# Patient Record
Sex: FEMALE | Race: BLACK OR AFRICAN AMERICAN | Employment: OTHER | URBAN - METROPOLITAN AREA
[De-identification: names, ages, dates, MRNs, and addresses within clinical notes are randomized per-mention and may not be internally consistent; named-entity substitution may affect disease eponyms.]

---

## 2018-10-22 ENCOUNTER — HOSPITAL ENCOUNTER (OUTPATIENT)
Dept: MAMMOGRAPHY | Age: 68
Discharge: HOME | End: 2018-10-22
Payer: MEDICARE

## 2018-10-22 DIAGNOSIS — Z12.31 VISIT FOR SCREENING MAMMOGRAM: ICD-10-CM

## 2018-10-22 PROCEDURE — 77067 SCR MAMMO BI INCL CAD: CPT

## 2019-09-04 ENCOUNTER — OFFICE VISIT (OUTPATIENT)
Dept: FAMILY MEDICINE CLINIC | Age: 69
End: 2019-09-04
Payer: MEDICARE

## 2019-09-04 VITALS
WEIGHT: 255.4 LBS | HEART RATE: 87 BPM | TEMPERATURE: 98.1 F | HEIGHT: 69 IN | OXYGEN SATURATION: 98 % | SYSTOLIC BLOOD PRESSURE: 138 MMHG | BODY MASS INDEX: 37.83 KG/M2 | DIASTOLIC BLOOD PRESSURE: 68 MMHG

## 2019-09-04 DIAGNOSIS — I10 ESSENTIAL HYPERTENSION: ICD-10-CM

## 2019-09-04 DIAGNOSIS — Z12.11 SCREENING FOR COLON CANCER: ICD-10-CM

## 2019-09-04 DIAGNOSIS — J42 CHRONIC BRONCHITIS, UNSPECIFIED CHRONIC BRONCHITIS TYPE (HCC): ICD-10-CM

## 2019-09-04 DIAGNOSIS — J01.90 ACUTE NON-RECURRENT SINUSITIS, UNSPECIFIED LOCATION: ICD-10-CM

## 2019-09-04 DIAGNOSIS — Z76.0 MEDICATION REFILL: Primary | ICD-10-CM

## 2019-09-04 DIAGNOSIS — Z76.89 ENCOUNTER TO ESTABLISH CARE: ICD-10-CM

## 2019-09-04 PROCEDURE — 3017F COLORECTAL CA SCREEN DOC REV: CPT | Performed by: NURSE PRACTITIONER

## 2019-09-04 PROCEDURE — 1090F PRES/ABSN URINE INCON ASSESS: CPT | Performed by: NURSE PRACTITIONER

## 2019-09-04 PROCEDURE — G8926 SPIRO NO PERF OR DOC: HCPCS | Performed by: NURSE PRACTITIONER

## 2019-09-04 PROCEDURE — 4004F PT TOBACCO SCREEN RCVD TLK: CPT | Performed by: NURSE PRACTITIONER

## 2019-09-04 PROCEDURE — 3023F SPIROM DOC REV: CPT | Performed by: NURSE PRACTITIONER

## 2019-09-04 PROCEDURE — 99203 OFFICE O/P NEW LOW 30 MIN: CPT | Performed by: NURSE PRACTITIONER

## 2019-09-04 PROCEDURE — G8427 DOCREV CUR MEDS BY ELIG CLIN: HCPCS | Performed by: NURSE PRACTITIONER

## 2019-09-04 PROCEDURE — 4040F PNEUMOC VAC/ADMIN/RCVD: CPT | Performed by: NURSE PRACTITIONER

## 2019-09-04 PROCEDURE — 1123F ACP DISCUSS/DSCN MKR DOCD: CPT | Performed by: NURSE PRACTITIONER

## 2019-09-04 PROCEDURE — G8417 CALC BMI ABV UP PARAM F/U: HCPCS | Performed by: NURSE PRACTITIONER

## 2019-09-04 PROCEDURE — G8400 PT W/DXA NO RESULTS DOC: HCPCS | Performed by: NURSE PRACTITIONER

## 2019-09-04 RX ORDER — IPRATROPIUM BROMIDE AND ALBUTEROL SULFATE 2.5; .5 MG/3ML; MG/3ML
1 SOLUTION RESPIRATORY (INHALATION) 4 TIMES DAILY
Qty: 120 VIAL | Refills: 1 | Status: SHIPPED | OUTPATIENT
Start: 2019-09-04 | End: 2021-06-21 | Stop reason: SDUPTHER

## 2019-09-04 RX ORDER — ALBUTEROL SULFATE 90 UG/1
2 AEROSOL, METERED RESPIRATORY (INHALATION) EVERY 4 HOURS PRN
Qty: 1 INHALER | Refills: 2 | Status: SHIPPED | OUTPATIENT
Start: 2019-09-04 | End: 2020-10-19 | Stop reason: SDUPTHER

## 2019-09-04 RX ORDER — MONTELUKAST SODIUM 10 MG/1
10 TABLET ORAL NIGHTLY
Qty: 90 TABLET | Refills: 2 | Status: SHIPPED | OUTPATIENT
Start: 2019-09-04 | End: 2020-01-03 | Stop reason: SDUPTHER

## 2019-09-04 RX ORDER — CETIRIZINE HYDROCHLORIDE 10 MG/1
10 TABLET ORAL DAILY
Qty: 90 TABLET | Refills: 2 | Status: SHIPPED | OUTPATIENT
Start: 2019-09-04 | End: 2020-09-11

## 2019-09-04 RX ORDER — AZELASTINE 1 MG/ML
2 SPRAY, METERED NASAL 2 TIMES DAILY
Qty: 1 BOTTLE | Refills: 3 | Status: SHIPPED | OUTPATIENT
Start: 2019-09-04 | End: 2019-11-06 | Stop reason: SINTOL

## 2019-09-04 RX ORDER — CETIRIZINE HYDROCHLORIDE 10 MG/1
10 TABLET ORAL DAILY
COMMUNITY
End: 2019-09-04 | Stop reason: SDUPTHER

## 2019-09-04 RX ORDER — SIMVASTATIN 40 MG
40 TABLET ORAL NIGHTLY
Qty: 90 TABLET | Refills: 2 | Status: SHIPPED | OUTPATIENT
Start: 2019-09-04 | End: 2020-07-02 | Stop reason: SDUPTHER

## 2019-09-04 RX ORDER — DIPHENHYDRAMINE HCL 25 MG
25 TABLET ORAL NIGHTLY PRN
Qty: 90 TABLET | Refills: 2 | Status: SHIPPED | OUTPATIENT
Start: 2019-09-04 | End: 2022-03-02

## 2019-09-04 RX ORDER — CYCLOBENZAPRINE HCL 5 MG
5 TABLET ORAL NIGHTLY PRN
Qty: 30 TABLET | Refills: 2 | Status: SHIPPED | OUTPATIENT
Start: 2019-09-04 | End: 2020-01-20

## 2019-09-04 RX ORDER — IBUPROFEN 800 MG/1
800 TABLET ORAL EVERY 12 HOURS PRN
Qty: 120 TABLET | Refills: 2 | Status: SHIPPED | OUTPATIENT
Start: 2019-09-04 | End: 2020-02-19

## 2019-09-04 RX ORDER — HYDROCHLOROTHIAZIDE 50 MG/1
50 TABLET ORAL DAILY
Qty: 90 TABLET | Refills: 4 | Status: SHIPPED | OUTPATIENT
Start: 2019-09-04 | End: 2019-09-25 | Stop reason: ALTCHOICE

## 2019-09-04 RX ORDER — MONTELUKAST SODIUM 10 MG/1
10 TABLET ORAL NIGHTLY
COMMUNITY
End: 2019-09-04 | Stop reason: SDUPTHER

## 2019-09-04 RX ORDER — AMOXICILLIN AND CLAVULANATE POTASSIUM 875; 125 MG/1; MG/1
1 TABLET, FILM COATED ORAL 2 TIMES DAILY
Qty: 20 TABLET | Refills: 0 | Status: SHIPPED | OUTPATIENT
Start: 2019-09-04 | End: 2019-09-14

## 2019-09-04 ASSESSMENT — PATIENT HEALTH QUESTIONNAIRE - PHQ9
2. FEELING DOWN, DEPRESSED OR HOPELESS: 0
SUM OF ALL RESPONSES TO PHQ9 QUESTIONS 1 & 2: 0
SUM OF ALL RESPONSES TO PHQ QUESTIONS 1-9: 0
1. LITTLE INTEREST OR PLEASURE IN DOING THINGS: 0
SUM OF ALL RESPONSES TO PHQ QUESTIONS 1-9: 0

## 2019-09-04 ASSESSMENT — ENCOUNTER SYMPTOMS
CONSTIPATION: 0
SORE THROAT: 0
NAUSEA: 0
RHINORRHEA: 1
WHEEZING: 0
SINUS PRESSURE: 1
TROUBLE SWALLOWING: 0
SINUS PAIN: 1
SHORTNESS OF BREATH: 0
CHEST TIGHTNESS: 0
FACIAL SWELLING: 1
COUGH: 0
DIARRHEA: 0

## 2019-09-04 NOTE — PROGRESS NOTES
Lifestyle    Physical activity:     Days per week: Not on file     Minutes per session: Not on file    Stress: Not on file   Relationships    Social connections:     Talks on phone: Not on file     Gets together: Not on file     Attends Synagogue service: Not on file     Active member of club or organization: Not on file     Attends meetings of clubs or organizations: Not on file     Relationship status: Not on file    Intimate partner violence:     Fear of current or ex partner: Not on file     Emotionally abused: Not on file     Physically abused: Not on file     Forced sexual activity: Not on file   Other Topics Concern    Not on file   Social History Narrative    Working now at [employer]    [employer] jobs from [address] [place] [address]   Constitutional: Negative for activity change, appetite change, chills, diaphoresis, fatigue, fever and unexpected weight change. HENT: Positive for facial swelling, postnasal drip, rhinorrhea, sinus pressure and sinus pain. Negative for ear pain, sore throat and trouble swallowing. Respiratory: Negative for cough, chest tightness, shortness of breath and wheezing. Cardiovascular: Negative for chest pain, palpitations and leg swelling. Gastrointestinal: Negative for constipation, diarrhea and nausea. Neurological: Negative for dizziness, weakness, light-headedness and headaches. Psychiatric/Behavioral: Negative. OBJECTIVE:     /68 (Site: Right Upper Arm, Position: Sitting, Cuff Size: Large Adult)   Pulse 87   Temp 98.1 °F (36.7 °C)   Ht 5' 9\" (1.753 m)   Wt 255 lb 6.4 oz (115.8 kg)   SpO2 98%   BMI 37.72 kg/m²     Physical Exam   Constitutional: She is oriented to person, place, and time. She appears well-developed and well-nourished. No distress. HENT:   Head: Normocephalic and atraumatic.    Right Ear: Tympanic membrane, external ear and ear canal normal.   Left Ear: Tympanic membrane, external ear and ear canal

## 2019-09-25 ENCOUNTER — TELEPHONE (OUTPATIENT)
Dept: FAMILY MEDICINE CLINIC | Age: 69
End: 2019-09-25

## 2019-09-25 ENCOUNTER — NURSE ONLY (OUTPATIENT)
Dept: FAMILY MEDICINE CLINIC | Age: 69
End: 2019-09-25
Payer: MEDICARE

## 2019-09-25 DIAGNOSIS — I10 ESSENTIAL HYPERTENSION: ICD-10-CM

## 2019-09-25 DIAGNOSIS — I10 ESSENTIAL HYPERTENSION: Primary | ICD-10-CM

## 2019-09-25 LAB
A/G RATIO: 1.4 (ref 1.1–2.2)
ALBUMIN SERPL-MCNC: 4 G/DL (ref 3.4–5)
ALP BLD-CCNC: 79 U/L (ref 40–129)
ALT SERPL-CCNC: 6 U/L (ref 10–40)
ANION GAP SERPL CALCULATED.3IONS-SCNC: 15 MMOL/L (ref 3–16)
AST SERPL-CCNC: 12 U/L (ref 15–37)
BASOPHILS ABSOLUTE: 0 K/UL (ref 0–0.2)
BASOPHILS RELATIVE PERCENT: 0.5 %
BILIRUB SERPL-MCNC: 0.4 MG/DL (ref 0–1)
BUN BLDV-MCNC: 9 MG/DL (ref 7–20)
CALCIUM SERPL-MCNC: 9.5 MG/DL (ref 8.3–10.6)
CHLORIDE BLD-SCNC: 91 MMOL/L (ref 99–110)
CHOLESTEROL, TOTAL: 119 MG/DL (ref 0–199)
CO2: 25 MMOL/L (ref 21–32)
CONTROL: NORMAL
CREAT SERPL-MCNC: 0.8 MG/DL (ref 0.6–1.2)
EOSINOPHILS ABSOLUTE: 0.3 K/UL (ref 0–0.6)
EOSINOPHILS RELATIVE PERCENT: 4.8 %
GFR AFRICAN AMERICAN: >60
GFR NON-AFRICAN AMERICAN: >60
GLOBULIN: 2.9 G/DL
GLUCOSE BLD-MCNC: 98 MG/DL (ref 70–99)
HCT VFR BLD CALC: 41.7 % (ref 36–48)
HDLC SERPL-MCNC: 52 MG/DL (ref 40–60)
HEMOCCULT STL QL: NORMAL
HEMOGLOBIN: 14.1 G/DL (ref 12–16)
LDL CHOLESTEROL CALCULATED: 47 MG/DL
LYMPHOCYTES ABSOLUTE: 2.4 K/UL (ref 1–5.1)
LYMPHOCYTES RELATIVE PERCENT: 37.8 %
MCH RBC QN AUTO: 27.9 PG (ref 26–34)
MCHC RBC AUTO-ENTMCNC: 33.8 G/DL (ref 31–36)
MCV RBC AUTO: 82.7 FL (ref 80–100)
MONOCYTES ABSOLUTE: 0.5 K/UL (ref 0–1.3)
MONOCYTES RELATIVE PERCENT: 7.9 %
NEUTROPHILS ABSOLUTE: 3.1 K/UL (ref 1.7–7.7)
NEUTROPHILS RELATIVE PERCENT: 49 %
PDW BLD-RTO: 14.4 % (ref 12.4–15.4)
PLATELET # BLD: 223 K/UL (ref 135–450)
PMV BLD AUTO: 8.5 FL (ref 5–10.5)
POTASSIUM SERPL-SCNC: 3.7 MMOL/L (ref 3.5–5.1)
RBC # BLD: 5.04 M/UL (ref 4–5.2)
SODIUM BLD-SCNC: 131 MMOL/L (ref 136–145)
TOTAL PROTEIN: 6.9 G/DL (ref 6.4–8.2)
TRIGL SERPL-MCNC: 98 MG/DL (ref 0–150)
TSH SERPL DL<=0.05 MIU/L-ACNC: 3.16 UIU/ML (ref 0.27–4.2)
VLDLC SERPL CALC-MCNC: 20 MG/DL
WBC # BLD: 6.4 K/UL (ref 4–11)

## 2019-09-25 PROCEDURE — 36415 COLL VENOUS BLD VENIPUNCTURE: CPT | Performed by: NURSE PRACTITIONER

## 2019-09-25 PROCEDURE — 82274 ASSAY TEST FOR BLOOD FECAL: CPT | Performed by: NURSE PRACTITIONER

## 2019-09-25 RX ORDER — LISINOPRIL AND HYDROCHLOROTHIAZIDE 20; 12.5 MG/1; MG/1
1 TABLET ORAL DAILY
Qty: 90 TABLET | Refills: 0 | Status: SHIPPED | OUTPATIENT
Start: 2019-09-25 | End: 2019-12-31

## 2019-09-30 ENCOUNTER — TELEPHONE (OUTPATIENT)
Dept: FAMILY MEDICINE CLINIC | Age: 69
End: 2019-09-30

## 2019-10-04 ENCOUNTER — INITIAL CONSULT (OUTPATIENT)
Dept: PULMONOLOGY | Age: 69
End: 2019-10-04
Payer: MEDICARE

## 2019-10-04 VITALS
WEIGHT: 256 LBS | DIASTOLIC BLOOD PRESSURE: 62 MMHG | HEIGHT: 70 IN | OXYGEN SATURATION: 96 % | SYSTOLIC BLOOD PRESSURE: 112 MMHG | HEART RATE: 78 BPM | BODY MASS INDEX: 36.65 KG/M2

## 2019-10-04 DIAGNOSIS — R05.3 CHRONIC COUGH: ICD-10-CM

## 2019-10-04 DIAGNOSIS — J45.30 MILD PERSISTENT ASTHMA WITHOUT COMPLICATION: ICD-10-CM

## 2019-10-04 DIAGNOSIS — G47.33 OBSTRUCTIVE SLEEP APNEA: ICD-10-CM

## 2019-10-04 DIAGNOSIS — Z72.0 TOBACCO ABUSE: ICD-10-CM

## 2019-10-04 DIAGNOSIS — J30.1 NON-SEASONAL ALLERGIC RHINITIS DUE TO POLLEN: Primary | ICD-10-CM

## 2019-10-04 LAB
EXPIRATORY TIME-POST: NORMAL SEC
EXPIRATORY TIME: NORMAL SEC
FEF 25-75% %CHNG: NORMAL
FEF 25-75% %PRED-POST: NORMAL %
FEF 25-75% %PRED-PRE: NORMAL L/SEC
FEF 25-75% PRED: NORMAL L/SEC
FEF 25-75%-POST: NORMAL L/SEC
FEF 25-75%-PRE: NORMAL L/SEC
FEV1 %PRED-POST: 53 %
FEV1 %PRED-PRE: 53 %
FEV1 PRED: 2.83 L
FEV1-POST: 1.5 L
FEV1-PRE: 1.49 L
FEV1/FVC %PRED-POST: 109 %
FEV1/FVC %PRED-PRE: 103 %
FEV1/FVC PRED: 75.9 %
FEV1/FVC-POST: 82.8 %
FEV1/FVC-PRE: 78 %
FVC %PRED-POST: 49 L
FVC %PRED-PRE: 51 %
FVC PRED: 3.72 L
FVC-POST: 1.81 L
FVC-PRE: 1.92 L
PEF %PRED-POST: NORMAL %
PEF %PRED-PRE: NORMAL L/SEC
PEF PRED: NORMAL L/SEC
PEF%CHNG: NORMAL
PEF-POST: NORMAL L/SEC
PEF-PRE: NORMAL L/SEC

## 2019-10-04 PROCEDURE — 4040F PNEUMOC VAC/ADMIN/RCVD: CPT | Performed by: INTERNAL MEDICINE

## 2019-10-04 PROCEDURE — 1036F TOBACCO NON-USER: CPT | Performed by: INTERNAL MEDICINE

## 2019-10-04 PROCEDURE — 99204 OFFICE O/P NEW MOD 45 MIN: CPT | Performed by: INTERNAL MEDICINE

## 2019-10-04 PROCEDURE — G8417 CALC BMI ABV UP PARAM F/U: HCPCS | Performed by: INTERNAL MEDICINE

## 2019-10-04 PROCEDURE — 1123F ACP DISCUSS/DSCN MKR DOCD: CPT | Performed by: INTERNAL MEDICINE

## 2019-10-04 PROCEDURE — 94060 EVALUATION OF WHEEZING: CPT | Performed by: INTERNAL MEDICINE

## 2019-10-04 PROCEDURE — G8400 PT W/DXA NO RESULTS DOC: HCPCS | Performed by: INTERNAL MEDICINE

## 2019-10-04 PROCEDURE — 3017F COLORECTAL CA SCREEN DOC REV: CPT | Performed by: INTERNAL MEDICINE

## 2019-10-04 PROCEDURE — 1090F PRES/ABSN URINE INCON ASSESS: CPT | Performed by: INTERNAL MEDICINE

## 2019-10-04 PROCEDURE — G8484 FLU IMMUNIZE NO ADMIN: HCPCS | Performed by: INTERNAL MEDICINE

## 2019-10-04 PROCEDURE — G8427 DOCREV CUR MEDS BY ELIG CLIN: HCPCS | Performed by: INTERNAL MEDICINE

## 2019-10-04 RX ORDER — PREDNISONE 1 MG/1
TABLET ORAL
Qty: 38 TABLET | Refills: 0 | Status: SHIPPED | OUTPATIENT
Start: 2019-10-04 | End: 2019-10-25

## 2019-10-04 ASSESSMENT — PULMONARY FUNCTION TESTS
FVC_PERCENT_PREDICTED_PRE: 51
FEV1_PERCENT_PREDICTED_PRE: 53
FEV1_PREDICTED: 2.83
FEV1/FVC_PREDICTED: 75.9
FVC_PREDICTED: 3.72
FEV1/FVC_PERCENT_PREDICTED_POST: 109
FEV1_PRE: 1.49
FEV1/FVC_PERCENT_PREDICTED_PRE: 103
FEV1/FVC_POST: 82.8
FVC_POST: 1.81
FEV1/FVC_PRE: 78.0
FEV1_PERCENT_PREDICTED_POST: 53
FVC_PRE: 1.92
FVC_PERCENT_PREDICTED_POST: 49
FEV1_POST: 1.50

## 2019-10-08 ENCOUNTER — TELEPHONE (OUTPATIENT)
Dept: FAMILY MEDICINE CLINIC | Age: 69
End: 2019-10-08

## 2019-10-08 DIAGNOSIS — J01.00 ACUTE MAXILLARY SINUSITIS, RECURRENCE NOT SPECIFIED: Primary | ICD-10-CM

## 2019-10-08 DIAGNOSIS — J45.30 MILD PERSISTENT ASTHMA WITHOUT COMPLICATION: ICD-10-CM

## 2019-10-08 RX ORDER — AMOXICILLIN AND CLAVULANATE POTASSIUM 875; 125 MG/1; MG/1
1 TABLET, FILM COATED ORAL 2 TIMES DAILY
Qty: 20 TABLET | Refills: 0 | Status: SHIPPED | OUTPATIENT
Start: 2019-10-08 | End: 2019-10-18

## 2019-10-24 ENCOUNTER — APPOINTMENT (OUTPATIENT)
Dept: GENERAL RADIOLOGY | Age: 69
End: 2019-10-24
Payer: MEDICARE

## 2019-10-24 ENCOUNTER — HOSPITAL ENCOUNTER (OUTPATIENT)
Age: 69
Setting detail: OBSERVATION
Discharge: HOME | End: 2019-10-26
Attending: EMERGENCY MEDICINE | Admitting: INTERNAL MEDICINE
Payer: MEDICARE

## 2019-10-24 ENCOUNTER — APPOINTMENT (OUTPATIENT)
Dept: CT IMAGING | Age: 69
End: 2019-10-24
Payer: MEDICARE

## 2019-10-24 DIAGNOSIS — R42 DIZZINESS: Primary | ICD-10-CM

## 2019-10-24 LAB
BASOPHILS ABSOLUTE: 0 K/CU MM
BASOPHILS RELATIVE PERCENT: 0.2 % (ref 0–1)
DIFFERENTIAL TYPE: ABNORMAL
EOSINOPHILS ABSOLUTE: 0.3 K/CU MM
EOSINOPHILS RELATIVE PERCENT: 3.8 % (ref 0–3)
HCT VFR BLD CALC: 43 % (ref 37–47)
HEMOGLOBIN: 13.7 GM/DL (ref 12.5–16)
IMMATURE NEUTROPHIL %: 0.5 % (ref 0–0.43)
LYMPHOCYTES ABSOLUTE: 2.7 K/CU MM
LYMPHOCYTES RELATIVE PERCENT: 32.9 % (ref 24–44)
MCH RBC QN AUTO: 27.3 PG (ref 27–31)
MCHC RBC AUTO-ENTMCNC: 31.9 % (ref 32–36)
MCV RBC AUTO: 85.7 FL (ref 78–100)
MONOCYTES ABSOLUTE: 0.8 K/CU MM
MONOCYTES RELATIVE PERCENT: 9 % (ref 0–4)
NUCLEATED RBC %: 0 %
PDW BLD-RTO: 14.6 % (ref 11.7–14.9)
PLATELET # BLD: 237 K/CU MM (ref 140–440)
PMV BLD AUTO: 9.4 FL (ref 7.5–11.1)
RBC # BLD: 5.02 M/CU MM (ref 4.2–5.4)
SEGMENTED NEUTROPHILS ABSOLUTE COUNT: 4.5 K/CU MM
SEGMENTED NEUTROPHILS RELATIVE PERCENT: 53.6 % (ref 36–66)
TOTAL IMMATURE NEUTOROPHIL: 0.04 K/CU MM
TOTAL NUCLEATED RBC: 0 K/CU MM
WBC # BLD: 8.3 K/CU MM (ref 4–10.5)

## 2019-10-24 PROCEDURE — 84484 ASSAY OF TROPONIN QUANT: CPT

## 2019-10-24 PROCEDURE — 85025 COMPLETE CBC W/AUTO DIFF WBC: CPT

## 2019-10-24 PROCEDURE — 93005 ELECTROCARDIOGRAM TRACING: CPT | Performed by: EMERGENCY MEDICINE

## 2019-10-24 PROCEDURE — 99285 EMERGENCY DEPT VISIT HI MDM: CPT

## 2019-10-24 PROCEDURE — 70450 CT HEAD/BRAIN W/O DYE: CPT

## 2019-10-24 PROCEDURE — 80053 COMPREHEN METABOLIC PANEL: CPT

## 2019-10-24 PROCEDURE — 71046 X-RAY EXAM CHEST 2 VIEWS: CPT

## 2019-10-24 ASSESSMENT — ENCOUNTER SYMPTOMS
SORE THROAT: 1
WHEEZING: 0
CONSTIPATION: 0
NAUSEA: 0
COUGH: 1
SHORTNESS OF BREATH: 0
DIARRHEA: 0
RHINORRHEA: 1
VOMITING: 0
SINUS PRESSURE: 1
ABDOMINAL PAIN: 0

## 2019-10-24 ASSESSMENT — PAIN DESCRIPTION - DESCRIPTORS: DESCRIPTORS: THROBBING

## 2019-10-24 ASSESSMENT — PAIN DESCRIPTION - PAIN TYPE: TYPE: ACUTE PAIN

## 2019-10-24 ASSESSMENT — PAIN DESCRIPTION - LOCATION: LOCATION: HEAD

## 2019-10-24 ASSESSMENT — PAIN SCALES - GENERAL: PAINLEVEL_OUTOF10: 5

## 2019-10-25 PROBLEM — R42 DIZZINESS: Status: ACTIVE | Noted: 2019-10-25

## 2019-10-25 LAB
ALBUMIN SERPL-MCNC: 3.8 GM/DL (ref 3.4–5)
ALP BLD-CCNC: 78 IU/L (ref 40–129)
ALT SERPL-CCNC: <5 U/L (ref 10–40)
ANION GAP SERPL CALCULATED.3IONS-SCNC: 9 MMOL/L (ref 4–16)
AST SERPL-CCNC: 13 IU/L (ref 15–37)
BILIRUB SERPL-MCNC: 0.4 MG/DL (ref 0–1)
BUN BLDV-MCNC: 10 MG/DL (ref 6–23)
CALCIUM SERPL-MCNC: 9.2 MG/DL (ref 8.3–10.6)
CHLORIDE BLD-SCNC: 95 MMOL/L (ref 99–110)
CO2: 27 MMOL/L (ref 21–32)
CREAT SERPL-MCNC: 1 MG/DL (ref 0.6–1.1)
GFR AFRICAN AMERICAN: >60 ML/MIN/1.73M2
GFR NON-AFRICAN AMERICAN: 55 ML/MIN/1.73M2
GLUCOSE BLD-MCNC: 104 MG/DL (ref 70–99)
POTASSIUM SERPL-SCNC: 3.5 MMOL/L (ref 3.5–5.1)
SODIUM BLD-SCNC: 131 MMOL/L (ref 135–145)
TOTAL PROTEIN: 7.6 GM/DL (ref 6.4–8.2)
TROPONIN T: <0.01 NG/ML

## 2019-10-25 PROCEDURE — 6370000000 HC RX 637 (ALT 250 FOR IP): Performed by: EMERGENCY MEDICINE

## 2019-10-25 PROCEDURE — G0378 HOSPITAL OBSERVATION PER HR: HCPCS

## 2019-10-25 PROCEDURE — 6370000000 HC RX 637 (ALT 250 FOR IP): Performed by: INTERNAL MEDICINE

## 2019-10-25 PROCEDURE — 6370000000 HC RX 637 (ALT 250 FOR IP): Performed by: NURSE PRACTITIONER

## 2019-10-25 PROCEDURE — 93010 ELECTROCARDIOGRAM REPORT: CPT | Performed by: INTERNAL MEDICINE

## 2019-10-25 PROCEDURE — 94640 AIRWAY INHALATION TREATMENT: CPT

## 2019-10-25 PROCEDURE — 94761 N-INVAS EAR/PLS OXIMETRY MLT: CPT

## 2019-10-25 RX ORDER — ASPIRIN 81 MG/1
81 TABLET, CHEWABLE ORAL DAILY
Status: DISCONTINUED | OUTPATIENT
Start: 2019-10-25 | End: 2019-10-26 | Stop reason: HOSPADM

## 2019-10-25 RX ORDER — LORATADINE AND PSEUDOEPHEDRINE 10; 240 MG/1; MG/1
1 TABLET, EXTENDED RELEASE ORAL DAILY
Status: DISCONTINUED | OUTPATIENT
Start: 2019-10-25 | End: 2019-10-25 | Stop reason: CLARIF

## 2019-10-25 RX ORDER — LISINOPRIL 20 MG/1
20 TABLET ORAL DAILY
Status: DISCONTINUED | OUTPATIENT
Start: 2019-10-25 | End: 2019-10-26 | Stop reason: HOSPADM

## 2019-10-25 RX ORDER — CETIRIZINE HYDROCHLORIDE 10 MG/1
10 TABLET ORAL DAILY
Status: DISCONTINUED | OUTPATIENT
Start: 2019-10-25 | End: 2019-10-26 | Stop reason: HOSPADM

## 2019-10-25 RX ORDER — IBUPROFEN 600 MG/1
600 TABLET ORAL EVERY 12 HOURS PRN
Status: DISCONTINUED | OUTPATIENT
Start: 2019-10-25 | End: 2019-10-26 | Stop reason: HOSPADM

## 2019-10-25 RX ORDER — MONTELUKAST SODIUM 10 MG/1
10 TABLET ORAL NIGHTLY
Status: DISCONTINUED | OUTPATIENT
Start: 2019-10-25 | End: 2019-10-26 | Stop reason: HOSPADM

## 2019-10-25 RX ORDER — GUAIFENESIN/DEXTROMETHORPHAN 100-10MG/5
5 SYRUP ORAL EVERY 4 HOURS PRN
Status: DISCONTINUED | OUTPATIENT
Start: 2019-10-25 | End: 2019-10-26 | Stop reason: HOSPADM

## 2019-10-25 RX ORDER — FLUTICASONE PROPIONATE 50 MCG
1 SPRAY, SUSPENSION (ML) NASAL DAILY
Status: DISCONTINUED | OUTPATIENT
Start: 2019-10-25 | End: 2019-10-26 | Stop reason: HOSPADM

## 2019-10-25 RX ORDER — CETIRIZINE HYDROCHLORIDE 10 MG/1
10 TABLET ORAL DAILY
Status: DISCONTINUED | OUTPATIENT
Start: 2019-10-25 | End: 2019-10-25

## 2019-10-25 RX ORDER — DIPHENHYDRAMINE HCL 25 MG
25 TABLET ORAL NIGHTLY PRN
Status: DISCONTINUED | OUTPATIENT
Start: 2019-10-25 | End: 2019-10-26 | Stop reason: HOSPADM

## 2019-10-25 RX ORDER — PSEUDOEPHEDRINE HCL 120 MG/1
120 TABLET, FILM COATED, EXTENDED RELEASE ORAL EVERY 12 HOURS SCHEDULED
Status: DISCONTINUED | OUTPATIENT
Start: 2019-10-25 | End: 2019-10-26 | Stop reason: HOSPADM

## 2019-10-25 RX ORDER — MECLIZINE HYDROCHLORIDE 25 MG/1
25 TABLET ORAL ONCE
Status: COMPLETED | OUTPATIENT
Start: 2019-10-25 | End: 2019-10-25

## 2019-10-25 RX ORDER — ALBUTEROL SULFATE 2.5 MG/3ML
2.5 SOLUTION RESPIRATORY (INHALATION) EVERY 6 HOURS PRN
Status: DISCONTINUED | OUTPATIENT
Start: 2019-10-25 | End: 2019-10-26 | Stop reason: HOSPADM

## 2019-10-25 RX ORDER — GLYCOPYRROLATE 1 MG/1
1 TABLET ORAL ONCE
Status: COMPLETED | OUTPATIENT
Start: 2019-10-25 | End: 2019-10-25

## 2019-10-25 RX ORDER — MECLIZINE HCL 12.5 MG/1
25 TABLET ORAL 3 TIMES DAILY PRN
Status: DISCONTINUED | OUTPATIENT
Start: 2019-10-25 | End: 2019-10-26 | Stop reason: HOSPADM

## 2019-10-25 RX ORDER — SIMVASTATIN 40 MG
40 TABLET ORAL NIGHTLY
Status: DISCONTINUED | OUTPATIENT
Start: 2019-10-25 | End: 2019-10-26 | Stop reason: HOSPADM

## 2019-10-25 RX ORDER — BENZONATATE 100 MG/1
100 CAPSULE ORAL 3 TIMES DAILY
Status: DISCONTINUED | OUTPATIENT
Start: 2019-10-25 | End: 2019-10-26 | Stop reason: HOSPADM

## 2019-10-25 RX ORDER — ALBUTEROL SULFATE 90 UG/1
2 AEROSOL, METERED RESPIRATORY (INHALATION) EVERY 4 HOURS PRN
Status: DISCONTINUED | OUTPATIENT
Start: 2019-10-25 | End: 2019-10-26 | Stop reason: HOSPADM

## 2019-10-25 RX ADMIN — MECLIZINE 25 MG: 12.5 TABLET ORAL at 20:14

## 2019-10-25 RX ADMIN — LISINOPRIL 20 MG: 20 TABLET ORAL at 09:34

## 2019-10-25 RX ADMIN — GUAIFENESIN AND DEXTROMETHORPHAN 5 ML: 100; 10 SYRUP ORAL at 20:14

## 2019-10-25 RX ADMIN — ASPIRIN 81 MG 81 MG: 81 TABLET ORAL at 09:34

## 2019-10-25 RX ADMIN — SIMVASTATIN 40 MG: 40 TABLET, FILM COATED ORAL at 20:10

## 2019-10-25 RX ADMIN — PSEUDOEPHEDRINE HYDROCHLORIDE 120 MG: 120 TABLET, FILM COATED, EXTENDED RELEASE ORAL at 21:50

## 2019-10-25 RX ADMIN — MECLIZINE HYDROCHLORIDE 25 MG: 25 TABLET ORAL at 01:11

## 2019-10-25 RX ADMIN — MONTELUKAST 10 MG: 10 TABLET, FILM COATED ORAL at 20:10

## 2019-10-25 RX ADMIN — GUAIFENESIN AND DEXTROMETHORPHAN 5 ML: 100; 10 SYRUP ORAL at 03:53

## 2019-10-25 RX ADMIN — BENZONATATE 100 MG: 100 CAPSULE ORAL at 20:10

## 2019-10-25 RX ADMIN — BENZONATATE 100 MG: 100 CAPSULE ORAL at 03:53

## 2019-10-25 RX ADMIN — GLYCOPYRROLATE 1 MG: 1 TABLET ORAL at 03:53

## 2019-10-25 RX ADMIN — GUAIFENESIN AND DEXTROMETHORPHAN HYDROBROMIDE 1 TABLET: 600; 30 TABLET, EXTENDED RELEASE ORAL at 20:10

## 2019-10-25 RX ADMIN — FLUTICASONE PROPIONATE 1 SPRAY: 50 SPRAY, METERED NASAL at 09:34

## 2019-10-25 RX ADMIN — BENZONATATE 100 MG: 100 CAPSULE ORAL at 13:28

## 2019-10-25 RX ADMIN — BENZONATATE 100 MG: 100 CAPSULE ORAL at 09:34

## 2019-10-25 RX ADMIN — CETIRIZINE HYDROCHLORIDE 10 MG: 10 TABLET, FILM COATED ORAL at 09:34

## 2019-10-25 RX ADMIN — MECLIZINE 25 MG: 12.5 TABLET ORAL at 10:57

## 2019-10-25 RX ADMIN — GUAIFENESIN AND DEXTROMETHORPHAN HYDROBROMIDE 1 TABLET: 600; 30 TABLET, EXTENDED RELEASE ORAL at 09:34

## 2019-10-25 RX ADMIN — GUAIFENESIN AND DEXTROMETHORPHAN 5 ML: 100; 10 SYRUP ORAL at 09:39

## 2019-10-25 RX ADMIN — Medication 2 PUFF: at 08:35

## 2019-10-25 RX ADMIN — PSEUDOEPHEDRINE HYDROCHLORIDE 120 MG: 120 TABLET, FILM COATED, EXTENDED RELEASE ORAL at 09:37

## 2019-10-25 RX ADMIN — GLYCOPYRROLATE 1 MG: 1 TABLET ORAL at 10:57

## 2019-10-25 ASSESSMENT — PAIN DESCRIPTION - PAIN TYPE: TYPE: ACUTE PAIN

## 2019-10-25 ASSESSMENT — PAIN DESCRIPTION - LOCATION: LOCATION: HEAD

## 2019-10-25 ASSESSMENT — PAIN SCALES - GENERAL
PAINLEVEL_OUTOF10: 4
PAINLEVEL_OUTOF10: 0

## 2019-10-25 ASSESSMENT — PAIN DESCRIPTION - DESCRIPTORS: DESCRIPTORS: HEADACHE

## 2019-10-26 VITALS
OXYGEN SATURATION: 98 % | WEIGHT: 272 LBS | RESPIRATION RATE: 16 BRPM | BODY MASS INDEX: 40.29 KG/M2 | DIASTOLIC BLOOD PRESSURE: 60 MMHG | TEMPERATURE: 97.8 F | HEIGHT: 69 IN | HEART RATE: 82 BPM | SYSTOLIC BLOOD PRESSURE: 149 MMHG

## 2019-10-26 LAB
ANION GAP SERPL CALCULATED.3IONS-SCNC: 11 MMOL/L (ref 4–16)
BUN BLDV-MCNC: 12 MG/DL (ref 6–23)
CALCIUM SERPL-MCNC: 8.9 MG/DL (ref 8.3–10.6)
CHLORIDE BLD-SCNC: 95 MMOL/L (ref 99–110)
CO2: 24 MMOL/L (ref 21–32)
CREAT SERPL-MCNC: 0.9 MG/DL (ref 0.6–1.1)
GFR AFRICAN AMERICAN: >60 ML/MIN/1.73M2
GFR NON-AFRICAN AMERICAN: >60 ML/MIN/1.73M2
GLUCOSE BLD-MCNC: 98 MG/DL (ref 70–99)
POTASSIUM SERPL-SCNC: 3.9 MMOL/L (ref 3.5–5.1)
SODIUM BLD-SCNC: 130 MMOL/L (ref 135–145)

## 2019-10-26 PROCEDURE — 6370000000 HC RX 637 (ALT 250 FOR IP): Performed by: INTERNAL MEDICINE

## 2019-10-26 PROCEDURE — G0378 HOSPITAL OBSERVATION PER HR: HCPCS

## 2019-10-26 PROCEDURE — 6370000000 HC RX 637 (ALT 250 FOR IP): Performed by: NURSE PRACTITIONER

## 2019-10-26 PROCEDURE — 80048 BASIC METABOLIC PNL TOTAL CA: CPT

## 2019-10-26 PROCEDURE — 36415 COLL VENOUS BLD VENIPUNCTURE: CPT

## 2019-10-26 RX ORDER — PSEUDOEPHEDRINE HCL 120 MG/1
120 TABLET, FILM COATED, EXTENDED RELEASE ORAL EVERY 12 HOURS
Qty: 14 TABLET | Refills: 0 | Status: SHIPPED | OUTPATIENT
Start: 2019-10-26 | End: 2019-11-02

## 2019-10-26 RX ORDER — MECLIZINE HYDROCHLORIDE 25 MG/1
25 TABLET ORAL 3 TIMES DAILY PRN
Qty: 90 TABLET | Refills: 0 | Status: SHIPPED | OUTPATIENT
Start: 2019-10-26 | End: 2019-11-25

## 2019-10-26 RX ORDER — FLUTICASONE PROPIONATE 50 MCG
1 SPRAY, SUSPENSION (ML) NASAL DAILY
Qty: 1 BOTTLE | Refills: 3 | Status: SHIPPED | OUTPATIENT
Start: 2019-10-26 | End: 2020-10-19 | Stop reason: SDUPTHER

## 2019-10-26 RX ADMIN — LISINOPRIL 20 MG: 20 TABLET ORAL at 11:13

## 2019-10-26 RX ADMIN — BENZONATATE 100 MG: 100 CAPSULE ORAL at 11:13

## 2019-10-26 RX ADMIN — ASPIRIN 81 MG 81 MG: 81 TABLET ORAL at 11:13

## 2019-10-26 RX ADMIN — FLUTICASONE PROPIONATE 1 SPRAY: 50 SPRAY, METERED NASAL at 11:17

## 2019-10-26 RX ADMIN — GUAIFENESIN AND DEXTROMETHORPHAN 5 ML: 100; 10 SYRUP ORAL at 11:19

## 2019-10-26 RX ADMIN — PSEUDOEPHEDRINE HYDROCHLORIDE 120 MG: 120 TABLET, FILM COATED, EXTENDED RELEASE ORAL at 11:14

## 2019-10-26 RX ADMIN — CETIRIZINE HYDROCHLORIDE 10 MG: 10 TABLET, FILM COATED ORAL at 11:13

## 2019-10-26 RX ADMIN — GUAIFENESIN AND DEXTROMETHORPHAN HYDROBROMIDE 1 TABLET: 600; 30 TABLET, EXTENDED RELEASE ORAL at 11:13

## 2019-10-29 LAB
EKG ATRIAL RATE: 88 BPM
EKG DIAGNOSIS: NORMAL
EKG P AXIS: 64 DEGREES
EKG P-R INTERVAL: 136 MS
EKG Q-T INTERVAL: 342 MS
EKG QRS DURATION: 74 MS
EKG QTC CALCULATION (BAZETT): 413 MS
EKG R AXIS: 53 DEGREES
EKG T AXIS: 52 DEGREES
EKG VENTRICULAR RATE: 88 BPM

## 2019-11-06 ENCOUNTER — OFFICE VISIT (OUTPATIENT)
Dept: FAMILY MEDICINE CLINIC | Age: 69
End: 2019-11-06
Payer: MEDICARE

## 2019-11-06 VITALS
TEMPERATURE: 98.1 F | HEART RATE: 83 BPM | DIASTOLIC BLOOD PRESSURE: 68 MMHG | WEIGHT: 268.2 LBS | BODY MASS INDEX: 38.39 KG/M2 | OXYGEN SATURATION: 95 % | SYSTOLIC BLOOD PRESSURE: 128 MMHG | HEIGHT: 70 IN

## 2019-11-06 DIAGNOSIS — R42 DIZZINESS: ICD-10-CM

## 2019-11-06 DIAGNOSIS — J32.9 CHRONIC SINUSITIS, UNSPECIFIED LOCATION: ICD-10-CM

## 2019-11-06 DIAGNOSIS — M79.18 LUMBAR MUSCLE PAIN: ICD-10-CM

## 2019-11-06 DIAGNOSIS — J45.30: Primary | ICD-10-CM

## 2019-11-06 DIAGNOSIS — E87.1 HYPONATREMIA: ICD-10-CM

## 2019-11-06 DIAGNOSIS — I10 ESSENTIAL HYPERTENSION: ICD-10-CM

## 2019-11-06 DIAGNOSIS — G47.30 SLEEP APNEA, UNSPECIFIED TYPE: ICD-10-CM

## 2019-11-06 LAB
ANION GAP SERPL CALCULATED.3IONS-SCNC: 18 MMOL/L (ref 3–16)
BUN BLDV-MCNC: 16 MG/DL (ref 7–20)
CALCIUM SERPL-MCNC: 9.6 MG/DL (ref 8.3–10.6)
CHLORIDE BLD-SCNC: 95 MMOL/L (ref 99–110)
CO2: 23 MMOL/L (ref 21–32)
CREAT SERPL-MCNC: 0.9 MG/DL (ref 0.6–1.2)
GFR AFRICAN AMERICAN: >60
GFR NON-AFRICAN AMERICAN: >60
GLUCOSE BLD-MCNC: 99 MG/DL (ref 70–99)
POTASSIUM SERPL-SCNC: 4.1 MMOL/L (ref 3.5–5.1)
SODIUM BLD-SCNC: 136 MMOL/L (ref 136–145)

## 2019-11-06 PROCEDURE — G8417 CALC BMI ABV UP PARAM F/U: HCPCS | Performed by: NURSE PRACTITIONER

## 2019-11-06 PROCEDURE — 99213 OFFICE O/P EST LOW 20 MIN: CPT | Performed by: NURSE PRACTITIONER

## 2019-11-06 PROCEDURE — G8484 FLU IMMUNIZE NO ADMIN: HCPCS | Performed by: NURSE PRACTITIONER

## 2019-11-06 PROCEDURE — 1123F ACP DISCUSS/DSCN MKR DOCD: CPT | Performed by: NURSE PRACTITIONER

## 2019-11-06 PROCEDURE — 4004F PT TOBACCO SCREEN RCVD TLK: CPT | Performed by: NURSE PRACTITIONER

## 2019-11-06 PROCEDURE — 1090F PRES/ABSN URINE INCON ASSESS: CPT | Performed by: NURSE PRACTITIONER

## 2019-11-06 PROCEDURE — 4040F PNEUMOC VAC/ADMIN/RCVD: CPT | Performed by: NURSE PRACTITIONER

## 2019-11-06 PROCEDURE — G8400 PT W/DXA NO RESULTS DOC: HCPCS | Performed by: NURSE PRACTITIONER

## 2019-11-06 PROCEDURE — 3017F COLORECTAL CA SCREEN DOC REV: CPT | Performed by: NURSE PRACTITIONER

## 2019-11-06 PROCEDURE — G8427 DOCREV CUR MEDS BY ELIG CLIN: HCPCS | Performed by: NURSE PRACTITIONER

## 2019-11-06 PROCEDURE — 36415 COLL VENOUS BLD VENIPUNCTURE: CPT | Performed by: NURSE PRACTITIONER

## 2019-11-06 RX ORDER — TIZANIDINE 2 MG/1
2 TABLET ORAL NIGHTLY PRN
Qty: 30 TABLET | Refills: 0 | Status: SHIPPED | OUTPATIENT
Start: 2019-11-06 | End: 2019-12-31

## 2019-11-06 ASSESSMENT — ENCOUNTER SYMPTOMS
COUGH: 1
TROUBLE SWALLOWING: 0
WHEEZING: 0
NAUSEA: 0
SINUS PAIN: 0
SORE THROAT: 0
ABDOMINAL PAIN: 0
SINUS PRESSURE: 1
CHANGE IN BOWEL HABIT: 0
CHEST TIGHTNESS: 0
SHORTNESS OF BREATH: 0

## 2019-11-07 ENCOUNTER — TELEPHONE (OUTPATIENT)
Dept: FAMILY MEDICINE CLINIC | Age: 69
End: 2019-11-07

## 2019-11-07 ENCOUNTER — TELEPHONE (OUTPATIENT)
Dept: CARDIOLOGY CLINIC | Age: 69
End: 2019-11-07

## 2019-11-08 DIAGNOSIS — G47.33 OBSTRUCTIVE SLEEP APNEA: Primary | ICD-10-CM

## 2019-11-11 ENCOUNTER — TELEPHONE (OUTPATIENT)
Dept: CARDIOLOGY CLINIC | Age: 69
End: 2019-11-11

## 2019-11-19 ENCOUNTER — TELEPHONE (OUTPATIENT)
Dept: PULMONOLOGY | Age: 69
End: 2019-11-19

## 2019-12-04 ENCOUNTER — TELEPHONE (OUTPATIENT)
Dept: PULMONOLOGY | Age: 69
End: 2019-12-04

## 2019-12-05 ENCOUNTER — INITIAL CONSULT (OUTPATIENT)
Dept: CARDIOLOGY CLINIC | Age: 69
End: 2019-12-05
Payer: MEDICARE

## 2019-12-05 VITALS
WEIGHT: 258.6 LBS | HEART RATE: 60 BPM | SYSTOLIC BLOOD PRESSURE: 130 MMHG | DIASTOLIC BLOOD PRESSURE: 78 MMHG | HEIGHT: 70 IN | BODY MASS INDEX: 37.02 KG/M2

## 2019-12-05 DIAGNOSIS — I73.9 CLAUDICATION (HCC): Primary | ICD-10-CM

## 2019-12-05 PROCEDURE — 99204 OFFICE O/P NEW MOD 45 MIN: CPT | Performed by: INTERNAL MEDICINE

## 2019-12-05 PROCEDURE — G8417 CALC BMI ABV UP PARAM F/U: HCPCS | Performed by: INTERNAL MEDICINE

## 2019-12-05 PROCEDURE — G8427 DOCREV CUR MEDS BY ELIG CLIN: HCPCS | Performed by: INTERNAL MEDICINE

## 2019-12-05 PROCEDURE — G8484 FLU IMMUNIZE NO ADMIN: HCPCS | Performed by: INTERNAL MEDICINE

## 2019-12-05 PROCEDURE — 1090F PRES/ABSN URINE INCON ASSESS: CPT | Performed by: INTERNAL MEDICINE

## 2019-12-06 ENCOUNTER — TELEPHONE (OUTPATIENT)
Dept: CARDIOLOGY CLINIC | Age: 69
End: 2019-12-06

## 2019-12-11 ENCOUNTER — PROCEDURE VISIT (OUTPATIENT)
Dept: CARDIOLOGY CLINIC | Age: 69
End: 2019-12-11
Payer: MEDICARE

## 2019-12-11 DIAGNOSIS — I73.9 CLAUDICATION (HCC): Primary | ICD-10-CM

## 2019-12-11 PROCEDURE — 93925 LOWER EXTREMITY STUDY: CPT | Performed by: INTERNAL MEDICINE

## 2019-12-11 PROCEDURE — 93922 UPR/L XTREMITY ART 2 LEVELS: CPT | Performed by: INTERNAL MEDICINE

## 2019-12-13 ENCOUNTER — TELEPHONE (OUTPATIENT)
Dept: CARDIOLOGY CLINIC | Age: 69
End: 2019-12-13

## 2019-12-16 ENCOUNTER — TELEPHONE (OUTPATIENT)
Dept: CARDIOLOGY CLINIC | Age: 69
End: 2019-12-16

## 2019-12-16 RX ORDER — METHYLPREDNISOLONE 32 MG/1
TABLET ORAL
Qty: 2 TABLET | Refills: 0 | Status: ON HOLD | OUTPATIENT
Start: 2019-12-16 | End: 2020-01-06 | Stop reason: HOSPADM

## 2019-12-30 ENCOUNTER — TELEPHONE (OUTPATIENT)
Dept: FAMILY MEDICINE CLINIC | Age: 69
End: 2019-12-30

## 2019-12-30 DIAGNOSIS — I10 ESSENTIAL HYPERTENSION: ICD-10-CM

## 2019-12-30 DIAGNOSIS — M79.18 LUMBAR MUSCLE PAIN: ICD-10-CM

## 2019-12-31 DIAGNOSIS — I10 ESSENTIAL HYPERTENSION: Primary | ICD-10-CM

## 2019-12-31 RX ORDER — TIZANIDINE 2 MG/1
2 TABLET ORAL NIGHTLY PRN
Qty: 30 TABLET | Refills: 0 | Status: SHIPPED | OUTPATIENT
Start: 2019-12-31 | End: 2020-02-11 | Stop reason: ALTCHOICE

## 2019-12-31 RX ORDER — LISINOPRIL AND HYDROCHLOROTHIAZIDE 20; 12.5 MG/1; MG/1
1 TABLET ORAL DAILY
Qty: 30 TABLET | Refills: 0 | Status: SHIPPED | OUTPATIENT
Start: 2019-12-31 | End: 2020-01-20

## 2019-12-31 RX ORDER — LISINOPRIL AND HYDROCHLOROTHIAZIDE 20; 12.5 MG/1; MG/1
1 TABLET ORAL DAILY
Qty: 30 TABLET | Refills: 0 | Status: SHIPPED | OUTPATIENT
Start: 2019-12-31 | End: 2020-02-11 | Stop reason: SDUPTHER

## 2019-12-31 RX ORDER — TIZANIDINE 2 MG/1
TABLET ORAL
Qty: 30 TABLET | Refills: 0 | Status: SHIPPED | OUTPATIENT
Start: 2019-12-31 | End: 2020-01-20

## 2020-01-02 ENCOUNTER — TELEPHONE (OUTPATIENT)
Dept: CARDIOLOGY CLINIC | Age: 70
End: 2020-01-02

## 2020-01-03 ENCOUNTER — HOSPITAL ENCOUNTER (OUTPATIENT)
Age: 70
Setting detail: SPECIMEN
Discharge: HOME | End: 2020-01-03
Payer: MEDICARE

## 2020-01-03 ENCOUNTER — TELEPHONE (OUTPATIENT)
Dept: CARDIOLOGY CLINIC | Age: 70
End: 2020-01-03

## 2020-01-03 ENCOUNTER — NURSE ONLY (OUTPATIENT)
Dept: FAMILY MEDICINE CLINIC | Age: 70
End: 2020-01-03
Payer: MEDICARE

## 2020-01-03 LAB
ANION GAP SERPL CALCULATED.3IONS-SCNC: 13 MMOL/L (ref 4–16)
APTT: 25.4 SECONDS (ref 25.1–37.1)
BASOPHILS ABSOLUTE: 0.1 K/CU MM
BASOPHILS RELATIVE PERCENT: 0.5 % (ref 0–1)
BUN BLDV-MCNC: 22 MG/DL (ref 6–23)
CALCIUM SERPL-MCNC: 9.7 MG/DL (ref 8.3–10.6)
CHLORIDE BLD-SCNC: 96 MMOL/L (ref 99–110)
CO2: 25 MMOL/L (ref 21–32)
CREAT SERPL-MCNC: 1.1 MG/DL (ref 0.6–1.1)
DIFFERENTIAL TYPE: ABNORMAL
EOSINOPHILS ABSOLUTE: 0.1 K/CU MM
EOSINOPHILS RELATIVE PERCENT: 1.2 % (ref 0–3)
GFR AFRICAN AMERICAN: 59 ML/MIN/1.73M2
GFR NON-AFRICAN AMERICAN: 49 ML/MIN/1.73M2
GLUCOSE BLD-MCNC: 105 MG/DL (ref 70–99)
HCT VFR BLD CALC: 44.9 % (ref 37–47)
HEMOGLOBIN: 14 GM/DL (ref 12.5–16)
IMMATURE NEUTROPHIL %: 0.5 % (ref 0–0.43)
INR BLD: 0.8 INDEX
LYMPHOCYTES ABSOLUTE: 3.5 K/CU MM
LYMPHOCYTES RELATIVE PERCENT: 31.6 % (ref 24–44)
MCH RBC QN AUTO: 27.1 PG (ref 27–31)
MCHC RBC AUTO-ENTMCNC: 31.2 % (ref 32–36)
MCV RBC AUTO: 87 FL (ref 78–100)
MONOCYTES ABSOLUTE: 0.9 K/CU MM
MONOCYTES RELATIVE PERCENT: 8.1 % (ref 0–4)
NUCLEATED RBC %: 0 %
PDW BLD-RTO: 14.1 % (ref 11.7–14.9)
PLATELET # BLD: 301 K/CU MM (ref 140–440)
PMV BLD AUTO: 10.3 FL (ref 7.5–11.1)
POTASSIUM SERPL-SCNC: 4.3 MMOL/L (ref 3.5–5.1)
PROTHROMBIN TIME: 9.7 SECONDS (ref 11.7–14.5)
RBC # BLD: 5.16 M/CU MM (ref 4.2–5.4)
SEGMENTED NEUTROPHILS ABSOLUTE COUNT: 6.4 K/CU MM
SEGMENTED NEUTROPHILS RELATIVE PERCENT: 58.1 % (ref 36–66)
SODIUM BLD-SCNC: 134 MMOL/L (ref 135–145)
TOTAL IMMATURE NEUTOROPHIL: 0.06 K/CU MM
TOTAL NUCLEATED RBC: 0 K/CU MM
WBC # BLD: 11 K/CU MM (ref 4–10.5)

## 2020-01-03 PROCEDURE — 80048 BASIC METABOLIC PNL TOTAL CA: CPT

## 2020-01-03 PROCEDURE — 85610 PROTHROMBIN TIME: CPT

## 2020-01-03 PROCEDURE — 85025 COMPLETE CBC W/AUTO DIFF WBC: CPT

## 2020-01-03 PROCEDURE — 36415 COLL VENOUS BLD VENIPUNCTURE: CPT | Performed by: NURSE PRACTITIONER

## 2020-01-03 PROCEDURE — 85730 THROMBOPLASTIN TIME PARTIAL: CPT

## 2020-01-06 ENCOUNTER — HOSPITAL ENCOUNTER (OUTPATIENT)
Dept: CARDIAC CATH/INVASIVE PROCEDURES | Age: 70
Discharge: HOME | End: 2020-01-06
Attending: INTERNAL MEDICINE | Admitting: INTERNAL MEDICINE
Payer: MEDICARE

## 2020-01-06 VITALS
OXYGEN SATURATION: 98 % | RESPIRATION RATE: 19 BRPM | WEIGHT: 258 LBS | SYSTOLIC BLOOD PRESSURE: 149 MMHG | TEMPERATURE: 98.2 F | DIASTOLIC BLOOD PRESSURE: 75 MMHG | HEART RATE: 62 BPM | HEIGHT: 68 IN | BODY MASS INDEX: 39.1 KG/M2

## 2020-01-06 LAB
ACTIVATED CLOTTING TIME, LOW RANGE: 176 SEC
ACTIVATED CLOTTING TIME, LOW RANGE: 194 SEC
ACTIVATED CLOTTING TIME, LOW RANGE: 234 SEC

## 2020-01-06 PROCEDURE — 2709999900 HC NON-CHARGEABLE SUPPLY

## 2020-01-06 PROCEDURE — 37220 PR REVASCULARIZATION ILIAC ARTERY ANGIOP 1ST VSL: CPT | Performed by: INTERNAL MEDICINE

## 2020-01-06 PROCEDURE — 6360000002 HC RX W HCPCS

## 2020-01-06 PROCEDURE — 85347 COAGULATION TIME ACTIVATED: CPT

## 2020-01-06 PROCEDURE — 6360000004 HC RX CONTRAST MEDICATION

## 2020-01-06 PROCEDURE — C1725 CATH, TRANSLUMIN NON-LASER: HCPCS

## 2020-01-06 PROCEDURE — 75716 ARTERY X-RAYS ARMS/LEGS: CPT

## 2020-01-06 PROCEDURE — 36245 INS CATH ABD/L-EXT ART 1ST: CPT

## 2020-01-06 PROCEDURE — 2500000003 HC RX 250 WO HCPCS

## 2020-01-06 PROCEDURE — 75625 CONTRAST EXAM ABDOMINL AORTA: CPT | Performed by: INTERNAL MEDICINE

## 2020-01-06 PROCEDURE — 75625 CONTRAST EXAM ABDOMINL AORTA: CPT

## 2020-01-06 PROCEDURE — C1769 GUIDE WIRE: HCPCS

## 2020-01-06 PROCEDURE — C1894 INTRO/SHEATH, NON-LASER: HCPCS

## 2020-01-06 PROCEDURE — 37220 HC ILIAC TERRITORY PLASTY: CPT

## 2020-01-06 PROCEDURE — 2580000003 HC RX 258

## 2020-01-06 PROCEDURE — 75716 ARTERY X-RAYS ARMS/LEGS: CPT | Performed by: INTERNAL MEDICINE

## 2020-01-06 PROCEDURE — 6370000000 HC RX 637 (ALT 250 FOR IP)

## 2020-01-06 RX ORDER — SODIUM CHLORIDE 9 MG/ML
INJECTION, SOLUTION INTRAVENOUS CONTINUOUS
Status: DISCONTINUED | OUTPATIENT
Start: 2020-01-06 | End: 2020-01-06 | Stop reason: HOSPADM

## 2020-01-06 RX ORDER — CLOPIDOGREL BISULFATE 75 MG/1
75 TABLET ORAL DAILY
Status: DISCONTINUED | OUTPATIENT
Start: 2020-01-07 | End: 2020-01-06 | Stop reason: HOSPADM

## 2020-01-06 RX ORDER — DIAZEPAM 5 MG/1
5 TABLET ORAL ONCE
Status: DISCONTINUED | OUTPATIENT
Start: 2020-01-06 | End: 2020-01-06 | Stop reason: HOSPADM

## 2020-01-06 RX ORDER — SODIUM CHLORIDE 0.9 % (FLUSH) 0.9 %
10 SYRINGE (ML) INJECTION PRN
Status: DISCONTINUED | OUTPATIENT
Start: 2020-01-06 | End: 2020-01-06 | Stop reason: HOSPADM

## 2020-01-06 RX ORDER — SODIUM CHLORIDE 0.9 % (FLUSH) 0.9 %
10 SYRINGE (ML) INJECTION EVERY 12 HOURS SCHEDULED
Status: DISCONTINUED | OUTPATIENT
Start: 2020-01-06 | End: 2020-01-06 | Stop reason: HOSPADM

## 2020-01-06 RX ORDER — CLOPIDOGREL BISULFATE 75 MG/1
75 TABLET ORAL DAILY
Qty: 90 TABLET | Refills: 3 | Status: SHIPPED | OUTPATIENT
Start: 2020-01-06 | End: 2020-10-19 | Stop reason: SDUPTHER

## 2020-01-06 RX ORDER — ASPIRIN 81 MG/1
81 TABLET, CHEWABLE ORAL DAILY
Status: DISCONTINUED | OUTPATIENT
Start: 2020-01-07 | End: 2020-01-06 | Stop reason: HOSPADM

## 2020-01-06 RX ORDER — DIPHENHYDRAMINE HCL 25 MG
25 TABLET ORAL ONCE
Status: DISCONTINUED | OUTPATIENT
Start: 2020-01-06 | End: 2020-01-06 | Stop reason: HOSPADM

## 2020-01-06 RX ORDER — ACETAMINOPHEN 325 MG/1
650 TABLET ORAL EVERY 4 HOURS PRN
Status: DISCONTINUED | OUTPATIENT
Start: 2020-01-06 | End: 2020-01-06 | Stop reason: HOSPADM

## 2020-01-06 NOTE — PROGRESS NOTES
ACT results 176  left femoral 5 fr. sheath removed. Manual pressure held per J Peabody RN x 15 minutes. No bruising, drainage, or swelling noted. No hematoma. DSD/Tegederm applied to site. Pulses unchanged , VSS. Pt. instructed on post sheath removal care/restrictions and verbalized an understanding.

## 2020-01-06 NOTE — PROGRESS NOTES
Received from cath lab. Monitor and alarms on. Call Light in reach. Procedure site assessment completed per Mickey Riedel and J Peabody RN       No hematoma or bleeding noted.

## 2020-01-06 NOTE — PROGRESS NOTES
Pt ambulated to bathroom and returned to bed without incident. Reviewed discharge instructions. Aware pt to begin on Plavix and that new prescription can be picked up at [facility], pt voiced understanding. Pt transported to main entrance by wheelchair to home with granddaughter and daughter in law.

## 2020-01-06 NOTE — PROGRESS NOTES
ACT results 176  right femoral 4 fr. sheath removed. Manual pressure held per J Peabody RN x 10 minutes. No bruising, drainage, or swelling noted. No hematoma. DSD/Tegederm applied to site. Pulses unchanged , VSS. Pt. instructed on post sheath removal care/restrictions and verbalized an understanding.

## 2020-01-06 NOTE — H&P
Substance Use Topics    Alcohol use: Yes       Comment: socially      Review of Systems:    Constitutional: Negative for diaphoresis and fatigue  Psychological:Negative for anxiety or depression  HENT: Negative for headaches, nasal congestion, sinus pain or vertigo  Eyes: Negative for visual disturbance. Endocrine: Negative for polydipsia/polyuria  Respiratory: Negative for shortness of breath  Cardiovascular: Negative for chest pain, dyspnea on exertion, claudication, edema, irregular heartbeat, murmur, palpitations or shortness of breath  Gastrointestinal: Negative for abdominal pain or heartburn  Genito-Urinary: Negative for urinary frequency/urgency  Musculoskeletal:  LE painNeurological: Negative for dizziness, headaches, memory loss, numbness/tingling, visual changes, syncope  Dermatological: Negative for rash     Objective:  /78   Pulse 60   Ht 5' 10\" (1.778 m)   Wt 258 lb 9.6 oz (117.3 kg)   BMI 37.11 kg/m²   Wt Readings from Last 3 Encounters:   12/05/19 258 lb 9.6 oz (117.3 kg)   11/06/19 268 lb 3.2 oz (121.7 kg)   10/26/19 272 lb (123.4 kg)      Body mass index is 37.11 kg/m². GENERAL - Alert, oriented, pleasant, in no apparent distress. EYES: No jaundice, no conjunctival pallor. SKIN: It is warm & dry. No rashes. No Echhymosis    HEENT - No clinically significant abnormalities seen. Neck - Supple. No jugular venous distention noted. No carotid bruits. Cardiovascular - Normal S1 and S2 without obvious murmur or gallop. Extremities - No cyanosis, clubbing, or significant edema. Pulmonary - No respiratory distress. No wheezes or rales. Abdomen - No masses, tenderness, or organomegaly. Musculoskeletal - No significant edema. No joint deformities. No muscle wasting. Neurologic - Cranial nerves II through XII are grossly intact.   There were no gross focal neurologic abnormalities.     Lab Review         Lab Results   Component Value Date     TROPONINT <0.010 10/24/2019    BNP:          Lab Results   Component Value Date     BNP 5 04/18/2013      PT/INR:  No results found for: INR  No results found for: LABA1C        Lab Results   Component Value Date     WBC 8.3 10/24/2019     HCT 43.0 10/24/2019     MCV 85.7 10/24/2019      10/24/2019            Lab Results   Component Value Date     CHOL 119 09/25/2019     TRIG 98 09/25/2019     HDL 52 09/25/2019     LDLCALC 47 09/25/2019            Lab Results   Component Value Date     ALT <5 (L) 10/24/2019     AST 13 (L) 10/24/2019      BMP:          Lab Results   Component Value Date      11/06/2019     K 4.1 11/06/2019     CL 95 11/06/2019     CO2 23 11/06/2019     BUN 16 11/06/2019     CREATININE 0.9 11/06/2019      CMP:         Lab Results   Component Value Date      11/06/2019     K 4.1 11/06/2019     CL 95 11/06/2019     CO2 23 11/06/2019     BUN 16 11/06/2019     PROT 7.6 10/24/2019     PROT 7.0 08/11/2012      TSH:          Lab Results   Component Value Date     TSH 3.16 09/25/2019     TSHHS 1.769 08/11/2012               Impression:    No diagnosis found. Patient Active Problem List   Diagnosis Code    HTN (hypertension) I10    Chronic back pain M54.9, G89.29    Allergic rhinitis J30.9    Conjunctivitis H10.9    Corneal abrasion S05. 00XA    Mild persistent asthma without complication G76.09    Obstructive sleep apnea G47.33    Tobacco abuse Z72.0    Chronic cough R05    Dizziness R42         Assessment & Plan:     -  Hypertension: Patients blood pressure is stable. Patient is advised about low sodium diet. Present medical regimen will not be changed. -  LIPID MANAGEMENT:  Available lipid  lab data reviewed  and patient was given dietary advice. NCEP- ATP III guidelines reviewed with patient. -   Changes  in medicines made:  No                         - PVD   Per angio                                            Black Gimenez MA  [facility] [place]                          Detailed Report

## 2020-01-07 RX ORDER — MONTELUKAST SODIUM 10 MG/1
10 TABLET ORAL NIGHTLY
Qty: 90 TABLET | Refills: 1 | Status: SHIPPED | OUTPATIENT
Start: 2020-01-07 | End: 2020-01-07 | Stop reason: SDUPTHER

## 2020-01-07 RX ORDER — MONTELUKAST SODIUM 10 MG/1
10 TABLET ORAL NIGHTLY
Qty: 90 TABLET | Refills: 1 | Status: SHIPPED | OUTPATIENT
Start: 2020-01-07 | End: 2020-02-06 | Stop reason: SDUPTHER

## 2020-01-20 ENCOUNTER — OFFICE VISIT (OUTPATIENT)
Dept: CARDIOLOGY CLINIC | Age: 70
End: 2020-01-20
Payer: MEDICARE

## 2020-01-20 ENCOUNTER — TELEPHONE (OUTPATIENT)
Dept: FAMILY MEDICINE CLINIC | Age: 70
End: 2020-01-20

## 2020-01-20 VITALS
HEART RATE: 94 BPM | HEIGHT: 67 IN | WEIGHT: 257.2 LBS | SYSTOLIC BLOOD PRESSURE: 118 MMHG | DIASTOLIC BLOOD PRESSURE: 66 MMHG | BODY MASS INDEX: 40.37 KG/M2

## 2020-01-20 PROCEDURE — 3017F COLORECTAL CA SCREEN DOC REV: CPT | Performed by: NURSE PRACTITIONER

## 2020-01-20 PROCEDURE — 4004F PT TOBACCO SCREEN RCVD TLK: CPT | Performed by: NURSE PRACTITIONER

## 2020-01-20 PROCEDURE — 1090F PRES/ABSN URINE INCON ASSESS: CPT | Performed by: NURSE PRACTITIONER

## 2020-01-20 PROCEDURE — 99213 OFFICE O/P EST LOW 20 MIN: CPT | Performed by: NURSE PRACTITIONER

## 2020-01-20 PROCEDURE — G8400 PT W/DXA NO RESULTS DOC: HCPCS | Performed by: NURSE PRACTITIONER

## 2020-01-20 PROCEDURE — 1123F ACP DISCUSS/DSCN MKR DOCD: CPT | Performed by: NURSE PRACTITIONER

## 2020-01-20 PROCEDURE — G8417 CALC BMI ABV UP PARAM F/U: HCPCS | Performed by: NURSE PRACTITIONER

## 2020-01-20 PROCEDURE — G8427 DOCREV CUR MEDS BY ELIG CLIN: HCPCS | Performed by: NURSE PRACTITIONER

## 2020-01-20 PROCEDURE — G8484 FLU IMMUNIZE NO ADMIN: HCPCS | Performed by: NURSE PRACTITIONER

## 2020-01-20 PROCEDURE — 4040F PNEUMOC VAC/ADMIN/RCVD: CPT | Performed by: NURSE PRACTITIONER

## 2020-01-20 RX ORDER — METHYLPREDNISOLONE 4 MG/1
TABLET ORAL
Qty: 1 KIT | Refills: 0 | Status: SHIPPED | OUTPATIENT
Start: 2020-01-20 | End: 2020-01-26

## 2020-01-20 RX ORDER — METHYLPREDNISOLONE 4 MG/1
TABLET ORAL
Qty: 1 KIT | Refills: 0 | Status: SHIPPED | OUTPATIENT
Start: 2020-01-20 | End: 2020-01-20 | Stop reason: CLARIF

## 2020-01-20 RX ORDER — PROMETHAZINE HYDROCHLORIDE AND CODEINE PHOSPHATE 6.25; 1 MG/5ML; MG/5ML
5 SYRUP ORAL EVERY 4 HOURS PRN
Qty: 80 ML | Refills: 0 | Status: SHIPPED | OUTPATIENT
Start: 2020-01-20 | End: 2020-01-24

## 2020-01-20 RX ORDER — PROMETHAZINE HYDROCHLORIDE AND CODEINE PHOSPHATE 6.25; 1 MG/5ML; MG/5ML
5 SYRUP ORAL EVERY 4 HOURS PRN
Qty: 80 ML | Refills: 0 | Status: SHIPPED | OUTPATIENT
Start: 2020-01-20 | End: 2020-01-20 | Stop reason: CLARIF

## 2020-01-20 NOTE — LETTER
CLINICAL STAFF DOCUMENTATION    Javon Carver  1950  [id number]    Have you had any Chest Pain - No     Have you had any Shortness of Breath - Yes, ongoing, unchanged, when coughing  If Yes - When at rest and on exertion    Have you had any dizziness - Yes, occasional, ongoing, on Meclizine PRN  If Yes DO ORTHOSTATIC BP - when do you feel dizzy with position change, walking, lying down   How long does it last seconds to hours.     Have you had any palpitations - No     Is the patient on any of the following medications - NONE  If Yes DO EKG    Do you have any edema - swelling in NONE    If Yes - CHECK TO SEE IF THE EDEMA IS PITTING      Check Venous \"LEG PROBLEM Questionnaire\"    Do you have a surgery or procedure scheduled in the near future - No  If Yes- DO EKG  Ask patient if they want to sign up for MyChart if they are not already signed up    Check to see if we have an E-MAIL on file for the patient    Check medication list thoroughly!!!  BE SURE TO ASK PATIENT IF THEY NEED MEDICATION REFILLS

## 2020-01-20 NOTE — PROGRESS NOTES
[address] [facility],Third [address]  Phone: [phone]    Fax [phone]                  Rock Okeefe MD, Jayne Mcneill MD, [address] [facility], MD, MD Kassidy Cross MD Ellaree Patches, MD Lucinda Manzanilla, APRN      Krista Geronimo, APRRIZWANA Taveras, APRRIZWANA Hawk, APRRIZWANA    CARDIOLOGY  NOTE      1/20/2020    RE: Yahir Dotson  (1950)                               TO:  Dr. Marzetta Phoenix, APRN - CNP  The primary cardiologist is Dr. Cherylene Setters    CC:   1. Claudication [facility])    2. Essential hypertension        Patient denies all of the following:  Chest Pain  Palpitations  Shortness of Breath  Edema  Dizziness  Syncope      HPI: Thank you for involving me in taking care of your patient Yahir Dotson, who is a  68y.o. year old female with a history as listed above. Patient presents to the office for follow up on HTN, claudication,and hyperlipidemia. Patient is  an active female who does not walk regularly. Patient is  compliant with she medications. Patient denies any cardiac complaints or needs. Vitals:    01/20/20 1158   BP: 118/66   Pulse: 94       Current Outpatient Medications   Medication Sig Dispense Refill    methylPREDNISolone (MEDROL DOSEPACK) 4 MG tablet Take by mouth. 1 kit 0    promethazine-codeine (PHENERGAN WITH CODEINE) 6.25-10 MG/5ML syrup Take 5 mLs by mouth every 4 hours as needed for Cough for up to 4 days.  80 mL 0    montelukast (SINGULAIR) 10 MG tablet Take 1 tablet by mouth nightly 90 tablet 1    clopidogrel (PLAVIX) 75 MG tablet Take 1 tablet by mouth daily 90 tablet 3    lisinopril-hydrochlorothiazide (PRINZIDE;ZESTORETIC) 20-12.5 MG per tablet TAKE 1 TABLET BY MOUTH DAILY 30 tablet 0    tiZANidine (ZANAFLEX) 2 MG tablet Take 1 tablet by mouth nightly as needed (musle pain) 30 tablet 0    MECLIZINE HCL PO Take 25 mg by mouth as needed       ADVAIR DISKUS 500-50 MCG/DOSE diskus inhaler Inhale 1 puff into the lungs every 12 hours After inhalation then gargle 1 Inhaler 11    fluticasone (FLONASE) 50 MCG/ACT nasal spray 1 spray by Each Nostril route daily 1 Bottle 3    cetirizine (ZYRTEC) 10 MG tablet Take 1 tablet by mouth daily 90 tablet 2    aspirin 81 MG tablet Take 1 tablet by mouth daily 90 tablet 2    simvastatin (ZOCOR) 40 MG tablet Take 1 tablet by mouth nightly 90 tablet 2    diphenhydrAMINE (BENADRYL) 25 MG tablet Take 1 tablet by mouth nightly as needed for Itching 90 tablet 2    ibuprofen (ADVIL;MOTRIN) 800 MG tablet Take 1 tablet by mouth every 12 hours as needed for Pain 120 tablet 2    albuterol sulfate HFA (PROVENTIL HFA) 108 (90 Base) MCG/ACT inhaler Inhale 2 puffs into the lungs every 4 hours as needed for Wheezing or Shortness of Breath (cough) 1 Inhaler 2    albuterol (PROVENTIL) (5 MG/ML) 0.5% nebulizer solution Take 1 mL by nebulization every 6 hours as needed for Wheezing or Shortness of Breath 100 vial 1    ipratropium-albuterol (DUONEB) 0.5-2.5 (3) MG/3ML SOLN nebulizer solution Inhale 3 mLs into the lungs 4 times daily 120 vial 1     No current facility-administered medications for this visit. Allergies: Latex; Cipro xr; Soap; Tomato; Influenza vaccines; and Soybean-containing drug products  Past Medical History:   Diagnosis Date    Active smoker     Active smoker     Ankle fracture, left 2006    Asthma     Chondromalacia of right knee     Dr. Jessica Ray + MRI    Chronic back pain     Chronic cough 10/4/2019    Depression     has seen psychiatry in [place] 6 months    Dizziness     CT brain normal -6/18/2012    Family history of early CAD     Father - 4st MI age 50, Massive MI age 71    H/O cardiovascular stress test 8/7/2012 8/7/2012-Lexiscan-Normal perfusion. LVSF normal.EF 58%    H/O Doppler ultrasound 12/11/2019     Abnormal left lower extremity arterial Doppler ultrasound.  The Left lower extremity arteries have a Monophasic waveform suggestive of inflow disease, The Left BARBER shows Severe peripheral arterial disease. Normal right lower extremity arterial Doppler ultrasound. Normal right lower extremity ankle brachial indices    H/O echocardiogram 8/7/2012 8/7/2012-LVSF normal. EF =>55%. impaired LV relaxation.  Herniated intervertebral disk     thoracic and lumbar    Hypertension     Mild persistent asthma without complication 00/9/1328    Normal echocardiogram 8/2012    EF=> 55%-Dr. Jose Johnson    Obesity (BMI 30-39. 9)     Obstructive sleep apnea 10/4/2019    Other screening mammogram     PAD (peripheral artery disease) ([address] Angioplasty 01/06/2020     LCIA 90% INSTENT RESTENOSED TO 0% WITH PTA.     Peripheral vascular disease ([address]     Postmenopausal     Shoulder fracture, left 2008    Tobacco abuse 10/4/2019     Past Surgical History:   Procedure Laterality Date    CHOLECYSTECTOMY  1991    KNEE SURGERY  1998    right knee    TONSILLECTOMY AND ADENOIDECTOMY  1963    TUBAL LIGATION  1977    TUMOR REMOVAL  1999    parotid    TUMOR REMOVAL  1976    left thigh     Family History   Problem Relation Age of Onset    High Blood Pressure Mother     Allergies Mother    Otelia Vasu Migraines Mother     Obesity Mother     Heart Disease Father     High Blood Pressure Father     Allergies Father     Obesity Father     Diabetes Daughter     High Blood Pressure Sister     Allergies Sister     Bleeding Prob Sister    Otelia Vasu Migraines Sister     Obesity Sister     Cancer Brother     High Blood Pressure Brother     Allergies Brother     Bleeding Prob Brother     Obesity Brother     Thyroid Disease Maternal Aunt     Kidney Disease Maternal Aunt     Heart Disease Maternal Uncle     Kidney Disease Maternal Uncle     Cancer Maternal Grandmother     Glaucoma Maternal Grandmother     Diabetes Maternal Grandfather     Glaucoma Maternal Grandfather     Glaucoma Paternal Grandmother     Glaucoma Paternal Grandfather      Social History Tobacco Use    Smoking status: Current Some Day Smoker     Packs/day: 0.25     Years: 39.00     Pack years: 9.75     Types: Cigarettes     Last attempt to quit: 2019     Years since quittin.3    Smokeless tobacco: Never Used    Tobacco comment: 2 cig. since last visit    Substance Use Topics    Alcohol use: Yes     Comment: socially        Review of Systems - History obtained from the patient  General: negative for - fatigue, malaise, night sweats, weight gain  Psychological: negative for - anxiety, depression, sleep disturbances  Ophthalmic: negative for - blurry vision, loss of vision  ENT: negative for - headaches, vertigo, visual changes  Hematological and Lymphatic: negative for - bleeding problems, blood clots, bruising, fatigue or pallor  Endocrine: negative for - malaise/lethargy, palpitations, unexpected weight changes  Respiratory: negative for - cough, orthopnea, shortness of breath or tachypnea  Cardiovascular: negative for - chest pain, dyspnea on exertion, edema or palpitations  Gastrointestinal: no abdominal pain, change in bowel habits, or black or bloody stools  Genito-Urinary: no dysuria, trouble voiding, or hematuria  Musculoskeletal: negative for - gait disturbance, pain, swelling    Neurological: negative for - confusion, dizziness, impaired coordination/balance or numbness/tingling    Objective:      Physical Exam:  /66 (Site: Left Upper Arm, Position: Sitting, Cuff Size: Large Adult)   Pulse 94   Ht 5' 7\" (1.702 m)   Wt 257 lb 3.2 oz (116.7 kg)   BMI 40.28 kg/m²   Wt Readings from Last 3 Encounters:   20 257 lb 3.2 oz (116.7 kg)   20 258 lb (117 kg)   19 258 lb 9.6 oz (117.3 kg)     Body mass index is 40.28 kg/m². GENERAL - Alert, oriented, pleasant, in no apparent distress.     Head unremarkable  Eyes - pupils equal and reactive to light - bilateral conjunctiva are pink: sclera are white   ENT - external ears intact, nose is intact:  tongue is

## 2020-01-20 NOTE — TELEPHONE ENCOUNTER
Patient called stating her allergies have started again with cough, wheezing and has not gotten any sleep because of this. Rx for cough medication and steroid called to [address] [facility]. [address] [facility]. Advised to keep appointment and to come to clinic if no better in 2 days. Verbalized understanding and agreement with plan.

## 2020-01-31 ENCOUNTER — TELEPHONE (OUTPATIENT)
Dept: FAMILY MEDICINE CLINIC | Age: 70
End: 2020-01-31

## 2020-01-31 PROBLEM — L50.9 URTICARIA: Status: ACTIVE | Noted: 2020-01-31

## 2020-01-31 RX ORDER — HYDROXYZINE HYDROCHLORIDE 25 MG/1
25 TABLET, FILM COATED ORAL EVERY 6 HOURS PRN
Qty: 30 TABLET | Refills: 0 | Status: SHIPPED | OUTPATIENT
Start: 2020-01-31 | End: 2020-02-06 | Stop reason: SDUPTHER

## 2020-01-31 NOTE — TELEPHONE ENCOUNTER
Patient called and stated that she has broke out in hives welts and is swollen, upper chest and breast area towards the back, also chin and hands. States noticed yesterday and progressively getting worse. She is taking benadryl. Unable to come in for an apt due to daughter working and she has no bus fare. Please advise.

## 2020-01-31 NOTE — TELEPHONE ENCOUNTER
She is taking Zyrtec and benadryl. She just had prednisone 1/20/20 so I would not want to give more at this point. If the swelling continues she or having some shortness of breath she would need an ED visit.

## 2020-02-06 ENCOUNTER — OFFICE VISIT (OUTPATIENT)
Dept: FAMILY MEDICINE CLINIC | Age: 70
End: 2020-02-06
Payer: MEDICARE

## 2020-02-06 VITALS
BODY MASS INDEX: 37.08 KG/M2 | WEIGHT: 259 LBS | HEART RATE: 72 BPM | DIASTOLIC BLOOD PRESSURE: 74 MMHG | SYSTOLIC BLOOD PRESSURE: 136 MMHG | TEMPERATURE: 97.6 F | HEIGHT: 70 IN | OXYGEN SATURATION: 9 %

## 2020-02-06 PROCEDURE — G8427 DOCREV CUR MEDS BY ELIG CLIN: HCPCS | Performed by: NURSE PRACTITIONER

## 2020-02-06 PROCEDURE — G8417 CALC BMI ABV UP PARAM F/U: HCPCS | Performed by: NURSE PRACTITIONER

## 2020-02-06 PROCEDURE — 4004F PT TOBACCO SCREEN RCVD TLK: CPT | Performed by: NURSE PRACTITIONER

## 2020-02-06 PROCEDURE — G8400 PT W/DXA NO RESULTS DOC: HCPCS | Performed by: NURSE PRACTITIONER

## 2020-02-06 PROCEDURE — 3017F COLORECTAL CA SCREEN DOC REV: CPT | Performed by: NURSE PRACTITIONER

## 2020-02-06 PROCEDURE — 1090F PRES/ABSN URINE INCON ASSESS: CPT | Performed by: NURSE PRACTITIONER

## 2020-02-06 PROCEDURE — 1123F ACP DISCUSS/DSCN MKR DOCD: CPT | Performed by: NURSE PRACTITIONER

## 2020-02-06 PROCEDURE — G8484 FLU IMMUNIZE NO ADMIN: HCPCS | Performed by: NURSE PRACTITIONER

## 2020-02-06 PROCEDURE — 99213 OFFICE O/P EST LOW 20 MIN: CPT | Performed by: NURSE PRACTITIONER

## 2020-02-06 PROCEDURE — 4040F PNEUMOC VAC/ADMIN/RCVD: CPT | Performed by: NURSE PRACTITIONER

## 2020-02-06 RX ORDER — MONTELUKAST SODIUM 10 MG/1
10 TABLET ORAL NIGHTLY
Qty: 90 TABLET | Refills: 1 | Status: SHIPPED | OUTPATIENT
Start: 2020-02-06 | End: 2020-10-19 | Stop reason: SDUPTHER

## 2020-02-06 RX ORDER — HYDROXYZINE HYDROCHLORIDE 25 MG/1
25 TABLET, FILM COATED ORAL EVERY 6 HOURS PRN
Qty: 30 TABLET | Refills: 1 | Status: SHIPPED | OUTPATIENT
Start: 2020-02-06 | End: 2020-04-06 | Stop reason: SDUPTHER

## 2020-02-06 RX ORDER — OMEPRAZOLE 20 MG/1
20 CAPSULE, DELAYED RELEASE ORAL DAILY
COMMUNITY
End: 2020-02-19

## 2020-02-06 ASSESSMENT — ENCOUNTER SYMPTOMS
NAUSEA: 0
SHORTNESS OF BREATH: 0
BACK PAIN: 1
CONSTIPATION: 0
DIARRHEA: 0
WHEEZING: 0
COUGH: 0
ABDOMINAL PAIN: 0

## 2020-02-06 NOTE — PROCEDURES
6  months. The patient will be followed up in the office.         Kalie Garay MD    D: 02/05/2020 12:43:51       T: 02/05/2020 13:40:44     [id number]  Job#: [id number]     Doc#: [id number]    CC:
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
## 2020-02-06 NOTE — PATIENT INSTRUCTIONS
Patient advised to increase exercise. Patient advised to contact [facility] to see about joining silver sneakers.

## 2020-02-11 RX ORDER — BACLOFEN 10 MG/1
5 TABLET ORAL NIGHTLY PRN
Qty: 15 TABLET | Refills: 2 | Status: SHIPPED | OUTPATIENT
Start: 2020-02-11 | End: 2020-04-06 | Stop reason: SDUPTHER

## 2020-02-11 RX ORDER — LISINOPRIL AND HYDROCHLOROTHIAZIDE 20; 12.5 MG/1; MG/1
1 TABLET ORAL DAILY
Qty: 90 TABLET | Refills: 3 | Status: ON HOLD
Start: 2020-02-11 | End: 2020-07-14 | Stop reason: HOSPADM

## 2020-02-13 ENCOUNTER — TELEPHONE (OUTPATIENT)
Dept: FAMILY MEDICINE CLINIC | Age: 70
End: 2020-02-13

## 2020-02-13 NOTE — TELEPHONE ENCOUNTER
Chapito Began called in with concerns of having increase in abdominal gas, discomfort. She states she looked up the side effects of omeprazole and found that it can cause increase in flatulence. She states the ENT prescribed the omeprazole because he felt reflux was causing her discomfort in her throat. She later was placed on Plavix. Advised her to stop the omeprazole because of potential drug-drug interaction leading to decrease in effectiveness of Plavix. She states she will stop the omeprazole. Advised to keep her follow up appointment.

## 2020-02-19 ENCOUNTER — OFFICE VISIT (OUTPATIENT)
Dept: FAMILY MEDICINE CLINIC | Age: 70
End: 2020-02-19
Payer: MEDICARE

## 2020-02-19 VITALS
WEIGHT: 252.3 LBS | TEMPERATURE: 98 F | BODY MASS INDEX: 37.37 KG/M2 | HEIGHT: 69 IN | OXYGEN SATURATION: 97 % | HEART RATE: 79 BPM | SYSTOLIC BLOOD PRESSURE: 128 MMHG | DIASTOLIC BLOOD PRESSURE: 70 MMHG

## 2020-02-19 PROCEDURE — 3017F COLORECTAL CA SCREEN DOC REV: CPT | Performed by: NURSE PRACTITIONER

## 2020-02-19 PROCEDURE — G8484 FLU IMMUNIZE NO ADMIN: HCPCS | Performed by: NURSE PRACTITIONER

## 2020-02-19 PROCEDURE — 1090F PRES/ABSN URINE INCON ASSESS: CPT | Performed by: NURSE PRACTITIONER

## 2020-02-19 PROCEDURE — 4040F PNEUMOC VAC/ADMIN/RCVD: CPT | Performed by: NURSE PRACTITIONER

## 2020-02-19 PROCEDURE — G8427 DOCREV CUR MEDS BY ELIG CLIN: HCPCS | Performed by: NURSE PRACTITIONER

## 2020-02-19 PROCEDURE — G8400 PT W/DXA NO RESULTS DOC: HCPCS | Performed by: NURSE PRACTITIONER

## 2020-02-19 PROCEDURE — 99213 OFFICE O/P EST LOW 20 MIN: CPT | Performed by: NURSE PRACTITIONER

## 2020-02-19 PROCEDURE — 1123F ACP DISCUSS/DSCN MKR DOCD: CPT | Performed by: NURSE PRACTITIONER

## 2020-02-19 PROCEDURE — G8417 CALC BMI ABV UP PARAM F/U: HCPCS | Performed by: NURSE PRACTITIONER

## 2020-02-19 PROCEDURE — 4004F PT TOBACCO SCREEN RCVD TLK: CPT | Performed by: NURSE PRACTITIONER

## 2020-02-19 RX ORDER — PREDNISONE 20 MG/1
20 TABLET ORAL DAILY
Qty: 5 TABLET | Refills: 0 | Status: SHIPPED | OUTPATIENT
Start: 2020-02-19 | End: 2020-02-24

## 2020-02-19 RX ORDER — AMOXICILLIN AND CLAVULANATE POTASSIUM 875; 125 MG/1; MG/1
1 TABLET, FILM COATED ORAL 2 TIMES DAILY
Qty: 20 TABLET | Refills: 0 | Status: SHIPPED | OUTPATIENT
Start: 2020-02-19 | End: 2020-02-29

## 2020-02-19 ASSESSMENT — PATIENT HEALTH QUESTIONNAIRE - PHQ9
SUM OF ALL RESPONSES TO PHQ QUESTIONS 1-9: 0
2. FEELING DOWN, DEPRESSED OR HOPELESS: 0
SUM OF ALL RESPONSES TO PHQ9 QUESTIONS 1 & 2: 0
SUM OF ALL RESPONSES TO PHQ QUESTIONS 1-9: 0
1. LITTLE INTEREST OR PLEASURE IN DOING THINGS: 0

## 2020-02-19 ASSESSMENT — ENCOUNTER SYMPTOMS
SHORTNESS OF BREATH: 1
CHEST TIGHTNESS: 0
COUGH: 1
WHEEZING: 0

## 2020-02-19 NOTE — PROGRESS NOTES
Shahab Zunilda  1950  68 y.o. SUBJECT JEMIMA:    Chief Complaint   Patient presents with   Wendie Chowdhury is a 68year old female who is in with 3 days of cough, congestion, and now hoarseness. She states over the weekend there were people at her home and a couple of the children were ill with runny noses and coughing. She denies fevers, chills or sweats but is having some shortness of breath but is bringing up thick yellow to green sputum. She is fatigued, has some chest discomfort and not eating well.         Current Outpatient Medications on File Prior to Visit   Medication Sig Dispense Refill    lisinopril-hydrochlorothiazide (PRINZIDE;ZESTORETIC) 20-12.5 MG per tablet Take 1 tablet by mouth daily 90 tablet 3    baclofen (LIORESAL) 10 MG tablet Take 0.5 tablets by mouth nightly as needed (muscle spasms) 15 tablet 2    montelukast (SINGULAIR) 10 MG tablet Take 1 tablet by mouth nightly 90 tablet 1    hydrOXYzine (ATARAX) 25 MG tablet Take 1 tablet by mouth every 6 hours as needed for Itching 30 tablet 1    clopidogrel (PLAVIX) 75 MG tablet Take 1 tablet by mouth daily 90 tablet 3    MECLIZINE HCL PO Take 25 mg by mouth as needed       ADVAIR DISKUS 500-50 MCG/DOSE diskus inhaler Inhale 1 puff into the lungs every 12 hours After inhalation then gargle 1 Inhaler 11    fluticasone (FLONASE) 50 MCG/ACT nasal spray 1 spray by Each Nostril route daily 1 Bottle 3    cetirizine (ZYRTEC) 10 MG tablet Take 1 tablet by mouth daily 90 tablet 2    aspirin 81 MG tablet Take 1 tablet by mouth daily 90 tablet 2    simvastatin (ZOCOR) 40 MG tablet Take 1 tablet by mouth nightly 90 tablet 2    diphenhydrAMINE (BENADRYL) 25 MG tablet Take 1 tablet by mouth nightly as needed for Itching 90 tablet 2    albuterol sulfate HFA (PROVENTIL HFA) 108 (90 Base) MCG/ACT inhaler Inhale 2 puffs into the lungs every 4 hours as needed for Wheezing or Shortness of Breath (cough) 1 Inhaler 2    albuterol (PROVENTIL) (5 MG/ML) 0.5% nebulizer solution Take 1 mL by nebulization every 6 hours as needed for Wheezing or Shortness of Breath 100 vial 1    ipratropium-albuterol (DUONEB) 0.5-2.5 (3) MG/3ML SOLN nebulizer solution Inhale 3 mLs into the lungs 4 times daily 120 vial 1     No current facility-administered medications on file prior to visit. Past Medical History:   Diagnosis Date    Active smoker     Active smoker     Ankle fracture, left 2006    Asthma     Chondromalacia of right knee     Dr. Jad Demarco + MRI    Chronic back pain     Chronic cough 10/4/2019    Depression     has seen psychiatry in [facility] 6 months    Dizziness     CT brain normal -6/18/2012    Family history of early CAD     Father - 4st MI age 50, Massive MI age 71    H/O cardiovascular stress test 8/7/2012 8/7/2012-Lexiscan-Normal perfusion. LVSF normal.EF 58%    H/O Doppler ultrasound 12/11/2019     Abnormal left lower extremity arterial Doppler ultrasound. The Left lower extremity arteries have a Monophasic waveform suggestive of inflow disease, The Left BARBER shows Severe peripheral arterial disease. Normal right lower extremity arterial Doppler ultrasound. Normal right lower extremity ankle brachial indices    H/O echocardiogram 8/7/2012 8/7/2012-LVSF normal. EF =>55%. impaired LV relaxation.  Herniated intervertebral disk     thoracic and lumbar    Hypertension     Mild persistent asthma without complication 25/6/2161    Normal echocardiogram 8/2012    EF=> 55%-Dr. Janell Guerin    Obesity (BMI 30-39. 9)     Obstructive sleep apnea 10/4/2019    Other screening mammogram     PAD (peripheral artery disease) ([address] Angioplasty 01/06/2020     LCIA 90% INSTENT RESTENOSED TO 0% WITH PTA.     Peripheral vascular disease ([address]     Postmenopausal     Shoulder fracture, left 2008    Tobacco abuse 10/4/2019     Past Surgical History:   Procedure Laterality Date    CHOLECYSTECTOMY  1991    KNEE SURGERY  1998    right knee Minutes per session: Not on file    Stress: Not on file   Relationships    Social connections:     Talks on phone: Not on file     Gets together: Not on file     Attends L[employer]-day service: Not on file     Active member of club or organization: Not on file     Attends meetings of clubs or organizations: Not on file     Relationship status: Not on file    Intimate partner violence:     Fear of current or ex partner: Not on file     Emotionally abused: Not on file     Physically abused: Not on file     Forced sexual activity: Not on file   Other Topics Concern    Not on file   Social History Narrative    Working now at [employer] jobs from [address] [place] [address]   Constitutional: Positive for appetite change and fatigue. Negative for activity change, chills, diaphoresis, fever and unexpected weight change. HENT: Positive for congestion and sore throat. Negative for sinus pressure, sinus pain and trouble swallowing. Respiratory: Positive for cough and shortness of breath. Negative for chest tightness and wheezing. Cardiovascular: Negative for chest pain, palpitations and leg swelling. Gastrointestinal: Negative for abdominal pain, constipation, diarrhea and nausea. Skin: Negative. Neurological: Negative. Psychiatric/Behavioral: Negative. OBJECTIVE:     /70 (Site: Right Upper Arm, Position: Sitting, Cuff Size: Medium Adult)   Pulse 79   Temp 98 °F (36.7 °C)   Ht 5' 9\" (1.753 m)   Wt 252 lb 4.8 oz (114.4 kg)   SpO2 97%   BMI 37.26 kg/m²     Physical Exam  Vitals signs reviewed. Constitutional:       General: She is not in acute distress. Appearance: She is well-developed. She is not diaphoretic. HENT:      Head: Normocephalic and atraumatic.       Right Ear: External ear normal.      Left Ear: External ear normal.      Nose: Nose normal.   Eyes:      Conjunctiva/sclera: Conjunctivae normal.      Pupils: Pupils are equal, round, and reactive to light. Neck:      Musculoskeletal: Normal range of motion and neck supple. Cardiovascular:      Rate and Rhythm: Normal rate and regular rhythm. Heart sounds: Normal heart sounds. Pulmonary:      Effort: Pulmonary effort is normal. No tachypnea, bradypnea, accessory muscle usage or respiratory distress. Breath sounds: Examination of the right-upper field reveals wheezing. Examination of the left-upper field reveals wheezing. Examination of the right-lower field reveals wheezing. Examination of the left-lower field reveals wheezing. Wheezing present. Abdominal:      Palpations: Abdomen is soft. Musculoskeletal: Normal range of motion. Skin:     General: Skin is warm and dry. Neurological:      Mental Status: She is alert and oriented to person, place, and time. Psychiatric:         Behavior: Behavior normal.         Thought Content: Thought content normal.         Judgment: Judgment normal.         No results found for requested labs within last 30 days.      LDL Calculated (mg/dL)   Date Value   09/25/2019 47       Lab Results   Component Value Date    WBC 11.0 01/03/2020    WBC 8.3 10/24/2019    WBC 6.4 09/25/2019    NEUTROABS 3.1 09/25/2019    HGB 14.0 01/03/2020    HGB 13.7 10/24/2019    HGB 14.1 09/25/2019    HCT 44.9 01/03/2020    HCT 43.0 10/24/2019    HCT 41.7 09/25/2019    MCV 87.0 01/03/2020    MCV 85.7 10/24/2019    MCV 82.7 09/25/2019     01/03/2020     10/24/2019     09/25/2019    SEGSABS 6.4 01/03/2020    SEGSABS 4.5 10/24/2019    SEGSABS 3.5 12/07/2017    LYMPHSABS 3.5 01/03/2020    MONOSABS 0.9 01/03/2020    EOSABS 0.1 01/03/2020    BASOSABS 0.1 01/03/2020     Lab Results   Component Value Date    TSH 3.16 09/25/2019    TSHHS 1.769 08/11/2012     Lab Results   Component Value Date    LABALBU 3.8 10/24/2019    BILITOT 0.4 10/24/2019    AST 13 10/24/2019    ALT <5 10/24/2019    ALKPHOS 78 10/24/2019             No results found for this visit on

## 2020-02-20 ASSESSMENT — ENCOUNTER SYMPTOMS
ABDOMINAL PAIN: 0
SORE THROAT: 1
SINUS PRESSURE: 0
TROUBLE SWALLOWING: 0
DIARRHEA: 0
NAUSEA: 0
SINUS PAIN: 0
CONSTIPATION: 0

## 2020-03-09 ENCOUNTER — OFFICE VISIT (OUTPATIENT)
Dept: PULMONOLOGY | Age: 70
End: 2020-03-09
Payer: MEDICARE

## 2020-03-09 VITALS
WEIGHT: 250 LBS | DIASTOLIC BLOOD PRESSURE: 66 MMHG | OXYGEN SATURATION: 96 % | HEART RATE: 70 BPM | BODY MASS INDEX: 35.79 KG/M2 | HEIGHT: 70 IN | SYSTOLIC BLOOD PRESSURE: 124 MMHG

## 2020-03-09 PROCEDURE — G8427 DOCREV CUR MEDS BY ELIG CLIN: HCPCS | Performed by: INTERNAL MEDICINE

## 2020-03-09 PROCEDURE — G8417 CALC BMI ABV UP PARAM F/U: HCPCS | Performed by: INTERNAL MEDICINE

## 2020-03-09 PROCEDURE — G8400 PT W/DXA NO RESULTS DOC: HCPCS | Performed by: INTERNAL MEDICINE

## 2020-03-09 PROCEDURE — 3017F COLORECTAL CA SCREEN DOC REV: CPT | Performed by: INTERNAL MEDICINE

## 2020-03-09 PROCEDURE — 1036F TOBACCO NON-USER: CPT | Performed by: INTERNAL MEDICINE

## 2020-03-09 PROCEDURE — G8484 FLU IMMUNIZE NO ADMIN: HCPCS | Performed by: INTERNAL MEDICINE

## 2020-03-09 PROCEDURE — 4040F PNEUMOC VAC/ADMIN/RCVD: CPT | Performed by: INTERNAL MEDICINE

## 2020-03-09 PROCEDURE — 99213 OFFICE O/P EST LOW 20 MIN: CPT | Performed by: INTERNAL MEDICINE

## 2020-03-09 PROCEDURE — 1090F PRES/ABSN URINE INCON ASSESS: CPT | Performed by: INTERNAL MEDICINE

## 2020-03-09 PROCEDURE — 1123F ACP DISCUSS/DSCN MKR DOCD: CPT | Performed by: INTERNAL MEDICINE

## 2020-04-06 RX ORDER — HYDROXYZINE HYDROCHLORIDE 25 MG/1
25 TABLET, FILM COATED ORAL EVERY 6 HOURS PRN
Qty: 30 TABLET | Refills: 1 | Status: SHIPPED | OUTPATIENT
Start: 2020-04-06 | End: 2020-07-02 | Stop reason: SDUPTHER

## 2020-04-06 RX ORDER — BACLOFEN 10 MG/1
5 TABLET ORAL NIGHTLY PRN
Qty: 15 TABLET | Refills: 2 | Status: SHIPPED | OUTPATIENT
Start: 2020-04-06 | End: 2020-04-07 | Stop reason: SDUPTHER

## 2020-04-07 ENCOUNTER — TELEPHONE (OUTPATIENT)
Dept: FAMILY MEDICINE CLINIC | Age: 70
End: 2020-04-07

## 2020-04-07 RX ORDER — MECLIZINE HYDROCHLORIDE 25 MG/1
25 TABLET ORAL 3 TIMES DAILY PRN
Qty: 30 TABLET | Refills: 0 | Status: SHIPPED | OUTPATIENT
Start: 2020-04-07 | End: 2020-12-09

## 2020-04-07 RX ORDER — MECLIZINE HYDROCHLORIDE CHEWABLE TABLETS 25 MG/1
25 TABLET, CHEWABLE ORAL DAILY PRN
Qty: 30 TABLET | Refills: 1 | Status: CANCELLED | OUTPATIENT
Start: 2020-04-07

## 2020-04-07 RX ORDER — BACLOFEN 10 MG/1
5 TABLET ORAL NIGHTLY PRN
Qty: 30 TABLET | Refills: 1 | Status: SHIPPED | OUTPATIENT
Start: 2020-04-07 | End: 2020-06-22 | Stop reason: SDUPTHER

## 2020-04-07 NOTE — TELEPHONE ENCOUNTER
Patient stated she has been having back pain from the dampness in the air and has been taking Baclofen half pill during the day and a half pill in the night. She is asking if it is okay to do this and if she can have a enough pills to last her a month. Also asking for refill on Meclizine 25 mg to take as needed. Last filled was in October.

## 2020-06-22 ENCOUNTER — OFFICE VISIT (OUTPATIENT)
Dept: FAMILY MEDICINE CLINIC | Age: 70
End: 2020-06-22
Payer: MEDICARE

## 2020-06-22 VITALS
TEMPERATURE: 96.7 F | WEIGHT: 258.2 LBS | SYSTOLIC BLOOD PRESSURE: 126 MMHG | HEART RATE: 71 BPM | DIASTOLIC BLOOD PRESSURE: 76 MMHG | HEIGHT: 70 IN | BODY MASS INDEX: 36.97 KG/M2 | OXYGEN SATURATION: 97 %

## 2020-06-22 PROCEDURE — 4040F PNEUMOC VAC/ADMIN/RCVD: CPT | Performed by: NURSE PRACTITIONER

## 2020-06-22 PROCEDURE — 1036F TOBACCO NON-USER: CPT | Performed by: NURSE PRACTITIONER

## 2020-06-22 PROCEDURE — 1090F PRES/ABSN URINE INCON ASSESS: CPT | Performed by: NURSE PRACTITIONER

## 2020-06-22 PROCEDURE — 3017F COLORECTAL CA SCREEN DOC REV: CPT | Performed by: NURSE PRACTITIONER

## 2020-06-22 PROCEDURE — 1123F ACP DISCUSS/DSCN MKR DOCD: CPT | Performed by: NURSE PRACTITIONER

## 2020-06-22 PROCEDURE — G8400 PT W/DXA NO RESULTS DOC: HCPCS | Performed by: NURSE PRACTITIONER

## 2020-06-22 PROCEDURE — G8417 CALC BMI ABV UP PARAM F/U: HCPCS | Performed by: NURSE PRACTITIONER

## 2020-06-22 PROCEDURE — G8427 DOCREV CUR MEDS BY ELIG CLIN: HCPCS | Performed by: NURSE PRACTITIONER

## 2020-06-22 PROCEDURE — 99213 OFFICE O/P EST LOW 20 MIN: CPT | Performed by: NURSE PRACTITIONER

## 2020-06-22 RX ORDER — LIDOCAINE HCL 4% 4 G/100G
1 CREAM TOPICAL 2 TIMES DAILY
Qty: 85 G | Refills: 0 | Status: ON HOLD | OUTPATIENT
Start: 2020-06-22 | End: 2020-07-10

## 2020-06-22 RX ORDER — BACLOFEN 10 MG/1
10 TABLET ORAL NIGHTLY PRN
Qty: 30 TABLET | Refills: 3 | Status: SHIPPED | OUTPATIENT
Start: 2020-06-22 | End: 2020-10-19 | Stop reason: ALTCHOICE

## 2020-06-22 RX ORDER — LIDOCAINE 5% 5 G/100G
1 CREAM TOPICAL NIGHTLY PRN
Qty: 113 G | Refills: 0 | Status: SHIPPED | OUTPATIENT
Start: 2020-06-22 | End: 2020-10-19 | Stop reason: ALTCHOICE

## 2020-06-22 RX ORDER — LANOLIN ALCOHOL/MO/W.PET/CERES
3 CREAM (GRAM) TOPICAL DAILY
Qty: 30 MG | Refills: 1 | Status: SHIPPED | OUTPATIENT
Start: 2020-06-22 | End: 2020-10-12

## 2020-06-22 ASSESSMENT — ENCOUNTER SYMPTOMS
COUGH: 0
BACK PAIN: 1
CHEST TIGHTNESS: 0
SHORTNESS OF BREATH: 0
WHEEZING: 0
TROUBLE SWALLOWING: 0
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
## 2020-07-06 RX ORDER — HYDROXYZINE HYDROCHLORIDE 25 MG/1
25 TABLET, FILM COATED ORAL EVERY 6 HOURS PRN
Qty: 30 TABLET | Refills: 1 | Status: SHIPPED | OUTPATIENT
Start: 2020-07-06 | End: 2020-11-06

## 2020-07-06 RX ORDER — SIMVASTATIN 40 MG
40 TABLET ORAL NIGHTLY
Qty: 90 TABLET | Refills: 2 | Status: SHIPPED | OUTPATIENT
Start: 2020-07-06 | End: 2020-10-19 | Stop reason: SDUPTHER

## 2020-07-10 ENCOUNTER — APPOINTMENT (OUTPATIENT)
Dept: GENERAL RADIOLOGY | Age: 70
End: 2020-07-10
Payer: MEDICARE

## 2020-07-10 ENCOUNTER — APPOINTMENT (OUTPATIENT)
Dept: CT IMAGING | Age: 70
End: 2020-07-10
Payer: MEDICARE

## 2020-07-10 ENCOUNTER — HOSPITAL ENCOUNTER (OUTPATIENT)
Age: 70
Setting detail: OBSERVATION
Discharge: HOME | End: 2020-07-14
Attending: INTERNAL MEDICINE | Admitting: INTERNAL MEDICINE
Payer: MEDICARE

## 2020-07-10 ENCOUNTER — APPOINTMENT (OUTPATIENT)
Dept: ULTRASOUND IMAGING | Age: 70
End: 2020-07-10
Payer: MEDICARE

## 2020-07-10 PROBLEM — R55 SYNCOPE AND COLLAPSE: Status: ACTIVE | Noted: 2020-07-10

## 2020-07-10 LAB
ALBUMIN SERPL-MCNC: 3.9 GM/DL (ref 3.4–5)
ALP BLD-CCNC: 77 IU/L (ref 40–129)
ALT SERPL-CCNC: 5 U/L (ref 10–40)
ANION GAP SERPL CALCULATED.3IONS-SCNC: 11 MMOL/L (ref 4–16)
AST SERPL-CCNC: 13 IU/L (ref 15–37)
BASOPHILS ABSOLUTE: 0 K/CU MM
BASOPHILS RELATIVE PERCENT: 0.4 % (ref 0–1)
BILIRUB SERPL-MCNC: 0.5 MG/DL (ref 0–1)
BUN BLDV-MCNC: 18 MG/DL (ref 6–23)
CALCIUM SERPL-MCNC: 9.3 MG/DL (ref 8.3–10.6)
CHLORIDE BLD-SCNC: 101 MMOL/L (ref 99–110)
CO2: 25 MMOL/L (ref 21–32)
CREAT SERPL-MCNC: 1.1 MG/DL (ref 0.6–1.1)
DIFFERENTIAL TYPE: ABNORMAL
EOSINOPHILS ABSOLUTE: 0.2 K/CU MM
EOSINOPHILS RELATIVE PERCENT: 2.2 % (ref 0–3)
GFR AFRICAN AMERICAN: 59 ML/MIN/1.73M2
GFR NON-AFRICAN AMERICAN: 49 ML/MIN/1.73M2
GLUCOSE BLD-MCNC: 99 MG/DL (ref 70–99)
HCT VFR BLD CALC: 45.3 % (ref 37–47)
HEMOGLOBIN: 14.3 GM/DL (ref 12.5–16)
IMMATURE NEUTROPHIL %: 0.6 % (ref 0–0.43)
LYMPHOCYTES ABSOLUTE: 2.5 K/CU MM
LYMPHOCYTES RELATIVE PERCENT: 36.5 % (ref 24–44)
MCH RBC QN AUTO: 27.5 PG (ref 27–31)
MCHC RBC AUTO-ENTMCNC: 31.6 % (ref 32–36)
MCV RBC AUTO: 87.1 FL (ref 78–100)
MONOCYTES ABSOLUTE: 0.7 K/CU MM
MONOCYTES RELATIVE PERCENT: 10.1 % (ref 0–4)
NUCLEATED RBC %: 0 %
PDW BLD-RTO: 14.3 % (ref 11.7–14.9)
PLATELET # BLD: 241 K/CU MM (ref 140–440)
PMV BLD AUTO: 9.5 FL (ref 7.5–11.1)
POTASSIUM SERPL-SCNC: 4.2 MMOL/L (ref 3.5–5.1)
PRO-BNP: 44.3 PG/ML
RBC # BLD: 5.2 M/CU MM (ref 4.2–5.4)
SEGMENTED NEUTROPHILS ABSOLUTE COUNT: 3.5 K/CU MM
SEGMENTED NEUTROPHILS RELATIVE PERCENT: 50.2 % (ref 36–66)
SODIUM BLD-SCNC: 137 MMOL/L (ref 135–145)
TOTAL IMMATURE NEUTOROPHIL: 0.04 K/CU MM
TOTAL NUCLEATED RBC: 0 K/CU MM
TOTAL PROTEIN: 7.7 GM/DL (ref 6.4–8.2)
TROPONIN T: <0.01 NG/ML
TROPONIN T: <0.01 NG/ML
WBC # BLD: 6.9 K/CU MM (ref 4–10.5)

## 2020-07-10 PROCEDURE — 93880 EXTRACRANIAL BILAT STUDY: CPT

## 2020-07-10 PROCEDURE — 93010 ELECTROCARDIOGRAM REPORT: CPT | Performed by: INTERNAL MEDICINE

## 2020-07-10 PROCEDURE — 6360000002 HC RX W HCPCS: Performed by: NURSE PRACTITIONER

## 2020-07-10 PROCEDURE — 85025 COMPLETE CBC W/AUTO DIFF WBC: CPT

## 2020-07-10 PROCEDURE — 83880 ASSAY OF NATRIURETIC PEPTIDE: CPT

## 2020-07-10 PROCEDURE — 80053 COMPREHEN METABOLIC PANEL: CPT

## 2020-07-10 PROCEDURE — 72131 CT LUMBAR SPINE W/O DYE: CPT

## 2020-07-10 PROCEDURE — 93005 ELECTROCARDIOGRAM TRACING: CPT | Performed by: PHYSICIAN ASSISTANT

## 2020-07-10 PROCEDURE — 6370000000 HC RX 637 (ALT 250 FOR IP): Performed by: PHYSICIAN ASSISTANT

## 2020-07-10 PROCEDURE — 72125 CT NECK SPINE W/O DYE: CPT

## 2020-07-10 PROCEDURE — 72170 X-RAY EXAM OF PELVIS: CPT

## 2020-07-10 PROCEDURE — 6370000000 HC RX 637 (ALT 250 FOR IP): Performed by: NURSE PRACTITIONER

## 2020-07-10 PROCEDURE — 2580000003 HC RX 258: Performed by: NURSE PRACTITIONER

## 2020-07-10 PROCEDURE — 70450 CT HEAD/BRAIN W/O DYE: CPT

## 2020-07-10 PROCEDURE — G0378 HOSPITAL OBSERVATION PER HR: HCPCS

## 2020-07-10 PROCEDURE — 84484 ASSAY OF TROPONIN QUANT: CPT

## 2020-07-10 PROCEDURE — 99285 EMERGENCY DEPT VISIT HI MDM: CPT

## 2020-07-10 PROCEDURE — 36415 COLL VENOUS BLD VENIPUNCTURE: CPT

## 2020-07-10 PROCEDURE — 96372 THER/PROPH/DIAG INJ SC/IM: CPT

## 2020-07-10 PROCEDURE — 71045 X-RAY EXAM CHEST 1 VIEW: CPT

## 2020-07-10 RX ORDER — ACETAMINOPHEN 325 MG/1
650 TABLET ORAL EVERY 6 HOURS PRN
Status: DISCONTINUED | OUTPATIENT
Start: 2020-07-10 | End: 2020-07-14 | Stop reason: HOSPADM

## 2020-07-10 RX ORDER — SODIUM CHLORIDE 0.9 % (FLUSH) 0.9 %
10 SYRINGE (ML) INJECTION PRN
Status: DISCONTINUED | OUTPATIENT
Start: 2020-07-10 | End: 2020-07-14 | Stop reason: HOSPADM

## 2020-07-10 RX ORDER — SODIUM CHLORIDE 0.9 % (FLUSH) 0.9 %
10 SYRINGE (ML) INJECTION EVERY 12 HOURS SCHEDULED
Status: DISCONTINUED | OUTPATIENT
Start: 2020-07-10 | End: 2020-07-14 | Stop reason: HOSPADM

## 2020-07-10 RX ORDER — FLUTICASONE PROPIONATE 50 MCG
1 SPRAY, SUSPENSION (ML) NASAL DAILY
Status: DISCONTINUED | OUTPATIENT
Start: 2020-07-11 | End: 2020-07-14 | Stop reason: HOSPADM

## 2020-07-10 RX ORDER — CETIRIZINE HYDROCHLORIDE 10 MG/1
10 TABLET ORAL DAILY
Status: DISCONTINUED | OUTPATIENT
Start: 2020-07-11 | End: 2020-07-14 | Stop reason: HOSPADM

## 2020-07-10 RX ORDER — LANOLIN ALCOHOL/MO/W.PET/CERES
3 CREAM (GRAM) TOPICAL DAILY
Status: DISCONTINUED | OUTPATIENT
Start: 2020-07-10 | End: 2020-07-14 | Stop reason: HOSPADM

## 2020-07-10 RX ORDER — ASPIRIN 81 MG/1
81 TABLET ORAL DAILY
Status: DISCONTINUED | OUTPATIENT
Start: 2020-07-10 | End: 2020-07-14 | Stop reason: HOSPADM

## 2020-07-10 RX ORDER — PROMETHAZINE HYDROCHLORIDE 25 MG/1
12.5 TABLET ORAL EVERY 6 HOURS PRN
Status: DISCONTINUED | OUTPATIENT
Start: 2020-07-10 | End: 2020-07-14 | Stop reason: HOSPADM

## 2020-07-10 RX ORDER — ONDANSETRON 2 MG/ML
4 INJECTION INTRAMUSCULAR; INTRAVENOUS EVERY 6 HOURS PRN
Status: DISCONTINUED | OUTPATIENT
Start: 2020-07-10 | End: 2020-07-14 | Stop reason: HOSPADM

## 2020-07-10 RX ORDER — SODIUM CHLORIDE 9 MG/ML
INJECTION, SOLUTION INTRAVENOUS CONTINUOUS
Status: DISCONTINUED | OUTPATIENT
Start: 2020-07-10 | End: 2020-07-11

## 2020-07-10 RX ORDER — MONTELUKAST SODIUM 10 MG/1
10 TABLET ORAL NIGHTLY
Status: DISCONTINUED | OUTPATIENT
Start: 2020-07-10 | End: 2020-07-14 | Stop reason: HOSPADM

## 2020-07-10 RX ORDER — CLOPIDOGREL BISULFATE 75 MG/1
75 TABLET ORAL DAILY
Status: DISCONTINUED | OUTPATIENT
Start: 2020-07-10 | End: 2020-07-14 | Stop reason: HOSPADM

## 2020-07-10 RX ORDER — HYDROXYZINE HYDROCHLORIDE 25 MG/1
25 TABLET, FILM COATED ORAL EVERY 6 HOURS PRN
Status: DISCONTINUED | OUTPATIENT
Start: 2020-07-10 | End: 2020-07-14 | Stop reason: HOSPADM

## 2020-07-10 RX ORDER — ACETAMINOPHEN 650 MG/1
650 SUPPOSITORY RECTAL EVERY 6 HOURS PRN
Status: DISCONTINUED | OUTPATIENT
Start: 2020-07-10 | End: 2020-07-14 | Stop reason: HOSPADM

## 2020-07-10 RX ORDER — HYDROCODONE BITARTRATE AND ACETAMINOPHEN 5; 325 MG/1; MG/1
1 TABLET ORAL ONCE
Status: COMPLETED | OUTPATIENT
Start: 2020-07-10 | End: 2020-07-10

## 2020-07-10 RX ORDER — POLYETHYLENE GLYCOL 3350 17 G/17G
17 POWDER, FOR SOLUTION ORAL DAILY PRN
Status: DISCONTINUED | OUTPATIENT
Start: 2020-07-10 | End: 2020-07-14 | Stop reason: HOSPADM

## 2020-07-10 RX ORDER — LISINOPRIL AND HYDROCHLOROTHIAZIDE 20; 12.5 MG/1; MG/1
1 TABLET ORAL DAILY
Status: DISCONTINUED | OUTPATIENT
Start: 2020-07-10 | End: 2020-07-13

## 2020-07-10 RX ORDER — BACLOFEN 10 MG/1
10 TABLET ORAL NIGHTLY PRN
Status: DISCONTINUED | OUTPATIENT
Start: 2020-07-10 | End: 2020-07-14 | Stop reason: HOSPADM

## 2020-07-10 RX ORDER — HYDROCODONE BITARTRATE AND ACETAMINOPHEN 5; 325 MG/1; MG/1
1 TABLET ORAL EVERY 6 HOURS PRN
Status: DISCONTINUED | OUTPATIENT
Start: 2020-07-10 | End: 2020-07-14 | Stop reason: HOSPADM

## 2020-07-10 RX ADMIN — MELATONIN 3 MG: at 22:02

## 2020-07-10 RX ADMIN — SODIUM CHLORIDE: 9 INJECTION, SOLUTION INTRAVENOUS at 22:06

## 2020-07-10 RX ADMIN — MONTELUKAST 10 MG: 10 TABLET, FILM COATED ORAL at 22:02

## 2020-07-10 RX ADMIN — SODIUM CHLORIDE, PRESERVATIVE FREE 10 ML: 5 INJECTION INTRAVENOUS at 22:03

## 2020-07-10 RX ADMIN — HYDROCODONE BITARTRATE AND ACETAMINOPHEN 1 TABLET: 5; 325 TABLET ORAL at 22:02

## 2020-07-10 RX ADMIN — CLOPIDOGREL BISULFATE 75 MG: 75 TABLET ORAL at 22:06

## 2020-07-10 RX ADMIN — ENOXAPARIN SODIUM 40 MG: 40 INJECTION SUBCUTANEOUS at 22:02

## 2020-07-10 RX ADMIN — LISINOPRIL AND HYDROCHLOROTHIAZIDE 1 TABLET: 12.5; 2 TABLET ORAL at 22:11

## 2020-07-10 RX ADMIN — ASPIRIN 81 MG: 81 TABLET, COATED ORAL at 22:06

## 2020-07-10 RX ADMIN — HYDROCODONE BITARTRATE AND ACETAMINOPHEN 1 TABLET: 5; 325 TABLET ORAL at 15:29

## 2020-07-10 ASSESSMENT — PAIN SCALES - GENERAL
PAINLEVEL_OUTOF10: 6
PAINLEVEL_OUTOF10: 0
PAINLEVEL_OUTOF10: 5
PAINLEVEL_OUTOF10: 0
PAINLEVEL_OUTOF10: 10
PAINLEVEL_OUTOF10: 10

## 2020-07-10 ASSESSMENT — PAIN DESCRIPTION - ORIENTATION
ORIENTATION: LOWER
ORIENTATION: LOWER;LEFT

## 2020-07-10 ASSESSMENT — PAIN DESCRIPTION - PAIN TYPE
TYPE: CHRONIC PAIN
TYPE: CHRONIC PAIN

## 2020-07-10 ASSESSMENT — PAIN DESCRIPTION - LOCATION
LOCATION: BACK
LOCATION: BACK

## 2020-07-10 NOTE — ED NOTES
Hospitalist Consult  Paged Dr Arlet Thakur at [phone]. RANDY responded at 56.      Lenora Sizer  07/10/20 [id number]

## 2020-07-10 NOTE — ED TRIAGE NOTES
Pt presents to ED for c/o fall. Pt states she was drying off after a shower while sitting on the toilet. Pt states she got sudden onset dizziness and fell off the toilet. Unknown LOC. Pt denies CP or SOB prior to falling or at this time. Pt denies being dizzy at this time. Pt denies visual disturbances or n/t/w. Pt's only c/o is back pain at this time however pt has chronic back pain and reports pain is not new. Pt a&ox4. GCS 15 speaking clear complete sentences. MAEx4 with equal strength. PMS intact. Given warm blanket for comfort. CCM and CPOX in place. Call light in reach. Will cont. To monitor.

## 2020-07-10 NOTE — ED PROVIDER NOTES
As triage Physician Assistant, I performed a medical screening history and physical exam on this patient. HISTORY OF PRESENT ILLNESS  Chastity Naylor is a 68 y.o. female  who presents with fall. Onset prior to arrival.  Context is patient had just finished her shower and was seated on the commode to dry her legs when she felt dizzy, lightheadedness. Upon attempting to stand up she \"passed out\". She does not recall how long she was passed out for but does state the next thing she knows she was \"on the floor\". She denies any preceding chest pain or shortness of breath. She notes low back pain status post fall. She states she has a history of chronic low back pain and feels she may have flared it up from the fall. PHYSICAL EXAM  /65   Pulse 78   Temp 98.5 °F (36.9 °C) (Oral)   Resp 16   Ht 5' 9\" (1.753 m)   Wt 258 lb (117 kg)   SpO2 97%   BMI 38.10 kg/m²         On exam, the patient appears well-hydrated, well-nourished, and in no acute distress. Mucous membranes are moist. Breathing is unlabored. Skin is dry. Mental status appears normal. Moves all extremities, and is without facial droop. Speech is clear. Any necessary Labs, Imaging, and/or treatment were initiated and patient moved to an emergency department exam room. Comment: Please note this report has been produced using speech recognition software and may contain errors related to that system including errors in grammar, punctuation, and spelling, as well as words and phrases that may be inappropriate. If there are any questions or concerns please feel free to contact the dictating provider for clarification.         Po Carter [place]  07/10/20 1829

## 2020-07-10 NOTE — H&P
History and Physical      Name:  Shira Tolbert /Age/Sex: 1950  (68 y.o. female)   MRN & CSN:  [id number] & [id number] Admission Date/Time: 7/10/2020  1:09 PM   [facility] PCP: Priyanka Godoy, [address] [facility] Day: 1        Admitting Physician: Dr. Lizeth Blount and Plan:   Shira Tolbert is a 68 y.o. female who presents with  Fall (fall, states leg gave out or she passed out while drying off from shower, back pain chronic, pt denies other injury from today fall)     Syncope and Collapse   Admit to Med/Surg   Neuro checks/orthostatics   Pending echo/carotids   Consult Cardiology follows with [facility]   Telemetry/troponin trend     Chronic Lower back pain exacerbation  fall   PRN symptom control     Essential hypertension- continue home antihypertensive regimen- Monitor BP trends.  PVD s/p PCI 3/2020     Patient case discussed with ED provider    Diet DIET GENERAL;   DVT Prophylaxis [x] Lovenox, []  Heparin, [] SCDs, [] Ambulation  [] Long term AC   GI Prophylaxis [x] PPI,  [] H2 Blocker,  [] Carafate,  [] Diet/Tube Feeds   Code Status Full     Disposition Admit to inpatient. Patient plans to return home upon discharge   MDM [] Low, [x] Moderate,[]  High     -Patient assessment and plan discussed and reviewed with admitting physician: Chris Vasquez MD.   History of Present Illness:     Chief Complaint: Fall (fall, states leg gave out or she passed out while drying off from shower, back pain chronic, pt denies other injury from today fall)    Shira Tolbert is a 68 y.o. female who presents after episode of loss of consciousness. The patient states she was prior to episode she was getting out of shower and sitting on her shower chair and attempted to stand up but also remembers is waking on the floor. She reports intermittent episodes of dizziness over the past month but she was attributing to chronic sinusitis due to \"allergies\".   She has a admission for similar presentation 10/2019    Ten point ROS: reviewed negative, unless as noted in above HPI. Objective:   No intake or output data in the 24 hours ending 07/10/20 1743     Vitals:   Vitals:    07/10/20 1532 07/10/20 1535 07/10/20 1603 07/10/20 1701   BP:   (!) 125/58 125/67   Pulse: 71 65 55 57   Resp: 23 21 17 19   Temp:       TempSrc:       SpO2: 96% 96% 96% 92%   Weight:       Height:           Physical Exam: 07/10/20     Gen:  awake, alert, cooperative, no apparent distress obese body habitus  Head/Eyes:  Normocephalic atraumatic, EOMI   NECK:   symmetrical, trachea midline  LUNGS: Normal Effort/ symmetry movement   CARDIOVASCULAR:  Normal rate Tele SR  ABDOMEN: Non tender, non distended, no HSM noted. MUSCULOSKELETAL: no gross deformities  NEUROLOGIC: Alert and Oriented,  Cranial nerves II-XII are grossly intact. SKIN:  no bruising or bleeding, normal skin color,  no redness    Past Medical History:      Past Medical History:   Diagnosis Date    Active smoker     Active smoker     Ankle fracture, left 2006    Asthma     Chondromalacia of right knee     Dr. Jessi Quesada + MRI    Chronic back pain     Chronic cough 10/4/2019    Depression     has seen psychiatry in [facility] 6 months    Dizziness     CT brain normal -6/18/2012    Family history of early CAD     Father - 4st MI age 50, Massive MI age 71    H/O cardiovascular stress test 8/7/2012 8/7/2012-Lexiscan-Normal perfusion. LVSF normal.EF 58%    H/O Doppler ultrasound 12/11/2019     Abnormal left lower extremity arterial Doppler ultrasound. The Left lower extremity arteries have a Monophasic waveform suggestive of inflow disease, The Left BARBER shows Severe peripheral arterial disease. Normal right lower extremity arterial Doppler ultrasound. Normal right lower extremity ankle brachial indices    H/O echocardiogram 8/7/2012 8/7/2012-LVSF normal. EF =>55%. impaired LV relaxation.      Herniated intervertebral disk     thoracic and lumbar  Hypertension     Mild persistent asthma without complication 71/3/9400    Normal echocardiogram 8/2012    EF=> 55%-Dr. Deatrice Oppenheim    Obesity (BMI 30-39. 9)     Obstructive sleep apnea 10/4/2019    Other screening mammogram     PAD (peripheral artery disease) ([facility] [address] Angioplasty 01/06/2020     LCIA 90% INSTENT RESTENOSED TO 0% WITH PTA.  Peripheral vascular disease ([facility] [address]     Postmenopausal     Shoulder fracture, left 2008    Tobacco abuse 10/4/2019       Past Surgical  History:    has a past surgical history that includes Tonsillectomy and adenoidectomy (1963); Tubal ligation (1977); knee surgery (1998); Cholecystectomy (1991); tumor removal (1999); and tumor removal (1976). Social History:    FAM HX: Reviewed  family history includes Allergies in her brother, father, mother, and sister; Bleeding Prob in her brother and sister; Cancer in her brother and maternal grandmother; Diabetes in her daughter and maternal grandfather; Glaucoma in her maternal grandfather, maternal grandmother, paternal grandfather, and paternal grandmother; Heart Disease in her father and maternal uncle; High Blood Pressure in her brother, father, mother, and sister; Kidney Disease in her maternal aunt and maternal uncle; Migraines in her mother and sister; Obesity in her brother, father, mother, and sister; Thyroid Disease in her maternal aunt. Soc HX:   Social History     Socioeconomic History    Marital status:       Spouse name: None    Number of children: 1    Years of education: None    Highest education level: None   Occupational History    Occupation: Customer Service   Social Needs    Financial resource strain: None    Food insecurity     Worry: None     Inability: None    Transportation needs     Medical: None     Non-medical: None   Tobacco Use    Smoking status: Former Smoker     Packs/day: 0.25     Years: 39.00     Pack years: 9.75     Types: Cigarettes     Last attempt to quit: 12/20/2019     Years since quittin.5    Smokeless tobacco: Never Used   Substance and Sexual Activity    Alcohol use: Yes     Comment: socially    Drug use: No    Sexual activity: None   Lifestyle    Physical activity     Days per week: None     Minutes per session: None    Stress: None   Relationships    Social connections     Talks on phone: None     Gets together: None     Attends Sikh service: None     Active member of club or organization: None     Attends meetings of clubs or organizations: None     Relationship status: None    Intimate partner violence     Fear of current or ex partner: None     Emotionally abused: None     Physically abused: None     Forced sexual activity: None   Other Topics Concern    None   Social History Narrative    Working now at [employer] jobs from [email or web address]:   reports that she quit smoking about 6 months ago. Her smoking use included cigarettes. She has a 9.75 pack-year smoking history. She has never used smokeless tobacco.  ETOH:   reports current alcohol use. Drugs:  reports no history of drug use. Allergies: Allergies   Allergen Reactions    Latex Hives and Rash    Cipro Xr Hives and Shortness Of Breath    Soap Hives and Shortness Of Breath     [place] Soap, Tide detergent      Tomato Hives and Shortness Of Breath    Influenza Vaccines Hives    Soybean-Containing Drug Products        Home Medications:     Prior to Admission medications    Medication Sig Start Date End Date Taking?  Authorizing Provider   simvastatin (ZOCOR) 40 MG tablet Take 1 tablet by mouth nightly 20   Allen Pouch, APRN - CNP   hydrOXYzine (ATARAX) 25 MG tablet Take 1 tablet by mouth every 6 hours as needed for Itching 20   Allen Pouch, APRN - CNP   baclofen (LIORESAL) 10 MG tablet Take 1 tablet by mouth nightly as needed (muscle spasms) 20   Allen Pouch, APRN - CNP   Lidocaine 5 % CREA Apply 1 applicator topically nightly as needed (chronic low back pain) 6/22/20   ADALID Coley CNP   melatonin (RA MELATONIN) 3 MG TABS tablet Take 1 tablet by mouth daily 6/22/20   ADALID Coley CNP   Lidocaine HCl (ASPERCREME W/LIDOCAINE) 4 % CREA Apply 1 inch topically 2 times daily 6/22/20   ADALID Coley CNP   lisinopril-hydrochlorothiazide (PRINZIDE;ZESTORETIC) 20-12.5 MG per tablet Take 1 tablet by mouth daily 2/11/20   Jillian Hdz MD   montelukast (SINGULAIR) 10 MG tablet Take 1 tablet by mouth nightly 2/6/20   ADALID Coley CNP   clopidogrel (PLAVIX) 75 MG tablet Take 1 tablet by mouth daily 1/6/20   Jillian Hdz MD   ADVAIR DISKUS 500-50 MCG/DOSE diskus inhaler Inhale 1 puff into the lungs every 12 hours After inhalation then gargle 11/6/19   ADALID Coley CNP   fluticasone [facility]) 50 MCG/ACT nasal spray 1 spray by Each Nostril route daily 10/26/19   Min Jack MD   cetirizine (ZYRTEC) 10 MG tablet Take 1 tablet by mouth daily 9/4/19   ADALID Coley CNP   aspirin 81 MG tablet Take 1 tablet by mouth daily 9/4/19   ADALID Coley CNP   diphenhydrAMINE (BENADRYL) 25 MG tablet Take 1 tablet by mouth nightly as needed for Itching 9/4/19   ADALID Coley CNP   albuterol sulfate HFA (PROVENTIL HFA) 108 (90 Base) MCG/ACT inhaler Inhale 2 puffs into the lungs every 4 hours as needed for Wheezing or Shortness of Breath (cough) 9/4/19   ADALID Coley CNP   albuterol (PROVENTIL) (5 MG/ML) 0.5% nebulizer solution Take 1 mL by nebulization every 6 hours as needed for Wheezing or Shortness of Breath 9/4/19 12/1/24  ADALID Coley CNP   ipratropium-albuterol (DUONEB) 0.5-2.5 (3) MG/3ML SOLN nebulizer solution Inhale 3 mLs into the lungs 4 times daily 9/4/19   ADALID Coley CNP         Medications:   Medications:    Infusions:   PRN Meds:     Data:     Laboratory this visit:  Reviewed  Recent Labs     07/10/20  1412   WBC 6.9   HGB 14.3   HCT 45.3         Recent Labs ORDERING SYSTEM PROVIDED HISTORY: injury TECHNOLOGIST PROVIDED HISTORY: Reason for exam:->injury Reason for Exam: syncope, fall FINDINGS: CT HEAD: BRAIN/VENTRICLES: There is no acute intracranial hemorrhage, mass effect or midline shift. No abnormal extra-axial fluid collection. The gray-white differentiation is maintained without evidence of an acute infarct. There is no evidence of hydrocephalus. ORBITS: The visualized portion of the orbits demonstrate no acute abnormality. SINUSES: The visualized paranasal sinuses and mastoid air cells demonstrate no acute abnormality. SOFT TISSUES/SKULL: No acute abnormality of the visualized skull or soft tissues. CT CERVICAL SPINE: BONES/ALIGNMENT: There is no acute fracture or traumatic malalignment. DEGENERATIVE CHANGES: Mild multilevel degenerative disc disease and facet osteoarthritis. SOFT TISSUES: There is no prevertebral soft tissue swelling. CT head: No evidence for acute intracranial pathology. CT cervical spine: No acute abnormality of the cervical spine. Ct Cervical Spine Wo Contrast    Result Date: 7/10/2020  EXAMINATION: CT OF THE HEAD WITHOUT CONTRAST; CT OF THE CERVICAL SPINE WITHOUT CONTRAST 7/10/2020 2:17 pm TECHNIQUE: CT of the head was performed without the administration of intravenous contrast. Dose modulation, iterative reconstruction, and/or weight based adjustment of the mA/kV was utilized to reduce the radiation dose to as low as reasonably achievable.; CT of the cervical spine was performed without the administration of intravenous contrast. Multiplanar reformatted images are provided for review. Dose modulation, iterative reconstruction, and/or weight based adjustment of the mA/kV was utilized to reduce the radiation dose to as low as reasonably achievable. COMPARISON: CT head 10/24/2019. No prior cervical spine studies.  HISTORY: ORDERING SYSTEM PROVIDED HISTORY: syncope, fall TECHNOLOGIST PROVIDED HISTORY: Has a \"code stroke\" or \"stroke degenerative disc disease. Severe bilateral facet arthropathy in the lower lumbar spine. SOFT TISSUES/RETROPERITONEUM: Status post cholecystectomy. Metal stent in the left common iliac artery. No acute lumbar spine abnormality. Xr Chest Portable    Result Date: 7/10/2020  EXAMINATION: ONE XRAY VIEW OF THE CHEST 7/10/2020 1:38 pm COMPARISON: 10/24/2019. HISTORY: ORDERING SYSTEM PROVIDED HISTORY: chest pain TECHNOLOGIST PROVIDED HISTORY: Reason for exam:->chest pain Reason for Exam: chest pian Acuity: Acute Type of Exam: Initial Additional signs and symptoms: na Relevant Medical/Surgical History: na FINDINGS: The lungs are without acute focal process. There is no effusion or pneumothorax. The cardiomediastinal silhouette is without acute process. The osseous structures are without acute process. No acute process.          Electronically signed by ADALID Gr CNP on 7/10/2020 at 5:43 PM

## 2020-07-10 NOTE — ED PROVIDER NOTES
Patient Identification  Bindu Preciado is a 68 y.o. female    Chief Complaint  Fall (fall, states leg gave out or she passed out while drying off from shower, back pain chronic, pt denies other injury from today fall)      HPI  (History provided by patient)  This is a 68 y.o. female who was brought in by EMS for CC of fall, syncope. Patient reports that she was getting out of the shower, sitting on toilet drying off when she began feeling dizzy, tried to stand up and then woke up on the floor. She reports her chronic back pain in the lower back is slightly worse than usual, more on the left. Denies radiation of pain. No headache. No chest pain or rib pain. No extremity injuries. She has no history of syncope or cardiac disease but does report frequent episodes of dizziness that she was told was due to an inner ear problem in the past.      REVIEW OF SYSTEMS    Constitutional:  Denies fever, chills  HENT:  Denies sore throat or ear pain   Eyes: Denies vision changes, eye pain  Cardiovascular:  Denies chest pain. + syncope  Respiratory:  Denies shortness of breath, cough   GI:  Denies abdominal pain, nausea, vomiting  :  Denies dysuria, discharge  Musculoskeletal:  Denies joint pain.  + back pain  Skin:  Denies rash, pruritis  Neurologic:  Denies headache, focal weakness, or sensory changes     See HPI and nursing notes for additional information     I have reviewed the following nursing documentation:  Allergies:    Allergies   Allergen Reactions    Latex Hives and Rash    Cipro Xr Hives and Shortness Of Breath    Soap Hives and Shortness Of Breath     [facility] Soap, Tide detergent      Tomato Hives and Shortness Of Breath    Influenza Vaccines Hives    Soybean-Containing Drug Products        Past medical history:  has a past medical history of Active smoker, Active smoker, Ankle fracture, left (2006), Asthma, Chondromalacia of right knee, Chronic back pain, Chronic cough (10/4/2019), Depression, ADVAIR DISKUS 500-50 MCG/DOSE diskus inhaler Inhale 1 puff into the lungs every 12 hours After inhalation then gargle 11/6/19   ADALID Canseco CNP   fluticasone [facility]) 50 MCG/ACT nasal spray 1 spray by Each Nostril route daily 10/26/19   Mary Anne Camacho MD   cetirizine (ZYRTEC) 10 MG tablet Take 1 tablet by mouth daily 9/4/19   ADALID Canseco CNP   aspirin 81 MG tablet Take 1 tablet by mouth daily 9/4/19   ADALID Canseco CNP   diphenhydrAMINE (BENADRYL) 25 MG tablet Take 1 tablet by mouth nightly as needed for Itching 9/4/19   ADALID Canseco CNP   albuterol sulfate HFA (PROVENTIL HFA) 108 (90 Base) MCG/ACT inhaler Inhale 2 puffs into the lungs every 4 hours as needed for Wheezing or Shortness of Breath (cough) 9/4/19   ADALID Canseco CNP   albuterol (PROVENTIL) (5 MG/ML) 0.5% nebulizer solution Take 1 mL by nebulization every 6 hours as needed for Wheezing or Shortness of Breath 9/4/19 12/1/24  ADALID Canseco CNP   ipratropium-albuterol (DUONEB) 0.5-2.5 (3) MG/3ML SOLN nebulizer solution Inhale 3 mLs into the lungs 4 times daily 9/4/19   ADALID Canseco CNP       Social history:  reports that she quit smoking about 6 months ago. Her smoking use included cigarettes. She has a 9.75 pack-year smoking history. She has never used smokeless tobacco. She reports current alcohol use. She reports that she does not use drugs.     Family history:    Family History   Problem Relation Age of Onset    High Blood Pressure Mother     Allergies Mother    [facility] Migraines Mother     Obesity Mother     Heart Disease Father     High Blood Pressure Father     Allergies Father     Obesity Father     Diabetes Daughter     High Blood Pressure Sister     Allergies Sister     Bleeding Prob Sister     Migraines Sister     Obesity Sister     Cancer Brother     High Blood Pressure Brother     Allergies Brother     Bleeding Prob Brother     Obesity Brother     Thyroid Disease Maternal Aunt     Kidney Disease Maternal Aunt     Heart Disease Maternal Uncle     Kidney Disease Maternal Uncle     Cancer Maternal Grandmother     Glaucoma Maternal Grandmother     Diabetes Maternal Grandfather     Glaucoma Maternal Grandfather     Glaucoma Paternal Grandmother     Glaucoma Paternal Grandfather          Exam  BP (!) 125/58   Pulse 55   Temp 98.5 °F (36.9 °C) (Oral)   Resp 17   Ht 5' 9\" (1.753 m)   Wt 258 lb (117 kg)   SpO2 96%   BMI 38.10 kg/m²   Nursing note and vitals reviewed. Constitutional: Well developed, well nourished. No acute distress. HENT:      Head: Normocephalic and atraumatic. Ears: External ears normal.      Nose: Nose normal.     Mouth: Membrane mucosa moist and pink. No posterior oropharynx erythema or tonsillar edema  Eyes: Anicteric sclera. No discharge, PERRL  Neck: Supple. Trachea midline. Cardiovascular: RRR, no murmurs, rubs, or gallops, radial pulses 2+ bilaterally. Pulmonary/Chest: Effort normal. No respiratory distress. CTAB. No stridor. No wheezes. No rales. Abdominal: Soft. Nontender to palpation. No distension. No guarding, rebound tenderness, or evidence of ascites. : No CVA tenderness. Musculoskeletal: Moves all extremities. No gross deformity. No tenderness to palpation of the cervical or thoracic spine or paraspinal muscle. Diffuse tenderness to palpation of the lumbar spine as well as the bilateral lumbar paraspinal muscles noted. NEUROLOGICAL: Awake and alert. GCS 15. Cranial nerves 2-12 grossly intact. Strength 5/5 throughout. Light touch sensation intact throughout. Skin: Warm and dry. No rash. Psychiatric: Normal mood and affect. Behavior is normal.      EKG   Please see Dr. Blossom Frazier note for EKG read.       Radiographs (if obtained):  [] The following radiograph was interpreted by myself in the absence of a radiologist:   [x] Radiologist's Report Reviewed:  XR PELVIS (1-2 VIEWS)   Final Result   No acute abnormality of the pelvis. If there is persistent clinical concern   for radiographically occult fracture in the patient is unable to bear weight,   consider further characterization with MRI. XR CHEST PORTABLE   Final Result   No acute process. CT LUMBAR SPINE WO CONTRAST   Final Result   No acute lumbar spine abnormality. CT Cervical Spine WO Contrast   Final Result   CT head:      No evidence for acute intracranial pathology. CT cervical spine:      No acute abnormality of the cervical spine. CT Head WO Contrast   Final Result   CT head:      No evidence for acute intracranial pathology. CT cervical spine:      No acute abnormality of the cervical spine.                 Labs  Results for orders placed or performed during the hospital encounter of 07/10/20   CBC Auto Differential   Result Value Ref Range    WBC 6.9 4.0 - 10.5 K/CU MM    RBC 5.20 4.2 - 5.4 M/CU MM    Hemoglobin 14.3 12.5 - 16.0 GM/DL    Hematocrit 45.3 37 - 47 %    MCV 87.1 78 - 100 FL    MCH 27.5 27 - 31 PG    MCHC 31.6 (L) 32.0 - 36.0 %    RDW 14.3 11.7 - 14.9 %    Platelets 706 508 - 709 K/CU MM    MPV 9.5 7.5 - 11.1 FL    Differential Type AUTOMATED DIFFERENTIAL     Segs Relative 50.2 36 - 66 %    Lymphocytes % 36.5 24 - 44 %    Monocytes % 10.1 (H) 0 - 4 %    Eosinophils % 2.2 0 - 3 %    Basophils % 0.4 0 - 1 %    Segs Absolute 3.5 K/CU MM    Lymphocytes Absolute 2.5 K/CU MM    Monocytes Absolute 0.7 K/CU MM    Eosinophils Absolute 0.2 K/CU MM    Basophils Absolute 0.0 K/CU MM    Nucleated RBC % 0.0 %    Total Nucleated RBC 0.0 K/CU MM    Total Immature Neutrophil 0.04 K/CU MM    Immature Neutrophil % 0.6 (H) 0 - 0.43 %   Comprehensive Metabolic Panel   Result Value Ref Range    Sodium 137 135 - 145 MMOL/L    Potassium 4.2 3.5 - 5.1 MMOL/L    Chloride 101 99 - 110 mMol/L    CO2 25 21 - 32 MMOL/L    BUN 18 6 - 23 MG/DL    CREATININE 1.1 0.6 - 1.1 MG/DL    Glucose 99 70 - 99 MG/DL    Calcium 9.3 8.3 - 10.6 MG/DL    Alb 3.9 3.4 - 5.0 GM/DL    Total Protein 7.7 6.4 - 8.2 GM/DL    Total Bilirubin 0.5 0.0 - 1.0 MG/DL    ALT 5 (L) 10 - 40 U/L    AST 13 (L) 15 - 37 IU/L    Alkaline Phosphatase 77 40 - 129 IU/L    GFR Non- 49 (L) >60 mL/min/1.73m2    GFR  59 (L) >60 mL/min/1.73m2    Anion Gap 11 4 - 16   Troponin   Result Value Ref Range    Troponin T <0.010 <0.01 NG/ML   Brain Natriuretic Peptide   Result Value Ref Range    Pro-BNP 44.30 <300 PG/ML   EKG 12 Lead   Result Value Ref Range    Ventricular Rate 66 BPM    Atrial Rate 66 BPM    P-R Interval 134 ms    QRS Duration 72 ms    Q-T Interval 396 ms    QTc Calculation (Bazett) 415 ms    P Axis 65 degrees    R Axis 36 degrees    T Axis 39 degrees    Diagnosis       Normal sinus rhythm  Low voltage QRS  Borderline ECG  When compared with ECG of 24-OCT-2019 21:38,  No significant change was found           MDM  Patient presents for syncopal episode while drying herself off after a shower. She notes exacerbation of chronic low back pain but no other injuries. Felt dizzy prior to syncopal episode. Unsure how long she was unconscious. EKG here shows normal sinus rhythm. CT of head and neck and lumbar spine reveals no acute findings. Chest x-ray and pelvis x-ray are negative. Laboratory testing is unremarkable. Unclear source of patient's syncopal episode, may be vasovagal, recommended observation in hospital for malignant arrhythmia or recurrence of symptoms. Patient comfortable with this. Consult placed to hospitalist, Idania Davis CNP, who agreed to admit. In consideration of current COVID19 pandemic, with effort to minimize unnecessary provider exposure, this patient was seen at bedside by me independently. However, in compliance with current hospital PATITO/ED protocol, prior to admission I did discuss this patient case with emergency department physician, Dr. Rhonda Das. Of note, this Pt was NOT admitted to the ICU.     Final Impression  1. Syncope and collapse    2. Acute exacerbation of chronic low back pain        Blood pressure (!) 125/58, pulse 55, temperature 98.5 °F (36.9 °C), temperature source Oral, resp. rate 17, height 5' 9\" (1.753 m), weight 258 lb (117 kg), SpO2 96 %. Disposition:  Admit to telemetry in stable condition. Patient was given scripts for the following medications. I counseled patient how to take these medications. New Prescriptions    No medications on file       This chart was generated using the [address] [place] [address] dictation system. I created this record but it may contain dictation errors given the limitations of this technology.        Vickie Lombardo PA-C  07/10/20 6931

## 2020-07-11 LAB
ALBUMIN SERPL-MCNC: 3.7 GM/DL (ref 3.4–5)
ALP BLD-CCNC: 73 IU/L (ref 40–129)
ALT SERPL-CCNC: <5 U/L (ref 10–40)
ANION GAP SERPL CALCULATED.3IONS-SCNC: 8 MMOL/L (ref 4–16)
AST SERPL-CCNC: 12 IU/L (ref 15–37)
BASOPHILS ABSOLUTE: 0 K/CU MM
BASOPHILS RELATIVE PERCENT: 0.5 % (ref 0–1)
BILIRUB SERPL-MCNC: 0.5 MG/DL (ref 0–1)
BUN BLDV-MCNC: 15 MG/DL (ref 6–23)
CALCIUM SERPL-MCNC: 9.2 MG/DL (ref 8.3–10.6)
CHLORIDE BLD-SCNC: 97 MMOL/L (ref 99–110)
CO2: 27 MMOL/L (ref 21–32)
CREAT SERPL-MCNC: 1.1 MG/DL (ref 0.6–1.1)
DIFFERENTIAL TYPE: ABNORMAL
EOSINOPHILS ABSOLUTE: 0.2 K/CU MM
EOSINOPHILS RELATIVE PERCENT: 2.5 % (ref 0–3)
GFR AFRICAN AMERICAN: 59 ML/MIN/1.73M2
GFR NON-AFRICAN AMERICAN: 49 ML/MIN/1.73M2
GLUCOSE BLD-MCNC: 88 MG/DL (ref 70–99)
HCT VFR BLD CALC: 41.6 % (ref 37–47)
HEMOGLOBIN: 13.2 GM/DL (ref 12.5–16)
IMMATURE NEUTROPHIL %: 0.5 % (ref 0–0.43)
LYMPHOCYTES ABSOLUTE: 2.1 K/CU MM
LYMPHOCYTES RELATIVE PERCENT: 35.1 % (ref 24–44)
MCH RBC QN AUTO: 27.8 PG (ref 27–31)
MCHC RBC AUTO-ENTMCNC: 31.7 % (ref 32–36)
MCV RBC AUTO: 87.8 FL (ref 78–100)
MONOCYTES ABSOLUTE: 0.6 K/CU MM
MONOCYTES RELATIVE PERCENT: 9.7 % (ref 0–4)
NUCLEATED RBC %: 0 %
PDW BLD-RTO: 14.5 % (ref 11.7–14.9)
PLATELET # BLD: 209 K/CU MM (ref 140–440)
PMV BLD AUTO: 9.5 FL (ref 7.5–11.1)
POTASSIUM SERPL-SCNC: 3.9 MMOL/L (ref 3.5–5.1)
RBC # BLD: 4.74 M/CU MM (ref 4.2–5.4)
SEGMENTED NEUTROPHILS ABSOLUTE COUNT: 3.1 K/CU MM
SEGMENTED NEUTROPHILS RELATIVE PERCENT: 51.7 % (ref 36–66)
SODIUM BLD-SCNC: 132 MMOL/L (ref 135–145)
TOTAL IMMATURE NEUTOROPHIL: 0.03 K/CU MM
TOTAL NUCLEATED RBC: 0 K/CU MM
TOTAL PROTEIN: 6.6 GM/DL (ref 6.4–8.2)
TROPONIN T: <0.01 NG/ML
WBC # BLD: 6.1 K/CU MM (ref 4–10.5)

## 2020-07-11 PROCEDURE — 2580000003 HC RX 258: Performed by: NURSE PRACTITIONER

## 2020-07-11 PROCEDURE — 6370000000 HC RX 637 (ALT 250 FOR IP): Performed by: NURSE PRACTITIONER

## 2020-07-11 PROCEDURE — 80053 COMPREHEN METABOLIC PANEL: CPT

## 2020-07-11 PROCEDURE — 84484 ASSAY OF TROPONIN QUANT: CPT

## 2020-07-11 PROCEDURE — 99205 OFFICE O/P NEW HI 60 MIN: CPT | Performed by: INTERNAL MEDICINE

## 2020-07-11 PROCEDURE — 85025 COMPLETE CBC W/AUTO DIFF WBC: CPT

## 2020-07-11 PROCEDURE — 6360000002 HC RX W HCPCS: Performed by: NURSE PRACTITIONER

## 2020-07-11 PROCEDURE — G0378 HOSPITAL OBSERVATION PER HR: HCPCS

## 2020-07-11 PROCEDURE — 96372 THER/PROPH/DIAG INJ SC/IM: CPT

## 2020-07-11 RX ADMIN — SODIUM CHLORIDE: 9 INJECTION, SOLUTION INTRAVENOUS at 11:08

## 2020-07-11 RX ADMIN — SODIUM CHLORIDE, PRESERVATIVE FREE 10 ML: 5 INJECTION INTRAVENOUS at 09:09

## 2020-07-11 RX ADMIN — CLOPIDOGREL BISULFATE 75 MG: 75 TABLET ORAL at 09:09

## 2020-07-11 RX ADMIN — CETIRIZINE HYDROCHLORIDE 10 MG: 10 TABLET, FILM COATED ORAL at 09:09

## 2020-07-11 RX ADMIN — ENOXAPARIN SODIUM 40 MG: 40 INJECTION SUBCUTANEOUS at 21:36

## 2020-07-11 RX ADMIN — ASPIRIN 81 MG: 81 TABLET, COATED ORAL at 09:09

## 2020-07-11 RX ADMIN — HYDROCODONE BITARTRATE AND ACETAMINOPHEN 1 TABLET: 5; 325 TABLET ORAL at 09:09

## 2020-07-11 RX ADMIN — LISINOPRIL AND HYDROCHLOROTHIAZIDE 1 TABLET: 12.5; 2 TABLET ORAL at 09:09

## 2020-07-11 RX ADMIN — HYDROCODONE BITARTRATE AND ACETAMINOPHEN 1 TABLET: 5; 325 TABLET ORAL at 18:44

## 2020-07-11 RX ADMIN — MELATONIN 3 MG: at 21:36

## 2020-07-11 RX ADMIN — MONTELUKAST 10 MG: 10 TABLET, FILM COATED ORAL at 21:36

## 2020-07-11 ASSESSMENT — ENCOUNTER SYMPTOMS
VOMITING: 0
COUGH: 0
COLOR CHANGE: 0
WHEEZING: 0
PHOTOPHOBIA: 0
BACK PAIN: 1
BLOOD IN STOOL: 0
CHEST TIGHTNESS: 0
NAUSEA: 0
CONSTIPATION: 0
SHORTNESS OF BREATH: 0
DIARRHEA: 0
ABDOMINAL PAIN: 0
EYE PAIN: 0

## 2020-07-11 ASSESSMENT — PAIN SCALES - GENERAL
PAINLEVEL_OUTOF10: 0
PAINLEVEL_OUTOF10: 9
PAINLEVEL_OUTOF10: 0
PAINLEVEL_OUTOF10: 8
PAINLEVEL_OUTOF10: 9

## 2020-07-11 ASSESSMENT — PAIN DESCRIPTION - LOCATION: LOCATION: BACK

## 2020-07-11 ASSESSMENT — PAIN DESCRIPTION - ORIENTATION: ORIENTATION: LOWER

## 2020-07-11 ASSESSMENT — PAIN DESCRIPTION - PAIN TYPE: TYPE: CHRONIC PAIN

## 2020-07-11 NOTE — ED PROVIDER NOTES
ED attending EKG interpretation (I otherwise did not participate in the care of this patient)     EKG Interpretation  Interpreted by me  Compared to 10/24/19  Rhythm: normal sinus   Rate: normal 66  Axis: normal  Ectopy: none  Conduction: normal  ST Segments: no acute change  T Waves: no acute change  Clinical Impression: normal sinus rhythm, no acute change     Candida Jacobo MD  07/11/20 2268

## 2020-07-11 NOTE — CONSULTS
Electrophysiology Consult Note      Reason for consultation: Syncope    Chief complaint : Syncope    Referring physician: ADALID Vitale      Primary care physician: ADALID Tan - CNP      History of Present Illness:     Patient is a 77-year-old female with history of vertigo, peripheral arterial disease, obstructive sleep apnea, asthma, hypertension, obesity presented to the emergency room after a syncopal episode. She reports that she had gotten out of the shower and was sitting on the toilet and bent over to dry off and became dizzy and lightheaded. She reports that she sat up and felt better. She then notes that when she stood up and in the next thing she realizes she found herself on the floor. She reports she did not have any confusion after waking up. She knew exactly where she was. She denied any loss of bowel or bladder movements. She denies any tongue biting. Patient reports this is her first episode of syncope. She thinks she passed out only momentarily. She denies any palpitations. She reports she had episodes of feeling lightheaded diaphoretic and flushed that are associated with dizziness before but never passing out. She also reports the above symptoms also happen with coughing. She denies any chest pain, shortness of breath      Pastmedical history:   Past Medical History:   Diagnosis Date    Active smoker     Active smoker     Ankle fracture, left 2006    Asthma     Chondromalacia of right knee     Dr. Danelle Pineda + MRI    Chronic back pain     Chronic cough 10/4/2019    Depression     has seen psychiatry in [place] 6 months    Dizziness     CT brain normal -6/18/2012    Family history of early CAD     Father - 4st MI age 50, Massive MI age 71    H/O cardiovascular stress test 8/7/2012 8/7/2012-Lexiscan-Normal perfusion. LVSF normal.EF 58%    H/O Doppler ultrasound 12/11/2019     Abnormal left lower extremity arterial Doppler ultrasound. The Left lower extremity arteries have a Monophasic waveform suggestive of inflow disease, The Left BARBER shows Severe peripheral arterial disease. Normal right lower extremity arterial Doppler ultrasound. Normal right lower extremity ankle brachial indices    H/O echocardiogram 8/7/2012 8/7/2012-LVSF normal. EF =>55%. impaired LV relaxation.  Herniated intervertebral disk     thoracic and lumbar    Hypertension     Mild persistent asthma without complication 46/8/9151    Normal echocardiogram 8/2012    EF=> 55%-Dr. Peggy Key    Obesity (BMI 30-39. 9)     Obstructive sleep apnea 10/4/2019    Other screening mammogram     PAD (peripheral artery disease) ([address] Angioplasty 01/06/2020     LCIA 90% INSTENT RESTENOSED TO 0% WITH PTA.     Peripheral vascular disease ([address]     Postmenopausal     Shoulder fracture, left 2008    Tobacco abuse 10/4/2019       Surgical history :   Past Surgical History:   Procedure Laterality Date    CHOLECYSTECTOMY  1991    KNEE SURGERY  1998    right knee    TONSILLECTOMY AND ADENOIDECTOMY  1963    TUBAL LIGATION  1977    TUMOR REMOVAL  1999    parotid    TUMOR REMOVAL  1976    left thigh       Family history:   Family History   Problem Relation Age of Onset    High Blood Pressure Mother     Allergies Mother    [facility] Migraines Mother     Obesity Mother     Heart Disease Father     High Blood Pressure Father     Allergies Father     Obesity Father     Diabetes Daughter     High Blood Pressure Sister     Allergies Sister     Bleeding Prob Sister     Migraines Sister     Obesity Sister     Cancer Brother     High Blood Pressure Brother     Allergies Brother     Bleeding Prob Brother     Obesity Brother     Thyroid Disease Maternal Aunt     Kidney Disease Maternal Aunt     Heart Disease Maternal Uncle     Kidney Disease Maternal Uncle     Cancer Maternal Grandmother     Glaucoma Maternal Grandmother     Diabetes Maternal Grandfather     90 tablet 2    diphenhydrAMINE (BENADRYL) 25 MG tablet Take 1 tablet by mouth nightly as needed for Itching 90 tablet 2    albuterol sulfate HFA (PROVENTIL HFA) 108 (90 Base) MCG/ACT inhaler Inhale 2 puffs into the lungs every 4 hours as needed for Wheezing or Shortness of Breath (cough) 1 Inhaler 2    ipratropium-albuterol (DUONEB) 0.5-2.5 (3) MG/3ML SOLN nebulizer solution Inhale 3 mLs into the lungs 4 times daily 120 vial 1    albuterol (PROVENTIL) (5 MG/ML) 0.5% nebulizer solution Take 1 mL by nebulization every 6 hours as needed for Wheezing or Shortness of Breath 100 vial 1       Review of Systems:   Review of Systems   Constitutional: Positive for fatigue. Negative for activity change, chills and fever. HENT: Positive for congestion and postnasal drip. Negative for ear pain and tinnitus. Eyes: Negative for photophobia, pain and visual disturbance. Respiratory: Negative for cough, chest tightness, shortness of breath and wheezing. Cardiovascular: Negative for chest pain, palpitations and leg swelling. Gastrointestinal: Negative for abdominal pain, blood in stool, constipation, diarrhea, nausea and vomiting. Endocrine: Negative for cold intolerance and heat intolerance. Genitourinary: Negative for dysuria, flank pain and hematuria. Musculoskeletal: Positive for arthralgias and back pain. Negative for myalgias and neck stiffness. Skin: Negative for color change and rash. Allergic/Immunologic: Negative for food allergies. Neurological: Positive for dizziness, syncope and light-headedness. Negative for numbness and headaches. Hematological: Does not bruise/bleed easily. Psychiatric/Behavioral: Negative for agitation, behavioral problems and confusion.        Examination:      Vitals:    07/10/20 2222 07/11/20 0636 07/11/20 0900 07/11/20 0910   BP: 124/60 (!) 112/51 133/63    Pulse: 76 57 63 61   Resp: 22 20 17    Temp: 98.9 °F (37.2 °C) 98 °F (36.7 °C) 97.9 °F (36.6 °C)    TempSrc: Oral Oral Oral    SpO2: 95% 93% 95%    Weight:  254 lb 11.2 oz (115.5 kg)     Height:            Body mass index is 37.61 kg/m². Physical Exam  Constitutional:       Appearance: She is well-developed. HENT:      Head: Normocephalic and atraumatic. Nose: No congestion. Mouth/Throat:      Mouth: Mucous membranes are moist.   Eyes:      Conjunctiva/sclera: Conjunctivae normal.      Pupils: Pupils are equal, round, and reactive to light. Neck:      Musculoskeletal: Normal range of motion. Thyroid: No thyromegaly. Vascular: No JVD. Cardiovascular:      Rate and Rhythm: Normal rate and regular rhythm. Heart sounds: Normal heart sounds. No murmur. No friction rub. Pulmonary:      Effort: Pulmonary effort is normal. No respiratory distress. Breath sounds: Normal breath sounds. No stridor. No wheezing. Abdominal:      General: Bowel sounds are normal. There is no distension. Palpations: Abdomen is soft. Tenderness: There is no abdominal tenderness. Musculoskeletal: Normal range of motion. General: No tenderness. Skin:     General: Skin is warm and dry. Findings: No erythema. Neurological:      General: No focal deficit present. Mental Status: She is alert and oriented to person, place, and time. Cranial Nerves: No cranial nerve deficit.    Psychiatric:         Behavior: Behavior normal.           CBC:   Lab Results   Component Value Date    WBC 6.9 07/10/2020    HGB 14.3 07/10/2020    HCT 45.3 07/10/2020     07/10/2020     Lipids:  Lab Results   Component Value Date    CHOL 119 09/25/2019    TRIG 98 09/25/2019    HDL 52 09/25/2019    LDLCALC 47 09/25/2019     PT/INR:   Lab Results   Component Value Date    INR 0.80 01/03/2020        BMP:    Lab Results   Component Value Date     07/10/2020    K 4.2 07/10/2020     07/10/2020    CO2 25 07/10/2020    BUN 18 07/10/2020     CMP:   Lab Results   Component Value Date    AST 13 (L) 07/10/2020    PROT 7.7 07/10/2020    BILITOT 0.5 07/10/2020    ALKPHOS 77 07/10/2020     TSH:    Lab Results   Component Value Date    TSH 3.16 09/25/2019       EKGINTERPRETATION - EKG Interpretation:  SINUS RHYTHM, no ischemic changes, QTC normal range      IMPRESSION / RECOMMENDATIONS:       Syncope appears vasovagal  Hypertension  Obesity  Obstructive sleep apnea    Patient description of syncope appears to be vasovagal  Given that she is about the age of 34 years it is important to ascertain that if this is cardioinhibitory syncope versus vasoinhibitor a syncope. Patient reports having multiple episodes even with coughing but never passed out but near syncopal.    So I would like to get a tilt table test on the patient prior to discharge. Echo is already ordered    Thanks again for allowing me to participate in care of this patient. Please call me if you have any questions. With best regards. Amy Harding MD, 7/11/2020 9:59 AM     Please note this report has been partially produced using speech recognition software and may contain errors related to that system including errors in grammar, punctuation, and spelling, as well as words and phrases that may be inappropriate. If there are any questions or concerns please feel free to contact the dictating provider for clarification.

## 2020-07-12 PROCEDURE — 6370000000 HC RX 637 (ALT 250 FOR IP): Performed by: NURSE PRACTITIONER

## 2020-07-12 PROCEDURE — 94761 N-INVAS EAR/PLS OXIMETRY MLT: CPT

## 2020-07-12 PROCEDURE — 2580000003 HC RX 258: Performed by: NURSE PRACTITIONER

## 2020-07-12 PROCEDURE — G0378 HOSPITAL OBSERVATION PER HR: HCPCS

## 2020-07-12 PROCEDURE — 6360000002 HC RX W HCPCS: Performed by: NURSE PRACTITIONER

## 2020-07-12 PROCEDURE — 99212 OFFICE O/P EST SF 10 MIN: CPT | Performed by: NURSE PRACTITIONER

## 2020-07-12 PROCEDURE — 96372 THER/PROPH/DIAG INJ SC/IM: CPT

## 2020-07-12 RX ADMIN — MELATONIN 3 MG: at 20:39

## 2020-07-12 RX ADMIN — CETIRIZINE HYDROCHLORIDE 10 MG: 10 TABLET, FILM COATED ORAL at 08:59

## 2020-07-12 RX ADMIN — MONTELUKAST 10 MG: 10 TABLET, FILM COATED ORAL at 20:39

## 2020-07-12 RX ADMIN — SODIUM CHLORIDE, PRESERVATIVE FREE 10 ML: 5 INJECTION INTRAVENOUS at 20:39

## 2020-07-12 RX ADMIN — ENOXAPARIN SODIUM 40 MG: 40 INJECTION SUBCUTANEOUS at 20:39

## 2020-07-12 RX ADMIN — HYDROCODONE BITARTRATE AND ACETAMINOPHEN 1 TABLET: 5; 325 TABLET ORAL at 00:46

## 2020-07-12 RX ADMIN — ASPIRIN 81 MG: 81 TABLET, COATED ORAL at 08:59

## 2020-07-12 RX ADMIN — CLOPIDOGREL BISULFATE 75 MG: 75 TABLET ORAL at 08:59

## 2020-07-12 RX ADMIN — LISINOPRIL AND HYDROCHLOROTHIAZIDE 1 TABLET: 12.5; 2 TABLET ORAL at 08:59

## 2020-07-12 RX ADMIN — HYDROCODONE BITARTRATE AND ACETAMINOPHEN 1 TABLET: 5; 325 TABLET ORAL at 08:59

## 2020-07-12 RX ADMIN — SODIUM CHLORIDE, PRESERVATIVE FREE 10 ML: 5 INJECTION INTRAVENOUS at 09:00

## 2020-07-12 RX ADMIN — HYDROCODONE BITARTRATE AND ACETAMINOPHEN 1 TABLET: 5; 325 TABLET ORAL at 19:16

## 2020-07-12 ASSESSMENT — PAIN DESCRIPTION - ORIENTATION: ORIENTATION: LOWER

## 2020-07-12 ASSESSMENT — PAIN SCALES - GENERAL
PAINLEVEL_OUTOF10: 8
PAINLEVEL_OUTOF10: 7
PAINLEVEL_OUTOF10: 8
PAINLEVEL_OUTOF10: 7

## 2020-07-12 ASSESSMENT — PAIN DESCRIPTION - PAIN TYPE: TYPE: CHRONIC PAIN

## 2020-07-12 ASSESSMENT — PAIN DESCRIPTION - LOCATION: LOCATION: BACK

## 2020-07-12 NOTE — PROGRESS NOTES
Hospitalist Progress Note      Name:  Genie Jay /Age/Sex: 1950  (68 y.o. female)   MRN & CSN:  [id number] & [id number] Admission Date/Time: 7/10/2020  1:09 PM   Location:  2667/0676-Z PCP: Pavan Meza 112 Day: 2    ASSESSMENT & PLAN:   Genie Jay is a 68 y.o.  female  Who was admitted to the hospital for syncopal episode. Disposition: Home  Awaiting Tilt Table Test on Monday. Non-Prodromal Syncope---took a shower and subsequently sat down. She bended while sitting and passed out. No prodromal symptoms. No similar episodes or syncopal episodes in the past.   ---Tilt Table Test  ---2D ECHO    Hypertension---- BP controlled  ---continue Lisinopril/HCTZ    Peripheral Arterial Disease----S/P PCI in 2020  ---Continue ASA/Plavix    OTTO    MEDICAL DECISION MAKING:  -Labs reviewed  -Imaging reviewed  -Level of risk moderate  Diet DIET GENERAL;  Diet NPO Time Specified   DVT Prophylaxis [x] Lovenox, []  Heparin, [] SCDs, [] Ambulation   GI Prophylaxis [] PPI,  [] H2 Blocker,  [] Carafate,  [] Diet/Tube Feeds   Code Status Full Code   Disposition home   MDM [] Low, [x] Moderate,[]  High     Chief complaint/Interval History/ROS     Chief Complaint:  Syncope      INTERVAL HISTORY:seen by cardiology. Tilt table test on Monday. ROS:  No chest pain. No abdominal pain. No nausea or vomiting  Objective: Intake/Output Summary (Last 24 hours) at 20207  Last data filed at 2020 5611  Gross per 24 hour   Intake 748 ml   Output 300 ml   Net 448 ml      Vitals:   Vitals:    20 2133   BP: 129/71   Pulse: 55   Resp: 18   Temp: 98.4 °F (36.9 °C)   SpO2: 96%     Physical Exam:   GEN: Awake female, AA female. Obese. Pleasant. No acute distress  EYES: No eye discharge. HENT:Moist Mucous membranes. No nasal bleeding. NECK: Supple,  RESP: no wheezing. No crackles. Symmetric breath sounds. CV: RRR. No pitting lower Ext edema. GI: Obese abdomen.    : No wright in place. MSK: no bony fractures. No gross deformities. SKIN: warm, dry, no rashes, no pressure ulcer  NEURO: Cranial nerves appear grossly intact, normal speech, no lateralizing weakness. PSYCH: Awake, alert, oriented x 4. Affect appropriate.     Medications:   Medications:    sodium chloride flush  10 mL Intravenous 2 times per day    enoxaparin  40 mg Subcutaneous Daily    aspirin  81 mg Oral Daily    clopidogrel  75 mg Oral Daily    cetirizine  10 mg Oral Daily    fluticasone  1 spray Each Nostril Daily    lisinopril-hydroCHLOROthiazide  1 tablet Oral Daily    melatonin  3 mg Oral Daily    montelukast  10 mg Oral Nightly      Infusions:    sodium chloride 75 mL/hr at 07/11/20 1108     PRN Meds: sodium chloride flush, 10 mL, PRN  acetaminophen, 650 mg, Q6H PRN    Or  acetaminophen, 650 mg, Q6H PRN  polyethylene glycol, 17 g, Daily PRN  promethazine, 12.5 mg, Q6H PRN    Or  ondansetron, 4 mg, Q6H PRN  baclofen, 10 mg, Nightly PRN  hydrOXYzine, 25 mg, Q6H PRN  HYDROcodone 5 mg - acetaminophen, 1 tablet, Q6H PRN          Electronically signed by Jose Duarte MD on 7/11/2020 at 10:57 PM

## 2020-07-13 LAB
LV EF: 53 %
LVEF MODALITY: NORMAL

## 2020-07-13 PROCEDURE — G0378 HOSPITAL OBSERVATION PER HR: HCPCS

## 2020-07-13 PROCEDURE — 96372 THER/PROPH/DIAG INJ SC/IM: CPT

## 2020-07-13 PROCEDURE — 6370000000 HC RX 637 (ALT 250 FOR IP): Performed by: NURSE PRACTITIONER

## 2020-07-13 PROCEDURE — 93660 TILT TABLE EVALUATION: CPT

## 2020-07-13 PROCEDURE — 6360000002 HC RX W HCPCS: Performed by: NURSE PRACTITIONER

## 2020-07-13 PROCEDURE — 93306 TTE W/DOPPLER COMPLETE: CPT

## 2020-07-13 PROCEDURE — 93660 TILT TABLE EVALUATION: CPT | Performed by: NURSE PRACTITIONER

## 2020-07-13 PROCEDURE — 2580000003 HC RX 258: Performed by: NURSE PRACTITIONER

## 2020-07-13 PROCEDURE — 7100000001 HC PACU RECOVERY - ADDTL 15 MIN

## 2020-07-13 PROCEDURE — 93660 TILT TABLE EVALUATION: CPT | Performed by: INTERNAL MEDICINE

## 2020-07-13 PROCEDURE — 94761 N-INVAS EAR/PLS OXIMETRY MLT: CPT

## 2020-07-13 PROCEDURE — 7100000000 HC PACU RECOVERY - FIRST 15 MIN

## 2020-07-13 RX ORDER — SENNA AND DOCUSATE SODIUM 50; 8.6 MG/1; MG/1
2 TABLET, FILM COATED ORAL DAILY PRN
Status: DISCONTINUED | OUTPATIENT
Start: 2020-07-13 | End: 2020-07-14 | Stop reason: HOSPADM

## 2020-07-13 RX ORDER — SPIRONOLACTONE 25 MG/1
25 TABLET ORAL DAILY
Qty: 90 TABLET | Refills: 0 | Status: CANCELLED | OUTPATIENT
Start: 2020-07-14

## 2020-07-13 RX ORDER — SPIRONOLACTONE 25 MG/1
25 TABLET ORAL DAILY
Status: DISCONTINUED | OUTPATIENT
Start: 2020-07-14 | End: 2020-07-14

## 2020-07-13 RX ADMIN — CLOPIDOGREL BISULFATE 75 MG: 75 TABLET ORAL at 11:14

## 2020-07-13 RX ADMIN — HYDROCODONE BITARTRATE AND ACETAMINOPHEN 1 TABLET: 5; 325 TABLET ORAL at 23:35

## 2020-07-13 RX ADMIN — HYDROCODONE BITARTRATE AND ACETAMINOPHEN 1 TABLET: 5; 325 TABLET ORAL at 14:50

## 2020-07-13 RX ADMIN — MONTELUKAST 10 MG: 10 TABLET, FILM COATED ORAL at 20:38

## 2020-07-13 RX ADMIN — ASPIRIN 81 MG: 81 TABLET, COATED ORAL at 11:13

## 2020-07-13 RX ADMIN — ACETAMINOPHEN 650 MG: 325 TABLET ORAL at 11:17

## 2020-07-13 RX ADMIN — LISINOPRIL AND HYDROCHLOROTHIAZIDE 1 TABLET: 12.5; 2 TABLET ORAL at 11:13

## 2020-07-13 RX ADMIN — ENOXAPARIN SODIUM 40 MG: 40 INJECTION SUBCUTANEOUS at 20:38

## 2020-07-13 RX ADMIN — CETIRIZINE HYDROCHLORIDE 10 MG: 10 TABLET, FILM COATED ORAL at 11:14

## 2020-07-13 RX ADMIN — HYDROCODONE BITARTRATE AND ACETAMINOPHEN 1 TABLET: 5; 325 TABLET ORAL at 06:17

## 2020-07-13 RX ADMIN — SODIUM CHLORIDE, PRESERVATIVE FREE 10 ML: 5 INJECTION INTRAVENOUS at 20:38

## 2020-07-13 ASSESSMENT — PAIN SCALES - GENERAL
PAINLEVEL_OUTOF10: 8
PAINLEVEL_OUTOF10: 9
PAINLEVEL_OUTOF10: 8
PAINLEVEL_OUTOF10: 8

## 2020-07-13 ASSESSMENT — PAIN DESCRIPTION - LOCATION: LOCATION: HEAD;BACK

## 2020-07-13 ASSESSMENT — PAIN DESCRIPTION - PAIN TYPE: TYPE: CHRONIC PAIN

## 2020-07-13 NOTE — PROGRESS NOTES
HEAD UP TILT TABLE TEST     Name: Courtney Vences  Date: 20  : 1950    MRN: [id number]  Physician: Gus Palacios MD     Allergies: Allergies   Allergen Reactions    Latex Hives and Rash    Cipro Xr Hives and Shortness Of Breath    Soap Hives and Shortness Of Breath     [facility] Soap, Tide detergent      Tomato Hives and Shortness Of Breath    Influenza Vaccines Hives    Soybean-Containing Drug Products         Medications:   Prior to Visit Medications    Medication Sig Taking?  Authorizing Provider   simvastatin (ZOCOR) 40 MG tablet Take 1 tablet by mouth nightly Yes Chaparrita Leslie, APRN - CNP   hydrOXYzine (ATARAX) 25 MG tablet Take 1 tablet by mouth every 6 hours as needed for Itching Yes Chaparrita Leslie, APRN - CNP   baclofen (LIORESAL) 10 MG tablet Take 1 tablet by mouth nightly as needed (muscle spasms) Yes Chaparrita Leslie, APRN - CNP   Lidocaine 5 % CREA Apply 1 applicator topically nightly as needed (chronic low back pain) Yes Chaparrita Leslie, APRN - CNP   melatonin (RA MELATONIN) 3 MG TABS tablet Take 1 tablet by mouth daily Yes Chaparrita Leslie, APRN - CNP   lisinopril-hydrochlorothiazide (PRINZIDE;ZESTORETIC) 20-12.5 MG per tablet Take 1 tablet by mouth daily Yes Carlos Celaya MD   montelukast (SINGULAIR) 10 MG tablet Take 1 tablet by mouth nightly Yes Chaparrita Leslie, APRN - CNP   clopidogrel (PLAVIX) 75 MG tablet Take 1 tablet by mouth daily Yes Carlos Celaya MD   ADVAIR DISKUS 500-50 MCG/DOSE diskus inhaler Inhale 1 puff into the lungs every 12 hours After inhalation then gargle Yes Chaparrita Leslie, APRN - CNP   fluticasone (FLONASE) 50 MCG/ACT nasal spray 1 spray by Each Nostril route daily Yes Zach Altman MD   cetirizine (ZYRTEC) 10 MG tablet Take 1 tablet by mouth daily Yes Chaparrita Leslie, APRN - CNP   aspirin 81 MG tablet Take 1 tablet by mouth daily Yes Chaparrita Leslie, APRN - CNP   diphenhydrAMINE (BENADRYL) 25 MG tablet Take 1 tablet by mouth nightly as needed for Itching Yes [place] ADALID Quesada CNP   albuterol sulfate HFA (PROVENTIL HFA) 108 (90 Base) MCG/ACT inhaler Inhale 2 puffs into the lungs every 4 hours as needed for Wheezing or Shortness of Breath (cough) Yes ADALID Gallegos CNP   ipratropium-albuterol (DUONEB) 0.5-2.5 (3) MG/3ML SOLN nebulizer solution Inhale 3 mLs into the lungs 4 times daily Yes ADALID Gallegos CNP   albuterol (PROVENTIL) (5 MG/ML) 0.5% nebulizer solution Take 1 mL by nebulization every 6 hours as needed for Wheezing or Shortness of Breath  ADALID Gallegos CNP         Past Medical History:   Diagnosis Date    Active smoker     Active smoker     Ankle fracture, left 2006    Asthma     Chondromalacia of right knee     Dr. Miguel Vizcaino + MRI    Chronic back pain     Chronic cough 10/4/2019    Depression     has seen psychiatry in [facility] 6 months    Dizziness     CT brain normal -6/18/2012    Family history of early CAD     Father - 1st MI age 50, Massive MI age 71    H/O cardiovascular stress test 8/7/2012 8/7/2012-Lexiscan-Normal perfusion. LVSF normal.EF 58%    H/O Doppler ultrasound 12/11/2019     Abnormal left lower extremity arterial Doppler ultrasound. The Left lower extremity arteries have a Monophasic waveform suggestive of inflow disease, The Left BARBER shows Severe peripheral arterial disease. Normal right lower extremity arterial Doppler ultrasound. Normal right lower extremity ankle brachial indices    H/O echocardiogram 8/7/2012 8/7/2012-LVSF normal. EF =>55%. impaired LV relaxation.  Herniated intervertebral disk     thoracic and lumbar    Hypertension     Mild persistent asthma without complication 20/2/3040    Normal echocardiogram 8/2012    EF=> 55%-Dr. Ginger Ayers    Obesity (BMI 30-39. 9)     Obstructive sleep apnea 10/4/2019    Other screening mammogram     PAD (peripheral artery disease) ([address] Angioplasty 01/06/2020     LCIA 90% INSTENT RESTENOSED TO 0% WITH PTA.     Peripheral vascular disease ([address]     Postmenopausal     Shoulder fracture, left 2008    Tobacco abuse 10/4/2019       Past Surgical History:   Procedure Laterality Date    CHOLECYSTECTOMY  1991    KNEE SURGERY  1998    right knee    TONSILLECTOMY AND ADENOIDECTOMY  1963    TUBAL LIGATION  1977    TUMOR REMOVAL  1999    parotid    TUMOR REMOVAL  1976    left thigh       [x] Patient verbalizes understanding of procedure    [x]  Consent obtained  [x]  NPO X13 hours  [x]  IV established with # 22 Site left anticube,  [x]  12 Lead EKG obtained   [x]  Electrolytes Na [x]  K [x]  CL [x]  CO2 [x]   Time:   07/11/2020             Date:0700    Nursing Notes/ Vital Signs/ LOC/ Skin Color alert and oriented color good     TIME BP HR RR SPO2 COMMENTS   0856 132/66 55 16 95 Denies dizziness   0858 121/73 81 22 96 Table up denies dizziness   0900 125/78 62 21 96 Skin warm and dry offers no c/o   0902 126/78 79 19 97 No changes   0904 112/70 72 12 96 Denies dizziness   0906 97/85 88 22 96 No changes   0908 86/57 89 22 95 Denies dizziness   0910 105/63 69 29 93 No changes denies dizziness   0912 124/74 77 24 95 No changes   0914 107/67 80 21 96 Denies dizziness   0917 108/62 76 22 95 Right and left carotid massage   0919 100/68 77 22 93 Nitro spray sublingual   0921 98/63 94 21 97 Complaints of being hot   0922 73/48 72 22 96 C/o feeling hot, bad   0923 58/34 62 22 93 Table down fluids open states she feels bad   0924 106/56 64 22 95 better   0926 110 66 69 23 93 Better denies dizziness   0927 104/63 67 22 94 Fluids remain open feeling better   0932 118/61 93 16 96 Sitting up no changes        Report called sent to echo

## 2020-07-13 NOTE — CARE COORDINATION
Chart reviewed, in to see pt for dc planning. Introduced self and board updated. Pt was admitted for syncopal episode. States she lives at home alone though currently has friends living with her. States she does not use nor need any DME. Explained she follows with PCP, has insurance with affordable RX co-pays and reliable transportation per [employer]. Discussed HHC and pt declined [address] need nor any other discharge needs. CM provided patient with AAA/Passport information for possible increased assistance with housekeeping and errands. CM remains available if needs arise.

## 2020-07-13 NOTE — DISCHARGE SUMMARY
Discharge Summary    Name:  Simran Delarosa /Age/Sex: 1950  (68 y.o. female)   MRN & CSN:  [id number] & [id number] Admission Date/Time: 7/10/2020  1:09 PM   Attending:  Dora Gomez MD Discharging Physician: Dora Gomez MD     Hospital Course:   Simran Delarosa is a 68 y.o.  female  Who was admitted to the hospital for syncopal episode. HOSPITAL COURSE:    Non-Prodromal Syncope----took a shower and subsequently sat down. She bended while sitting and passed out. No prodromal symptoms. No similar episodes or syncopal episodes in the past.   Has not had any more episodes of syncope while in the hospital.  2D echo with normal ejection fraction. No valvular disease. Tilt table testing was done. She was noted to phasic inhibitory syncope. As a result, lisinopril was decreased to 10 mg daily. Additionally, hydrochlorothiazide was changed to every other day. Patient reassured and discharged in stable condition. Hypertension---BP controlled. Lisinopril changed to 10 mg daily. Hydrochlorothiazide changed to every other day without a change of dosing. Peripheral Arterial Disease---S/P PCI in 2020. On aspirin and Plavix.     Obesity---weight 253 pounds, BMI 37.4    The patient expressed appropriate understanding of and agreement with the discharge recommendations, medications, and plan.      Consults this admission:  IP CONSULT TO HOSPITALIST  IP CONSULT TO CARDIOLOGY    Discharge Instruction:   Follow-up with PCP    Diet:  regular diet and cardiac diet   Activity: activity as tolerated  Disposition: Discharged to:   [x]Home, [][facility], []SNF, []Acute Rehab, []Hospice   Condition on discharge: Stable    Discharge Medications:      Mary Saleem \"Anuja\"   Home Medication Instructions LAD:[id number]    Printed on:20 0910   Medication Information                      ADVAIR DISKUS 500-50 MCG/DOSE diskus inhaler  Inhale 1 puff into the lungs every 12 hours After inhalation then gargle albuterol (PROVENTIL) (5 MG/ML) 0.5% nebulizer solution  Take 1 mL by nebulization every 6 hours as needed for Wheezing or Shortness of Breath             albuterol sulfate HFA (PROVENTIL HFA) 108 (90 Base) MCG/ACT inhaler  Inhale 2 puffs into the lungs every 4 hours as needed for Wheezing or Shortness of Breath (cough)             aspirin 81 MG tablet  Take 1 tablet by mouth daily             baclofen (LIORESAL) 10 MG tablet  Take 1 tablet by mouth nightly as needed (muscle spasms)             cetirizine (ZYRTEC) 10 MG tablet  Take 1 tablet by mouth daily             clopidogrel (PLAVIX) 75 MG tablet  Take 1 tablet by mouth daily             diphenhydrAMINE (BENADRYL) 25 MG tablet  Take 1 tablet by mouth nightly as needed for Itching             fluticasone (FLONASE) 50 MCG/ACT nasal spray  1 spray by Each Nostril route daily             hydroCHLOROthiazide (HYDRODIURIL) 12.5 MG tablet  Take 1 tablet by mouth every other day             hydroCHLOROthiazide (HYDRODIURIL) 12.5 MG tablet  Take 1 tablet by mouth every other day             HYDROcodone-acetaminophen (NORCO) 5-325 MG per tablet  Take 1 tablet by mouth every 6 hours as needed for Pain for up to 3 days.              hydrOXYzine (ATARAX) 25 MG tablet  Take 1 tablet by mouth every 6 hours as needed for Itching             ipratropium-albuterol (DUONEB) 0.5-2.5 (3) MG/3ML SOLN nebulizer solution  Inhale 3 mLs into the lungs 4 times daily             Lidocaine 5 % CREA  Apply 1 applicator topically nightly as needed (chronic low back pain)             lisinopril (PRINIVIL;ZESTRIL) 10 MG tablet  Take 1 tablet by mouth daily             lisinopril (PRINIVIL;ZESTRIL) 10 MG tablet  Take 1 tablet by mouth daily             melatonin (RA MELATONIN) 3 MG TABS tablet  Take 1 tablet by mouth daily             montelukast (SINGULAIR) 10 MG tablet  Take 1 tablet by mouth nightly             simvastatin (ZOCOR) 40 MG tablet  Take 1 tablet by mouth nightly Objective Findings at Discharge:   /61   Pulse 60   Temp 98.2 °F (36.8 °C) (Oral)   Resp 19   Ht 5' 9\" (1.753 m)   Wt 261 lb 11.2 oz (118.7 kg)   SpO2 95%   BMI 38.65 kg/m²            PHYSICAL EXAM   GEN Awake female, obese female. Pleasant. No acute distress. EYES ocular muscles intact. No eye discharge. Sclera anicteric. HENT Mucous membranes are moist.  Normocephalic atraumatic. NECK Supple,  RESP Clear to auscultation, no wheezes, rales or rhonchi. Symmetric chest movement while on room air. CARDIO/VASC        RRR. No pitting lower extremity edema. GI obese abdomen. Soft. Nontender.   Weinstein catheter is not present. External genitalia not examined. MSK No gross joint deformities. No bone fractures. SKIN Normal coloration, warm, dry. NEURO Cranial nerves appear grossly intact, normal speech, no lateralizing weakness.   PSYCH Awake, alert, oriented, normal affect, normal mood    BMP/CBC  Recent Labs     07/11/20  1044   *   K 3.9   CL 97*   CO2 27   BUN 15   CREATININE 1.1   WBC 6.1   HCT 41.6          IMAGING:  All imaging reviewed before discharge    Discharge Time of 25 minutes    Electronically signed by Viridiana Russell MD on 7/14/2020 at 9:10 AM

## 2020-07-13 NOTE — PROGRESS NOTES
Tilt table test done - + for cardio inhibitory response  recommend to decrease lisinopril to 10 mg daily  Change HCTZ to qod as patient c/o edema to legs and hands  Keep hydrated  Discussed with patient

## 2020-07-13 NOTE — PROGRESS NOTES
Hospitalist Progress Note      Name:  Clara Edouard /Age/Sex: 1950  (68 y.o. female)   MRN & CSN:  [id number] & [id number] Admission Date/Time: 7/10/2020  1:09 PM   Location:  [address] [place] [address] PCP: Pavan Alcaraz 112 Day: 4    ASSESSMENT & PLAN:   Clara Edouard is a 68 y.o.  female  Who was admitted to the hospital for syncopal episode. Disposition: Home tomorrow  Patient does not feel safe going home today. S  he is having some abdominal discomfort and headaches. Patient to remain in the hospital tonight. Non-Prodromal Syncope----took a shower and subsequently sat down. She bended while sitting and passed out. No prodromal symptoms. No similar episodes or syncopal episodes in the past.   -Tilt Table Test  -2D ECHO    Hypertension---BP controlled  -Discontinue hydrochlorothiazide and lisinopril  -Aldactone 25 on discharge    Peripheral Arterial Disease---S/P PCI in 2020  -Continue ASA/Plavix    Obesity---weight 253 pounds, BMI 37.4      MEDICAL DECISION MAKING:  -Labs reviewed  -Imaging reviewed  -Level of risk moderate  Diet DIET GENERAL;   DVT Prophylaxis [x] Lovenox, []  Heparin, [] SCDs, [] Ambulation   GI Prophylaxis [] PPI,  [] H2 Blocker,  [] Carafate,  [] Diet/Tube Feeds   Code Status Full Code   Disposition home   MDM [] Low, [x] Moderate,[]  High     Chief complaint/Interval History/ROS     Chief Complaint:  Syncope      INTERVAL HISTORY: Underwent tilt table test.  Cardiology recommended discontinuation of hydrochlorothiazide. However the patient stated if hydrochlorothiazide is discontinued she will swell up. Plan is to discontinue lisinopril hydrochlorothiazide and start the patient Aldactone on discharge. ROS:  No chest pain. No abdominal pain.  No nausea or vomiting  Objective:     No intake or output data in the 24 hours ending 20 1427   Vitals:   Vitals:    20 0828   BP: (!) 119/48   Pulse: 53   Resp: 18   Temp: 98.7 °F (37.1 °C)

## 2020-07-14 VITALS
HEART RATE: 60 BPM | TEMPERATURE: 98.2 F | OXYGEN SATURATION: 95 % | DIASTOLIC BLOOD PRESSURE: 61 MMHG | WEIGHT: 261.7 LBS | BODY MASS INDEX: 38.76 KG/M2 | HEIGHT: 69 IN | SYSTOLIC BLOOD PRESSURE: 126 MMHG | RESPIRATION RATE: 19 BRPM

## 2020-07-14 PROCEDURE — 6370000000 HC RX 637 (ALT 250 FOR IP): Performed by: INTERNAL MEDICINE

## 2020-07-14 PROCEDURE — 94761 N-INVAS EAR/PLS OXIMETRY MLT: CPT

## 2020-07-14 PROCEDURE — 6370000000 HC RX 637 (ALT 250 FOR IP): Performed by: NURSE PRACTITIONER

## 2020-07-14 PROCEDURE — G0378 HOSPITAL OBSERVATION PER HR: HCPCS

## 2020-07-14 RX ORDER — HYDROCHLOROTHIAZIDE 12.5 MG/1
25 TABLET ORAL EVERY OTHER DAY
Qty: 30 TABLET | Refills: 0 | Status: SHIPPED | OUTPATIENT
Start: 2020-07-15 | End: 2020-07-14

## 2020-07-14 RX ORDER — HYDROCHLOROTHIAZIDE 12.5 MG/1
12.5 TABLET ORAL EVERY OTHER DAY
Qty: 30 TABLET | Refills: 0 | Status: SHIPPED | OUTPATIENT
Start: 2020-07-15 | End: 2020-07-21 | Stop reason: SDUPTHER

## 2020-07-14 RX ORDER — HYDROCHLOROTHIAZIDE 12.5 MG/1
12.5 TABLET ORAL EVERY OTHER DAY
Qty: 30 TABLET | Refills: 1 | Status: SHIPPED | OUTPATIENT
Start: 2020-07-15 | End: 2020-10-19 | Stop reason: SDUPTHER

## 2020-07-14 RX ORDER — LISINOPRIL 10 MG/1
10 TABLET ORAL DAILY
Qty: 90 TABLET | Refills: 0 | Status: SHIPPED | OUTPATIENT
Start: 2020-07-14 | End: 2020-10-19 | Stop reason: SDUPTHER

## 2020-07-14 RX ORDER — LISINOPRIL 10 MG/1
10 TABLET ORAL DAILY
Qty: 60 TABLET | Refills: 0 | Status: SHIPPED | OUTPATIENT
Start: 2020-07-14 | End: 2020-07-21 | Stop reason: SDUPTHER

## 2020-07-14 RX ORDER — HYDROCHLOROTHIAZIDE 12.5 MG/1
12.5 TABLET ORAL EVERY OTHER DAY
Qty: 30 TABLET | Refills: 1 | Status: SHIPPED | OUTPATIENT
Start: 2020-07-15 | End: 2020-07-14

## 2020-07-14 RX ORDER — HYDROCODONE BITARTRATE AND ACETAMINOPHEN 5; 325 MG/1; MG/1
1 TABLET ORAL EVERY 6 HOURS PRN
Qty: 12 TABLET | Refills: 0 | Status: SHIPPED | OUTPATIENT
Start: 2020-07-14 | End: 2020-07-17

## 2020-07-14 RX ORDER — LISINOPRIL 10 MG/1
10 TABLET ORAL DAILY
Status: DISCONTINUED | OUTPATIENT
Start: 2020-07-14 | End: 2020-07-14 | Stop reason: HOSPADM

## 2020-07-14 RX ADMIN — ASPIRIN 81 MG: 81 TABLET, COATED ORAL at 08:54

## 2020-07-14 RX ADMIN — LISINOPRIL 10 MG: 10 TABLET ORAL at 08:54

## 2020-07-14 RX ADMIN — CETIRIZINE HYDROCHLORIDE 10 MG: 10 TABLET, FILM COATED ORAL at 08:54

## 2020-07-14 ASSESSMENT — PAIN SCALES - GENERAL
PAINLEVEL_OUTOF10: 0
PAINLEVEL_OUTOF10: 0

## 2020-07-14 NOTE — PROGRESS NOTES
CLINICAL PHARMACY NOTE: MEDS TO [address] [facility] Patient?: No  Total # of Prescriptions Filled: 3   The following medications were delivered to the patient:  · Hydrochlorothiazide 12.5mg  · Hydrocodone/apap 5/325mg  · Lisinopril 10mg  Total # of Interventions Completed: 1  Time Spent (min): 30    Additional Documentation:  Med assist contacted and covered her copays for the lisinopril and hctz. Kaushik Mendez in the pharmacy was kind enough to pay for her hydrocodone/apap today.

## 2020-07-21 ENCOUNTER — OFFICE VISIT (OUTPATIENT)
Dept: FAMILY MEDICINE CLINIC | Age: 70
End: 2020-07-21
Payer: MEDICARE

## 2020-07-21 VITALS
WEIGHT: 233 LBS | HEART RATE: 64 BPM | SYSTOLIC BLOOD PRESSURE: 108 MMHG | DIASTOLIC BLOOD PRESSURE: 60 MMHG | TEMPERATURE: 98.6 F | BODY MASS INDEX: 35.31 KG/M2 | OXYGEN SATURATION: 97 % | HEIGHT: 68 IN

## 2020-07-21 PROCEDURE — 36415 COLL VENOUS BLD VENIPUNCTURE: CPT | Performed by: NURSE PRACTITIONER

## 2020-07-21 PROCEDURE — 1111F DSCHRG MED/CURRENT MED MERGE: CPT | Performed by: NURSE PRACTITIONER

## 2020-07-21 PROCEDURE — 99214 OFFICE O/P EST MOD 30 MIN: CPT | Performed by: NURSE PRACTITIONER

## 2020-07-21 RX ORDER — HYDROCHLOROTHIAZIDE 12.5 MG/1
12.5 TABLET ORAL EVERY OTHER DAY
Qty: 45 TABLET | Refills: 1 | Status: SHIPPED | OUTPATIENT
Start: 2020-07-21 | End: 2020-10-19

## 2020-07-21 RX ORDER — LISINOPRIL 10 MG/1
10 TABLET ORAL DAILY
Qty: 90 TABLET | Refills: 1 | Status: SHIPPED | OUTPATIENT
Start: 2020-07-21 | End: 2020-10-19

## 2020-07-21 ASSESSMENT — ENCOUNTER SYMPTOMS
BACK PAIN: 1
SHORTNESS OF BREATH: 1
GASTROINTESTINAL NEGATIVE: 1
SINUS PAIN: 1
EYE REDNESS: 1
SINUS PRESSURE: 1
EYE ITCHING: 1
ALLERGIC/IMMUNOLOGIC NEGATIVE: 1
COUGH: 1

## 2020-07-21 NOTE — PROGRESS NOTES
ADALID Quesada CNP   lisinopril (PRINIVIL;ZESTRIL) 10 MG tablet Take 1 tablet by mouth daily Yes Lito Rojas MD   simvastatin (ZOCOR) 40 MG tablet Take 1 tablet by mouth nightly Yes ADALID Gallegos CNP   hydrOXYzine (ATARAX) 25 MG tablet Take 1 tablet by mouth every 6 hours as needed for Itching Yes ADALID Gallegos CNP   baclofen (LIORESAL) 10 MG tablet Take 1 tablet by mouth nightly as needed (muscle spasms) Yes ADALID Gallegos CNP   Lidocaine 5 % CREA Apply 1 applicator topically nightly as needed (chronic low back pain) Yes ADALID Gallegos CNP   melatonin (RA MELATONIN) 3 MG TABS tablet Take 1 tablet by mouth daily Yes ADALID Gallegos CNP   montelukast (SINGULAIR) 10 MG tablet Take 1 tablet by mouth nightly Yes ADALID Gallegos CNP   clopidogrel (PLAVIX) 75 MG tablet Take 1 tablet by mouth daily Yes Jennie Durant MD   ADVAIR DISKUS 500-50 MCG/DOSE diskus inhaler Inhale 1 puff into the lungs every 12 hours After inhalation then gargle Yes ADALID Gallegos CNP   fluticasone (FLONASE) 50 MCG/ACT nasal spray 1 spray by Each Nostril route daily Yes Harris Hua MD   cetirizine (ZYRTEC) 10 MG tablet Take 1 tablet by mouth daily Yes ADALID Gallegos CNP   aspirin 81 MG tablet Take 1 tablet by mouth daily Yes ADALID Gallegos CNP   diphenhydrAMINE (BENADRYL) 25 MG tablet Take 1 tablet by mouth nightly as needed for Itching Yes ADALID Gallegos CNP   albuterol sulfate HFA (PROVENTIL HFA) 108 (90 Base) MCG/ACT inhaler Inhale 2 puffs into the lungs every 4 hours as needed for Wheezing or Shortness of Breath (cough) Yes ADALID Gallegos CNP   albuterol (PROVENTIL) (5 MG/ML) 0.5% nebulizer solution Take 1 mL by nebulization every 6 hours as needed for Wheezing or Shortness of Breath Yes ADALID Gallegos CNP   ipratropium-albuterol (DUONEB) 0.5-2.5 (3) MG/3ML SOLN nebulizer solution Inhale 3 mLs into the lungs 4 times daily Yes Sidra Schneider APRN - CNP hydroCHLOROthiazide (HYDRODIURIL) 12.5 MG tablet Take 1 tablet by mouth every other day  Zoraida Paez MD        Social History     Tobacco Use    Smoking status: Former Smoker     Packs/day: 0.25     Years: 39.00     Pack years: 9.75     Types: Cigarettes     Last attempt to quit: 2019     Years since quittin.5    Smokeless tobacco: Never Used   Substance Use Topics    Alcohol use: Yes     Comment: socially        Vitals:    20 1116   BP: 108/60   Site: Right Upper Arm   Position: Sitting   Cuff Size: Medium Adult   Pulse: 64   Temp: 98.6 °F (37 °C)   SpO2: 97%   Weight: 233 lb (105.7 kg)   Height: 5' 8\" (1.727 m)     Estimated body mass index is 35.43 kg/m² as calculated from the following:    Height as of this encounter: 5' 8\" (1.727 m). Weight as of this encounter: 233 lb (105.7 kg). Physical Exam  Constitutional:       Appearance: She is obese. Eyes:      General: Lids are normal.      Extraocular Movements: Extraocular movements intact. Pupils: Pupils are equal, round, and reactive to light. Cardiovascular:      Rate and Rhythm: Normal rate and regular rhythm. Pulses: Normal pulses. Heart sounds: Normal heart sounds. Pulmonary:      Effort: Pulmonary effort is normal.      Breath sounds: Normal breath sounds. Neurological:      General: No focal deficit present. Mental Status: She is alert and oriented to person, place, and time. Mental status is at baseline. ASSESSMENT/PLAN:  1. Essential hypertension  - stable  - hydrochlorothiazide (HYDRODIURIL) 12.5 MG tablet; Take 1 tablet by mouth every other day  Dispense: 45 tablet; Refill: 1  - lisinopril (PRINIVIL;ZESTRIL) 10 MG tablet; Take 1 tablet by mouth daily  Dispense: 90 tablet; Refill: 1  - COMPREHENSIVE METABOLIC PANEL; Future  - CBC; Future    2. Fatigue, unspecified type  - will call results of labs  - VITAMIN B12; Future  - COMPREHENSIVE METABOLIC PANEL; Future  - CBC;  Future    Call cardiology and see if they want any changes. Return in 3 months (on 10/21/2020). An electronic signature was used to authenticate this note.     --ADALID Leigh - CNP on 7/21/2020 at 4:02 PM

## 2020-07-21 NOTE — PROGRESS NOTES
of Breath (cough)             aspirin 81 MG tablet  Take 1 tablet by mouth daily             baclofen (LIORESAL) 10 MG tablet  Take 1 tablet by mouth nightly as needed (muscle spasms)             cetirizine (ZYRTEC) 10 MG tablet  Take 1 tablet by mouth daily             clopidogrel (PLAVIX) 75 MG tablet  Take 1 tablet by mouth daily             diphenhydrAMINE (BENADRYL) 25 MG tablet  Take 1 tablet by mouth nightly as needed for Itching             fluticasone (FLONASE) 50 MCG/ACT nasal spray  1 spray by Each Nostril route daily             hydroCHLOROthiazide (HYDRODIURIL) 12.5 MG tablet  Take 1 tablet by mouth every other day             hydrochlorothiazide (HYDRODIURIL) 12.5 MG tablet  Take 1 tablet by mouth every other day             hydrOXYzine (ATARAX) 25 MG tablet  Take 1 tablet by mouth every 6 hours as needed for Itching             ipratropium-albuterol (DUONEB) 0.5-2.5 (3) MG/3ML SOLN nebulizer solution  Inhale 3 mLs into the lungs 4 times daily             Lidocaine 5 % CREA  Apply 1 applicator topically nightly as needed (chronic low back pain)             lisinopril (PRINIVIL;ZESTRIL) 10 MG tablet  Take 1 tablet by mouth daily             lisinopril (PRINIVIL;ZESTRIL) 10 MG tablet  Take 1 tablet by mouth daily             melatonin (RA MELATONIN) 3 MG TABS tablet  Take 1 tablet by mouth daily             montelukast (SINGULAIR) 10 MG tablet  Take 1 tablet by mouth nightly             simvastatin (ZOCOR) 40 MG tablet  Take 1 tablet by mouth nightly                   Medications marked \"taking\" at this time  Outpatient Medications Marked as Taking for the 7/21/20 encounter (Office Visit) with ADALID Castañeda CNP   Medication Sig Dispense Refill    hydrochlorothiazide (HYDRODIURIL) 12.5 MG tablet Take 1 tablet by mouth every other day 45 tablet 1    lisinopril (PRINIVIL;ZESTRIL) 10 MG tablet Take 1 tablet by mouth daily 90 tablet 1    lisinopril (PRINIVIL;ZESTRIL) 10 MG tablet Take 1 tablet by mouth daily 90 tablet 0    simvastatin (ZOCOR) 40 MG tablet Take 1 tablet by mouth nightly 90 tablet 2    hydrOXYzine (ATARAX) 25 MG tablet Take 1 tablet by mouth every 6 hours as needed for Itching 30 tablet 1    baclofen (LIORESAL) 10 MG tablet Take 1 tablet by mouth nightly as needed (muscle spasms) 30 tablet 3    Lidocaine 5 % CREA Apply 1 applicator topically nightly as needed (chronic low back pain) 113 g 0    melatonin (RA MELATONIN) 3 MG TABS tablet Take 1 tablet by mouth daily 30 mg 1    montelukast (SINGULAIR) 10 MG tablet Take 1 tablet by mouth nightly 90 tablet 1    clopidogrel (PLAVIX) 75 MG tablet Take 1 tablet by mouth daily 90 tablet 3    ADVAIR DISKUS 500-50 MCG/DOSE diskus inhaler Inhale 1 puff into the lungs every 12 hours After inhalation then gargle 1 Inhaler 11    fluticasone (FLONASE) 50 MCG/ACT nasal spray 1 spray by Each Nostril route daily 1 Bottle 3    cetirizine (ZYRTEC) 10 MG tablet Take 1 tablet by mouth daily 90 tablet 2    aspirin 81 MG tablet Take 1 tablet by mouth daily 90 tablet 2    diphenhydrAMINE (BENADRYL) 25 MG tablet Take 1 tablet by mouth nightly as needed for Itching 90 tablet 2    albuterol sulfate HFA (PROVENTIL HFA) 108 (90 Base) MCG/ACT inhaler Inhale 2 puffs into the lungs every 4 hours as needed for Wheezing or Shortness of Breath (cough) 1 Inhaler 2    albuterol (PROVENTIL) (5 MG/ML) 0.5% nebulizer solution Take 1 mL by nebulization every 6 hours as needed for Wheezing or Shortness of Breath 100 vial 1    ipratropium-albuterol (DUONEB) 0.5-2.5 (3) MG/3ML SOLN nebulizer solution Inhale 3 mLs into the lungs 4 times daily 120 vial 1        Medications patient taking as of now reconciled against medications ordered at time of hospital discharge: Yes    Chief Complaint   Patient presents with    Follow-Up from Hospital       History of Present illness - Follow up of Hospital diagnosis(es): Syncope, collapse; Chronic low back pain; Essential hypertension; PVD    Inpatient course: Discharge summary reviewed- see chart. Interval history/Current status: Stable      Vitals:    07/21/20 1116   BP: 108/60   Site: Right Upper Arm   Position: Sitting   Cuff Size: Medium Adult   Pulse: 64   Temp: 98.6 °F (37 °C)   SpO2: 97%   Weight: 233 lb (105.7 kg)   Height: 5' 8\" (1.727 m)     Body mass index is 35.43 kg/m². Wt Readings from Last 3 Encounters:   07/21/20 233 lb (105.7 kg)   07/14/20 261 lb 11.2 oz (118.7 kg)   06/22/20 258 lb 3.2 oz (117.1 kg)     BP Readings from Last 3 Encounters:   07/21/20 108/60   07/14/20 126/61   06/22/20 126/76        Physical Exam:  See assessment below    Assessment/Plan:  1. Essential hypertension  - stable, continue current medication regimen  - hydrochlorothiazide (HYDRODIURIL) 12.5 MG tablet; Take 1 tablet by mouth every other day  Dispense: 45 tablet; Refill: 1  - lisinopril (PRINIVIL;ZESTRIL) 10 MG tablet; Take 1 tablet by mouth daily  Dispense: 90 tablet; Refill: 1  - COMPREHENSIVE METABOLIC PANEL; Future  - CBC; Future  - LA DISCHARGE MEDS RECONCILED W/ CURRENT OUTPATIENT MED LIST    2. Fatigue, unspecified type  - will call results of labs  - VITAMIN B12; Future  - COMPREHENSIVE METABOLIC PANEL; Future  - CBC;  Future        Medical Decision Making: moderate complexity

## 2020-07-22 LAB
A/G RATIO: 1.3 (ref 1.1–2.2)
ALBUMIN SERPL-MCNC: 4.1 G/DL (ref 3.4–5)
ALP BLD-CCNC: 74 U/L (ref 40–129)
ALT SERPL-CCNC: 8 U/L (ref 10–40)
ANION GAP SERPL CALCULATED.3IONS-SCNC: 15 MMOL/L (ref 3–16)
AST SERPL-CCNC: 16 U/L (ref 15–37)
BILIRUB SERPL-MCNC: 0.4 MG/DL (ref 0–1)
BUN BLDV-MCNC: 13 MG/DL (ref 7–20)
CALCIUM SERPL-MCNC: 9.3 MG/DL (ref 8.3–10.6)
CHLORIDE BLD-SCNC: 101 MMOL/L (ref 99–110)
CO2: 23 MMOL/L (ref 21–32)
CREAT SERPL-MCNC: 0.9 MG/DL (ref 0.6–1.2)
EKG ATRIAL RATE: 66 BPM
EKG DIAGNOSIS: NORMAL
EKG P AXIS: 65 DEGREES
EKG P-R INTERVAL: 134 MS
EKG Q-T INTERVAL: 396 MS
EKG QRS DURATION: 72 MS
EKG QTC CALCULATION (BAZETT): 415 MS
EKG R AXIS: 36 DEGREES
EKG T AXIS: 39 DEGREES
EKG VENTRICULAR RATE: 66 BPM
GFR AFRICAN AMERICAN: >60
GFR NON-AFRICAN AMERICAN: >60
GLOBULIN: 3.1 G/DL
GLUCOSE BLD-MCNC: 93 MG/DL (ref 70–99)
HCT VFR BLD CALC: 44.9 % (ref 36–48)
HEMOGLOBIN: 14.5 G/DL (ref 12–16)
MCH RBC QN AUTO: 27.5 PG (ref 26–34)
MCHC RBC AUTO-ENTMCNC: 32.4 G/DL (ref 31–36)
MCV RBC AUTO: 85.1 FL (ref 80–100)
PDW BLD-RTO: 14.5 % (ref 12.4–15.4)
PLATELET # BLD: 231 K/UL (ref 135–450)
PMV BLD AUTO: 8.7 FL (ref 5–10.5)
POTASSIUM SERPL-SCNC: 4.1 MMOL/L (ref 3.5–5.1)
RBC # BLD: 5.27 M/UL (ref 4–5.2)
SODIUM BLD-SCNC: 139 MMOL/L (ref 136–145)
TOTAL PROTEIN: 7.2 G/DL (ref 6.4–8.2)
VITAMIN B-12: 494 PG/ML (ref 211–911)
WBC # BLD: 5.7 K/UL (ref 4–11)

## 2020-07-27 ENCOUNTER — TELEPHONE (OUTPATIENT)
Dept: FAMILY MEDICINE CLINIC | Age: 70
End: 2020-07-27

## 2020-07-27 NOTE — TELEPHONE ENCOUNTER
Called patient about labs. Advised to purchase a multivitamin, continue to increase fluids, rest, gradually increase activity. Verbalized understanding and agreement with plan.

## 2020-09-11 RX ORDER — CETIRIZINE HYDROCHLORIDE 10 MG/1
TABLET ORAL
Qty: 90 TABLET | Refills: 0 | Status: SHIPPED | OUTPATIENT
Start: 2020-09-11 | End: 2020-10-19 | Stop reason: SDUPTHER

## 2020-09-29 NOTE — CARE COORDINATION
Received call from Remigio Nava in pharmacy asking for help with patients copay on discharge medications.   Approved and faxed voucher to [facility] and placed patient on flagged list. Surgeon: Sanjana Pruett MD

## 2020-10-05 RX ORDER — LANOLIN ALCOHOL/MO/W.PET/CERES
3 CREAM (GRAM) TOPICAL DAILY
Qty: 30 TABLET | Refills: 1 | OUTPATIENT
Start: 2020-10-05

## 2020-10-05 RX ORDER — HYDROXYZINE HYDROCHLORIDE 25 MG/1
TABLET, FILM COATED ORAL
Qty: 30 TABLET | Refills: 1 | OUTPATIENT
Start: 2020-10-05

## 2020-10-12 RX ORDER — LANOLIN ALCOHOL/MO/W.PET/CERES
3 CREAM (GRAM) TOPICAL DAILY
Qty: 30 TABLET | Refills: 1 | Status: SHIPPED | OUTPATIENT
Start: 2020-10-12 | End: 2020-10-19 | Stop reason: SDUPTHER

## 2020-10-19 ENCOUNTER — OFFICE VISIT (OUTPATIENT)
Dept: FAMILY MEDICINE CLINIC | Age: 70
End: 2020-10-19
Payer: MEDICARE

## 2020-10-19 VITALS
HEIGHT: 69 IN | WEIGHT: 261.6 LBS | SYSTOLIC BLOOD PRESSURE: 134 MMHG | TEMPERATURE: 97.9 F | BODY MASS INDEX: 38.75 KG/M2 | HEART RATE: 66 BPM | DIASTOLIC BLOOD PRESSURE: 74 MMHG | OXYGEN SATURATION: 93 %

## 2020-10-19 VITALS
DIASTOLIC BLOOD PRESSURE: 74 MMHG | SYSTOLIC BLOOD PRESSURE: 134 MMHG | OXYGEN SATURATION: 93 % | HEART RATE: 66 BPM | HEIGHT: 69 IN | WEIGHT: 261.6 LBS | BODY MASS INDEX: 38.75 KG/M2

## 2020-10-19 PROBLEM — J42 CHRONIC BRONCHITIS (HCC): Status: ACTIVE | Noted: 2020-10-19

## 2020-10-19 PROBLEM — E66.01 MORBIDLY OBESE (HCC): Status: ACTIVE | Noted: 2020-10-19

## 2020-10-19 LAB
BASOPHILS ABSOLUTE: 0 K/UL (ref 0–0.2)
BASOPHILS RELATIVE PERCENT: 0.6 %
CHOLESTEROL, TOTAL: 126 MG/DL (ref 0–199)
EOSINOPHILS ABSOLUTE: 0.2 K/UL (ref 0–0.6)
EOSINOPHILS RELATIVE PERCENT: 2.3 %
HCT VFR BLD CALC: 44.6 % (ref 36–48)
HDLC SERPL-MCNC: 59 MG/DL (ref 40–60)
HEMOGLOBIN: 14.8 G/DL (ref 12–16)
HEPATITIS C ANTIBODY INTERPRETATION: NORMAL
LDL CHOLESTEROL CALCULATED: 51 MG/DL
LYMPHOCYTES ABSOLUTE: 2 K/UL (ref 1–5.1)
LYMPHOCYTES RELATIVE PERCENT: 30.7 %
MCH RBC QN AUTO: 27.7 PG (ref 26–34)
MCHC RBC AUTO-ENTMCNC: 33.2 G/DL (ref 31–36)
MCV RBC AUTO: 83.4 FL (ref 80–100)
MONOCYTES ABSOLUTE: 0.5 K/UL (ref 0–1.3)
MONOCYTES RELATIVE PERCENT: 7.5 %
NEUTROPHILS ABSOLUTE: 3.9 K/UL (ref 1.7–7.7)
NEUTROPHILS RELATIVE PERCENT: 58.9 %
PDW BLD-RTO: 15.1 % (ref 12.4–15.4)
PLATELET # BLD: 252 K/UL (ref 135–450)
PMV BLD AUTO: 8.2 FL (ref 5–10.5)
RBC # BLD: 5.34 M/UL (ref 4–5.2)
TRIGL SERPL-MCNC: 81 MG/DL (ref 0–150)
VLDLC SERPL CALC-MCNC: 16 MG/DL
WBC # BLD: 6.6 K/UL (ref 4–11)

## 2020-10-19 PROCEDURE — 1036F TOBACCO NON-USER: CPT | Performed by: NURSE PRACTITIONER

## 2020-10-19 PROCEDURE — 1090F PRES/ABSN URINE INCON ASSESS: CPT | Performed by: NURSE PRACTITIONER

## 2020-10-19 PROCEDURE — 4040F PNEUMOC VAC/ADMIN/RCVD: CPT | Performed by: NURSE PRACTITIONER

## 2020-10-19 PROCEDURE — 3023F SPIROM DOC REV: CPT | Performed by: NURSE PRACTITIONER

## 2020-10-19 PROCEDURE — 90732 PPSV23 VACC 2 YRS+ SUBQ/IM: CPT | Performed by: NURSE PRACTITIONER

## 2020-10-19 PROCEDURE — 99213 OFFICE O/P EST LOW 20 MIN: CPT | Performed by: NURSE PRACTITIONER

## 2020-10-19 PROCEDURE — 36415 COLL VENOUS BLD VENIPUNCTURE: CPT | Performed by: NURSE PRACTITIONER

## 2020-10-19 PROCEDURE — G0009 ADMIN PNEUMOCOCCAL VACCINE: HCPCS | Performed by: NURSE PRACTITIONER

## 2020-10-19 PROCEDURE — G0438 PPPS, INITIAL VISIT: HCPCS | Performed by: NURSE PRACTITIONER

## 2020-10-19 PROCEDURE — G8417 CALC BMI ABV UP PARAM F/U: HCPCS | Performed by: NURSE PRACTITIONER

## 2020-10-19 PROCEDURE — 1123F ACP DISCUSS/DSCN MKR DOCD: CPT | Performed by: NURSE PRACTITIONER

## 2020-10-19 PROCEDURE — G8427 DOCREV CUR MEDS BY ELIG CLIN: HCPCS | Performed by: NURSE PRACTITIONER

## 2020-10-19 PROCEDURE — G8400 PT W/DXA NO RESULTS DOC: HCPCS | Performed by: NURSE PRACTITIONER

## 2020-10-19 PROCEDURE — G8484 FLU IMMUNIZE NO ADMIN: HCPCS | Performed by: NURSE PRACTITIONER

## 2020-10-19 PROCEDURE — G8926 SPIRO NO PERF OR DOC: HCPCS | Performed by: NURSE PRACTITIONER

## 2020-10-19 PROCEDURE — 3017F COLORECTAL CA SCREEN DOC REV: CPT | Performed by: NURSE PRACTITIONER

## 2020-10-19 RX ORDER — CLOPIDOGREL BISULFATE 75 MG/1
75 TABLET ORAL DAILY
Qty: 90 TABLET | Refills: 1 | Status: SHIPPED | OUTPATIENT
Start: 2020-10-19 | End: 2020-11-06 | Stop reason: SDUPTHER

## 2020-10-19 RX ORDER — LANOLIN ALCOHOL/MO/W.PET/CERES
3 CREAM (GRAM) TOPICAL DAILY
Qty: 90 TABLET | Refills: 1 | Status: SHIPPED | OUTPATIENT
Start: 2020-10-19 | End: 2020-11-06 | Stop reason: SDUPTHER

## 2020-10-19 RX ORDER — MONTELUKAST SODIUM 10 MG/1
10 TABLET ORAL NIGHTLY
Qty: 90 TABLET | Refills: 1 | Status: SHIPPED | OUTPATIENT
Start: 2020-10-19 | End: 2020-11-06 | Stop reason: SDUPTHER

## 2020-10-19 RX ORDER — FLUTICASONE PROPIONATE 50 MCG
1 SPRAY, SUSPENSION (ML) NASAL DAILY
Qty: 1 BOTTLE | Refills: 0 | Status: SHIPPED | OUTPATIENT
Start: 2020-10-19 | End: 2020-11-06 | Stop reason: SDUPTHER

## 2020-10-19 RX ORDER — HYDROCHLOROTHIAZIDE 12.5 MG/1
12.5 TABLET ORAL EVERY OTHER DAY
Qty: 30 TABLET | Refills: 1 | Status: SHIPPED | OUTPATIENT
Start: 2020-10-19 | End: 2020-11-06 | Stop reason: SDUPTHER

## 2020-10-19 RX ORDER — SIMVASTATIN 40 MG
40 TABLET ORAL NIGHTLY
Qty: 90 TABLET | Refills: 1 | Status: SHIPPED | OUTPATIENT
Start: 2020-10-19 | End: 2020-11-06 | Stop reason: SDUPTHER

## 2020-10-19 RX ORDER — LISINOPRIL 10 MG/1
10 TABLET ORAL DAILY
Qty: 90 TABLET | Refills: 1 | Status: SHIPPED | OUTPATIENT
Start: 2020-10-19 | End: 2020-11-06 | Stop reason: SDUPTHER

## 2020-10-19 RX ORDER — MECLIZINE HYDROCHLORIDE 25 MG/1
25 TABLET ORAL 3 TIMES DAILY PRN
COMMUNITY
End: 2020-11-06

## 2020-10-19 RX ORDER — ALBUTEROL SULFATE 90 UG/1
2 AEROSOL, METERED RESPIRATORY (INHALATION) EVERY 4 HOURS PRN
Qty: 1 INHALER | Refills: 2 | Status: SHIPPED | OUTPATIENT
Start: 2020-10-19 | End: 2020-11-06 | Stop reason: SDUPTHER

## 2020-10-19 RX ORDER — CETIRIZINE HYDROCHLORIDE 10 MG/1
TABLET ORAL
Qty: 90 TABLET | Refills: 0 | Status: SHIPPED | OUTPATIENT
Start: 2020-10-19 | End: 2021-03-03

## 2020-10-19 RX ORDER — BACLOFEN 10 MG/1
10 TABLET ORAL 2 TIMES DAILY
Qty: 60 TABLET | Refills: 2 | Status: SHIPPED | OUTPATIENT
Start: 2020-10-19 | End: 2020-11-06 | Stop reason: SDUPTHER

## 2020-10-19 ASSESSMENT — ENCOUNTER SYMPTOMS
EYE DISCHARGE: 1
VOMITING: 0
SHORTNESS OF BREATH: 1
SINUS PAIN: 1
CONSTIPATION: 0
DIARRHEA: 0
CHEST TIGHTNESS: 0
BACK PAIN: 1
NAUSEA: 0

## 2020-10-19 ASSESSMENT — PATIENT HEALTH QUESTIONNAIRE - PHQ9
SUM OF ALL RESPONSES TO PHQ QUESTIONS 1-9: 1
2. FEELING DOWN, DEPRESSED OR HOPELESS: 0
SUM OF ALL RESPONSES TO PHQ QUESTIONS 1-9: 1
SUM OF ALL RESPONSES TO PHQ QUESTIONS 1-9: 1
2. FEELING DOWN, DEPRESSED OR HOPELESS: 0
SUM OF ALL RESPONSES TO PHQ9 QUESTIONS 1 & 2: 1
SUM OF ALL RESPONSES TO PHQ QUESTIONS 1-9: 1
SUM OF ALL RESPONSES TO PHQ QUESTIONS 1-9: 1
SUM OF ALL RESPONSES TO PHQ9 QUESTIONS 1 & 2: 2
SUM OF ALL RESPONSES TO PHQ9 QUESTIONS 1 & 2: 1
1. LITTLE INTEREST OR PLEASURE IN DOING THINGS: 1
SUM OF ALL RESPONSES TO PHQ QUESTIONS 1-9: 1
SUM OF ALL RESPONSES TO PHQ QUESTIONS 1-9: 2
2. FEELING DOWN, DEPRESSED OR HOPELESS: 1
1. LITTLE INTEREST OR PLEASURE IN DOING THINGS: 1
1. LITTLE INTEREST OR PLEASURE IN DOING THINGS: 1

## 2020-10-19 ASSESSMENT — LIFESTYLE VARIABLES
AUDIT-C TOTAL SCORE: INCOMPLETE
HOW OFTEN DO YOU HAVE A DRINK CONTAINING ALCOHOL: NEVER
AUDIT TOTAL SCORE: INCOMPLETE
HOW OFTEN DO YOU HAVE A DRINK CONTAINING ALCOHOL: 0

## 2020-10-19 NOTE — PROGRESS NOTES
Medicare Annual Wellness Visit  Name: Shaheed Joy Date: 10/19/2020   MRN: [id number] Sex: Female   Age: 68 y.o. Ethnicity: Non-/Non    : 1950 Race: Yany Ca is here for [employer] for behavioral, psychosocial and functional/safety risks, and cognitive dysfunction are all negative except as indicated below. These results, as well as other patient data from the [address] form, are documented in Flowsheets linked to this Encounter. Allergies   Allergen Reactions    Latex Hives and Rash    Cipro Xr Hives and Shortness Of Breath    Soap Hives and Shortness Of Breath     [place] Soap, Tide detergent      Tomato Hives and Shortness Of Breath    Influenza Vaccines Hives    Soybean-Containing Drug Products        Prior to Visit Medications    Medication Sig Taking?  Authorizing Provider   meclizine (ANTIVERT) 25 MG tablet Take 25 mg by mouth 3 times daily as needed for Dizziness Yes Historical Provider, MD   melatonin 3 MG TABS tablet TAKE 1 TABLET BY MOUTH DAILY Yes ADALID Robertson CNP   cetirizine (ZYRTEC) 10 MG tablet TAKE ONE TABLET ONCE DAILY Yes ADALID Robertson CNP   lisinopril (PRINIVIL;ZESTRIL) 10 MG tablet Take 1 tablet by mouth daily Yes Abbie Ortega MD   hydroCHLOROthiazide (HYDRODIURIL) 12.5 MG tablet Take 1 tablet by mouth every other day Yes Abbie Ortega MD   simvastatin (ZOCOR) 40 MG tablet Take 1 tablet by mouth nightly Yes ADALID Robertson CNP   hydrOXYzine (ATARAX) 25 MG tablet Take 1 tablet by mouth every 6 hours as needed for Itching Yes ADALID Robertson CNP   montelukast (SINGULAIR) 10 MG tablet Take 1 tablet by mouth nightly Yes ADALID Robertson CNP   clopidogrel (PLAVIX) 75 MG tablet Take 1 tablet by mouth daily Yes Mak Dominguez MD   ADVAIR DISKUS 500-50 MCG/DOSE diskus inhaler Inhale 1 puff into the lungs every 12 hours After inhalation then gargle Yes ADALID Robertson CNP fluticasone (FLONASE) 50 MCG/ACT nasal spray 1 spray by Each Nostril route daily  Patient taking differently: 1 spray by Each Nostril route daily PRN Yes Nasreen Wells MD   aspirin 81 MG tablet Take 1 tablet by mouth daily Yes ADALID Allison CNP   diphenhydrAMINE (BENADRYL) 25 MG tablet Take 1 tablet by mouth nightly as needed for Itching Yes ADALID Allison CNP   albuterol sulfate HFA (PROVENTIL HFA) 108 (90 Base) MCG/ACT inhaler Inhale 2 puffs into the lungs every 4 hours as needed for Wheezing or Shortness of Breath (cough) Yes ADALID Allison CNP   albuterol (PROVENTIL) (5 MG/ML) 0.5% nebulizer solution Take 1 mL by nebulization every 6 hours as needed for Wheezing or Shortness of Breath Yes ADALID Allison CNP   ipratropium-albuterol (DUONEB) 0.5-2.5 (3) MG/3ML SOLN nebulizer solution Inhale 3 mLs into the lungs 4 times daily Yes ADALID Allison CNP       Past Medical History:   Diagnosis Date    Active smoker     Active smoker     Ankle fracture, left 2006    Asthma     Chondromalacia of right knee     Dr. Latosha Beaulieu + MRI    Chronic back pain     Chronic cough 10/4/2019    Depression     has seen psychiatry in [place] 6 months    Dizziness     CT brain normal -6/18/2012    Family history of early CAD     Father - 1st MI age 50, Massive MI age 76    H/O cardiovascular stress test 8/7/2012 8/7/2012-Lexiscan-Normal perfusion. LVSF normal.EF 58%    H/O Doppler ultrasound 12/11/2019     Abnormal left lower extremity arterial Doppler ultrasound. The Left lower extremity arteries have a Monophasic waveform suggestive of inflow disease, The Left BARBER shows Severe peripheral arterial disease. Normal right lower extremity arterial Doppler ultrasound. Normal right lower extremity ankle brachial indices    H/O echocardiogram 8/7/2012 8/7/2012-LVSF normal. EF =>55%. impaired LV relaxation.      Herniated intervertebral disk     thoracic and lumbar    Hypertension     Directive ACP-Power of     Not on File Not on File Filed [address] Risk Interventions:  · Stress: regular exercise recommended- 3-5 times per week, 30-45 minutes per session    Health Habits/Nutrition:  Health Habits/Nutrition  Do you exercise for at least 20 minutes 2-3 times per week?: (!) No  Have you lost any weight without trying in the past 3 months?: No  Do you eat fewer than 2 meals per day?: (!) Yes  Have you seen a dentist within the past year?: (!) No  Body mass index: (!) 39.19  Health Habits/Nutrition Interventions:  · Nutritional issues:  recognizes need to decrease calories   · Plans to get dental care but cannot afford at this time  · She tries to walk but now the weather is bad, also her left knee bothers her at times    Safety:  Safety  Do you have working smoke detectors?: Yes  Have all throw rugs been removed or fastened?: Yes  Do you have non-slip mats or surfaces in all bathtubs/showers?: (!) No  Do all of your stairways have a railing or banister?: Yes  Are your doorways, halls and stairs free of clutter?: Yes  Do you always fasten your seatbelt when you are in a car?: Yes  Safety Interventions:  · no issues - she has a shower chair     Personalized Preventive Plan   Current Health Maintenance Status  There is no immunization history for the selected administration types on file for this patient.      Health Maintenance   Topic Date Due    Hepatitis C screen  1950    DTaP/Tdap/Td vaccine (1 - Tdap) 12/25/1967    Shingles Vaccine (1 of 2) 12/25/1998    DEXA (modify frequency per FRAX score)  12/25/2003    Pneumococcal 65+ years Vaccine (1 of 1 - PPSV23) 12/25/2013    Annual Wellness Visit (AWV)  01/02/2020    Colon Cancer Screen FIT/FOBT  09/24/2020    Lipid screen  09/25/2020    Breast cancer screen  10/22/2020    Potassium monitoring  07/21/2021    Creatinine monitoring  07/21/2021    Hepatitis A vaccine  Aged Out    Hepatitis B vaccine  Aged C/ [address] 19 Hib vaccine  Aged Out    Meningococcal (ACWY) vaccine  Aged Out     Recommendations for [place] Due: see orders and patient instructions/AVS.  . Recommended screening schedule for the next 5-10 years is provided to the patient in written form: see Patient Layo Espinoza was seen today for medicare awv. Diagnoses and all orders for this visit:    Asymptomatic menopausal state  -     DEXA Bone Density Axial Skeleton; Future    Encounter for screening mammogram for breast cancer  -     GERRY Digital Screen Bilateral [[id number]]; Future    Need for prophylactic vaccination against Streptococcus pneumoniae (pneumococcus)  -     Pneumococcal polysaccharide vaccine 23-valent PPSV23     Other problems related to lifestyle  -     Hepatitis C Antibody; Future    Screening for hyperlipidemia  -     Lipid Panel; Future    Routine general medical examination at a health care facility    Flu shot offered - refused - states she had a severe reaction in the past with flu shot      Advance Care Planning   Advanced Care Planning: Discussed the patients choices for care and treatment in case of a health event that adversely affects decision-making abilities. Also discussed the patients long-term treatment options. Reviewed with the patient the [address] [place] [address] Declaration forms  Reviewed the process of designating a competent adult as an Agent (or -in-fact) that could take make health care decisions for the patient if incompetent. Patient was asked to complete the declaration forms, either acknowledge the forms by a public notary or an eligible witness and provide a signed copy to the practice office. Time spent (minutes): 5 minutes     Cardiovascular Disease Risk Counseling: Assessed the patient's risk to develop cardiovascular disease and reviewed main risk factors.    Reviewed steps to reduce disease risk including:   · Quitting tobacco use, reducing amount smoked, or not starting the habit  · Making healthy food choices  · Being physically active and gradualy increasing activity levels   · Reduce weight and determine a healthy BMI goal  · Monitor blood pressure and treat if higher than 140/90 mmHg  · Maintain blood total cholesterol levels under 5 mmol/l or 190 mg/dl  · Maintain LDL cholesterol levels under 3.0 mmol/l or 115 mg/dl   · Control blood glucose levels  · Consider taking aspirin (75 mg daily), once blood pressure is controlled   Provided a follow up plan.   Time spent (minutes): 20 minutes
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
## 2020-10-19 NOTE — PATIENT INSTRUCTIONS
Learning About Low-Carbohydrate Diets  What is a low-carbohydrate diet? A low-carbohydrate (or \"low-carb\") diet limits foods and drinks that have carbohydrates. This includes grains, fruits, milk and yogurt, and starchy vegetables like potatoes, beans, and corn. It also avoids foods and drinks that have added sugar. Instead, low-carb diets include foods that are high in protein and fat. Why might you follow a low-carb diet? Low-carb diets may be used for a variety of reasons, such as for weight loss. People who have diabetes may use a low-carb diet to help manage their blood sugar levels. What should you do before you start the diet? Talk to your doctor before you try any diet. This is even more important if you have health problems like kidney disease, heart disease, or diabetes. Your doctor may suggest that you meet with a registered dietitian. A dietitian can help you make an eating plan that works for you. What foods do you eat on a low-carb diet? On a low-carb diet, you choose foods that are high in protein and fat. Examples of these are:  · Meat, poultry, and fish. · Eggs. · Nuts, such as walnuts, pecans, almonds, and peanuts. · Peanut butter and other nut butters. · Tofu. · Avocado. · Sheri Fear. · Non-starchy vegetables like broccoli, cauliflower, green beans, mushrooms, peppers, lettuce, and spinach. · Unsweetened non-dairy milks like almond milk and coconut milk. · Cheese, cottage cheese, and cream cheese. Current as of: August 22, 2019               Content Version: 12.6  © 2888-0260 [employer]. Care instructions adapted under license by [facility] ([facility]). If you have questions about a medical condition or this instruction, always ask your healthcare professional. [employer] any warranty or liability for your use of this information.       Personalized Preventive Plan for Nadeen Wood - 10/19/2020  Medicare offers a range of preventive health benefits. Some of the tests and screenings are paid in full while other may be subject to a deductible, co-insurance, and/or copay. Some of these benefits include a comprehensive review of your medical history including lifestyle, illnesses that may run in your family, and various assessments and screenings as appropriate. After reviewing your medical record and screening and assessments performed today your provider may have ordered immunizations, labs, imaging, and/or referrals for you. A list of these orders (if applicable) as well as your Preventive Care list are included within your After Visit Summary for your review. Other Preventive Recommendations:    · A preventive eye exam performed by an eye specialist is recommended every 1-2 years to screen for glaucoma; cataracts, macular degeneration, and other eye disorders. · A preventive dental visit is recommended every 6 months. · Try to get at least 150 minutes of exercise per week or 10,000 steps per day on a pedometer . · Order or download the FREE \"Exercise & Physical Activity: Your Everyday Guide\" from [employer]. Call 1-693.828.7882 or search [employer] online. · You need 6604-2601 mg of calcium and 6606-7871 IU of vitamin D per day. It is possible to meet your calcium requirement with diet alone, but a vitamin D supplement is usually necessary to meet this goal.  · When exposed to the sun, use a sunscreen that protects against both UVA and UVB radiation with an SPF of 30 or greater. Reapply every 2 to 3 hours or after sweating, drying off with a towel, or swimming. · Always wear a seat belt when traveling in a car. Always wear a helmet when riding a bicycle or motorcycle.

## 2020-11-06 RX ORDER — CLOPIDOGREL BISULFATE 75 MG/1
75 TABLET ORAL DAILY
Qty: 90 TABLET | Refills: 1 | Status: SHIPPED | OUTPATIENT
Start: 2020-11-06 | End: 2021-07-13 | Stop reason: SDUPTHER

## 2020-11-06 RX ORDER — SIMVASTATIN 40 MG
40 TABLET ORAL NIGHTLY
Qty: 90 TABLET | Refills: 1 | Status: SHIPPED | OUTPATIENT
Start: 2020-11-06 | End: 2021-08-21 | Stop reason: SDUPTHER

## 2020-11-06 RX ORDER — LISINOPRIL 10 MG/1
10 TABLET ORAL DAILY
Qty: 90 TABLET | Refills: 1 | Status: SHIPPED | OUTPATIENT
Start: 2020-11-06 | End: 2021-08-21 | Stop reason: SDUPTHER

## 2020-11-06 RX ORDER — FLUTICASONE PROPIONATE 50 MCG
1 SPRAY, SUSPENSION (ML) NASAL DAILY
Qty: 1 BOTTLE | Refills: 0 | Status: SHIPPED | OUTPATIENT
Start: 2020-11-06 | End: 2021-01-12 | Stop reason: SINTOL

## 2020-11-06 RX ORDER — HYDROCHLOROTHIAZIDE 12.5 MG/1
12.5 TABLET ORAL EVERY OTHER DAY
Qty: 90 TABLET | Refills: 1 | Status: SHIPPED | OUTPATIENT
Start: 2020-11-06 | End: 2021-08-21 | Stop reason: SDUPTHER

## 2020-11-06 RX ORDER — BACLOFEN 10 MG/1
10 TABLET ORAL 2 TIMES DAILY
Qty: 60 TABLET | Refills: 2 | Status: SHIPPED | OUTPATIENT
Start: 2020-11-06 | End: 2021-05-20 | Stop reason: SDUPTHER

## 2020-11-06 RX ORDER — LANOLIN ALCOHOL/MO/W.PET/CERES
3 CREAM (GRAM) TOPICAL DAILY
Qty: 90 TABLET | Refills: 1 | Status: SHIPPED | OUTPATIENT
Start: 2020-11-06 | End: 2021-01-12

## 2020-11-06 RX ORDER — ALBUTEROL SULFATE 90 UG/1
2 AEROSOL, METERED RESPIRATORY (INHALATION) EVERY 4 HOURS PRN
Qty: 1 INHALER | Refills: 2 | Status: SHIPPED | OUTPATIENT
Start: 2020-11-06 | End: 2021-08-02 | Stop reason: SDUPTHER

## 2020-11-06 RX ORDER — MONTELUKAST SODIUM 10 MG/1
10 TABLET ORAL NIGHTLY
Qty: 90 TABLET | Refills: 1 | Status: SHIPPED | OUTPATIENT
Start: 2020-11-06 | End: 2021-05-20 | Stop reason: SDUPTHER

## 2020-11-06 RX ORDER — HYDROXYZINE HYDROCHLORIDE 25 MG/1
TABLET, FILM COATED ORAL
Qty: 30 TABLET | Refills: 1 | Status: SHIPPED | OUTPATIENT
Start: 2020-11-06 | End: 2021-01-13

## 2020-12-09 RX ORDER — MECLIZINE HYDROCHLORIDE 25 MG/1
TABLET ORAL
Qty: 30 TABLET | Refills: 0 | Status: SHIPPED | OUTPATIENT
Start: 2020-12-09 | End: 2021-08-21 | Stop reason: SDUPTHER

## 2020-12-10 ENCOUNTER — HOSPITAL ENCOUNTER (OUTPATIENT)
Dept: MAMMOGRAPHY | Age: 70
Discharge: HOME | End: 2020-12-10
Payer: MEDICARE

## 2020-12-10 PROCEDURE — 77067 SCR MAMMO BI INCL CAD: CPT

## 2020-12-28 LAB
CONTROL: NORMAL
HEMOCCULT STL QL: NORMAL

## 2021-01-12 ENCOUNTER — OFFICE VISIT (OUTPATIENT)
Dept: FAMILY MEDICINE CLINIC | Age: 71
End: 2021-01-12
Payer: MEDICARE

## 2021-01-12 VITALS
WEIGHT: 261 LBS | OXYGEN SATURATION: 97 % | DIASTOLIC BLOOD PRESSURE: 74 MMHG | SYSTOLIC BLOOD PRESSURE: 130 MMHG | BODY MASS INDEX: 38.66 KG/M2 | HEIGHT: 69 IN | HEART RATE: 78 BPM | TEMPERATURE: 97.8 F

## 2021-01-12 DIAGNOSIS — G89.29 CHRONIC PAIN OF LEFT ANKLE: Primary | ICD-10-CM

## 2021-01-12 DIAGNOSIS — M25.572 CHRONIC PAIN OF LEFT ANKLE: Primary | ICD-10-CM

## 2021-01-12 DIAGNOSIS — R20.9 COLD EXTREMITIES: ICD-10-CM

## 2021-01-12 DIAGNOSIS — J45.30 MILD PERSISTENT ASTHMA WITHOUT COMPLICATION: ICD-10-CM

## 2021-01-12 DIAGNOSIS — I10 ESSENTIAL HYPERTENSION: ICD-10-CM

## 2021-01-12 PROCEDURE — G8427 DOCREV CUR MEDS BY ELIG CLIN: HCPCS | Performed by: NURSE PRACTITIONER

## 2021-01-12 PROCEDURE — G8484 FLU IMMUNIZE NO ADMIN: HCPCS | Performed by: NURSE PRACTITIONER

## 2021-01-12 PROCEDURE — 99213 OFFICE O/P EST LOW 20 MIN: CPT | Performed by: NURSE PRACTITIONER

## 2021-01-12 PROCEDURE — G8400 PT W/DXA NO RESULTS DOC: HCPCS | Performed by: NURSE PRACTITIONER

## 2021-01-12 PROCEDURE — 4040F PNEUMOC VAC/ADMIN/RCVD: CPT | Performed by: NURSE PRACTITIONER

## 2021-01-12 PROCEDURE — 3017F COLORECTAL CA SCREEN DOC REV: CPT | Performed by: NURSE PRACTITIONER

## 2021-01-12 PROCEDURE — 1036F TOBACCO NON-USER: CPT | Performed by: NURSE PRACTITIONER

## 2021-01-12 PROCEDURE — 1123F ACP DISCUSS/DSCN MKR DOCD: CPT | Performed by: NURSE PRACTITIONER

## 2021-01-12 PROCEDURE — 1090F PRES/ABSN URINE INCON ASSESS: CPT | Performed by: NURSE PRACTITIONER

## 2021-01-12 PROCEDURE — G8417 CALC BMI ABV UP PARAM F/U: HCPCS | Performed by: NURSE PRACTITIONER

## 2021-01-12 ASSESSMENT — ENCOUNTER SYMPTOMS
SORE THROAT: 0
SINUS PAIN: 0
SHORTNESS OF BREATH: 0
COUGH: 0
CHOKING: 0
TROUBLE SWALLOWING: 0
GASTROINTESTINAL NEGATIVE: 1
WHEEZING: 0
SINUS PRESSURE: 0
CHEST TIGHTNESS: 0

## 2021-01-12 ASSESSMENT — PATIENT HEALTH QUESTIONNAIRE - PHQ9
SUM OF ALL RESPONSES TO PHQ QUESTIONS 1-9: 0
2. FEELING DOWN, DEPRESSED OR HOPELESS: 0
SUM OF ALL RESPONSES TO PHQ QUESTIONS 1-9: 0

## 2021-01-12 NOTE — PROGRESS NOTES
Bonita Anderson  1950  65 y.o. SUBJECT JEMIMA:    Chief Complaint   Patient presents with    Hypertension       HPI    Jef Arriola is a 65year old female who is in for follow up of high blood pressure. She states she has been taking all medications as directed. She complains of left ankle pain that has been present for several months. She states \"I injured my ankle after stepping off a step and I heard a pop. I went to the ED and was told there was a hairline fracture, a boot was applied and told to wear this for a few weeks. I never had a follow up. \" She states the pain has gotten worse over the last few months. She is also complaining of cold extremities. She states she has placed a hot water bottle in her bed and lots of blankets because her hands and feet stay cold. She states she will be calling cardiology and pulmonology for appointments. She states she is concerned about getting the Covid vaccine but is wanting to get it. She states \"I have such allergies to medications, develop hives. I have never had a problem with medications affecting my breathing but I don't know. The allergist has never found anything that I am allergic to. \"    She states she is learning to cope with her stress at home. She states she cannot evict nor can her landlord because of the rules implemented by the pandemic.     Current Outpatient Medications on File Prior to Visit   Medication Sig Dispense Refill    Melatonin 5 MG SUBL Place 1 tablet under the tongue nightly as needed      Multiple Vitamin (MULTI-VITAMIN DAILY PO) Take 1 tablet by mouth daily      Phenylephrine-Acetaminophen (TYLENOL SINUS CONGESTION/PAIN PO) Take 1 tablet by mouth 2 times daily as needed      meclizine (ANTIVERT) 25 MG tablet TAKE ONE TABLET THREE TIMES A DAY AS NEEDED FOR DIZZINESS AVOID ALCOHOL/CAUSES DROWSINESS 30 tablet 0    hydrOXYzine (ATARAX) 25 MG tablet TAKE 1 TABLET EVERY 6 HOURS AS NEEDED FOR ITCHING AVOID ALCOHOL/CAUSES DROWSINESS 30 tablet 1 8/7/2012 8/7/2012-Lexiscan-Normal perfusion. LVSF normal.EF 58%    H/O Doppler ultrasound 12/11/2019     Abnormal left lower extremity arterial Doppler ultrasound. The Left lower extremity arteries have a Monophasic waveform suggestive of inflow disease, The Left BARBER shows Severe peripheral arterial disease. Normal right lower extremity arterial Doppler ultrasound. Normal right lower extremity ankle brachial indices    H/O echocardiogram 8/7/2012 8/7/2012-LVSF normal. EF =>55%. impaired LV relaxation.  Herniated intervertebral disk     thoracic and lumbar    Hypertension     Mild persistent asthma without complication 60/7/3953    Normal echocardiogram 8/2012    EF=> 55%-Dr. Owen Herrera    Obesity (BMI 30-39. 9)     Obstructive sleep apnea 10/4/2019    Other screening mammogram     PAD (peripheral artery disease) ([address] Angioplasty 01/06/2020     LCIA 90% INSTENT RESTENOSED TO 0% WITH PTA.     Peripheral vascular disease ([address]     Postmenopausal     Shoulder fracture, left 2008    Tobacco abuse 10/4/2019     Past Surgical History:   Procedure Laterality Date    CHOLECYSTECTOMY  1991    KNEE SURGERY  1998    right knee    TONSILLECTOMY AND ADENOIDECTOMY  1963    TUBAL LIGATION  1977    TUMOR REMOVAL  1999    parotid    TUMOR REMOVAL  1976    left thigh     Family History   Problem Relation Age of Onset    High Blood Pressure Mother     Allergies Mother    [employer] Migraines Mother     Obesity Mother     Heart Disease Father     High Blood Pressure Father     Allergies Father     Obesity Father     Diabetes Daughter     High Blood Pressure Sister     Allergies Sister     Bleeding Prob Sister     Migraines Sister     Obesity Sister     Cancer Brother     High Blood Pressure Brother     Allergies Brother     Bleeding Prob Brother     Obesity Brother     Thyroid Disease Maternal Aunt     Kidney Disease Maternal Aunt     Heart Disease Maternal Uncle     Kidney Disease Maternal Uncle     Cancer Maternal Grandmother     Glaucoma Maternal Grandmother     Diabetes Maternal Grandfather     Glaucoma Maternal Grandfather     Glaucoma Paternal Grandmother     Glaucoma Paternal Grandfather      Social History     Socioeconomic History    Marital status:      Spouse name: Not on file    Number of children: 1    Years of education: Not on file    Highest education level: Not on file   Occupational History    Occupation: Customer Service   Social Needs    Financial resource strain: Not on file    Food insecurity     Worry: Not on file     Inability: Not on file   [employer] needs     Medical: Not on file     Non-medical: Not on file   Tobacco Use    Smoking status: Former Smoker     Packs/day: 0.25     Years: 39.00     Pack years: 9.75     Types: Cigarettes     Quit date: 2019     Years since quittin.0    Smokeless tobacco: Never Used   Substance and Sexual Activity    Alcohol use: Yes     Comment: socially    Drug use: No    Sexual activity: Not on file   Lifestyle    Physical activity     Days per week: Not on file     Minutes per session: Not on file    Stress: Not on file   Relationships    Social connections     Talks on phone: Not on file     Gets together: Not on file     Attends Hinduism service: Not on file     Active member of club or organization: Not on file     Attends meetings of clubs or organizations: Not on file     Relationship status: Not on file    Intimate partner violence     Fear of current or ex partner: Not on file     Emotionally abused: Not on file     Physically abused: Not on file     Forced sexual activity: Not on file   Other Topics Concern    Not on file   Social History Narrative    Working now at [employer] from [address] [place] [address]   Constitutional: Negative for activity change, appetite change, chills, diaphoresis, fatigue, fever and unexpected weight change.    HENT: Negative for congestion, sinus pressure, sinus pain, sore throat and trouble swallowing. Respiratory: Negative for cough, choking, chest tightness, shortness of breath and wheezing. Cardiovascular: Negative for chest pain, palpitations and leg swelling. Gastrointestinal: Negative. Genitourinary: Negative. Musculoskeletal: Positive for arthralgias (left ankle). Negative for neck pain and neck stiffness. Skin: Negative. Neurological: Negative. Psychiatric/Behavioral: Negative. OBJECTIVE:     /74   Pulse 78   Temp 97.8 °F (36.6 °C) (Infrared)   Ht 5' 8.5\" (1.74 m)   Wt 261 lb (118.4 kg)   SpO2 97%   BMI 39.11 kg/m²     Physical Exam  Vitals signs reviewed. Constitutional:       General: She is not in acute distress. Appearance: Normal appearance. She is well-developed. She is not ill-appearing or diaphoretic. HENT:      Head: Normocephalic and atraumatic. Eyes:      General: No scleral icterus. Conjunctiva/sclera: Conjunctivae normal.      Pupils: Pupils are equal, round, and reactive to light. Neck:      Musculoskeletal: Normal range of motion and neck supple. No neck rigidity or muscular tenderness. Cardiovascular:      Rate and Rhythm: Normal rate and regular rhythm. Heart sounds: Normal heart sounds. No murmur. No friction rub. No gallop. Pulmonary:      Effort: Pulmonary effort is normal. No respiratory distress. Breath sounds: Normal breath sounds. No wheezing. Chest:      Chest wall: No tenderness. Abdominal:      Palpations: Abdomen is soft. Musculoskeletal: Normal range of motion. General: Tenderness (left ankle, lateral aspect) present. Right lower leg: No edema. Left lower leg: No edema. Lymphadenopathy:      Cervical: No cervical adenopathy. Skin:     General: Skin is warm and dry. Neurological:      Mental Status: She is alert and oriented to person, place, and time. Motor: No weakness.       Gait: Gait normal. Psychiatric:         Behavior: Behavior normal.         Thought Content: Thought content normal.         Judgment: Judgment normal.         No results found for requested labs within last 30 days. LDL Calculated (mg/dL)   Date Value   10/19/2020 51       Lab Results   Component Value Date    WBC 6.6 10/19/2020    WBC 5.7 07/21/2020    WBC 6.1 07/11/2020    NEUTROABS 3.9 10/19/2020    NEUTROABS 3.1 09/25/2019    HGB 14.8 10/19/2020    HGB 14.5 07/21/2020    HGB 13.2 07/11/2020    HCT 44.6 10/19/2020    HCT 44.9 07/21/2020    HCT 41.6 07/11/2020    MCV 83.4 10/19/2020    MCV 85.1 07/21/2020    MCV 87.8 07/11/2020     10/19/2020     07/21/2020     07/11/2020    SEGSABS 3.1 07/11/2020    SEGSABS 3.5 07/10/2020    SEGSABS 6.4 01/03/2020    LYMPHSABS 2.0 10/19/2020    MONOSABS 0.5 10/19/2020    EOSABS 0.2 10/19/2020    BASOSABS 0.0 10/19/2020     Lab Results   Component Value Date    TSH 3.16 09/25/2019    TSHHS 1.769 08/11/2012     Lab Results   Component Value Date    LABALBU 4.1 07/21/2020    BILITOT 0.4 07/21/2020    AST 16 07/21/2020    ALT 8 07/21/2020    ALKPHOS 74 07/21/2020             No results found for this visit on 01/12/21. ASSESSMENT AND PLAN:     1. Chronic pain of left ankle  - will call results of xray  - XR ANKLE LEFT (MIN 3 VIEWS); Future  - [address] DO [place], Orthopedic Surgery, [place]    2. Cold extremities  - will call results  - follow up with cardiology  - VITAMIN B12 & FOLATE; Future    3. Essential hypertension  - stable    4. Mild persistent asthma without complication  - follow up with pulmonology    Fluids, rest  Continue current medication regimen  Patient to contact cardiology and pulmonology for follow up appointments  Consider getting Covid vaccine  Verbalized understanding and agreement with plan      Return in about 6 months (around 7/12/2021). Care discussed with patient.  Patient educated on signs and symptoms of exacerbation and when to seek further medical attention. Advised to call for any problems, questions, or concerns. Patient verbalizes understanding and agrees with plan. Medications reviewed and reconciled. Continue current medications. Appropriate prescriptions are ordered. Risks and benefits of meds are discussed. After visit summary provided.

## 2021-01-13 DIAGNOSIS — L50.9 URTICARIA: ICD-10-CM

## 2021-01-13 RX ORDER — HYDROXYZINE HYDROCHLORIDE 25 MG/1
TABLET, FILM COATED ORAL
Qty: 30 TABLET | Refills: 1 | OUTPATIENT
Start: 2021-01-13

## 2021-01-18 ENCOUNTER — VIRTUAL VISIT (OUTPATIENT)
Dept: FAMILY MEDICINE CLINIC | Age: 71
End: 2021-01-18
Payer: MEDICARE

## 2021-01-18 DIAGNOSIS — Z12.11 SCREENING FOR COLON CANCER: ICD-10-CM

## 2021-01-18 DIAGNOSIS — J01.00 ACUTE NON-RECURRENT MAXILLARY SINUSITIS: ICD-10-CM

## 2021-01-18 DIAGNOSIS — Z12.11 SCREENING FOR COLON CANCER: Primary | ICD-10-CM

## 2021-01-18 DIAGNOSIS — K08.89 TOOTH ACHE: Primary | ICD-10-CM

## 2021-01-18 PROCEDURE — 1090F PRES/ABSN URINE INCON ASSESS: CPT | Performed by: NURSE PRACTITIONER

## 2021-01-18 PROCEDURE — 99213 OFFICE O/P EST LOW 20 MIN: CPT | Performed by: NURSE PRACTITIONER

## 2021-01-18 PROCEDURE — 1123F ACP DISCUSS/DSCN MKR DOCD: CPT | Performed by: NURSE PRACTITIONER

## 2021-01-18 PROCEDURE — 1036F TOBACCO NON-USER: CPT | Performed by: NURSE PRACTITIONER

## 2021-01-18 PROCEDURE — 4040F PNEUMOC VAC/ADMIN/RCVD: CPT | Performed by: NURSE PRACTITIONER

## 2021-01-18 PROCEDURE — G8417 CALC BMI ABV UP PARAM F/U: HCPCS | Performed by: NURSE PRACTITIONER

## 2021-01-18 PROCEDURE — G8400 PT W/DXA NO RESULTS DOC: HCPCS | Performed by: NURSE PRACTITIONER

## 2021-01-18 PROCEDURE — 82274 ASSAY TEST FOR BLOOD FECAL: CPT | Performed by: NURSE PRACTITIONER

## 2021-01-18 PROCEDURE — 3017F COLORECTAL CA SCREEN DOC REV: CPT | Performed by: NURSE PRACTITIONER

## 2021-01-18 PROCEDURE — G8427 DOCREV CUR MEDS BY ELIG CLIN: HCPCS | Performed by: NURSE PRACTITIONER

## 2021-01-18 PROCEDURE — G8484 FLU IMMUNIZE NO ADMIN: HCPCS | Performed by: NURSE PRACTITIONER

## 2021-01-18 RX ORDER — AMOXICILLIN 500 MG/1
500 CAPSULE ORAL 3 TIMES DAILY
Qty: 30 CAPSULE | Refills: 0 | Status: SHIPPED | OUTPATIENT
Start: 2021-01-18 | End: 2021-01-28

## 2021-01-18 ASSESSMENT — ENCOUNTER SYMPTOMS
WHEEZING: 0
FACIAL SWELLING: 1
SINUS PRESSURE: 1
SHORTNESS OF BREATH: 0
CHOKING: 0
SINUS PAIN: 1
COUGH: 0
CHEST TIGHTNESS: 0
TROUBLE SWALLOWING: 0
SORE THROAT: 0
GASTROINTESTINAL NEGATIVE: 1

## 2021-01-18 NOTE — PROGRESS NOTES
2021    TELEHEALTH EVALUATION -- Audio/Visual (During [facility]-55 public health emergency)    HPI:    Bonita Anderson (:  1950) has requested an audio/video evaluation for the following concern(s):    Una Novoa is a 65year old female who has requested a virtual visit from her home complaining of an infected tooth, face pain, facial swelling on the right, cough and mild headache. She states a tooth on the upper right broke off 5 days ago, the following day developed pain, no fever (98.5) and facial swelling. She states she has been using Tylenol Sinus which helps the pain. She also complains of a tender swollen gland. She denies shortness of breath, chest pain or palpitations. Review of Systems   Constitutional: Negative for activity change, appetite change, chills, diaphoresis, fatigue, fever and unexpected weight change. HENT: Positive for congestion, ear pain, facial swelling, sinus pressure and sinus pain. Negative for ear discharge, mouth sores, sore throat and trouble swallowing. Respiratory: Negative for cough, choking, chest tightness, shortness of breath and wheezing. Cardiovascular: Negative for chest pain and palpitations. Gastrointestinal: Negative. Musculoskeletal: Negative. Skin: Negative. Neurological: Positive for headaches. Prior to Visit Medications    Medication Sig Taking?  Authorizing Provider   amoxicillin (AMOXIL) 500 MG capsule Take 1 capsule by mouth 3 times daily for 10 days Yes ADALID Potter - CNP   Melatonin 5 MG SUBL Place 1 tablet under the tongue nightly as needed  Historical Provider, MD   Multiple Vitamin (MULTI-VITAMIN DAILY PO) Take 1 tablet by mouth daily  Historical Provider, MD   Phenylephrine-Acetaminophen (TYLENOL SINUS CONGESTION/PAIN PO) Take 1 tablet by mouth 2 times daily as needed  Historical Provider, MD   meclizine (ANTIVERT) 25 MG tablet TAKE ONE TABLET THREE TIMES A DAY AS NEEDED FOR DIZZINESS AVOID ALCOHOL/CAUSES DROWSINESS ADALID Giles CNP   baclofen (LIORESAL) 10 MG tablet Take 1 tablet by mouth 2 times daily  ADALID Giles CNP   lisinopril (PRINIVIL;ZESTRIL) 10 MG tablet Take 1 tablet by mouth daily  ADALID Giles CNP   montelukast (SINGULAIR) 10 MG tablet Take 1 tablet by mouth nightly  ADALID Giles CNP   hydroCHLOROthiazide (HYDRODIURIL) 12.5 MG tablet Take 1 tablet by mouth every other day  ADALID Giles CNP   ADVAIR DISKUS 500-50 MCG/DOSE diskus inhaler Inhale 1 puff into the lungs every 12 hours After inhalation then gargle  ADALID Giles CNP   albuterol sulfate HFA (PROVENTIL HFA) 108 (90 Base) MCG/ACT inhaler Inhale 2 puffs into the lungs every 4 hours as needed for Wheezing or Shortness of Breath (cough)  ADALID Giles CNP   simvastatin (ZOCOR) 40 MG tablet Take 1 tablet by mouth nightly  ADALID Giles CNP   clopidogrel (PLAVIX) 75 MG tablet Take 1 tablet by mouth daily  ADALID Giles CNP   cetirizine (ZYRTEC) 10 MG tablet TAKE ONE TABLET ONCE DAILY  ADALID Giles CNP   aspirin 81 MG tablet Take 1 tablet by mouth daily  ADALID Giles CNP   diphenhydrAMINE (BENADRYL) 25 MG tablet Take 1 tablet by mouth nightly as needed for Itching  ADALID Giles CNP   albuterol (PROVENTIL) (5 MG/ML) 0.5% nebulizer solution Take 1 mL by nebulization every 6 hours as needed for Wheezing or Shortness of Breath  ADALID Giles CNP   ipratropium-albuterol (DUONEB) 0.5-2.5 (3) MG/3ML SOLN nebulizer solution Inhale 3 mLs into the lungs 4 times daily  Patient not taking: Reported on 2021  ADALID Giles CNP       Social History     Tobacco Use    Smoking status: Former Smoker     Packs/day: 0.25     Years: 39.00     Pack years: 9.75     Types: Cigarettes     Quit date: 2019     Years since quittin.0    Smokeless tobacco: Never Used   Substance Use Topics    Alcohol use: Yes     Comment: socially    Drug use:  No PHYSICAL EXAMINATION:  [ INSTRUCTIONS:  \"[x]\" Indicates a positive item  \"[]\" Indicates a negative item  -- DELETE ALL ITEMS NOT EXAMINED]  Vital Signs: (As obtained by patient/caregiver or practitioner observation)    Blood pressure-  Heart rate-    Respiratory rate-    Temperature-  Pulse oximetry-     Constitutional: [x] Appears well-developed and well-nourished [x] No apparent distress      [] Abnormal-   Mental status  [x] Alert and awake  [x] Oriented to person/place/time [x]Able to follow commands      Eyes:  EOM    []  Normal  [] Abnormal-  Sclera  [x]  Normal  [] Abnormal -         Discharge [x]  None visible  [] Abnormal -    HENT:   [x] Normocephalic, atraumatic. [] Abnormal   Right facial swelling  Slight swelling of right eye  [] Mouth/Throat: Mucous membranes are moist.     External Ears [x] Normal  [] Abnormal-     Neck: [x] No visualized mass     Pulmonary/Chest: [x] Respiratory effort normal.  [] No visualized signs of difficulty breathing or respiratory distress        [] Abnormal-      Musculoskeletal:   [] Normal gait with no signs of ataxia         [] Normal range of motion of neck        [] Abnormal-       Neurological:        [] No Facial Asymmetry (Cranial nerve 7 motor function) (limited exam to video visit)          [x] No gaze palsy        [] Abnormal-         Skin:        [x] No significant exanthematous lesions or discoloration noted on facial skin         [] Abnormal-            Psychiatric:       [x] Normal Affect [] No Hallucinations        [] Abnormal-     Other pertinent observable physical exam findings-     ASSESSMENT/PLAN:  1. Tooth ache  - amoxicillin (AMOXIL) 500 MG capsule; Take 1 capsule by mouth 3 times daily for 10 days  Dispense: 30 capsule; Refill: 0    2. Acute non-recurrent maxillary sinusitis  - amoxicillin (AMOXIL) 500 MG capsule; Take 1 capsule by mouth 3 times daily for 10 days  Dispense: 30 capsule;  Refill: 0    Fluids, rest  Take prescribed medication as directed  Find dentist for dental care  Tylenol for discomfort  Warm compresses to right face  Saline nasal spray  Verbalized understanding and agreement with plan    No follow-ups on file. Elli Jameson is a 65 y.o. female being evaluated by a Virtual Visit (video visit) encounter to address concerns as mentioned above. A caregiver was present when appropriate. Due to this being a TeleHealth encounter (During [facility]-24 public health emergency), evaluation of the following organ systems was limited: Vitals/Constitutional/EENT/Resp/CV/GI//MS/Neuro/Skin/Heme-Lymph-Imm. Pursuant to the emergency declaration under the [address] [place] [address], [address] [place] [address] authority and the [employer] Act, this Virtual Visit was conducted with patient's (and/or legal guardian's) consent, to reduce the patient's risk of exposure to COVID-19 and provide necessary medical care. The patient (and/or legal guardian) has also been advised to contact this office for worsening conditions or problems, and seek emergency medical treatment and/or call 911 if deemed necessary. Patient identification was verified at the start of the visit: Yes    Total time spent on this encounter: Not billed by time    Services were provided through a video synchronous discussion virtually to substitute for in-person clinic visit. Patient and provider were located at their individual homes. --Archer Prader, APRN - CNP on 1/18/2021 at 10:41 AM    An electronic signature was used to authenticate this note.

## 2021-01-19 ENCOUNTER — HOSPITAL ENCOUNTER (OUTPATIENT)
Age: 71
Discharge: HOME | End: 2021-01-19
Payer: MEDICARE

## 2021-01-19 LAB
FOLATE: >20 NG/ML (ref 3.1–17.5)
VITAMIN B-12: 521.7 PG/ML (ref 211–911)

## 2021-01-19 PROCEDURE — 82607 VITAMIN B-12: CPT

## 2021-01-19 PROCEDURE — 36415 COLL VENOUS BLD VENIPUNCTURE: CPT

## 2021-01-19 PROCEDURE — 82746 ASSAY OF FOLIC ACID SERUM: CPT

## 2021-01-20 ENCOUNTER — OFFICE VISIT (OUTPATIENT)
Dept: ORTHOPEDIC SURGERY | Age: 71
End: 2021-01-20
Payer: MEDICARE

## 2021-01-20 VITALS
OXYGEN SATURATION: 99 % | BODY MASS INDEX: 38.66 KG/M2 | WEIGHT: 261 LBS | RESPIRATION RATE: 16 BRPM | HEIGHT: 69 IN | HEART RATE: 86 BPM

## 2021-01-20 DIAGNOSIS — M25.572 CHRONIC PAIN OF LEFT ANKLE: Primary | ICD-10-CM

## 2021-01-20 DIAGNOSIS — G89.29 CHRONIC PAIN OF LEFT ANKLE: Primary | ICD-10-CM

## 2021-01-20 PROCEDURE — G8484 FLU IMMUNIZE NO ADMIN: HCPCS | Performed by: PHYSICIAN ASSISTANT

## 2021-01-20 PROCEDURE — G8417 CALC BMI ABV UP PARAM F/U: HCPCS | Performed by: PHYSICIAN ASSISTANT

## 2021-01-20 PROCEDURE — G8427 DOCREV CUR MEDS BY ELIG CLIN: HCPCS | Performed by: PHYSICIAN ASSISTANT

## 2021-01-20 PROCEDURE — 1090F PRES/ABSN URINE INCON ASSESS: CPT | Performed by: PHYSICIAN ASSISTANT

## 2021-01-20 PROCEDURE — 3017F COLORECTAL CA SCREEN DOC REV: CPT | Performed by: PHYSICIAN ASSISTANT

## 2021-01-20 PROCEDURE — 1123F ACP DISCUSS/DSCN MKR DOCD: CPT | Performed by: PHYSICIAN ASSISTANT

## 2021-01-20 PROCEDURE — 99202 OFFICE O/P NEW SF 15 MIN: CPT | Performed by: PHYSICIAN ASSISTANT

## 2021-01-20 PROCEDURE — G8400 PT W/DXA NO RESULTS DOC: HCPCS | Performed by: PHYSICIAN ASSISTANT

## 2021-01-20 PROCEDURE — 4040F PNEUMOC VAC/ADMIN/RCVD: CPT | Performed by: PHYSICIAN ASSISTANT

## 2021-01-20 PROCEDURE — 1036F TOBACCO NON-USER: CPT | Performed by: PHYSICIAN ASSISTANT

## 2021-01-20 NOTE — PROGRESS NOTES
[place] [id number] and [facility]  I reviewed and agree with the portions of the HPI, review of systems, vital documentation and plan performed by my staff and have added/addended where appropriate. HPI:  Owens Boas is a 65 y.o. female that presents the office today with complaints of chronic left ankle pain that she states began as a result of an ankle fracture she sustained 20 years ago. She did not have to have surgery for the ankle but was casted and also given a brace. She states that the brace that she had been given was working well but that has since been lost and she would like a new brace. She only did therapy on the ankle back when she initially hurt the ankle 20 years ago. Rates the pain at a 5/10. Past Medical History:   Diagnosis Date    Active smoker     Active smoker     Ankle fracture, left 2006    Asthma     Chondromalacia of right knee     Dr. Griffiths Saliva + MRI    Chronic back pain     Chronic cough 10/4/2019    Depression     has seen psychiatry in [place] 6 months    Dizziness     CT brain normal -6/18/2012    Family history of early CAD     Father - 4st MI age 50, Massive MI age 71    H/O cardiovascular stress test 8/7/2012 8/7/2012-Lexiscan-Normal perfusion. LVSF normal.EF 58%    H/O Doppler ultrasound 12/11/2019     Abnormal left lower extremity arterial Doppler ultrasound. The Left lower extremity arteries have a Monophasic waveform suggestive of inflow disease, The Left BARBER shows Severe peripheral arterial disease. Normal right lower extremity arterial Doppler ultrasound. Normal right lower extremity ankle brachial indices    H/O echocardiogram 8/7/2012 8/7/2012-LVSF normal. EF =>55%. impaired LV relaxation.  Herniated intervertebral disk     thoracic and lumbar    Hypertension     Mild persistent asthma without complication 97/0/6089    Normal echocardiogram 8/2012    EF=> 55%-Dr. Josh Shabazz    Obesity (BMI 30-39. 9)     Obstructive sleep Years: 39.00     Pack years: 9.75     Types: Cigarettes     Quit date: 2019     Years since quittin.0    Smokeless tobacco: Never Used   Substance and Sexual Activity    Alcohol use: Yes     Comment: socially    Drug use: No    Sexual activity: Not on file   Lifestyle    Physical activity     Days per week: Not on file     Minutes per session: Not on file    Stress: Not on file   Relationships    Social connections     Talks on phone: Not on file     Gets together: Not on file     Attends [employer] service: Not on file     Active member of club or organization: Not on file     Attends meetings of clubs or organizations: Not on file     Relationship status: Not on file    Intimate partner violence     Fear of current or ex partner: Not on file     Emotionally abused: Not on file     Physically abused: Not on file     Forced sexual activity: Not on file   Other Topics Concern    Not on file   Social History Narrative    Working now at [employer] from [place]               Current Outpatient Medications   Medication Sig Dispense Refill    amoxicillin (AMOXIL) 500 MG capsule Take 1 capsule by mouth 3 times daily for 10 days 30 capsule 0    Melatonin 5 MG SUBL Place 1 tablet under the tongue nightly as needed      Multiple Vitamin (MULTI-VITAMIN DAILY PO) Take 1 tablet by mouth daily      Phenylephrine-Acetaminophen (TYLENOL SINUS CONGESTION/PAIN PO) Take 1 tablet by mouth 2 times daily as needed      meclizine (ANTIVERT) 25 MG tablet TAKE ONE TABLET THREE TIMES A DAY AS NEEDED FOR DIZZINESS AVOID ALCOHOL/CAUSES DROWSINESS 30 tablet 0    baclofen (LIORESAL) 10 MG tablet Take 1 tablet by mouth 2 times daily 60 tablet 2    lisinopril (PRINIVIL;ZESTRIL) 10 MG tablet Take 1 tablet by mouth daily 90 tablet 1    montelukast (SINGULAIR) 10 MG tablet Take 1 tablet by mouth nightly 90 tablet 1    hydroCHLOROthiazide (HYDRODIURIL) 12.5 MG tablet Take 1 tablet by mouth every other day 90 tablet 1    ADVAIR DISKUS 500-50 MCG/DOSE diskus inhaler Inhale 1 puff into the lungs every 12 hours After inhalation then gargle 1 Inhaler 11    albuterol sulfate HFA (PROVENTIL HFA) 108 (90 Base) MCG/ACT inhaler Inhale 2 puffs into the lungs every 4 hours as needed for Wheezing or Shortness of Breath (cough) 1 Inhaler 2    simvastatin (ZOCOR) 40 MG tablet Take 1 tablet by mouth nightly 90 tablet 1    clopidogrel (PLAVIX) 75 MG tablet Take 1 tablet by mouth daily 90 tablet 1    cetirizine (ZYRTEC) 10 MG tablet TAKE ONE TABLET ONCE DAILY 90 tablet 0    aspirin 81 MG tablet Take 1 tablet by mouth daily 90 tablet 2    diphenhydrAMINE (BENADRYL) 25 MG tablet Take 1 tablet by mouth nightly as needed for Itching 90 tablet 2    albuterol (PROVENTIL) (5 MG/ML) 0.5% nebulizer solution Take 1 mL by nebulization every 6 hours as needed for Wheezing or Shortness of Breath 100 vial 1    ipratropium-albuterol (DUONEB) 0.5-2.5 (3) MG/3ML SOLN nebulizer solution Inhale 3 mLs into the lungs 4 times daily 120 vial 1     No current facility-administered medications for this visit. Allergies   Allergen Reactions    Latex Hives and Rash    Cipro Xr Hives and Shortness Of Breath    Soap Hives and Shortness Of Breath     [facility] So[facility], Tide detergent      Tomato Hives and Shortness Of Breath    Influenza Vaccines Hives    Soybean-Containing Drug Products        Review of Systems:    Review of Systems   Constitutional: Negative. HENT: Negative. Eyes: Negative. Respiratory: Negative. Cardiovascular: Negative. Gastrointestinal: Negative. Genitourinary: Negative. Musculoskeletal: Positive for arthralgias and gait problem. Skin: Negative. Negative for rash and wound. Psychiatric/Behavioral: Negative. Physical Exam:   Pulse 86   Resp 16   Ht 5' 8.5\" (1.74 m)   Wt 261 lb (118.4 kg)   SpO2 99%   BMI 39.11 kg/m²       Gait is Antalgic.  The patient can bear weight on the injured pain  Follow up in 8 weeks

## 2021-01-20 NOTE — PROGRESS NOTES
Patient is in the office today with left ankle pain. Patient states that she broke the ankle probably 20+ years ago in another states. Patient states that she is unable to bear weight on the foot when first standing up. Will send a shocking sharp pain through the leg.  Patient states that she denies an recent injury to the ankle

## 2021-01-21 PROBLEM — G89.29 CHRONIC PAIN OF LEFT ANKLE: Status: ACTIVE | Noted: 2021-01-21

## 2021-01-21 PROBLEM — M25.572 CHRONIC PAIN OF LEFT ANKLE: Status: ACTIVE | Noted: 2021-01-21

## 2021-01-21 ASSESSMENT — ENCOUNTER SYMPTOMS
RESPIRATORY NEGATIVE: 1
GASTROINTESTINAL NEGATIVE: 1
EYES NEGATIVE: 1

## 2021-02-01 ENCOUNTER — HOSPITAL ENCOUNTER (OUTPATIENT)
Dept: PHYSICAL THERAPY | Age: 71
Setting detail: THERAPIES SERIES
Discharge: HOME | End: 2021-02-01
Payer: MEDICARE

## 2021-02-01 DIAGNOSIS — J42 CHRONIC BRONCHITIS, UNSPECIFIED CHRONIC BRONCHITIS TYPE (HCC): ICD-10-CM

## 2021-02-01 PROCEDURE — 97140 MANUAL THERAPY 1/> REGIONS: CPT

## 2021-02-01 PROCEDURE — 97161 PT EVAL LOW COMPLEX 20 MIN: CPT

## 2021-02-01 PROCEDURE — 97110 THERAPEUTIC EXERCISES: CPT

## 2021-02-01 NOTE — FLOWSHEET NOTE
address listed impairments, progress toward goal completion and improve ADL/IADL independence. PT also warranted to reduce risk for further decline or future injury. Subjective:  See eval         Any changes in Ambulatory Summary Sheet? None        Objective:  See eval   Prior to today's treatment session, patient was screened for signs and symptoms related to COVID-19 including but not limited to verbally answering questions related to feeling ill, cough, or SOB, along with taking temperature via forehead thermometer. Patient presented with all negative signs and symptoms and had no fever >100 degrees Fahrenheit this date. Exercises: (No more than 4 columns)   Exercise/Equipment 2/1/21 #1 Date Date           WARM UP                    TABLE      4 way ankle YTB x10 ea                                STANDING      Calf stretch x30\"                                              PROPRIOCEPTION                                    MODALITIES                      Other Therapeutic Activities/Education:  HEP and importance of completion, POC and goals, anatomy and physiology related to condition    Home Exercise Program:  Issued, practiced and pt demo ability to perform 2/1/2021      Manual Treatments:  STM L achilles tendon, soleus, gastroc, peroneals in prone w/ foot on stretch into DF      Modalities:  none      Communication with other providers:  POC sent 2/1      Assessment:  0/10 end pain. Pt noted her L ankle felt significant better and \"best its felt in a long time\" after manual therapy this date. Pt tolerated today's treatment without any adverse reactions or complications this date. Assessment: Pt is a 73-year-old female who presents with chronic L ankle/foot pain after an ankle fracture 20 years ago, pain worsening over the last year.  Upon assessment, pt presents with impairments in L ankle AROM, L ankle strength, SLS on LLE, impaired gait, impaired overall balance, increased soft tissue tension/ reduced gastroc and soleus flexibility and reduced independence with ADL / IADL completion. Pt would benefit from skilled therapy to address listed impairments, progress toward goal completion and improve ADL/IADL independence. PT also warranted to reduce risk for further decline or future injury.       Plan for Next Session:  nustep/bike, L ankle mobs and mobility, STM/ graston L achilles and soleus/gastroc and peroneals, L ankle strength/ balance, calf stretching      Time In / Time Out:    6643-4113    Timed Code/Total Treatment Minutes:  62': 13' MT x1, 11' TE x1, 34' Eval x1      Next Progress Note due:  10th visit      Plan of Care Interventions:  [x] Therapeutic Exercise  [x] Modalities:  [x] Therapeutic Activity     [x] Ultrasound  [x] Estim  [x] Gait Training      [] Cervical Traction [] Lumbar Traction  [x] Neuromuscular Re-education    [x] Cold/hotpack [] Iontophoresis   [x] Instruction in HEP      [x] Vasopneumatic   [] Dry Needling    [x] Manual Therapy               [] Aquatic Therapy              Electronically signed by:  Ragini Seay, PT, DPT 2/1/2021, 3:45 PM

## 2021-02-01 NOTE — PLAN OF CARE
Outpatient Physical Therapy           [place]           [x] Phone: [phone]   Fax: [phone]  [facility]           [] Phone: [phone]   Fax: [phone]     To: Referring Practitioner: Fer Claros PA-C  From: Theodora Can, PT, DPT     Patient: Zay Soriano       : 1950  Diagnosis: Diagnosis: Chronic pain of L ankle   Treatment Diagnosis: Treatment Diagnosis: L ankle weakness, reduced AROM L ankle   Date: 2021    Physical Therapy Certification/Re-Certification Form  Dear Fer Claros,  The following patient has been evaluated for physical therapy services and for therapy to continue, insurance requires physician review of the treatment plan initially and every 90 days. Please review the attached evaluation and/or summary of the patient's plan of care, and verify that you agree therapy should continue by signing the attached document and sending it back to our office. Assessment:    Assessment: Pt is a 68-year-old female who presents with chronic L ankle/foot pain after an ankle fracture 20 years ago, pain worsening over the last year. Upon assessment, pt presents with impairments in L ankle AROM, L ankle strength, SLS on LLE, impaired gait, impaired overall balance, increased soft tissue tension/ reduced gastroc and soleus flexibility and reduced independence with ADL / IADL completion. Pt would benefit from skilled therapy to address listed impairments, progress toward goal completion and improve ADL/IADL independence. PT also warranted to reduce risk for further decline or future injury. Patient agrees with established plan of care and assisted in the development of their short term and long term goals. Patient had no adverse reaction with initial treatment and there are no barriers to learning. Demonstrates no mental or cognitive disorder.      Plan of Care/Treatment to date:  [x] Therapeutic Exercise  [x] Modalities:  [x] Therapeutic Activity     [x] Ultrasound  [x] Electrical Stimulation  [x] Gait Training      [] Cervical Traction [] Lumbar Traction  [x] Neuromuscular Re-education    [x] Cold/hotpack [] Iontophoresis   [x] Instruction in HEP      [x] Vasopneumatic    [] Dry Needling  [x] Manual Therapy               [] Aquatic Therapy       Other:          Frequency/Duration:  # Days per week: [] 1 day # Weeks: [] 1 week [] 5 weeks     [x] 2 days   [] 2 weeks [x] 6 weeks     [] 3 days   [] 3 weeks [] 7 weeks     [] 4 days   [] 4 weeks [] 8 weeks         [] 9 weeks [] 10 weeks         [] 11 weeks [] 12 weeks    Rehab Potential/Progress: [] Excellent [x] Good [] Fair  [] Poor     Goals:      Short term goals  Time Frame for Short term goals: 6 visits  Short term goal 1: Pt will be Ind with HEP in order to maximize recovery outside of clinic  Short term goal 2: Pt will improve L DF AROM to 15 deg or higher to aide in stairs  Long term goals  Time Frame for Long term goals : 12 visits  Long term goal 1: Pt will improve LEFS to 44 or higher to show [address] and subjective improvement  Long term goal 2: Pt will improve L eversion AROM to 16 deg or higher to aide in gait  Long term goal 3: Pt will improve all ankle MMT/ strength to 4+/5 or higher to aide in gait  Long term goal 4: Pt will improve LLE SLS to 10 seconds or higher to show improved balance and ankle control      Electronically signed by:  Hilaria Cruz PT, DPT 2/1/2021, 3:44 PM        If you have any questions or concerns, please don't hesitate to call.   Thank you for your referral.      Physician Signature:________________________________Date:_________ TIME: _____  By signing above, therapists plan is approved by physician

## 2021-02-01 NOTE — PROGRESS NOTES
Physical Therapy  Initial Assessment  Date: 2021  Patient Name: Oanh Panda  MRN: [id number]  : 1950     Treatment Diagnosis: L ankle weakness, reduced AROM L ankle    Restrictions: none       Subjective   General  Chart Reviewed: Yes  Patient assessed for rehabilitation services?: Yes  Additional Pertinent Hx: HTN, Asthma, L ankle fracture 20 years ago  Referring Practitioner: Ana Monique PA-C  Referral Date : 21  Diagnosis: Chronic pain of L ankle  Follows Commands: Within Functional Limits  PT Visit Information  PT Insurance Information: [facility] medicare  Subjective  Subjective: Pt had a previous L ankle fracture 20 years ago (no surgery or hardware was done) and she has had worsening pain/ more trouble with mobility since then. She notes that she has increased pain when she is up and walking vs sitting and after a few days of increased activity she is really hurting. Pt notes that pain is on the lateral ankle and she will occasionally have a dull/ ache, but most of the pain in a sharp pain that will put her back into the seated position when it starts. Pt does have some difficulty with getting in/out of a chair at home and sometimes difficulty with stairs. Pt does note N/T into her feet, but she states she has stents due to circulation that can cause that. Pt does have a brace she will wear when she is out but does not use in the house unless she is going to be doing a lot of chores. Pt X-ray results from 21 show small suspected osteochondral defect within lateral aspect of talar dome.   Pain Screening  Patient Currently in Pain: No  Vital Signs  Patient Currently in Pain: No    Vision/Hearing  Vision  Vision: Within Functional Limits  Hearing  Hearing: Within functional limits    Orientation  Orientation  Overall Orientation Status: Within Normal Limits    Social/Functional History  Social/Functional History  Lives With: Alone(someone lives with her but is not avalible for assistance)  ADL Assistance: Independent  Homemaking Assistance: Independent  Ambulation Assistance: Independent  Transfer Assistance: Independent  Active : No  Mode of Transportation: Bus  Occupation: Retired    Objective     Observation/Palpation  Palpation: TTP over lateral > medial ankle, mild TTP over anterior talocrural joint and achilles tendon. Observation: Gait: mild reduced stance time LLE, reduced swing phase RLE, mild antalgic gait    AROM RLE (degrees)  RLE AROM: WFL  AROM LLE (degrees)  LLE AROM : Exceptions  L Ankle Dorsiflexion 0-20: 12  L Ankle Plantar Flexion 0-45: WFL  L Ankle Forefoot Inversion 0-40: 21  L Ankle Forefoot Eversion 0-20: 13  Joint Mobility  ROM LLE: reduced subtalar and talocrural mobility    Strength RLE  Strength RLE: WFL  Strength LLE  Strength LLE: Exception  L Ankle Dorsiflexion: 4/5  L Ankle Plantar Flexion: 5/5  L Ankle Inversion: 4/5  L Ankle Eversion: 4-/5     Additional Measures  Flexibility: reduced soleus and gastroc flexibility LLE  Special Tests: Anterior drawer: negative  Other: SLS LLE: 3 seconds  Sensation  Overall Sensation Status: WNL        Assessment   Conditions Requiring Skilled Therapeutic Intervention  Body structures, Functions, Activity limitations: Decreased functional mobility ; Decreased ADL status; Decreased ROM; Decreased strength;Decreased high-level IADLs;Decreased balance; Increased pain  Assessment: Pt is a 66-year-old female who presents with chronic L ankle/foot pain after an ankle fracture 20 years ago, pain worsening over the last year. Upon assessment, pt presents with impairments in L ankle AROM, L ankle strength, SLS on LLE, impaired gait, impaired overall balance, increased soft tissue tension/ reduced gastroc and soleus flexibility and reduced independence with ADL / IADL completion. Pt would benefit from skilled therapy to address listed impairments, progress toward goal completion and improve ADL/IADL independence. PT also warranted to reduce risk for further decline or future injury. Treatment Diagnosis: L ankle weakness, reduced AROM L ankle  Prognosis: Good  Decision Making: Low Complexity  History: see above. PLOF: independent  Exam: see above  Clinical Presentation: see above  Barriers to Learning: none. style: demonstration  REQUIRES PT FOLLOW UP: Yes  Treatment Initiated : yes on 2/1    Patient agrees with established plan of care and assisted in the development of their short term and long term goals. Patient had no adverse reaction with initial treatment and there are no barriers to learning. Demonstrates no mental or cognitive disorder.          Plan   Plan  Times per week: 2  Times per day: Daily  Plan weeks: 6  Specific instructions for Next Treatment: nustep/bike, L ankle mobs and mobility, STM/ graston L achilles and soleus/gastroc, L ankle strength/ balance  Current Treatment Recommendations: Strengthening, IADL Training, Neuromuscular Re-education, Home Exercise Program, ROM, Balance Training, Safety Education & Training, Patient/Caregiver Education & Training, Functional Mobility Training, Modalities, Gait Training, ADL/Self-care Training, Pain Management    OutComes Score   LEFS: 35/80    Goals  Short term goals  Time Frame for Short term goals: 6 visits  Short term goal 1: Pt will be Ind with HEP in order to maximize recovery outside of clinic  Short term goal 2: Pt will improve L DF AROM to 15 deg or higher to aide in stairs  Long term goals  Time Frame for Long term goals : 12 visits  Long term goal 1: Pt will improve LEFS to 44 or higher to show [address] and subjective improvement  Long term goal 2: Pt will improve L eversion AROM to 16 deg or higher to aide in gait  Long term goal 3: Pt will improve all ankle MMT/ strength to 4+/5 or higher to aide in gait  Long term goal 4: Pt will improve LLE SLS to 10 seconds or higher to show improved balance and ankle control  Patient Goals   Patient goals : reduce ankle pain       Therapy Time: [address], PT, DPT

## 2021-02-02 ENCOUNTER — HOSPITAL ENCOUNTER (OUTPATIENT)
Dept: WOMENS IMAGING | Age: 71
Discharge: HOME | End: 2021-02-02
Payer: MEDICARE

## 2021-02-02 DIAGNOSIS — Z78.0 ASYMPTOMATIC MENOPAUSAL STATE: ICD-10-CM

## 2021-02-02 PROCEDURE — 77080 DXA BONE DENSITY AXIAL: CPT

## 2021-02-12 ENCOUNTER — HOSPITAL ENCOUNTER (OUTPATIENT)
Dept: PHYSICAL THERAPY | Age: 71
Setting detail: THERAPIES SERIES
Discharge: HOME | End: 2021-02-12
Payer: MEDICARE

## 2021-02-12 PROCEDURE — 97140 MANUAL THERAPY 1/> REGIONS: CPT

## 2021-02-12 PROCEDURE — 97110 THERAPEUTIC EXERCISES: CPT

## 2021-02-12 NOTE — FLOWSHEET NOTE
Outpatient Physical Therapy  [place]           [x] Phone: [phone]   Fax: [phone]  [place]           [] Phone: [phone]   Fax: [phone]        Physical Therapy Daily Treatment Note  Date:  2021    Patient Name:  Rudi Eldridge    :  1950  MRN: [id number]  Restrictions/Precautions:  none  Diagnosis:   Diagnosis: Chronic pain of L ankle  Date of Injury/Surgery:   Treatment Diagnosis: Treatment Diagnosis: L ankle weakness, reduced AROM L ankle    Insurance/Certification information: PT Insurance Information: [facility] medicare   Referring Physician:  Referring Practitioner: Shantell Heart PA-C  Next Doctor Visit:    Plan of care signed (Y/N):  POC sent   Outcome Measure: LEFS:   Visit# / total visits:    Pain level: 510   Goals:       Short term goals  Time Frame for Short term goals: 6 visits  Short term goal 1: Pt will be Ind with HEP in order to maximize recovery outside of clinic  Short term goal 2: Pt will improve L DF AROM to 15 deg or higher to aide in stairs  Long term goals  Time Frame for Long term goals : 12 visits  Long term goal 1: Pt will improve LEFS to 44 or higher to show [address] and subjective improvement  Long term goal 2: Pt will improve L eversion AROM to 16 deg or higher to aide in gait  Long term goal 3: Pt will improve all ankle MMT/ strength to 4+/5 or higher to aide in gait  Long term goal 4: Pt will improve LLE SLS to 10 seconds or higher to show improved balance and ankle control    Summary of Evaluation: Assessment: Pt is a 78-year-old female who presents with chronic L ankle/foot pain after an ankle fracture 20 years ago, pain worsening over the last year. Upon assessment, pt presents with impairments in L ankle AROM, L ankle strength, SLS on LLE, impaired gait, impaired overall balance, increased soft tissue tension/ reduced gastroc and soleus flexibility and reduced independence with ADL / IADL completion.  Pt would benefit from skilled therapy to address listed impairments, progress toward goal completion and improve ADL/IADL independence. PT also warranted to reduce risk for further decline or future injury. Subjective:  Pt stated that her pain was about 5/10 today. Pt stated that she fell on ice/ snow walking to bus stop and hurt her knees and back. Pt stated that she has not been doing her HEP as much due to not feeling like it because of knee and back pain. Any changes in Ambulatory Summary Sheet? None        Objective:   Prior to today's treatment session, patient was screened for signs and symptoms related to COVID-19 including but not limited to verbally answering questions related to feeling ill, cough, or SOB, along with taking temperature via forehead thermometer. Patient presented with all negative signs and symptoms and had no fever >100 degrees Fahrenheit this date. Noted several areas of tissue restriction during Graston technique that improved afterwards with more tissue mobility. Exercises: (No more than 4 columns)   Exercise/Equipment 2/1/21 #1 2/12/2021 #2 Date           WARM UP        Nu-step  L-6 x 6'           TABLE      4 way ankle YTB x10 ea YTB 10x2 ea dir                                STANDING      Calf stretch x30\" man                                             [address]                      Other Therapeutic Activities/Education:  HEP and importance of completion, POC and goals, anatomy and physiology related to condition    Home Exercise Program:  Issued, practiced and pt demo ability to perform 2/1/2021      Manual Treatments:  STM/ graston L achilles and soleus/gastroc and peroneals x 20'  Manual stretching and ROM x 8'    Total manual x 28'   Modalities:  none      Communication with other providers:  POC sent 2/1      Assessment:  Pt tolerated treatment fairly well.  Pt was able to get more ROM after manual. Pt will continue to benefit from more strengthening and ROM. Did not do standing or balance activities today due to pain in back and knees from falling on the ice this past Tuesday. Pt rated pain at 4/10 after treatment. Assessment: Pt is a 68-year-old female who presents with chronic L ankle/foot pain after an ankle fracture 20 years ago, pain worsening over the last year. Upon assessment, pt presents with impairments in L ankle AROM, L ankle strength, SLS on LLE, impaired gait, impaired overall balance, increased soft tissue tension/ reduced gastroc and soleus flexibility and reduced independence with ADL / IADL completion. Pt would benefit from skilled therapy to address listed impairments, progress toward goal completion and improve ADL/IADL independence. PT also warranted to reduce risk for further decline or future injury.       Plan for Next Session:  nustep/bike, L ankle mobs and mobility, STM/ graston L achilles and soleus/gastroc and peroneals, L ankle strength/ balance, calf stretching      Time In / Time Out: 1300/1343    Timed Code/Total Treatment Minutes:    43'/ 37'   2 man ( 28') 1 TE (15')       Next Progress Note due:  10th visit      Plan of Care Interventions:  [x] Therapeutic Exercise  [x] Modalities:  [x] Therapeutic Activity     [x] Ultrasound  [x] Estim  [x] Gait Training      [] Cervical Traction [] Lumbar Traction  [x] Neuromuscular Re-education    [x] Cold/hotpack [] Iontophoresis   [x] Instruction in HEP      [x] Vasopneumatic   [] Dry Needling    [x] Manual Therapy               [] Aquatic Therapy              Electronically signed by:  Stevphen Libman, PTA 2/12/2021, 9:42 AM     2/12/2021,1:57 PM

## 2021-02-19 ENCOUNTER — HOSPITAL ENCOUNTER (OUTPATIENT)
Dept: PHYSICAL THERAPY | Age: 71
Setting detail: THERAPIES SERIES
Discharge: HOME | End: 2021-02-19
Payer: MEDICARE

## 2021-02-19 PROCEDURE — 97110 THERAPEUTIC EXERCISES: CPT

## 2021-02-19 PROCEDURE — 97140 MANUAL THERAPY 1/> REGIONS: CPT

## 2021-02-19 PROCEDURE — 97530 THERAPEUTIC ACTIVITIES: CPT

## 2021-02-19 NOTE — FLOWSHEET NOTE
Outpatient Physical Therapy  [place]           [x] Phone: [phone]   Fax: [phone]  [place]           [] Phone: [phone]   Fax: [phone]        Physical Therapy Daily Treatment Note  Date:  2021    Patient Name:  Ramez Fuller    :  1950  MRN: [id number]  Restrictions/Precautions:  none  Diagnosis:   Diagnosis: Chronic pain of L ankle  Date of Injury/Surgery:   Treatment Diagnosis: Treatment Diagnosis: L ankle weakness, reduced AROM L ankle    Insurance/Certification information: PT Insurance Information: [facility] medicare   Referring Physician:  Referring Practitioner: Suly Narvaez PA-C  Next Doctor Visit:    Plan of care signed (Y/N):  POC sent   Outcome Measure: LEFS: 35/80  Visit# / total visits:  3/12  Pain level: 0/10   Goals:       Short term goals  Time Frame for Short term goals: 6 visits  Short term goal 1: Pt will be Ind with HEP in order to maximize recovery outside of clinic  Short term goal 2: Pt will improve L DF AROM to 15 deg or higher to aide in stairs  Long term goals  Time Frame for Long term goals : 12 visits  Long term goal 1: Pt will improve LEFS to 44 or higher to show [address] and subjective improvement  Long term goal 2: Pt will improve L eversion AROM to 16 deg or higher to aide in gait  Long term goal 3: Pt will improve all ankle MMT/ strength to 4+/5 or higher to aide in gait  Long term goal 4: Pt will improve LLE SLS to 10 seconds or higher to show improved balance and ankle control    Summary of Evaluation: Assessment: Pt is a 58-year-old female who presents with chronic L ankle/foot pain after an ankle fracture 20 years ago, pain worsening over the last year. Upon assessment, pt presents with impairments in L ankle AROM, L ankle strength, SLS on LLE, impaired gait, impaired overall balance, increased soft tissue tension/ reduced gastroc and soleus flexibility and reduced independence with ADL / IADL completion.  Pt would benefit from skilled therapy to Modalities:  [x] Therapeutic Activity     [x] Ultrasound  [x] Estim  [x] Gait Training      [] Cervical Traction [] Lumbar Traction  [x] Neuromuscular Re-education    [x] Cold/hotpack [] Iontophoresis   [x] Instruction in HEP      [x] Vasopneumatic   [] Dry Needling    [x] Manual Therapy               [] Aquatic Therapy              Electronically signed by:  Silvia Gibson PTA 2/19/2021, 12:51 PM     3/75/5188,9:85 PM

## 2021-02-24 ENCOUNTER — HOSPITAL ENCOUNTER (OUTPATIENT)
Dept: PHYSICAL THERAPY | Age: 71
Setting detail: THERAPIES SERIES
Discharge: HOME | End: 2021-02-24
Payer: MEDICARE

## 2021-02-24 PROCEDURE — 97140 MANUAL THERAPY 1/> REGIONS: CPT

## 2021-02-24 PROCEDURE — 97112 NEUROMUSCULAR REEDUCATION: CPT

## 2021-02-24 PROCEDURE — 97110 THERAPEUTIC EXERCISES: CPT

## 2021-02-24 NOTE — FLOWSHEET NOTE
Outpatient Physical Therapy  [place]           [x] Phone: [phone]   Fax: [phone]  [facility]           [] Phone: [phone]   Fax: [phone]        Physical Therapy Daily Treatment Note  Date:  2021    Patient Name:  Chana Keys    :  1950  MRN: [id number]  Restrictions/Precautions:  none  Diagnosis:   Diagnosis: Chronic pain of L ankle  Date of Injury/Surgery:   Treatment Diagnosis: Treatment Diagnosis: L ankle weakness, reduced AROM L ankle    Insurance/Certification information: PT Insurance Information: [facility] medicare   Referring Physician:  Referring Practitioner: Marisol Alves PA-C  Next Doctor Visit:    Plan of care signed (Y/N):  POC sent   Outcome Measure: LEFS: 35/80  Visit# / total visits:    Pain level: 0/10 L ankle   Goals:       Short term goals  Time Frame for Short term goals: 6 visits  Short term goal 1: Pt will be Ind with HEP in order to maximize recovery outside of clinic  Short term goal 2: Pt will improve L DF AROM to 15 deg or higher to aide in stairs  Long term goals  Time Frame for Long term goals : 12 visits  Long term goal 1: Pt will improve LEFS to 44 or higher to show [address] and subjective improvement  Long term goal 2: Pt will improve L eversion AROM to 16 deg or higher to aide in gait  Long term goal 3: Pt will improve all ankle MMT/ strength to 4+/5 or higher to aide in gait  Long term goal 4: Pt will improve LLE SLS to 10 seconds or higher to show improved balance and ankle control    Summary of Evaluation: Assessment: Pt is a 76-year-old female who presents with chronic L ankle/foot pain after an ankle fracture 20 years ago, pain worsening over the last year. Upon assessment, pt presents with impairments in L ankle AROM, L ankle strength, SLS on LLE, impaired gait, impaired overall balance, increased soft tissue tension/ reduced gastroc and soleus flexibility and reduced independence with ADL / IADL completion.  Pt would benefit from skilled therapy to address listed impairments, progress toward goal completion and improve ADL/IADL independence. PT also warranted to reduce risk for further decline or future injury. Subjective: Pt stated that she fell on ice again yesterday. Pt stated that her sciatic nerve is aggravated today. Any changes in Ambulatory Summary Sheet? None        Objective:   Prior to today's treatment session, patient was screened for signs and symptoms related to COVID-19 including but not limited to verbally answering questions related to feeling ill, cough, or SOB, along with taking temperature via forehead thermometer. Patient presented with all negative signs and symptoms and had no fever >100 degrees Fahrenheit this date. The patient received  explanation for the benefits and rational in utilizing the Graston Technique for their soft tissues limitations. Patient does not have any Red flags or absolute contraindications. Patient was instructed the use of the Graston Instruments on the skin may cause some discomfort/pain [place] of the skin, bruising or Petechiae. Patient understands these risks and has agreed to continue with the intervention.              Exercises: (No more than 4 columns)   Exercise/Equipment 2/1/21 #1 2/12/2021 #2 2/19/2021 #3 2/24/2021 #4            WARM UP         Nu-step  L-6 x 6'  L-6x5'  L-6 x 5'           TABLE       4 way ankle YTB x10 ea YTB 10x2 ea dir  GTB 10x2 ea dir GTB 10x2 ea dir                                  STANDING       Calf stretch x30\" man 1/2 roll 20\" man   Ant step up   6\" 10x2 --   Lateral step up    4\" x 15 --   Marching on airex   20x 20x   Standing hip abduction     10x2 ea LE                      PROPRIOCEPTION                                          MODALITIES                         Other Therapeutic Activities/Education:  HEP and importance of completion, POC and goals, anatomy and physiology related to condition    Home Exercise Program:  Issued, practiced and Exercise  [x] Modalities:  [x] Therapeutic Activity     [x] Ultrasound  [x] Estim  [x] Gait Training      [] Cervical Traction [] Lumbar Traction  [x] Neuromuscular Re-education    [x] Cold/hotpack [] Iontophoresis   [x] Instruction in HEP      [x] Vasopneumatic   [] Dry Needling    [x] Manual Therapy               [] Aquatic Therapy              Electronically signed by:  Eugene Garcia PTA 2/24/2021, 10:33 AM      2/24/2021,3:52 PM

## 2021-02-26 ENCOUNTER — HOSPITAL ENCOUNTER (OUTPATIENT)
Dept: PHYSICAL THERAPY | Age: 71
Setting detail: THERAPIES SERIES
Discharge: HOME | End: 2021-02-26
Payer: MEDICARE

## 2021-02-26 PROCEDURE — 97110 THERAPEUTIC EXERCISES: CPT

## 2021-02-26 PROCEDURE — 97140 MANUAL THERAPY 1/> REGIONS: CPT

## 2021-02-26 NOTE — FLOWSHEET NOTE
Outpatient Physical Therapy  [place]           [x] Phone: [phone]   Fax: [phone]  [facility]           [] Phone: [phone]   Fax: [phone]        Physical Therapy Daily Treatment Note  Date:  2021    Patient Name:  Blake Lopes    :  1950  MRN: [id number]  Restrictions/Precautions:  none  Diagnosis:   Diagnosis: Chronic pain of L ankle  Date of Injury/Surgery:   Treatment Diagnosis: Treatment Diagnosis: L ankle weakness, reduced AROM L ankle    Insurance/Certification information: PT Insurance Information: [facility] medicare   Referring Physician:  Referring Practitioner: Blanca Singer PA-C  Next Doctor Visit:    Plan of care signed (Y/N):  POC sent   Outcome Measure: LEFS: 35  Visit# / total visits:    Pain level: 10 L ankle   Goals:       Short term goals  Time Frame for Short term goals: 6 visits  Short term goal 1: Pt will be Ind with HEP in order to maximize recovery outside of clinic  Short term goal 2: Pt will improve L DF AROM to 15 deg or higher to aide in stairs  Long term goals  Time Frame for Long term goals : 12 visits  Long term goal 1: Pt will improve LEFS to 44 or higher to show MDC and subjective improvement  Long term goal 2: Pt will improve L eversion AROM to 16 deg or higher to aide in gait  Long term goal 3: Pt will improve all ankle MMT/ strength to 4+/5 or higher to aide in gait  Long term goal 4: Pt will improve LLE SLS to 10 seconds or higher to show improved balance and ankle control    Summary of Evaluation: Assessment: Pt is a 26-year-old female who presents with chronic L ankle/foot pain after an ankle fracture 20 years ago, pain worsening over the last year. Upon assessment, pt presents with impairments in L ankle AROM, L ankle strength, SLS on LLE, impaired gait, impaired overall balance, increased soft tissue tension/ reduced gastroc and soleus flexibility and reduced independence with ADL / IADL completion.  Pt would benefit from skilled therapy to address listed impairments, progress toward goal completion and improve ADL/IADL independence. PT also warranted to reduce risk for further decline or future injury. Subjective: Pt states her ankle is doing alright this date. She did do more activity yesterday and her back is bothering her, but her ankle held up. Any changes in Ambulatory Summary Sheet? None        Objective:   Prior to today's treatment session, patient was screened for signs and symptoms related to COVID-19 including but not limited to verbally answering questions related to feeling ill, cough, or SOB, along with taking temperature via forehead thermometer. Patient presented with all negative signs and symptoms and had no fever >100 degrees Fahrenheit this date.      -significant tightness peroneals/tendons and gastroc/soleus and achilles     Exercises: (No more than 4 columns)   Exercise/Equipment 2/19/2021 #3 2/24/2021 #4 2/26/21 #5           WARM UP        Nu-step L-6x5'  L-6 x 5'  L6 x5'         TABLE      4 way ankle GTB 10x2 ea dir GTB 10x2 ea dir    [place] board   x20 4 directions                        STANDING      Calf stretch 1/2 roll 20\" man 2x30\" gastroc  2x30\" soleus   Ant step up 6\" 10x2 --    Lateral step up  4\" x 15 --    Marching on airex 20x 20x    Standing hip abduction   10x2 ea LE                     PROPRIOCEPTION                                    MODALITIES                      Other Therapeutic Activities/Education:  HEP and importance of completion    Home Exercise Program:  Issued, practiced and pt demo ability to perform 2/1/2021    Manual Treatments:    STM and IASTM L gastroc, soleus, peroneals, achilles and peroneal tendons. Modalities:  none      Communication with other providers:  POC sent 2/1      Assessment:  Pt tolerated today's treatment without any adverse reactions or complications this date. Pt had improved tissue tension, less pain and improved gait after manual this date.  Pt would continue to benefit from skilled therapy interventions to address remaining impairments, improve mobility and strength and progress toward goal completion while reducing risk for re-injury or further decline.   End pain: 0/10 and felt looser    Plan for Next Session:  nustep/bike, L ankle mobs and mobility, STM/ graston L achilles and soleus/gastroc and peroneals, L ankle strength/ balance, calf stretching    Time In / Time Out: 8242-2088    Timed Code/Total Treatment Minutes:  37': 24' MT x2, 19' TE x1      Next Progress Note due:  10th visit      Plan of Care Interventions:  [x] Therapeutic Exercise  [x] Modalities:  [x] Therapeutic Activity     [x] Ultrasound  [x] Estim  [x] Gait Training      [] Cervical Traction [] Lumbar Traction  [x] Neuromuscular Re-education    [x] Cold/hotpack [] Iontophoresis   [x] Instruction in HEP      [x] Vasopneumatic   [] Dry Needling    [x] Manual Therapy               [] Aquatic Therapy              Electronically signed by:  Theodora Can, PT, DPT 2/26/2021, 10:30 AM      2/26/2021,10:30 AM

## 2021-03-02 NOTE — FLOWSHEET NOTE
Outpatient Physical Therapy  [facility]           [x] Phone: [phone]   Fax: [phone]  [facility]           [] Phone: [phone]   Fax: [phone]        Physical Therapy Daily Treatment Note  Date:  3/2/2021    Patient Name:  April Salazar    :  1950  MRN: [id number]  Restrictions/Precautions:  none  Diagnosis:   Diagnosis: Chronic pain of L ankle  Date of Injury/Surgery:   Treatment Diagnosis: Treatment Diagnosis: L ankle weakness, reduced AROM L ankle    Insurance/Certification information: PT Insurance Information: [facility] medicare   Referring Physician:  Referring Practitioner: Candi England PA-C  Next Doctor Visit:    Plan of care signed (Y/N):  POC sent   Outcome Measure: LEFS: 35/80  Visit# / total visits:    Pain level: 0/10 L ankle       Goals:       Short term goals  Time Frame for Short term goals: 6 visits  Short term goal 1: Pt will be Ind with HEP in order to maximize recovery outside of clinic Reports compliance   Short term goal 2: Pt will improve L DF AROM to 15 deg or higher to aide in stairs Not met   Long term goals  Time Frame for Long term goals : 12 visits  Long term goal 1: Pt will improve LEFS to 44 or higher to show [address] and subjective improvement  Long term goal 2: Pt will improve L eversion AROM to 16 deg or higher to aide in gait Min progress   Long term goal 3: Pt will improve all ankle MMT/ strength to 4+/5 or higher to aide in gait Met   Long term goal 4: Pt will improve LLE SLS to 10 seconds or higher to show improved balance and ankle control Not met     Summary of Evaluation: Assessment: Pt is a 68-year-old female who presents with chronic L ankle/foot pain after an ankle fracture 20 years ago, pain worsening over the last year.  Upon assessment, pt presents with impairments in L ankle AROM, L ankle strength, SLS on LLE, impaired gait, impaired overall balance, increased soft tissue tension/ reduced gastroc and soleus flexibility and reduced independence with ADL / IADL completion. Pt would benefit from skilled therapy to address listed impairments, progress toward goal completion and improve ADL/IADL independence. PT also warranted to reduce risk for further decline or future injury. Subjective: Caron Robertson arrives to therapy stating that the ankle is feeling pretty good, back is bothering her. Any changes in Ambulatory Summary Sheet? None        Objective:   Prior to today's treatment session, patient was screened for signs and symptoms related to COVID-19 including but not limited to verbally answering questions related to feeling ill, cough, or SOB, along with taking temperature via forehead thermometer. Patient presented with all negative signs and symptoms and had no fever >100 degrees Fahrenheit this date. AROM  DF 2°  EV 14°    MMT  5/5 within available range     Difficulty with airex marches; however, after first few marches patient was able to progress to marching with no UE assist. Very tight in gastroc/soleus complex and tender in some areas with graston. Posterior tibialis was tender and tight distally. Moderate swelling surrounding the ankle and in the achilles.      Exercises: (No more than 4 columns)   Exercise/Equipment 2/24/2021 #4 2/26/21 #5 3/3/2021 #6           WARM UP        Nu-step L-6 x 5'  L6 x5' S14/A11/L6 5'         TABLE      4 way ankle GTB 10x2 ea dir  -   [place] board  x20 4 directions 20* ea dir                         STANDING      Calf stretch man 2x30\" gastroc  2x30\" soleus    Marching on airex 20x  2*10   Standing hip abduction  10x2 ea LE  -                    PROPRIOCEPTION                                    MODALITIES                      Other Therapeutic Activities/Education:  HEP and importance of completion    Home Exercise Program:  Issued, practiced and pt demo ability to perform 2/1/2021    Manual Treatments:  GT was applied to the  Gastroc/soleus complex, achilles tendon, peroneals and posterior tibialis

## 2021-03-03 ENCOUNTER — HOSPITAL ENCOUNTER (OUTPATIENT)
Dept: PHYSICAL THERAPY | Age: 71
Setting detail: THERAPIES SERIES
Discharge: HOME | End: 2021-03-03
Payer: MEDICARE

## 2021-03-03 PROCEDURE — 97140 MANUAL THERAPY 1/> REGIONS: CPT

## 2021-03-03 PROCEDURE — 97110 THERAPEUTIC EXERCISES: CPT

## 2021-03-03 RX ORDER — CETIRIZINE HYDROCHLORIDE 10 MG/1
TABLET ORAL
Qty: 90 TABLET | Refills: 0 | Status: SHIPPED | OUTPATIENT
Start: 2021-03-03 | End: 2021-05-20 | Stop reason: SDUPTHER

## 2021-03-05 ENCOUNTER — HOSPITAL ENCOUNTER (OUTPATIENT)
Dept: PHYSICAL THERAPY | Age: 71
Setting detail: THERAPIES SERIES
Discharge: HOME | End: 2021-03-05
Payer: MEDICARE

## 2021-03-05 PROCEDURE — 97110 THERAPEUTIC EXERCISES: CPT

## 2021-03-05 PROCEDURE — 97140 MANUAL THERAPY 1/> REGIONS: CPT

## 2021-03-05 NOTE — FLOWSHEET NOTE
Outpatient Physical Therapy  [facility]           [x] Phone: [phone]   Fax: [phone]  [facility]           [] Phone: [phone]   Fax: [phone]        Physical Therapy Daily Treatment Note  Date:  3/5/2021    Patient Name:  Aurelio Soni    :  1950  MRN: [id number]  Restrictions/Precautions:  none  Diagnosis:   Diagnosis: Chronic pain of L ankle  Date of Injury/Surgery:   Treatment Diagnosis: Treatment Diagnosis: L ankle weakness, reduced AROM L ankle    Insurance/Certification information: PT Insurance Information: [facility] medicare   Referring Physician:  Referring Practitioner: Elisabeth Mckeon PA-C  Next Doctor Visit:    Plan of care signed (Y/N):  POC sent   Outcome Measure: LEFS: 35/80  Visit# / total visits:    Pain level: 0/10 L ankle       Goals:       Short term goals  Time Frame for Short term goals: 6 visits  Short term goal 1: Pt will be Ind with HEP in order to maximize recovery outside of clinic Reports compliance   Short term goal 2: Pt will improve L DF AROM to 15 deg or higher to aide in stairs Not met   Long term goals  Time Frame for Long term goals : 12 visits  Long term goal 1: Pt will improve LEFS to 44 or higher to show [address] and subjective improvement  Long term goal 2: Pt will improve L eversion AROM to 16 deg or higher to aide in gait Min progress   Long term goal 3: Pt will improve all ankle MMT/ strength to 4+/5 or higher to aide in gait Met   Long term goal 4: Pt will improve LLE SLS to 10 seconds or higher to show improved balance and ankle control Not met     Summary of Evaluation: Assessment: Pt is a 68-year-old female who presents with chronic L ankle/foot pain after an ankle fracture 20 years ago, pain worsening over the last year.  Upon assessment, pt presents with impairments in L ankle AROM, L ankle strength, SLS on LLE, impaired gait, impaired overall balance, increased soft tissue tension/ reduced gastroc and soleus flexibility and reduced independence with ADL / IADL completion. Pt would benefit from skilled therapy to address listed impairments, progress toward goal completion and improve ADL/IADL independence. PT also warranted to reduce risk for further decline or future injury. Subjective:  Pt stated that her ankle feels pretty good today. Pt stated that it's just stiff and tight. Pt stated that she had cramping in \"ball of foot\" last night. Pt stated that she feels like therapy is helping her to walk better. Pt stated that her allergies are really bad today. Any changes in Ambulatory Summary Sheet? None        Objective:   Prior to today's treatment session, patient was screened for signs and symptoms related to COVID-19 including but not limited to verbally answering questions related to feeling ill, cough, or SOB, along with taking temperature via forehead thermometer. Patient presented with all negative signs and symptoms and had no fever >100 degrees Fahrenheit this date.     noted red/ watery eyes    Multiple areas of tenderness in calf especially around sock indentation and medial L malleolus   Pt is very ticklish on bottom of foot so it was done with deep pressure of hand vs. Graston  Tends to march off of airex    Exercises: (No more than 4 columns)   Exercise/Equipment 2/24/2021 #4 2/26/21 #5 3/3/2021 #6 3/5/2021 # 7            WARM UP         Nu-step L-6 x 5'  L6 x5' S14/A11/L6 5' L6 x6'          TABLE       4 way ankle GTB 10x2 ea dir  - HEP   [place] board  x20 4 directions 20* ea dir                             STANDING       Calf stretch man 2x30\" gastroc  2x30\" soleus  man   Marching on airex 20x  2*10 20x  20x on floor   Standing hip abduction  10x2 ea LE  -    Heel raises     10x2                PROPRIOCEPTION       [place] board taps    F/B 10x2 ea                                MODALITIES                         Other Therapeutic Activities/Education:  HEP and importance of completion    Home Exercise Program:  Issued, practiced and pt demo ability to perform 2/1/2021    Manual Treatments:  GT was applied to the  Gastroc/soleus complex, achilles tendon, peroneals and posterior tibialis because the assessment demonstrated decreased soft tissue mobility and ROM. Ulus Reusing Tools and techniques used were based on the assessment and treatment goals. The patients skin at the end of the treatment showed improved ROM and increased tissue mobility with mild redness in some areas   Overall GT treatment time 15'            Modalities:  none      Communication with other providers:  POC sent 2/1      Assessment: Pt tolerated treatment fairly well. Pt is making improvements with balance and ROM ,but continues to be limited and reports cramping in foot and calf. Pt rated pain at 0/10 and stated that she has more mobility in her ankle. Pt will continue to benefit from more ROM/ flexibility and strength.    Plan for Next Session:  nustep/bike, L ankle mobs and mobility, STM/ graston L achilles and soleus/gastroc and peroneals, L ankle strength/ balance, calf stretching    Time In / Time Out: 1300/ 1353    Timed Code/Total Treatment Minutes:  2 TE ( 30') 2 TE (23')        Next Progress Note due:  10th visit      Plan of Care Interventions:  [x] Therapeutic Exercise  [x] Modalities:  [x] Therapeutic Activity     [x] Ultrasound  [x] Estim  [x] Gait Training      [] Cervical Traction [] Lumbar Traction  [x] Neuromuscular Re-education    [x] Cold/hotpack [] Iontophoresis   [x] Instruction in HEP      [x] Vasopneumatic   [] Dry Needling    [x] Manual Therapy               [] Aquatic Therapy              Electronically signed by:  Nash Berger PTA     3/5/2021, 12:57 PM     3/5/2021,3:12 PM

## 2021-03-12 ENCOUNTER — HOSPITAL ENCOUNTER (OUTPATIENT)
Dept: PHYSICAL THERAPY | Age: 71
Setting detail: THERAPIES SERIES
Discharge: HOME | End: 2021-03-12
Payer: MEDICARE

## 2021-03-12 ENCOUNTER — VIRTUAL VISIT (OUTPATIENT)
Dept: FAMILY MEDICINE CLINIC | Age: 71
End: 2021-03-12
Payer: MEDICARE

## 2021-03-12 DIAGNOSIS — K08.89 TOOTH ACHE: Primary | ICD-10-CM

## 2021-03-12 PROCEDURE — 4040F PNEUMOC VAC/ADMIN/RCVD: CPT | Performed by: NURSE PRACTITIONER

## 2021-03-12 PROCEDURE — G8399 PT W/DXA RESULTS DOCUMENT: HCPCS | Performed by: NURSE PRACTITIONER

## 2021-03-12 PROCEDURE — G8417 CALC BMI ABV UP PARAM F/U: HCPCS | Performed by: NURSE PRACTITIONER

## 2021-03-12 PROCEDURE — 1090F PRES/ABSN URINE INCON ASSESS: CPT | Performed by: NURSE PRACTITIONER

## 2021-03-12 PROCEDURE — G8484 FLU IMMUNIZE NO ADMIN: HCPCS | Performed by: NURSE PRACTITIONER

## 2021-03-12 PROCEDURE — 97110 THERAPEUTIC EXERCISES: CPT

## 2021-03-12 PROCEDURE — 97140 MANUAL THERAPY 1/> REGIONS: CPT

## 2021-03-12 PROCEDURE — 99213 OFFICE O/P EST LOW 20 MIN: CPT | Performed by: NURSE PRACTITIONER

## 2021-03-12 PROCEDURE — 1123F ACP DISCUSS/DSCN MKR DOCD: CPT | Performed by: NURSE PRACTITIONER

## 2021-03-12 PROCEDURE — 3017F COLORECTAL CA SCREEN DOC REV: CPT | Performed by: NURSE PRACTITIONER

## 2021-03-12 PROCEDURE — G8428 CUR MEDS NOT DOCUMENT: HCPCS | Performed by: NURSE PRACTITIONER

## 2021-03-12 PROCEDURE — 1036F TOBACCO NON-USER: CPT | Performed by: NURSE PRACTITIONER

## 2021-03-12 RX ORDER — AMOXICILLIN 500 MG/1
500 CAPSULE ORAL 3 TIMES DAILY
Qty: 30 CAPSULE | Refills: 0 | Status: SHIPPED | OUTPATIENT
Start: 2021-03-12 | End: 2021-03-22

## 2021-03-12 ASSESSMENT — ENCOUNTER SYMPTOMS
SINUS PRESSURE: 0
CHEST TIGHTNESS: 0
WHEEZING: 0
SORE THROAT: 0
SHORTNESS OF BREATH: 0
FACIAL SWELLING: 1
SINUS PAIN: 0
COUGH: 0

## 2021-03-12 NOTE — PROGRESS NOTES
3/12/2021    TELEHEALTH EVALUATION -- Audio/Visual (During UJBXV-[id number] public health emergency)    HPI:    Cash Diaz (:  1950) has requested an audio/video evaluation for the following concern(s):    Joanna Santoro is a 65year old female who has requested a virtual visit to address dental pain. She states she had a filling that fell out about 3 days ago. She contacted her dentist who told she would have to wait until 2 weeks after her second Covid vaccine. She states the left lower molar that had the filling is very painful, has face swelling on the left and a \"knot\" under the jaw line. She also complains of some left ear pain. She denies fevers, chills or sweats. She states since the vaccine she has had some continued fatigue. Review of Systems   Constitutional: Negative for activity change, appetite change, chills, diaphoresis, fatigue, fever and unexpected weight change. HENT: Positive for ear pain and facial swelling. Negative for ear discharge, sinus pressure, sinus pain and sore throat. Respiratory: Negative for cough, chest tightness, shortness of breath and wheezing. Cardiovascular: Negative for chest pain and palpitations. Neurological: Negative for dizziness, light-headedness and headaches. Prior to Visit Medications    Medication Sig Taking?  Authorizing Provider   amoxicillin (AMOXIL) 500 MG capsule Take 1 capsule by mouth 3 times daily for 10 days Yes ADALID Velasco CNP   cetirizine (ZYRTEC) 10 MG tablet TAKE ONE TABLET ONCE DAILY  ADALID Velasco CNP   albuterol (PROVENTIL) (5 MG/ML) 0.5% nebulizer solution Take 1 mL by nebulization every 6 hours as needed for Wheezing or Shortness of Breath  ADALID Velasco CNP   Melatonin 5 MG SUBL Place 1 tablet under the tongue nightly as needed  Historical Provider, MD   Multiple Vitamin (MULTI-VITAMIN DAILY PO) Take 1 tablet by mouth daily  Historical Provider, MD   Phenylephrine-Acetaminophen (TYLENOL SINUS CONGESTION/PAIN PO) Take 1 tablet by mouth 2 times daily as needed  Historical Provider, MD   meclizine (ANTIVERT) 25 MG tablet TAKE ONE TABLET THREE TIMES A DAY AS NEEDED FOR DIZZINESS AVOID ALCOHOL/CAUSES DROWSINESS  Umsan Span, APRN - CNP   baclofen (LIORESAL) 10 MG tablet Take 1 tablet by mouth 2 times daily  Usman Span, APRN - CNP   lisinopril (PRINIVIL;ZESTRIL) 10 MG tablet Take 1 tablet by mouth daily  Usman Span, APRN - CNP   montelukast (SINGULAIR) 10 MG tablet Take 1 tablet by mouth nightly  Usman Span, APRN - CNP   hydroCHLOROthiazide (HYDRODIURIL) 12.5 MG tablet Take 1 tablet by mouth every other day  Usman Span, APRN - CNP   ADVAIR DISKUS 500-50 MCG/DOSE diskus inhaler Inhale 1 puff into the lungs every 12 hours After inhalation then gargle  Usman Span, APRN - CNP   albuterol sulfate HFA (PROVENTIL HFA) 108 (90 Base) MCG/ACT inhaler Inhale 2 puffs into the lungs every 4 hours as needed for Wheezing or Shortness of Breath (cough)  Usman Span, APRN - CNP   simvastatin (ZOCOR) 40 MG tablet Take 1 tablet by mouth nightly  Usman Span, APRN - CNP   clopidogrel (PLAVIX) 75 MG tablet Take 1 tablet by mouth daily  Usman Span, APRN - CNP   aspirin 81 MG tablet Take 1 tablet by mouth daily  Usman Span, APRN - CNP   diphenhydrAMINE (BENADRYL) 25 MG tablet Take 1 tablet by mouth nightly as needed for Itching  Usman Span, APRN - CNP   ipratropium-albuterol (DUONEB) 0.5-2.5 (3) MG/3ML SOLN nebulizer solution Inhale 3 mLs into the lungs 4 times daily  Usman Span, APRN - CNP       Social History     Tobacco Use    Smoking status: Former Smoker     Packs/day: 0.25     Years: 39.00     Pack years: 9.75     Types: Cigarettes     Quit date: 2019     Years since quittin.2    Smokeless tobacco: Never Used   Substance Use Topics    Alcohol use: Yes     Comment: socially    Drug use: No          PHYSICAL EXAMINATION:  [ INSTRUCTIONS:  \"[x]\" Indicates a positive item  \"[]\" Indicates a negative item  -- DELETE ALL ITEMS NOT EXAMINED]  Vital Signs: (As obtained by patient/caregiver or practitioner observation)    Blood pressure-  Heart rate-    Respiratory rate-    Temperature-  Pulse oximetry-     Constitutional: [x] Appears well-developed and well-nourished [x] No apparent distress      [] Abnormal-   Mental status  [x] Alert and awake  [] Oriented to person/place/time [x]Able to follow commands      Eyes:  EOM    []  Normal  [] Abnormal-  Sclera  [x]  Normal  [] Abnormal -         Discharge [x]  None visible  [] Abnormal -    HENT:   [x] Normocephalic, atraumatic. [] Abnormal   [x] Mouth/Throat: Mucous membranes are moist.     External Ears [x] Normal  [] Abnormal-     Neck: [x] No visualized mass     Pulmonary/Chest: [x] Respiratory effort normal.  [x] No visualized signs of difficulty breathing or respiratory distress        [] Abnormal-      Musculoskeletal:   [] Normal gait with no signs of ataxia         [x] Normal range of motion of neck        [] Abnormal-       Neurological:        [x] No Facial Asymmetry (Cranial nerve 7 motor function) (limited exam to video visit)          [x] No gaze palsy        [] Abnormal-         Skin:        [x] No significant exanthematous lesions or discoloration noted on facial skin         [] Abnormal-            Psychiatric:       [x] Normal Affect [] No Hallucinations        [] Abnormal-     Other pertinent observable physical exam findings-     ASSESSMENT/PLAN:  1. Tooth ache  - amoxicillin (AMOXIL) 500 MG capsule; Take 1 capsule by mouth 3 times daily for 10 days  Dispense: 30 capsule; Refill: 0    Fluids, rest  Warm salt mouth rinses  Tylenol for discomfort  Take prescribed medication as directed  Follow up with dentist in 2 weeks  Verbalized understanding and agreement with plan    No follow-ups on file. Tierra Junior, was evaluated through a synchronous (real-time) audio-video encounter.  The patient (or guardian if applicable) is aware that this is a billable service. Verbal consent to proceed has been obtained within the past 12 months. The visit was conducted pursuant to the emergency declaration under the [address] [place] [address] and the [employer] General Act. Patient identification was verified, and a caregiver was present when appropriate. The patient was located in a state where the provider was credentialed to provide care. Total time spent on this encounter: Not billed by time    --ADALID Jackson CNP on 3/12/2021 at 11:11 AM    An electronic signature was used to authenticate this note.

## 2021-03-12 NOTE — FLOWSHEET NOTE
Outpatient Physical Therapy  [place]           [x] Phone: [phone]   Fax: [phone]  [facility]           [] Phone: [phone]   Fax: [phone]        Physical Therapy Daily Treatment Note  Date:  3/12/2021    Patient Name:  Addison Jacobsen    :  1950  MRN: [id number]  Restrictions/Precautions:  none  Diagnosis:   Diagnosis: Chronic pain of L ankle  Date of Injury/Surgery:   Treatment Diagnosis: Treatment Diagnosis: L ankle weakness, reduced AROM L ankle    Insurance/Certification information: PT Insurance Information: [facility] medicare   Referring Physician:  Referring Practitioner: Lawanda More PA-C  Next Doctor Visit:    Plan of care signed (Y/N):  yes  Outcome Measure: LEFS: 35/80  Visit# / total visits:    Pain level: 0/10 L ankle however significant tightness in calf       Goals:       Short term goals  Time Frame for Short term goals: 6 visits  Short term goal 1: Pt will be Ind with HEP in order to maximize recovery outside of clinic Reports compliance   Short term goal 2: Pt will improve L DF AROM to 15 deg or higher to aide in stairs Not met   Long term goals  Time Frame for Long term goals : 12 visits  Long term goal 1: Pt will improve LEFS to 44 or higher to show [address] and subjective improvement  Long term goal 2: Pt will improve L eversion AROM to 16 deg or higher to aide in gait Min progress   Long term goal 3: Pt will improve all ankle MMT/ strength to 4+/5 or higher to aide in gait Met   Long term goal 4: Pt will improve LLE SLS to 10 seconds or higher to show improved balance and ankle control Not met     Summary of Evaluation: Assessment: Pt is a 77-year-old female who presents with chronic L ankle/foot pain after an ankle fracture 20 years ago, pain worsening over the last year.  Upon assessment, pt presents with impairments in L ankle AROM, L ankle strength, SLS on LLE, impaired gait, impaired overall balance, increased soft tissue tension/ reduced gastroc and soleus flexibility and reduced independence with ADL / IADL completion. Pt would benefit from skilled therapy to address listed impairments, progress toward goal completion and improve ADL/IADL independence. PT also warranted to reduce risk for further decline or future injury. Subjective:  Pt is doing well this date but she has not been able to do her HEP the last 2 days due to fatigue and symptoms from getting her second COVID shot on Wed. She notes that due to not having as much energy to do her HEP, she feels more tight than usual. Pt notes her ankle is not hurting, just the calf tightness. Any changes in Ambulatory Summary Sheet? None      Objective:   Prior to today's treatment session, patient was screened for signs and symptoms related to COVID-19 including but not limited to verbally answering questions related to feeling ill, cough, or SOB, along with taking temperature via forehead thermometer. Patient presented with all negative signs and symptoms and had no fever >100 degrees Fahrenheit this date.     noted red/ watery eyes    -increased rest needed this date due to fatigue from second COVID shot    Exercises: (No more than 4 columns)   Exercise/Equipment 3/3/2021 #6 3/5/2021 # 7 3/12/21 #8           WARM UP        Nu-step S14/A11/L6 5' L6 x6' L6 x5'         TABLE      4 way ankle - HEP    [place] board 20* ea dir   20x in 4 directions                        STANDING      Calf stretch  man 2x30\" gastroc, x30\" soleus   Marching on airex 2*10 20x  20x on floor    Standing hip abduction  -     Heel raises   10x2  Discontinue - will make calf tighter               PROPRIOCEPTION      Wobble board taps  F/B 10x2 ea  F/B 10x2 ea                            MODALITIES                      Other Therapeutic Activities/Education:  HEP and importance of completion    Home Exercise Program:  Issued, practiced and pt demo ability to perform 2/1/2021    Manual Treatments:      STM and IASTM L gastroc, soleus, peroneals, achilles and peroneal tendons. Modalities:  none      Communication with other providers:  POC sent 2/1      Assessment: Pt tolerated today's treatment without any adverse reactions or complications this date. Pt had no pain when she left and reported significant improvement in tightness and tissue tension after manual this date. Pt would continue to benefit from skilled therapy interventions to address remaining impairments, improve mobility and strength and progress toward goal completion while reducing risk for re-injury or further decline.   End pain: 0/10    Plan for Next Session:  nustep/bike, L ankle mobs and mobility, STM/ graston L achilles and soleus/gastroc and peroneals, L ankle strength/ balance, calf stretching    Time In / Time Out: 1300 - 1339    Timed Code/Total Treatment Minutes:  44': 21' MT x2, 16' TE x1      Next Progress Note due:  10th visit      Plan of Care Interventions:  [x] Therapeutic Exercise  [x] Modalities:  [x] Therapeutic Activity     [x] Ultrasound  [x] Estim  [x] Gait Training      [] Cervical Traction [] Lumbar Traction  [x] Neuromuscular Re-education    [x] Cold/hotpack [] Iontophoresis   [x] Instruction in HEP      [x] Vasopneumatic   [] Dry Needling    [x] Manual Therapy               [] Aquatic Therapy              Electronically signed by:  Cate Cisneros PT, DPT  3/12/2021,1:04 PM

## 2021-03-17 ENCOUNTER — OFFICE VISIT (OUTPATIENT)
Dept: ORTHOPEDIC SURGERY | Age: 71
End: 2021-03-17
Payer: MEDICARE

## 2021-03-17 VITALS — WEIGHT: 261 LBS | RESPIRATION RATE: 16 BRPM | HEIGHT: 69 IN | BODY MASS INDEX: 38.66 KG/M2

## 2021-03-17 DIAGNOSIS — M25.572 CHRONIC PAIN OF LEFT ANKLE: Primary | ICD-10-CM

## 2021-03-17 DIAGNOSIS — G89.29 CHRONIC PAIN OF LEFT ANKLE: Primary | ICD-10-CM

## 2021-03-17 PROCEDURE — 1090F PRES/ABSN URINE INCON ASSESS: CPT | Performed by: PHYSICIAN ASSISTANT

## 2021-03-17 PROCEDURE — G8484 FLU IMMUNIZE NO ADMIN: HCPCS | Performed by: PHYSICIAN ASSISTANT

## 2021-03-17 PROCEDURE — 3017F COLORECTAL CA SCREEN DOC REV: CPT | Performed by: PHYSICIAN ASSISTANT

## 2021-03-17 PROCEDURE — G8399 PT W/DXA RESULTS DOCUMENT: HCPCS | Performed by: PHYSICIAN ASSISTANT

## 2021-03-17 PROCEDURE — G8427 DOCREV CUR MEDS BY ELIG CLIN: HCPCS | Performed by: PHYSICIAN ASSISTANT

## 2021-03-17 PROCEDURE — G8417 CALC BMI ABV UP PARAM F/U: HCPCS | Performed by: PHYSICIAN ASSISTANT

## 2021-03-17 PROCEDURE — 4040F PNEUMOC VAC/ADMIN/RCVD: CPT | Performed by: PHYSICIAN ASSISTANT

## 2021-03-17 PROCEDURE — 99212 OFFICE O/P EST SF 10 MIN: CPT | Performed by: PHYSICIAN ASSISTANT

## 2021-03-17 PROCEDURE — 1036F TOBACCO NON-USER: CPT | Performed by: PHYSICIAN ASSISTANT

## 2021-03-17 PROCEDURE — 1123F ACP DISCUSS/DSCN MKR DOCD: CPT | Performed by: PHYSICIAN ASSISTANT

## 2021-03-17 NOTE — PROGRESS NOTES
Review of Systems   Constitutional: Negative. HENT: Negative. Eyes: Negative. Respiratory: Negative. Cardiovascular: Negative. Gastrointestinal: Negative. Genitourinary: Negative. Musculoskeletal: Positive for arthralgias and gait problem. Skin: Negative. Negative for rash and wound. Psychiatric/Behavioral: Negative. I reviewed and agree with the portions of the HPI, review of systems, vital documentation and plan performed by my staff and have added/addended where appropriate. Gail Cristina is a 65 y.o. female that is in the office post physical therapy. Patient states that the lateral side of the ankle is great, but the medially side of the ankle is having sharp achy pain that radiates into the heel. No new injury or falls. Past Medical History:   Diagnosis Date    Active smoker     Active smoker     Ankle fracture, left 2006    Asthma     Chondromalacia of right knee     Dr. Juliana Gentile + MRI    Chronic back pain     Chronic cough 10/4/2019    Depression     has seen psychiatry in [place] 6 months    Dizziness     CT brain normal -6/18/2012    Family history of early CAD     Father - 4st MI age 50, Massive MI age 71    H/O cardiovascular stress test 8/7/2012 8/7/2012-[facility]-Normal perfusion. LVSF normal.EF 58%    H/O Doppler ultrasound 12/11/2019     Abnormal left lower extremity arterial Doppler ultrasound. The Left lower extremity arteries have a Monophasic waveform suggestive of inflow disease, The Left BARBER shows Severe peripheral arterial disease. Normal right lower extremity arterial Doppler ultrasound. Normal right lower extremity ankle brachial indices    H/O echocardiogram 8/7/2012 8/7/2012-LVSF normal. EF =>55%. impaired LV relaxation.      Herniated intervertebral disk     thoracic and lumbar    Hypertension     Mild persistent asthma without complication 54/9/3630    Normal echocardiogram 8/2012    EF=> 55%-Dr. Cesia Lord    Obesity (BMI years:  9.75     Types: Cigarettes     Quit date: 2019     Years since quittin.3    Smokeless tobacco: Never Used   Substance and Sexual Activity    Alcohol use: Yes     Comment: socially    Drug use: No    Sexual activity: None   Lifestyle    Physical activity     Days per week: None     Minutes per session: None    Stress: None   Relationships    Social connections     Talks on phone: None     Gets together: None     Attends [employer] service: None     Active member of club or organization: None     Attends meetings of clubs or organizations: None     Relationship status: None    Intimate partner violence     Fear of current or ex partner: None     Emotionally abused: None     Physically abused: None     Forced sexual activity: None   Other Topics Concern    None   Social History Narrative    Working now at [employer]    Transferred jobs from [employer]               Current Outpatient Medications   Medication Sig Dispense Refill    cetirizine (ZYRTEC) 10 MG tablet TAKE ONE TABLET ONCE DAILY 90 tablet 0    albuterol (PROVENTIL) (5 MG/ML) 0.5% nebulizer solution Take 1 mL by nebulization every 6 hours as needed for Wheezing or Shortness of Breath 100 vial 1    Melatonin 5 MG SUBL Place 1 tablet under the tongue nightly as needed      Multiple Vitamin (MULTI-VITAMIN DAILY PO) Take 1 tablet by mouth daily      Phenylephrine-Acetaminophen (TYLENOL SINUS CONGESTION/PAIN PO) Take 1 tablet by mouth 2 times daily as needed      meclizine (ANTIVERT) 25 MG tablet TAKE ONE TABLET THREE TIMES A DAY AS NEEDED FOR DIZZINESS AVOID ALCOHOL/CAUSES DROWSINESS 30 tablet 0    baclofen (LIORESAL) 10 MG tablet Take 1 tablet by mouth 2 times daily 60 tablet 2    lisinopril (PRINIVIL;ZESTRIL) 10 MG tablet Take 1 tablet by mouth daily 90 tablet 1    montelukast (SINGULAIR) 10 MG tablet Take 1 tablet by mouth nightly 90 tablet 1    hydroCHLOROthiazide (HYDRODIURIL) 12.5 MG tablet Take 1 tablet by mouth every other day 90 tablet 1    ADVAIR DISKUS 500-50 MCG/DOSE diskus inhaler Inhale 1 puff into the lungs every 12 hours After inhalation then gargle 1 Inhaler 11    albuterol sulfate HFA (PROVENTIL HFA) 108 (90 Base) MCG/ACT inhaler Inhale 2 puffs into the lungs every 4 hours as needed for Wheezing or Shortness of Breath (cough) 1 Inhaler 2    simvastatin (ZOCOR) 40 MG tablet Take 1 tablet by mouth nightly 90 tablet 1    clopidogrel (PLAVIX) 75 MG tablet Take 1 tablet by mouth daily 90 tablet 1    aspirin 81 MG tablet Take 1 tablet by mouth daily 90 tablet 2    diphenhydrAMINE (BENADRYL) 25 MG tablet Take 1 tablet by mouth nightly as needed for Itching 90 tablet 2    ipratropium-albuterol (DUONEB) 0.5-2.5 (3) MG/3ML SOLN nebulizer solution Inhale 3 mLs into the lungs 4 times daily 120 vial 1     No current facility-administered medications for this visit. Allergies   Allergen Reactions    Latex Hives and Rash    Cipro Xr Hives and Shortness Of Breath    Soap Hives and Shortness Of Breath     [facility] Soap, Tide detergent      Tomato Hives and Shortness Of Breath    Influenza Vaccines Hives    Soybean-Containing Drug Products        Review of Systems:  See above      Physical Exam:   Resp 16   Ht 5' 8.5\" (1.74 m)   Wt 261 lb (118.4 kg)   BMI 39.11 kg/m²        Gait is Antalgic. Gen/Psych:Examination reveals a pleasant individual in no acute distress. The patient is oriented to time, place and person. The patient's mood and affect are appropriate. Patient appears well nourished. Body habitus is overweight     Lymph:  no lymphedema in bilateral lower extremities     Skin intact in bilateral lower extremities with no ulcerations, lesions, rash, erythema. Vascular: There are mild varicosities in bilateral lower extremities, sensation intact to light touch over bilateral lower extremities. LEft ankle exam:  Inspection: No edema or erythema, no ecchymosis.   Palpation: Mild to moderate tenderness over the medial aspect of the ankle. Range of motion/Strength   Dorsiflexion 5 degrees, 5/5   Plantarflexion 40 degrees, 5/5   Eversion 10 degrees, 5/5   Inversion 10 degrees, 5/5                Impression:   Diagnosis Orders   1.  Chronic pain of left ankle             Plan:    The most likely impression, expected course, diagnostic and treatment options were discussed, will proceed with:  Patient Instructions   Continue to weight bear as tolerated  Continue range of motion  Ice and elevate as needed  Tylenol or Motrin for pain  Continue therapy sessions  Follow up as needed

## 2021-03-17 NOTE — PATIENT INSTRUCTIONS
Continue to weight bear as tolerated  Continue range of motion  Ice and elevate as needed  Tylenol or Motrin for pain  Continue therapy sessions  Follow up as needed

## 2021-03-24 ENCOUNTER — HOSPITAL ENCOUNTER (OUTPATIENT)
Dept: PHYSICAL THERAPY | Age: 71
Setting detail: THERAPIES SERIES
Discharge: HOME | End: 2021-03-24
Payer: MEDICARE

## 2021-03-24 PROCEDURE — 97110 THERAPEUTIC EXERCISES: CPT

## 2021-03-24 PROCEDURE — 97140 MANUAL THERAPY 1/> REGIONS: CPT

## 2021-03-24 NOTE — FLOWSHEET NOTE
Outpatient Physical Therapy  [facility]           [x] Phone: [phone]   Fax: [phone]  [facility]           [] Phone: [phone]   Fax: [phone]        Physical Therapy Daily Treatment Note  Date:  3/24/2021    Patient Name:  April Lights    :  1950  MRN: [id number]  Restrictions/Precautions:  none  Diagnosis:   Diagnosis: Chronic pain of L ankle  Date of Injury/Surgery:   Treatment Diagnosis: Treatment Diagnosis: L ankle weakness, reduced AROM L ankle    Insurance/Certification information: PT Insurance Information: [facility] medicare   Referring Physician:  Referring Practitioner: Candi England PA-C  Next Doctor Visit:    Plan of care signed (Y/N):  yes  Outcome Measure: LEFS: 35  Visit# / total visits:    Pain level: 1-2/10 medial L ankle       Goals:       Short term goals  Time Frame for Short term goals: 6 visits  Short term goal 1: Pt will be Ind with HEP in order to maximize recovery outside of clinic Reports compliance   Short term goal 2: Pt will improve L DF AROM to 15 deg or higher to aide in stairs Not met   Long term goals  Time Frame for Long term goals : 12 visits  Long term goal 1: Pt will improve LEFS to 44 or higher to show MDC and subjective improvement  Long term goal 2: Pt will improve L eversion AROM to 16 deg or higher to aide in gait Min progress   Long term goal 3: Pt will improve all ankle MMT/ strength to 4+/5 or higher to aide in gait Met   Long term goal 4: Pt will improve LLE SLS to 10 seconds or higher to show improved balance and ankle control Not met     Summary of Evaluation: Assessment: Pt is a 66-year-old female who presents with chronic L ankle/foot pain after an ankle fracture 20 years ago, pain worsening over the last year.  Upon assessment, pt presents with impairments in L ankle AROM, L ankle strength, SLS on LLE, impaired gait, impaired overall balance, increased soft tissue tension/ reduced gastroc and soleus flexibility and reduced independence with ADL / IADL completion. Pt would benefit from skilled therapy to address listed impairments, progress toward goal completion and improve ADL/IADL independence. PT also warranted to reduce risk for further decline or future injury. Subjective:  Pt reports primarily of discomfort medial ankle and muscle tightness this morning. Any changes in Ambulatory Summary Sheet? None      Objective:   Prior to today's treatment session, patient was screened for signs and symptoms related to COVID-19 including but not limited to verbally answering questions related to feeling ill, cough, or SOB, along with taking temperature via forehead thermometer. Patient presented with all negative signs and symptoms and had no fever >100 degrees Fahrenheit this date. noted red/ watery eyes    Min tenderness medial ankle    Exercises: (No more than 4 columns)   Exercise/Equipment 3/3/2021 #6 3/5/2021 # 7 3/12/21 #8 3/24/21 #9            WARM UP         Nu-step S14/A11/L6 5' L6 x6' L6 x5' L6 x5'          TABLE       4 way ankle - HEP     [place] board 20* ea dir   20x in 4 directions 20x in 4 directions                           STANDING       Calf stretch  man 2x30\" gastroc, x30\" soleus 2x30\" gastroc, x30\" soleus   Marching on airex 2*10 20x  20x on floor     Standing hip abduction  -      Heel raises   10x2  Discontinue - will make calf tighter                 PROPRIOCEPTION       Wobble board taps  F/B 10x2 ea  F/B 10x2 ea  F/B 10x2 ea                               MODALITIES                         Other Therapeutic Activities/Education:  HEP and importance of completion    Home Exercise Program:  Issued, practiced and pt demo ability to perform 2/1/2021    Manual Treatments:      STM  L gastroc, soleus, peroneals, achilles and peroneal tendons. Modalities:  none      Communication with other providers:  POC sent 2/1      Assessment: Pt tolerated today's treatment without any adverse reactions.  Reports of no pain and less muscle tightness following today's session. Pt would continue to benefit from skilled therapy interventions to address remaining impairments, improve mobility and strength and progress toward goal completion while reducing risk for re-injury or further decline.   End pain: 0/10    Plan for Next Session:  nustep/bike, L ankle mobs and mobility, STM/ graston L achilles and soleus/gastroc and peroneals, L ankle strength/ balance, calf stretching    Time In / Time Out: 1118 - 1157    Timed Code/Total Treatment Minutes:  39/39, (2) TE, (1) MT      Next Progress Note due:  10th visit      Plan of Care Interventions:  [x] Therapeutic Exercise  [x] Modalities:  [x] Therapeutic Activity     [x] Ultrasound  [x] Estim  [x] Gait Training      [] Cervical Traction [] Lumbar Traction  [x] Neuromuscular Re-education    [x] Cold/hotpack [] Iontophoresis   [x] Instruction in HEP      [x] Vasopneumatic   [] Dry Needling    [x] Manual Therapy               [] Aquatic Therapy              Electronically signed by:  Vadim Cm PTA   3/24/2021,11:18 AM

## 2021-03-31 ENCOUNTER — HOSPITAL ENCOUNTER (OUTPATIENT)
Dept: PHYSICAL THERAPY | Age: 71
Setting detail: THERAPIES SERIES
Discharge: HOME | End: 2021-03-31
Payer: MEDICARE

## 2021-03-31 PROCEDURE — 97140 MANUAL THERAPY 1/> REGIONS: CPT

## 2021-03-31 PROCEDURE — 97110 THERAPEUTIC EXERCISES: CPT

## 2021-03-31 NOTE — FLOWSHEET NOTE
Outpatient Physical Therapy  [facility]           [x] Phone: [phone]   Fax: [phone]  Claudyishmael Isaacs           [] Phone: [phone]   Fax: [phone]        Physical Therapy Daily Treatment Note  Date:  3/31/2021    Patient Name:  Ian Merlin    :  1950  MRN: [id number]  Restrictions/Precautions:  none  Diagnosis:   Diagnosis: Chronic pain of L ankle  Date of Injury/Surgery:   Treatment Diagnosis: Treatment Diagnosis: L ankle weakness, reduced AROM L ankle    Insurance/Certification information: PT Insurance Information: [facility] medicare   Referring Physician:  Referring Practitioner: Shoshana Pringle PA-C  Next Doctor Visit:    Plan of care signed (Y/N):  yes  Outcome Measure: LEFS:   Visit# / total visits:  10/12  Pain level: 1-2/10 medial L ankle       Goals:       Short term goals  Time Frame for Short term goals: 6 visits  Short term goal 1: Pt will be Ind with HEP in order to maximize recovery outside of clinic Reports compliance   Short term goal 2: Pt will improve L DF AROM to 15 deg or higher to aide in stairs Not met   Long term goals  Time Frame for Long term goals : 12 visits  Long term goal 1: Pt will improve LEFS to 44 or higher to show MDC and subjective improvement  Long term goal 2: Pt will improve L eversion AROM to 16 deg or higher to aide in gait Min progress   Long term goal 3: Pt will improve all ankle MMT/ strength to 4+/5 or higher to aide in gait Met   Long term goal 4: Pt will improve LLE SLS to 10 seconds or higher to show improved balance and ankle control Not met     Summary of Evaluation: Assessment: Pt is a 78-year-old female who presents with chronic L ankle/foot pain after an ankle fracture 20 years ago, pain worsening over the last year.  Upon assessment, pt presents with impairments in L ankle AROM, L ankle strength, SLS on LLE, impaired gait, impaired overall balance, increased soft tissue tension/ reduced gastroc and soleus flexibility and reduced independence pt demo ability to perform 2/1/2021    Manual Treatments:  GT was applied to the  Gastroc/soleus complex, achilles tendon, peroneals and posterior tibialis because the assessment demonstrated decreased soft tissue mobility and ROM. Dk Miguel Tools and techniques used were based on the assessment and treatment goals. The patients skin at the end of the treatment showed improved ROM and increased tissue mobility with mild redness in some areas   Overall GT treatment time 15'     STM  L gastroc, soleus, peroneals, achilles and peroneal tendons x 10'     Modalities:  none      Communication with other providers:  POC sent 2/1      Assessment: Pt tolerated treatment fairly well. Pt responded well to manual stretching and Graston treatment. Pt rated pain at 0/10 and stated that she had more mobility. Pt will continue to benefit from more stretching and ROM.    Plan for Next Session:  nustep/bike, L ankle mobs and mobility, STM/ graston L achilles and soleus/gastroc and peroneals, L ankle strength/ balance, calf stretching    Time In / Time Out: 1300/1344    Timed Code/Total Treatment Minutes:  44'/44' 1 man ( 21') 2 TE (23')     Next Progress Note due:  10th visit      Plan of Care Interventions:  [x] Therapeutic Exercise  [x] Modalities:  [x] Therapeutic Activity     [x] Ultrasound  [x] Estim  [x] Gait Training      [] Cervical Traction [] Lumbar Traction  [x] Neuromuscular Re-education    [x] Cold/hotpack [] Iontophoresis   [x] Instruction in HEP      [x] Vasopneumatic   [] Dry Needling    [x] Manual Therapy               [] Aquatic Therapy              Electronically signed by:  Denyce Leventhal, PTA   3/31/2021,12:17 PM       3/31/2021,3:17 PM

## 2021-04-06 ENCOUNTER — HOSPITAL ENCOUNTER (OUTPATIENT)
Dept: PHYSICAL THERAPY | Age: 71
Setting detail: THERAPIES SERIES
Discharge: HOME | End: 2021-04-06
Payer: MEDICARE

## 2021-04-06 PROCEDURE — 97140 MANUAL THERAPY 1/> REGIONS: CPT

## 2021-04-06 PROCEDURE — 97110 THERAPEUTIC EXERCISES: CPT

## 2021-04-06 NOTE — DISCHARGE SUMMARY
seconds        Assessment:   Pt has shown good progress since therapy start regarding improved ROM and strength at L ankle, along with some improved flexibility. Pt continues to present with some soft tissue tightness at L achilles/ calf and peroneals but has improved since eval and now knows how to address with HEP. Pt has also shown good goal completion at this time and has met all goals except for a few degrees short on DF (but is not limited by functionally). PT educated pt on continuation of HEP after dc for max benefits and reduced risk for decline. Pt dc this date. Goal Status:  [x] Achieved [x] Partially Achieved  [] Not Achieved     Changes to goals:    Short term goals  Time Frame for Short term goals: 6 visits  Short term goal 1: Pt will be Ind with HEP in order to maximize recovery outside of clinic GOAL MET 4/6   Short term goal 2: Pt will improve L DF AROM to 15 deg or higher to aide in stairs Almost MET 4/6  Long term goals  Time Frame for Long term goals : 12 visits  Long term goal 1: Pt will improve LEFS to 44 or higher to show [address] and subjective improvement: GOAL MET 4/6  Long term goal 2: Pt will improve L eversion AROM to 16 deg or higher to aide in gait GOAL MET 4/6  Long term goal 3: Pt will improve all ankle MMT strength to 4+/5 or higher to aide in gait GOAL MET 4/6  Long term goal 4: Pt will improve LLE SLS to 10 seconds or higher to show improved balance and ankle control GOAL MET 4/6     Patient Status: [] Continue per initial plan of Care     [x] Patient now discharged     [] Additional visits requested, Please re-certify for additional visits:      Requested frequency/duration:      If we are requesting more visits, we fully anticipate the patient's condition is expected to improve within the treatment timeframe we are requesting. Electronically signed by:  Enrique Hook, PT, DPT 4/6/2021, 6:49 AM    If you have any questions or concerns, please don't hesitate to call.   Thank you for your referral.    Physician Signature:______________________ Date:______ Time: ________  By signing above, therapists plan is approved by physician

## 2021-04-06 NOTE — FLOWSHEET NOTE
Outpatient Physical Therapy  [facility]           [x] Phone: [phone]   Fax: [phone]  [facility]           [] Phone: [phone]   Fax: [phone]        Physical Therapy Daily Treatment Note  Date:  2021    Patient Name:  Brendan Israel    :  1950  MRN: [id number]  Restrictions/Precautions:  none  Diagnosis:   Diagnosis: Chronic pain of L ankle  Date of Injury/Surgery:   Treatment Diagnosis: Treatment Diagnosis: L ankle weakness, reduced AROM L ankle    Insurance/Certification information: PT Insurance Information: [facility] medicare   Referring Physician:  Referring Practitioner: Alejandro Posadas PA-C  Next Doctor Visit:    Plan of care signed (Y/N):  yes  Outcome Measure: LEFS: 49/80 - on   Visit# / total visits:    Pain level: 0/10 - only sore      Goals:        Short term goals  Time Frame for Short term goals: 6 visits  Short term goal 1: Pt will be Ind with HEP in order to maximize recovery outside of clinic GOAL MET    Short term goal 2: Pt will improve L DF AROM to 15 deg or higher to aide in stairs Almost MET /6  Long term goals  Time Frame for Long term goals : 12 visits  Long term goal 1: Pt will improve LEFS to 44 or higher to show MDC and subjective improvement: GOAL MET   Long term goal 2: Pt will improve L eversion AROM to 16 deg or higher to aide in gait GOAL MET 6  Long term goal 3: Pt will improve all ankle MMT strength to 4+/5 or higher to aide in gait GOAL MET 4/6  Long term goal 4: Pt will improve LLE SLS to 10 seconds or higher to show improved balance and ankle control GOAL MET /6    Summary of Evaluation: Assessment: Pt is a 68-year-old female who presents with chronic L ankle/foot pain after an ankle fracture 20 years ago, pain worsening over the last year.  Upon assessment, pt presents with impairments in L ankle AROM, L ankle strength, SLS on LLE, impaired gait, impaired overall balance, increased soft tissue tension/ reduced gastroc and soleus STANDING      Calf stretch 2x30\" gastroc, x30\" soleus 30\" x2 gastroc  30\"x2 soleus  30\" x2 gastroc  30\"x2 soleus    Marching on airex      Standing hip abduction       Heel raises                  PROPRIOCEPTION      Wobble board taps F/B 10x2 ea F/B 10x2 ea F/B 10x2 ea                           MODALITIES                      Other Therapeutic Activities/Education:  HEP and importance of completion after dc    Home Exercise Program:  Issued, practiced and pt demo ability to perform 2/1/2021    Manual Treatments:      STM & IASTM  L gastroc, soleus, peroneals, achilles and peroneal tendons     Modalities:  none      Communication with other providers:  POC sent 2/1      Assessment: Pt tolerated today's treatment without any adverse reactions or complications this date. Pt has shown good progress since therapy start regarding improved ROM and strength at L ankle, along with some improved flexibility. Pt continues to present with some soft tissue tightness at L achilles/ calf and peroneals but has improved since eval and now knows how to address with HEP. Pt has also shown good goal completion at this time and has met all goals except for a few degrees short on DF (but is not limited by functionally). PT educated pt on continuation of HEP after dc for max benefits and reduced risk for decline. Pt dc this date.        Plan for Next Session:  nustep/bike, L ankle mobs and mobility, STM/ graston L achilles and soleus/gastroc and peroneals, L ankle strength/ balance, calf stretching    Time In / Time Out: 8118 - 1337    Timed Code/Total Treatment Minutes:  44': 24' MT x2, 15' TE x1    Next Progress Note due:  dc      Plan of Care Interventions:  [x] Therapeutic Exercise  [x] Modalities:  [x] Therapeutic Activity     [x] Ultrasound  [x] Estim  [x] Gait Training      [] Cervical Traction [] Lumbar Traction  [x] Neuromuscular Re-education    [x] Cold/hotpack [] Iontophoresis   [x] Instruction in HEP      [x] Vasopneumatic [] Dry Needling    [x] Manual Therapy               [] Aquatic Therapy              Electronically signed by:  Griffin Jalloh PT, DPT   4/6/2021,6:44 AM       4/6/2021,6:44 AM

## 2021-04-11 ASSESSMENT — ENCOUNTER SYMPTOMS
EYES NEGATIVE: 1
RESPIRATORY NEGATIVE: 1
GASTROINTESTINAL NEGATIVE: 1

## 2021-04-14 ENCOUNTER — VIRTUAL VISIT (OUTPATIENT)
Dept: FAMILY MEDICINE CLINIC | Age: 71
End: 2021-04-14
Payer: MEDICARE

## 2021-04-14 DIAGNOSIS — R05.9 COUGH: Primary | ICD-10-CM

## 2021-04-14 DIAGNOSIS — R07.89 CHEST TIGHTNESS: ICD-10-CM

## 2021-04-14 PROCEDURE — G8399 PT W/DXA RESULTS DOCUMENT: HCPCS | Performed by: NURSE PRACTITIONER

## 2021-04-14 PROCEDURE — 1090F PRES/ABSN URINE INCON ASSESS: CPT | Performed by: NURSE PRACTITIONER

## 2021-04-14 PROCEDURE — 99213 OFFICE O/P EST LOW 20 MIN: CPT | Performed by: NURSE PRACTITIONER

## 2021-04-14 PROCEDURE — G8428 CUR MEDS NOT DOCUMENT: HCPCS | Performed by: NURSE PRACTITIONER

## 2021-04-14 PROCEDURE — 3017F COLORECTAL CA SCREEN DOC REV: CPT | Performed by: NURSE PRACTITIONER

## 2021-04-14 PROCEDURE — 1036F TOBACCO NON-USER: CPT | Performed by: NURSE PRACTITIONER

## 2021-04-14 PROCEDURE — 1123F ACP DISCUSS/DSCN MKR DOCD: CPT | Performed by: NURSE PRACTITIONER

## 2021-04-14 PROCEDURE — 4040F PNEUMOC VAC/ADMIN/RCVD: CPT | Performed by: NURSE PRACTITIONER

## 2021-04-14 PROCEDURE — G8417 CALC BMI ABV UP PARAM F/U: HCPCS | Performed by: NURSE PRACTITIONER

## 2021-04-14 RX ORDER — PREDNISONE 20 MG/1
20 TABLET ORAL DAILY
Qty: 5 TABLET | Refills: 0 | Status: SHIPPED | OUTPATIENT
Start: 2021-04-14 | End: 2021-04-19

## 2021-04-14 ASSESSMENT — ENCOUNTER SYMPTOMS
CHEST TIGHTNESS: 0
SORE THROAT: 0
SINUS PAIN: 0
CHOKING: 0
NAUSEA: 0
TROUBLE SWALLOWING: 0
SHORTNESS OF BREATH: 0
SINUS PRESSURE: 0
ABDOMINAL PAIN: 0
WHEEZING: 0
COUGH: 0

## 2021-04-14 NOTE — PROGRESS NOTES
THREE TIMES A DAY AS NEEDED FOR DIZZINESS AVOID ALCOHOL/CAUSES DROWSINESS  ADALID Musa CNP   baclofen (LIORESAL) 10 MG tablet Take 1 tablet by mouth 2 times daily  ADALID Musa CNP   lisinopril (PRINIVIL;ZESTRIL) 10 MG tablet Take 1 tablet by mouth daily  ADALID Musa CNP   montelukast (SINGULAIR) 10 MG tablet Take 1 tablet by mouth nightly  ADALID Musa CNP   hydroCHLOROthiazide (HYDRODIURIL) 12.5 MG tablet Take 1 tablet by mouth every other day  ADALID Musa CNP   ADVAIR DISKUS 500-50 MCG/DOSE diskus inhaler Inhale 1 puff into the lungs every 12 hours After inhalation then gargle  ADALID Musa CNP   albuterol sulfate HFA (PROVENTIL HFA) 108 (90 Base) MCG/ACT inhaler Inhale 2 puffs into the lungs every 4 hours as needed for Wheezing or Shortness of Breath (cough)  ADALID Musa CNP   simvastatin (ZOCOR) 40 MG tablet Take 1 tablet by mouth nightly  ADALID Musa CNP   clopidogrel (PLAVIX) 75 MG tablet Take 1 tablet by mouth daily  ADALID Musa CNP   aspirin 81 MG tablet Take 1 tablet by mouth daily  ADALID Musa CNP   diphenhydrAMINE (BENADRYL) 25 MG tablet Take 1 tablet by mouth nightly as needed for Itching  ADALID Musa CNP   ipratropium-albuterol (DUONEB) 0.5-2.5 (3) MG/3ML SOLN nebulizer solution Inhale 3 mLs into the lungs 4 times daily  ADALID Musa CNP       Social History     Tobacco Use    Smoking status: Former Smoker     Packs/day: 0.25     Years: 39.00     Pack years: 9.75     Types: Cigarettes     Quit date: 2019     Years since quittin.3    Smokeless tobacco: Never Used   Substance Use Topics    Alcohol use: Yes     Comment: socially    Drug use: No          PHYSICAL EXAMINATION:  [ INSTRUCTIONS:  \"[x]\" Indicates a positive item  \"[]\" Indicates a negative item  -- DELETE ALL ITEMS NOT EXAMINED]  Vital Signs: (As obtained by patient/caregiver or practitioner observation)    Blood pressure-  Heart rate-    Respiratory rate-    Temperature-  Pulse oximetry-     Constitutional: [x] Appears well-developed and well-nourished [x] No apparent distress      [] Abnormal-   Mental status  [x] Alert and awake  [x] Oriented to person/place/time [x]Able to follow commands      Eyes:  EOM    []  Normal  [] Abnormal-  Sclera  [x]  Normal  [] Abnormal -         Discharge []  None visible  [] Abnormal -    HENT:   [x] Normocephalic, atraumatic. [] Abnormal   [] Mouth/Throat: Mucous membranes are moist.     External Ears [x] Normal  [] Abnormal-     Neck: [x] No visualized mass     Pulmonary/Chest: [x] Respiratory effort normal.  [x] No visualized signs of difficulty breathing or respiratory distress        [] Abnormal-      Musculoskeletal:   [] Normal gait with no signs of ataxia         [x] Normal range of motion of neck        [] Abnormal-       Neurological:        [x] No Facial Asymmetry (Cranial nerve 7 motor function) (limited exam to video visit)          [] No gaze palsy        [] Abnormal-         Skin:        [x] No significant exanthematous lesions or discoloration noted on facial skin         [] Abnormal-            Psychiatric:       [x] Normal Affect [] No Hallucinations        [] Abnormal-     Other pertinent observable physical exam findings-     ASSESSMENT/PLAN:  1. Cough  - predniSONE (DELTASONE) 20 MG tablet; Take 1 tablet by mouth daily for 5 days  Dispense: 5 tablet; Refill: 0    2. Chest tightness  - predniSONE (DELTASONE) 20 MG tablet; Take 1 tablet by mouth daily for 5 days  Dispense: 5 tablet; Refill: 0    Fluids, rest  Take prescribed medication as directed  Robitussin for cough and congestion  Follow up if no improvement in one week  Verbalized understanding and agreement with plan    No follow-ups on file. Lopez Mosley, was evaluated through a synchronous (real-time) audio-video encounter. The patient (or guardian if applicable) is aware that this is a billable service.

## 2021-04-16 ENCOUNTER — TELEPHONE (OUTPATIENT)
Dept: FAMILY MEDICINE CLINIC | Age: 71
End: 2021-04-16

## 2021-04-16 NOTE — TELEPHONE ENCOUNTER
Spoke with Lito Maldonado, states her grandson fell into there right knee last night and she started having pain and swelling. She states she had a meniscus tear on medial aspect of the right knee a few years ago. She states the knee is still swollen and painful when standing. She has contacted ortho and has an appointment in one week. Advised her to use ice pack, elevated and tylenol for discomfort. She states \"I started my prednisone and that may help with the swelling. \"

## 2021-04-23 ENCOUNTER — OFFICE VISIT (OUTPATIENT)
Dept: ORTHOPEDIC SURGERY | Age: 71
End: 2021-04-23
Payer: MEDICARE

## 2021-04-23 VITALS
OXYGEN SATURATION: 96 % | WEIGHT: 261 LBS | HEIGHT: 69 IN | BODY MASS INDEX: 38.66 KG/M2 | HEART RATE: 86 BPM | RESPIRATION RATE: 18 BRPM

## 2021-04-23 DIAGNOSIS — M25.561 MEDIAL JOINT LINE TENDERNESS OF RIGHT KNEE: Primary | ICD-10-CM

## 2021-04-23 DIAGNOSIS — S83.91XA SPRAIN OF RIGHT KNEE, UNSPECIFIED LIGAMENT, INITIAL ENCOUNTER: ICD-10-CM

## 2021-04-23 PROCEDURE — G8417 CALC BMI ABV UP PARAM F/U: HCPCS | Performed by: PHYSICIAN ASSISTANT

## 2021-04-23 PROCEDURE — 3017F COLORECTAL CA SCREEN DOC REV: CPT | Performed by: PHYSICIAN ASSISTANT

## 2021-04-23 PROCEDURE — G8399 PT W/DXA RESULTS DOCUMENT: HCPCS | Performed by: PHYSICIAN ASSISTANT

## 2021-04-23 PROCEDURE — 1123F ACP DISCUSS/DSCN MKR DOCD: CPT | Performed by: PHYSICIAN ASSISTANT

## 2021-04-23 PROCEDURE — 1090F PRES/ABSN URINE INCON ASSESS: CPT | Performed by: PHYSICIAN ASSISTANT

## 2021-04-23 PROCEDURE — 99203 OFFICE O/P NEW LOW 30 MIN: CPT | Performed by: PHYSICIAN ASSISTANT

## 2021-04-23 PROCEDURE — 4040F PNEUMOC VAC/ADMIN/RCVD: CPT | Performed by: PHYSICIAN ASSISTANT

## 2021-04-23 PROCEDURE — 1036F TOBACCO NON-USER: CPT | Performed by: PHYSICIAN ASSISTANT

## 2021-04-23 PROCEDURE — G8427 DOCREV CUR MEDS BY ELIG CLIN: HCPCS | Performed by: PHYSICIAN ASSISTANT

## 2021-04-23 ASSESSMENT — ENCOUNTER SYMPTOMS
GASTROINTESTINAL NEGATIVE: 1
EYES NEGATIVE: 1
RESPIRATORY NEGATIVE: 1
ALLERGIC/IMMUNOLOGIC NEGATIVE: 1

## 2021-04-23 NOTE — PROGRESS NOTES
[facility]    HPI:  Elizabeth Michelle is a 65 y.o. female who resents for an evaluation of a right knee injury. Patient states on 4/15 her grandson abruptly woke her up and she jumped and tripped and her right knee hit the floor. Patient rates her pain 45/10 describes an ache with sharp pains. She states she did take some prednisone which helped with her swelling but states she continues to have pain. She states she notes pain with pivoting and pain with sitting and prolonged standing as well as prolonged walking. She states she has noted some instability and buckling of her right knee. She says straightening her knee also worsens her pain. She states she does have past history of a right knee arthroscopy and did have a meniscus repair at that time. She states her pain in her right knee feels similar to her previous meniscus tear symptoms. Past Medical History:   Diagnosis Date    Active smoker     Active smoker     Ankle fracture, left 2006    Asthma     Chondromalacia of right knee     Dr. Peng Ramirez + MRI    Chronic back pain     Chronic cough 10/4/2019    Depression     has seen psychiatry in [place] 6 months    Dizziness     CT brain normal -6/18/2012    Family history of early CAD     Father - 4st MI age 50, Massive MI age 71    H/O cardiovascular stress test 8/7/2012 8/7/2012-Lexiscan-Normal perfusion. LVSF normal.EF 58%    H/O Doppler ultrasound 12/11/2019     Abnormal left lower extremity arterial Doppler ultrasound. The Left lower extremity arteries have a Monophasic waveform suggestive of inflow disease, The Left BARBER shows Severe peripheral arterial disease. Normal right lower extremity arterial Doppler ultrasound. Normal right lower extremity ankle brachial indices    H/O echocardiogram 8/7/2012 8/7/2012-LVSF normal. EF =>55%. impaired LV relaxation.      Herniated intervertebral disk     thoracic and lumbar    Hypertension     Mild persistent asthma without complication 64/4/5956    Normal echocardiogram 8/2012    EF=> 55%-Dr. Cesia Lord    Obesity (BMI 30-39. 9)     Obstructive sleep apnea 10/4/2019    Other screening mammogram     PAD (peripheral artery disease) ([facility] [address] Angioplasty 01/06/2020     LCIA 90% INSTENT RESTENOSED TO 0% WITH PTA.  Peripheral vascular disease ([facility] [address]     Postmenopausal     Shoulder fracture, left 2008    Tobacco abuse 10/4/2019     Past Surgical History:   Procedure Laterality Date    CHOLECYSTECTOMY  1991    KNEE SURGERY  1998    right knee    TONSILLECTOMY AND ADENOIDECTOMY  1963    TUBAL LIGATION  1977    TUMOR REMOVAL  1999    parotid    TUMOR REMOVAL  1976    left thigh      Family History   Problem Relation Age of Onset    High Blood Pressure Mother     Allergies Mother    Gennette Iraqi Migraines Mother     Obesity Mother     Heart Disease Father     High Blood Pressure Father     Allergies Father     Obesity Father     Diabetes Daughter     High Blood Pressure Sister     Allergies Sister     Bleeding Prob Sister    Gennette Iraqi Migraines Sister     Obesity Sister     Cancer Brother     High Blood Pressure Brother     Allergies Brother     Bleeding Prob Brother     Obesity Brother     Thyroid Disease Maternal Aunt     Kidney Disease Maternal Aunt     Heart Disease Maternal Uncle     Kidney Disease Maternal Uncle     Cancer Maternal Grandmother     Glaucoma Maternal Grandmother     Diabetes Maternal Grandfather     Glaucoma Maternal Grandfather     Glaucoma Paternal Grandmother     Glaucoma Paternal Grandfather      Social History     Socioeconomic History    Marital status:       Spouse name: None    Number of children: 1    Years of education: None    Highest education level: None   Occupational History    Occupation: Customer Service   Social Needs    Financial resource strain: None    Food insecurity     Worry: None     Inability: None    Transportation needs     Medical: None Non-medical: None   Tobacco Use    Smoking status: Former Smoker     Packs/day: 0.25     Years: 39.00     Pack years: 9.75     Types: Cigarettes     Quit date: 2019     Years since quittin.3    Smokeless tobacco: Never Used   Substance and Sexual Activity    Alcohol use: Yes     Comment: socially    Drug use: No    Sexual activity: None   Lifestyle    Physical activity     Days per week: None     Minutes per session: None    Stress: None   Relationships    Social connections     Talks on phone: None     Gets together: None     Attends Religion service: None     Active member of club or organization: None     Attends meetings of clubs or organizations: None     Relationship status: None    Intimate partner violence     Fear of current or ex partner: None     Emotionally abused: None     Physically abused: None     Forced sexual activity: None   Other Topics Concern    None   Social History Narrative    Working now at [employer]    Transferred jobs from [employer]             Current Outpatient Medications   Medication Sig Dispense Refill    cetirizine (ZYRTEC) 10 MG tablet TAKE ONE TABLET ONCE DAILY 90 tablet 0    albuterol (PROVENTIL) (5 MG/ML) 0.5% nebulizer solution Take 1 mL by nebulization every 6 hours as needed for Wheezing or Shortness of Breath 100 vial 1    Multiple Vitamin (MULTI-VITAMIN DAILY PO) Take 1 tablet by mouth daily      Phenylephrine-Acetaminophen (TYLENOL SINUS CONGESTION/PAIN PO) Take 1 tablet by mouth 2 times daily as needed      baclofen (LIORESAL) 10 MG tablet Take 1 tablet by mouth 2 times daily 60 tablet 2    lisinopril (PRINIVIL;ZESTRIL) 10 MG tablet Take 1 tablet by mouth daily 90 tablet 1    montelukast (SINGULAIR) 10 MG tablet Take 1 tablet by mouth nightly 90 tablet 1    hydroCHLOROthiazide (HYDRODIURIL) 12.5 MG tablet Take 1 tablet by mouth every other day 90 tablet 1    simvastatin (ZOCOR) 40 MG tablet Take 1 tablet by mouth nightly 90 tablet 1    clopidogrel (PLAVIX) 75 MG tablet Take 1 tablet by mouth daily 90 tablet 1    aspirin 81 MG tablet Take 1 tablet by mouth daily 90 tablet 2    diphenhydrAMINE (BENADRYL) 25 MG tablet Take 1 tablet by mouth nightly as needed for Itching 90 tablet 2    ipratropium-albuterol (DUONEB) 0.5-2.5 (3) MG/3ML SOLN nebulizer solution Inhale 3 mLs into the lungs 4 times daily 120 vial 1    Melatonin 5 MG SUBL Place 1 tablet under the tongue nightly as needed      meclizine (ANTIVERT) 25 MG tablet TAKE ONE TABLET THREE TIMES A DAY AS NEEDED FOR DIZZINESS AVOID ALCOHOL/CAUSES DROWSINESS (Patient not taking: Reported on 4/23/2021) 30 tablet 0    ADVAIR DISKUS 500-50 MCG/DOSE diskus inhaler Inhale 1 puff into the lungs every 12 hours After inhalation then gargle (Patient not taking: Reported on 4/23/2021) 1 Inhaler 11    albuterol sulfate HFA (PROVENTIL HFA) 108 (90 Base) MCG/ACT inhaler Inhale 2 puffs into the lungs every 4 hours as needed for Wheezing or Shortness of Breath (cough) (Patient not taking: Reported on 4/23/2021) 1 Inhaler 2     No current facility-administered medications for this visit. Allergies   Allergen Reactions    Latex Hives and Rash    Cipro Xr Hives and Shortness Of Breath    Soap Hives and Shortness Of Breath     [facility] Soap, Tide detergent      Tomato Hives and Shortness Of Breath    Influenza Vaccines Hives    Soybean-Containing Drug Products        Review of Systems:   Review of Systems   Constitutional: Negative. HENT: Negative. Eyes: Negative. Respiratory: Negative. Cardiovascular: Negative. Gastrointestinal: Negative. Endocrine: Negative. Genitourinary: Negative. Musculoskeletal: Positive for arthralgias. Skin: Negative. Allergic/Immunologic: Negative. Neurological: Negative. Hematological: Negative. Psychiatric/Behavioral: Negative.         Physical Exam:  Pulse 86   Resp 18   Ht 5' 8.5\" (1.74 m)   Wt 261 lb (118.4 kg)   SpO2 96%   BMI 39.11 kg/m²      The patient can bear weight on the injured extremity. Gen/Psych: Examination reveals a pleasant individual in no acute distress. The patient is oriented to time, place, and person. The patient's mood and affect are appropriate. Patient appears well nourished. Lymph: No obvious lymphedema in right lower extremity     Skin: Intact in right lower extremity with no ulcerations, lesions, rash, erythema. Vascular: There are no obvious varicosities in right lower extremity, sensation present to light touch over right lower extremity. Capillary refill less than 3. Musculoskeletal:  right leg/knee exam:  Inspection:    No erythema or ecchymosis. No swelling noted. Leg alignment:     neutral  Quadriceps/hamstring atrophy:   no  Knee effusion:    no   ROM:       Lachman:    no  Ant/Posterior drawer:   no  Lateral patella glide at 30 deg's: 20%  Medial patella glide at 30 deg's: 10mm  Varus laxity at 0 and 30 deg's: no  Valguslaxity at 0 and 30 deg's: no  Tenderness at:   Tenderness to palpation over the medial joint line. Nontender palpation of the lateral joint line. Nontender palpation posterior calf, soft compartments. Lora:    Positive  Knee strength is 5/5 flexion and extension  No pain with hip internal rotation and external rotation. Patient can perform ankle dorsiflexion and plantarflexion. Imagin views of the right knee were obtained which showed no acute fracture or dislocation. Impression   Chondrocalcinosis compatible with CPPD arthropathy, with mild tricompartment   degenerative change and small joint effusion         Impression:   1. Medial joint line tenderness  2. Right knee sprain    Plan:   The patient was seen in clinic for evaluation of her right knee injury. Patient states she jumped up and tripped and fell on her right knee. She states she does have history of a meniscus repair and states her symptoms today feel similar.   She states her swelling has improved but continues to have pain. She notes pain with pivoting and has had some buckling. Xray imaging of the patient's right knee was obtained which showed no acute fracture dislocation. On physical exam, patient was tender palpation over the medial joint line region. No erythema or ecchymosis seen. Capillary refill less than 3. Sensation intact to light touch. Positive Lora's. Due to patient's mechanism of injury, medial joint line pain, and positive Lora's, MRI will be ordered for the evaluation of any ligament or meniscus tears. Patient will follow up in clinic once MRI has been completed. Continue to weight bear as tolerated  Continue range of motion as tolerated  Ice and elevate as needed  May take Tylenol or Motrin for pain as needed  MRI ordered. Follow-up in 2 weeks     Please note this report has been partially produced using speech recognition software and may contain errors related to that system including errors in grammar, punctuation, and spelling, as well as words and phrases that may be inappropriate. If there are any questions or concerns please feel free to contact the dictating provider for clarification.

## 2021-04-23 NOTE — PROGRESS NOTES
Patient presents to the office with complaints of right knee pain that began following recent injury that occurred on 4/15/21. Patient states that she in a deep sleep when her grandson stopped by her house startling her when he pounded on her door causing the patient to abruptly awaken and twist the knee upon getting out of bed and tripping over a pile of clothes landing onto her knee. Since the fall, patient has been having severe pain along the medial aspect of the right knee with swelling of the entire leg down to her toes. Swelling has since subsided some, but pain remains. Pain is aggravated by weightbearing, extension, flexion, and transitioning from seated to standing position. She is not currently taking OTC pain relievers or anti-inflammatories at this time. She previously was applying ice to the extremity for the first few days following injury. Hx of previous meniscus repair of the right knee by [place] 8-9 years ago and knee arthroscopy in [place] 30 years ago. No recent surgeries or conservative therapies reported.

## 2021-04-23 NOTE — PATIENT INSTRUCTIONS
Continue to weight bear as tolerated  Continue range of motion as tolerated  Ice and elevate as needed  May take Tylenol or Motrin for pain as needed  MRI ordered.     Follow-up in 2 weeks

## 2021-05-06 ENCOUNTER — TELEPHONE (OUTPATIENT)
Dept: CARDIOLOGY CLINIC | Age: 71
End: 2021-05-06

## 2021-05-06 NOTE — TELEPHONE ENCOUNTER
Patient is to have a MRI of her knee 5/11 [facility]   They are asking her information about her   Stents that she has no Idea the information

## 2021-05-20 DIAGNOSIS — J42 CHRONIC BRONCHITIS, UNSPECIFIED CHRONIC BRONCHITIS TYPE (HCC): ICD-10-CM

## 2021-05-20 DIAGNOSIS — M54.40 CHRONIC LOW BACK PAIN WITH SCIATICA, SCIATICA LATERALITY UNSPECIFIED, UNSPECIFIED BACK PAIN LATERALITY: ICD-10-CM

## 2021-05-20 DIAGNOSIS — Z76.0 MEDICATION REFILL: ICD-10-CM

## 2021-05-20 DIAGNOSIS — G89.29 CHRONIC LOW BACK PAIN WITH SCIATICA, SCIATICA LATERALITY UNSPECIFIED, UNSPECIFIED BACK PAIN LATERALITY: ICD-10-CM

## 2021-05-21 RX ORDER — CETIRIZINE HYDROCHLORIDE 10 MG/1
TABLET ORAL
Qty: 90 TABLET | Refills: 0 | Status: SHIPPED | OUTPATIENT
Start: 2021-05-21 | End: 2021-08-21 | Stop reason: SDUPTHER

## 2021-05-21 RX ORDER — MONTELUKAST SODIUM 10 MG/1
10 TABLET ORAL NIGHTLY
Qty: 90 TABLET | Refills: 1 | Status: SHIPPED | OUTPATIENT
Start: 2021-05-21 | End: 2021-10-12 | Stop reason: SDUPTHER

## 2021-05-21 RX ORDER — BACLOFEN 10 MG/1
10 TABLET ORAL 2 TIMES DAILY
Qty: 60 TABLET | Refills: 2 | Status: SHIPPED | OUTPATIENT
Start: 2021-05-21 | End: 2021-10-12 | Stop reason: SDUPTHER

## 2021-06-15 ENCOUNTER — OFFICE VISIT (OUTPATIENT)
Dept: FAMILY MEDICINE CLINIC | Age: 71
End: 2021-06-15
Payer: MEDICARE

## 2021-06-15 VITALS
DIASTOLIC BLOOD PRESSURE: 84 MMHG | BODY MASS INDEX: 39.65 KG/M2 | HEART RATE: 76 BPM | WEIGHT: 264.6 LBS | OXYGEN SATURATION: 94 % | TEMPERATURE: 98.6 F | SYSTOLIC BLOOD PRESSURE: 136 MMHG

## 2021-06-15 DIAGNOSIS — J01.00 ACUTE MAXILLARY SINUSITIS, RECURRENCE NOT SPECIFIED: ICD-10-CM

## 2021-06-15 DIAGNOSIS — J42 CHRONIC BRONCHITIS, UNSPECIFIED CHRONIC BRONCHITIS TYPE (HCC): ICD-10-CM

## 2021-06-15 DIAGNOSIS — M54.40 CHRONIC LOW BACK PAIN WITH SCIATICA, SCIATICA LATERALITY UNSPECIFIED, UNSPECIFIED BACK PAIN LATERALITY: ICD-10-CM

## 2021-06-15 DIAGNOSIS — H11.241: ICD-10-CM

## 2021-06-15 DIAGNOSIS — G89.29 CHRONIC LOW BACK PAIN WITH SCIATICA, SCIATICA LATERALITY UNSPECIFIED, UNSPECIFIED BACK PAIN LATERALITY: ICD-10-CM

## 2021-06-15 DIAGNOSIS — I10 ESSENTIAL HYPERTENSION: Primary | ICD-10-CM

## 2021-06-15 PROCEDURE — G8399 PT W/DXA RESULTS DOCUMENT: HCPCS | Performed by: NURSE PRACTITIONER

## 2021-06-15 PROCEDURE — G8417 CALC BMI ABV UP PARAM F/U: HCPCS | Performed by: NURSE PRACTITIONER

## 2021-06-15 PROCEDURE — G8926 SPIRO NO PERF OR DOC: HCPCS | Performed by: NURSE PRACTITIONER

## 2021-06-15 PROCEDURE — 3023F SPIROM DOC REV: CPT | Performed by: NURSE PRACTITIONER

## 2021-06-15 PROCEDURE — 4040F PNEUMOC VAC/ADMIN/RCVD: CPT | Performed by: NURSE PRACTITIONER

## 2021-06-15 PROCEDURE — 1036F TOBACCO NON-USER: CPT | Performed by: NURSE PRACTITIONER

## 2021-06-15 PROCEDURE — 1090F PRES/ABSN URINE INCON ASSESS: CPT | Performed by: NURSE PRACTITIONER

## 2021-06-15 PROCEDURE — 1123F ACP DISCUSS/DSCN MKR DOCD: CPT | Performed by: NURSE PRACTITIONER

## 2021-06-15 PROCEDURE — 99214 OFFICE O/P EST MOD 30 MIN: CPT | Performed by: NURSE PRACTITIONER

## 2021-06-15 PROCEDURE — 3017F COLORECTAL CA SCREEN DOC REV: CPT | Performed by: NURSE PRACTITIONER

## 2021-06-15 PROCEDURE — G8427 DOCREV CUR MEDS BY ELIG CLIN: HCPCS | Performed by: NURSE PRACTITIONER

## 2021-06-15 RX ORDER — METHYLPREDNISOLONE 4 MG/1
TABLET ORAL
Qty: 1 KIT | Refills: 0 | Status: SHIPPED | OUTPATIENT
Start: 2021-06-15 | End: 2021-06-21

## 2021-06-15 ASSESSMENT — ENCOUNTER SYMPTOMS
CHEST TIGHTNESS: 0
SHORTNESS OF BREATH: 1
SINUS PRESSURE: 1
COUGH: 1
TROUBLE SWALLOWING: 0
SORE THROAT: 0
WHEEZING: 0
BACK PAIN: 1
SINUS PAIN: 0

## 2021-06-15 NOTE — PROGRESS NOTES
Caren Byrne  1950  65 y.o. SUBJECT JEMIMA:    Chief Complaint   Patient presents with    6 Month Follow-Up       HPI     Melissa Anders is a 65year old female who is in for follow up. She complains of increase in bronchitis and sinus problems with the current allergy season. She states she is compliant with her medications. Her blood pressure stable, today 136/84. She has been using saline nasal spray without much relief. She states the steroid nasal sprays cause a lot of nose bleed so she avoids them. She complains of an eye disorder that occurred about 6 months after some debris got in her eye. She states \"I never follow up on this, just decided to flush my eyes. \" She states she has not had changes in vision and has no pain. She states her last eye exam was 2 years ago and knows she has cataracts. She complains of ridges in her nails and easy breakage of nails. She state she takes daily vitamins and eats well balanced meals. She states she continues to smoke, able to quit for 2 weeks when her roommate was away. She states during the time her roommate was away she had more energy, went outside and enjoyed her time. She states once the roommate returned and there was drama she started back to smoking. She states she has flares up of her back pain and has sciatica that affects both sides. She states she uses tylenol and her baclofen which help. Melissa Anders is very upset and spoke about transportation issues set up by insurance plan. She states there have been a couple of occasions where the transportation company's behavior has made her late for appointment. Current Outpatient Medications on File Prior to Visit   Medication Sig Dispense Refill    NASAL SALINE NA by Nasal route      albuterol (PROVENTIL) (5 MG/ML) 0.5% nebulizer solution Take 1 mL by nebulization every 6 hours as needed for Wheezing or Shortness of Breath 100 vial 1    cetirizine (ZYRTEC) 10 MG tablet Take one tablet by mouth daily.  90 tablet 0  baclofen (LIORESAL) 10 MG tablet Take 1 tablet by mouth 2 times daily 60 tablet 2    montelukast (SINGULAIR) 10 MG tablet Take 1 tablet by mouth nightly 90 tablet 1    Melatonin 5 MG SUBL Place 1 tablet under the tongue nightly as needed      Multiple Vitamin (MULTI-VITAMIN DAILY PO) Take 1 tablet by mouth daily      Phenylephrine-Acetaminophen (TYLENOL SINUS CONGESTION/PAIN PO) Take 1 tablet by mouth 2 times daily as needed      meclizine (ANTIVERT) 25 MG tablet TAKE ONE TABLET THREE TIMES A DAY AS NEEDED FOR DIZZINESS AVOID ALCOHOL/CAUSES DROWSINESS 30 tablet 0    lisinopril (PRINIVIL;ZESTRIL) 10 MG tablet Take 1 tablet by mouth daily 90 tablet 1    hydroCHLOROthiazide (HYDRODIURIL) 12.5 MG tablet Take 1 tablet by mouth every other day 90 tablet 1    albuterol sulfate HFA (PROVENTIL HFA) 108 (90 Base) MCG/ACT inhaler Inhale 2 puffs into the lungs every 4 hours as needed for Wheezing or Shortness of Breath (cough) 1 Inhaler 2    simvastatin (ZOCOR) 40 MG tablet Take 1 tablet by mouth nightly 90 tablet 1    clopidogrel (PLAVIX) 75 MG tablet Take 1 tablet by mouth daily 90 tablet 1    aspirin 81 MG tablet Take 1 tablet by mouth daily 90 tablet 2    diphenhydrAMINE (BENADRYL) 25 MG tablet Take 1 tablet by mouth nightly as needed for Itching 90 tablet 2    albuterol (PROVENTIL) (5 MG/ML) 0.5% nebulizer solution Take 1 mL by nebulization every 6 hours as needed for Wheezing or Shortness of Breath 100 vial 1    ADVAIR DISKUS 500-50 MCG/DOSE diskus inhaler Inhale 1 puff into the lungs every 12 hours After inhalation then gargle (Patient not taking: Reported on 4/23/2021) 1 Inhaler 11    ipratropium-albuterol (DUONEB) 0.5-2.5 (3) MG/3ML SOLN nebulizer solution Inhale 3 mLs into the lungs 4 times daily (Patient not taking: Reported on 6/15/2021) 120 vial 1     No current facility-administered medications on file prior to visit.        Past Medical History:   Diagnosis Date    Active smoker     Active smoker     Ankle fracture, left 2006    Asthma     Chondromalacia of right knee     Dr. Vaibhav Morton + MRI    Chronic back pain     Chronic cough 10/4/2019    Depression     [facility]    Dizziness     CT brain normal -6/18/2012    Family history of early CAD     Father - 4st MI age 50, Massive MI age 71    H/O cardiovascular stress test 8/7/2012 8/7/2012-Lexiscan-Normal perfusion. LVSF normal.EF 58%    H/O Doppler ultrasound 12/11/2019     Abnormal left lower extremity arterial Doppler ultrasound. The Left lower extremity arteries have a Monophasic waveform suggestive of inflow disease, The Left BARBER shows Severe peripheral arterial disease. Normal right lower extremity arterial Doppler ultrasound. Normal right lower extremity ankle brachial indices    H/O echocardiogram 8/7/2012 8/7/2012-LVSF normal. EF =>55%. impaired LV relaxation.  Herniated intervertebral disk     thoracic and lumbar    Hypertension     Mild persistent asthma without complication 17/4/9173    Normal echocardiogram 8/2012    EF=> 55%-Dr. Espinoza Hope    Obesity (BMI 30-39. 9)     Obstructive sleep apnea 10/4/2019    Other screening mammogram     PAD (peripheral artery disease) ([address] Angioplasty 01/06/2020     LCIA 90% INSTENT RESTENOSED TO 0% WITH PTA.     Peripheral vascular disease ([address]     Postmenopausal     Shoulder fracture, left 2008    Tobacco abuse 10/4/2019     Past Surgical History:   Procedure Laterality Date    CHOLECYSTECTOMY  1991    KNEE SURGERY  1998    right knee    TONSILLECTOMY AND ADENOIDECTOMY  1963    TUBAL LIGATION  1977    TUMOR REMOVAL  1999    parotid    TUMOR REMOVAL  1976    left thigh     Family History   Problem Relation Age of Onset    High Blood Pressure Mother     Allergies Mother    Del Henle Migraines Mother     Obesity Mother     Heart Disease Father     High Blood Pressure Father     Allergies Father     Obesity Father     Diabetes Daughter     High Blood Pressure Sister     Allergies Sister     Bleeding Prob Sister    Bryant Migraines Sister     Obesity Sister     Cancer Brother     High Blood Pressure Brother     Allergies Brother     Bleeding Prob Brother     Obesity Brother     Thyroid Disease Maternal Aunt     Kidney Disease Maternal Aunt     Heart Disease Maternal Uncle     Kidney Disease Maternal Uncle     Cancer Maternal Grandmother     Glaucoma Maternal Grandmother     Diabetes Maternal Grandfather     Glaucoma Maternal Grandfather     Glaucoma Paternal Grandmother     Glaucoma Paternal Grandfather      Social History     Socioeconomic History    Marital status:      Spouse name: Not on file    Number of children: 1    Years of education: Not on file    Highest education level: Not on file   Occupational History    Occupation: Customer Service   Tobacco Use    Smoking status: Former Smoker     Packs/day: 0.25     Years: 39.00     Pack years: 9.75     Types: Cigarettes     Quit date: 2019     Years since quittin.4    Smokeless tobacco: Never Used   Substance and Sexual Activity    Alcohol use: Yes     Comment: socially    Drug use: No    Sexual activity: Not on file   Other Topics Concern    Not on file   Social History Narrative    Working now at [employer] jobs from [address] [employer] Strain:     Difficulty of Paying Living Expenses:    Food Insecurity:     Worried About [address] [facility] in the Last Year:    [address] [facility] in the Last Year:    Transportation Needs:     Lack of Transportation (Medical):      Lack of Transportation (Non-Medical):    Physical Activity:     Days of Exercise per Week:     Minutes of Exercise per Session:    Stress:     Feeling of Stress :    Social Connections:     Frequency of Communication with Friends and Family:     Frequency of Social Gatherings with Friends and Family:     Attends Anabaptism Services:     Active Member of Clubs or Organizations:     Attends Club or Organization Meetings:     Marital Status:    Intimate Partner Violence:     Fear of Current or Ex-Partner:     Emotionally Abused:     Physically Abused:     Sexually Abused:        Review of Systems   Constitutional: Negative for activity change, appetite change, chills, diaphoresis, fatigue, fever and unexpected weight change. HENT: Positive for sinus pressure and sneezing. Negative for sinus pain, sore throat and trouble swallowing. Respiratory: Positive for cough and shortness of breath. Negative for chest tightness and wheezing. Cardiovascular: Negative for chest pain, palpitations and leg swelling. Genitourinary: Negative. Musculoskeletal: Positive for back pain. Negative for gait problem. Allergic/Immunologic: Positive for environmental allergies. Psychiatric/Behavioral: Positive for dysphoric mood. The patient is nervous/anxious. OBJECTIVE:     /84 (Site: Right Upper Arm, Position: Sitting, Cuff Size: Large Adult)   Pulse 76   Temp 98.6 °F (37 °C) (Infrared)   Wt 264 lb 9.6 oz (120 kg)   SpO2 94%   BMI 39.65 kg/m²     Physical Exam  Vitals reviewed. Constitutional:       General: She is not in acute distress. Appearance: Normal appearance. She is well-developed. She is obese. She is not ill-appearing or diaphoretic. HENT:      Head: Normocephalic and atraumatic. Right Ear: Tympanic membrane and external ear normal.      Left Ear: Tympanic membrane and external ear normal.      Nose: Nose normal. No congestion or rhinorrhea. Mouth/Throat:      Pharynx: No oropharyngeal exudate or posterior oropharyngeal erythema. Eyes:      General: No scleral icterus. Right eye: No discharge. Conjunctiva/sclera: Conjunctivae normal.      Pupils: Pupils are equal, round, and reactive to light. Cardiovascular:      Rate and Rhythm: Normal rate and regular rhythm.       Heart sounds: Normal heart sounds. Pulmonary:      Effort: Pulmonary effort is normal.      Breath sounds: Normal breath sounds. Abdominal:      Palpations: Abdomen is soft. Musculoskeletal:         General: Normal range of motion. Cervical back: Normal range of motion and neck supple. No rigidity or tenderness. Right lower leg: No edema. Left lower leg: No edema. Lymphadenopathy:      Cervical: No cervical adenopathy. Skin:     General: Skin is warm and dry. Neurological:      Mental Status: She is alert and oriented to person, place, and time. Psychiatric:         Behavior: Behavior normal.         Thought Content: Thought content normal.         Judgment: Judgment normal.         No results found for requested labs within last 30 days. LDL Calculated (mg/dL)   Date Value   10/19/2020 51       Lab Results   Component Value Date    WBC 6.6 10/19/2020    WBC 5.7 07/21/2020    WBC 6.1 07/11/2020    NEUTROABS 3.9 10/19/2020    NEUTROABS 3.1 09/25/2019    HGB 14.8 10/19/2020    HGB 14.5 07/21/2020    HGB 13.2 07/11/2020    HCT 44.6 10/19/2020    HCT 44.9 07/21/2020    HCT 41.6 07/11/2020    MCV 83.4 10/19/2020    MCV 85.1 07/21/2020    MCV 87.8 07/11/2020     10/19/2020     07/21/2020     07/11/2020    SEGSABS 3.1 07/11/2020    SEGSABS 3.5 07/10/2020    SEGSABS 6.4 01/03/2020    LYMPHSABS 2.0 10/19/2020    MONOSABS 0.5 10/19/2020    EOSABS 0.2 10/19/2020    BASOSABS 0.0 10/19/2020     Lab Results   Component Value Date    TSH 3.16 09/25/2019    TSHHS 1.769 08/11/2012     Lab Results   Component Value Date    LABALBU 4.1 07/21/2020    BILITOT 0.4 07/21/2020    AST 16 07/21/2020    ALT 8 07/21/2020    ALKPHOS 74 07/21/2020             No results found for this visit on 06/15/21. ASSESSMENT AND PLAN:     1. Essential hypertension  - stable    2. Chronic bronchitis, unspecified chronic bronchitis type (HCC)  - methylPREDNISolone (MEDROL DOSEPACK) 4 MG tablet;  Take by mouth.  Dispense: 1 kit; Refill: 0    3. Chronic low back pain with sciatica, sciatica laterality unspecified, unspecified back pain laterality    4. Acute maxillary sinusitis, recurrence not specified  - methylPREDNISolone (MEDROL DOSEPACK) 4 MG tablet; Take by mouth. Dispense: 1 kit; Refill: 0    5. Conjunctival scar, right    Fluids, rest  Continue current medication regimen  Take prednisone as directed  Call ophthamologist for appointment to address right eye issue  Keep appointment with pulmonologist and cardiologist  Suggested follow up with allergist - declined to return to him at this time  Verbalized understanding and agreement with plan    Spent 5 minutes review labs, consultant notes  Spent 15 minutes doing assessments and discussing complaints  Spent 10 minutes discussing plan  Spent 5 minutes completing note and closing chart    Return in about 4 months (around 10/15/2021). Care discussed with patient. Patient educated on signs and symptoms of exacerbation and when to seek further medical attention. Advised to call for any problems, questions, or concerns. Patient verbalizes understanding and agrees with plan. Medications reviewed and reconciled. Continue current medications. Appropriate prescriptions are ordered. Risks and benefits of meds are discussed. After visit summary provided.

## 2021-06-21 ENCOUNTER — TELEMEDICINE (OUTPATIENT)
Dept: FAMILY MEDICINE CLINIC | Age: 71
End: 2021-06-21
Payer: MEDICARE

## 2021-06-21 DIAGNOSIS — J42 CHRONIC BRONCHITIS, UNSPECIFIED CHRONIC BRONCHITIS TYPE (HCC): ICD-10-CM

## 2021-06-21 DIAGNOSIS — G44.89 HEADACHE SYNDROME: Primary | ICD-10-CM

## 2021-06-21 PROCEDURE — 1090F PRES/ABSN URINE INCON ASSESS: CPT | Performed by: NURSE PRACTITIONER

## 2021-06-21 PROCEDURE — 1123F ACP DISCUSS/DSCN MKR DOCD: CPT | Performed by: NURSE PRACTITIONER

## 2021-06-21 PROCEDURE — G8417 CALC BMI ABV UP PARAM F/U: HCPCS | Performed by: NURSE PRACTITIONER

## 2021-06-21 PROCEDURE — 1036F TOBACCO NON-USER: CPT | Performed by: NURSE PRACTITIONER

## 2021-06-21 PROCEDURE — 4040F PNEUMOC VAC/ADMIN/RCVD: CPT | Performed by: NURSE PRACTITIONER

## 2021-06-21 PROCEDURE — G8427 DOCREV CUR MEDS BY ELIG CLIN: HCPCS | Performed by: NURSE PRACTITIONER

## 2021-06-21 PROCEDURE — G8399 PT W/DXA RESULTS DOCUMENT: HCPCS | Performed by: NURSE PRACTITIONER

## 2021-06-21 PROCEDURE — 3023F SPIROM DOC REV: CPT | Performed by: NURSE PRACTITIONER

## 2021-06-21 PROCEDURE — 99214 OFFICE O/P EST MOD 30 MIN: CPT | Performed by: NURSE PRACTITIONER

## 2021-06-21 PROCEDURE — G8926 SPIRO NO PERF OR DOC: HCPCS | Performed by: NURSE PRACTITIONER

## 2021-06-21 PROCEDURE — 3017F COLORECTAL CA SCREEN DOC REV: CPT | Performed by: NURSE PRACTITIONER

## 2021-06-21 RX ORDER — BUTALBITAL, ACETAMINOPHEN AND CAFFEINE 50; 325; 40 MG/1; MG/1; MG/1
1 TABLET ORAL 3 TIMES DAILY PRN
Qty: 30 TABLET | Refills: 0 | Status: SHIPPED | OUTPATIENT
Start: 2021-06-21 | End: 2022-02-23 | Stop reason: SDUPTHER

## 2021-06-21 RX ORDER — IPRATROPIUM BROMIDE AND ALBUTEROL SULFATE 2.5; .5 MG/3ML; MG/3ML
1 SOLUTION RESPIRATORY (INHALATION) 4 TIMES DAILY
Qty: 120 VIAL | Refills: 1 | Status: SHIPPED | OUTPATIENT
Start: 2021-06-21 | End: 2022-10-12 | Stop reason: SDUPTHER

## 2021-06-21 ASSESSMENT — ENCOUNTER SYMPTOMS
SORE THROAT: 0
WHEEZING: 1
CHEST TIGHTNESS: 1
COUGH: 1
SHORTNESS OF BREATH: 1
TROUBLE SWALLOWING: 0
NAUSEA: 0
CHOKING: 0
SINUS PAIN: 0
SINUS PRESSURE: 0

## 2021-06-21 NOTE — PROGRESS NOTES
2021    TELEHEALTH EVALUATION -- Audio/Visual (During CCIWW-98 public health emergency)    HPI:    Amairani Garrison (:  1950) has requested an audio/video evaluation for the following concern(s):    Bhavana Flores is a 65year old female who has requested a virtual visit. She states since the humidity has been up and hot temperatures she has had more problems with breathing. She states she is using all medications for asthma and allergy. She states she finally turned on the air conditioning which helped some. She is also complaining of a headache that was worse this morning. She states she did take some pain medication and the headache is tolerable at this time. Masha Salazar states she will be traveling to [place] in 3 1/2 weeks. She is concerned that the humidity and temperatures will be up and will have a flare of her asthma. She states she has been coughing up some phlegm, short of breath and some wheezing. Review of Systems   Constitutional: Negative for activity change, appetite change, chills, diaphoresis, fatigue, fever and unexpected weight change. HENT: Negative for sinus pressure, sinus pain, sore throat and trouble swallowing. Respiratory: Positive for cough, chest tightness, shortness of breath and wheezing. Negative for choking. Cardiovascular: Negative for chest pain, palpitations and leg swelling. Gastrointestinal: Negative for nausea. Neurological: Positive for headaches. Negative for dizziness, weakness and light-headedness. Prior to Visit Medications    Medication Sig Taking?  Authorizing Provider   ipratropium-albuterol (DUONEB) 0.5-2.5 (3) MG/3ML SOLN nebulizer solution Inhale 3 mLs into the lungs 4 times daily Yes ADALID Butler CNP   butalbital-acetaminophen-caffeine (FIORICET, ESGIC) -40 MG per tablet Take 1 tablet by mouth 3 times daily as needed for Headaches Yes ADALID Butler CNP   NASAL SALINE NA by Nasal route Yes Historical Provider, MD   albuterol (PROVENTIL) (5 MG/ML) 0.5% nebulizer solution Take 1 mL by nebulization every 6 hours as needed for Wheezing or Shortness of Breath Yes ADALID Musa CNP   cetirizine (ZYRTEC) 10 MG tablet Take one tablet by mouth daily. Yes ADALID Musa CNP   montelukast (SINGULAIR) 10 MG tablet Take 1 tablet by mouth nightly Yes ADALID Musa CNP   albuterol (PROVENTIL) (5 MG/ML) 0.5% nebulizer solution Take 1 mL by nebulization every 6 hours as needed for Wheezing or Shortness of Breath Yes ADALID Musa CNP   Multiple Vitamin (MULTI-VITAMIN DAILY PO) Take 1 tablet by mouth daily Yes Historical Provider, MD   lisinopril (PRINIVIL;ZESTRIL) 10 MG tablet Take 1 tablet by mouth daily Yes ADALID Musa CNP   hydroCHLOROthiazide (HYDRODIURIL) 12.5 MG tablet Take 1 tablet by mouth every other day Yes ADALID Musa CNP   ADVAIR DISKUS 500-50 MCG/DOSE diskus inhaler Inhale 1 puff into the lungs every 12 hours After inhalation then gargle Yes ADALID Musa CNP   albuterol sulfate HFA (PROVENTIL HFA) 108 (90 Base) MCG/ACT inhaler Inhale 2 puffs into the lungs every 4 hours as needed for Wheezing or Shortness of Breath (cough) Yes ADALID Musa CNP   simvastatin (ZOCOR) 40 MG tablet Take 1 tablet by mouth nightly Yes ADALID Musa CNP   clopidogrel (PLAVIX) 75 MG tablet Take 1 tablet by mouth daily Yes ADALID Musa CNP   aspirin 81 MG tablet Take 1 tablet by mouth daily Yes ADALID Musa CNP   diphenhydrAMINE (BENADRYL) 25 MG tablet Take 1 tablet by mouth nightly as needed for Itching Yes ADALID Musa CNP   methylPREDNISolone (MEDROL DOSEPACK) 4 MG tablet Take by mouth.   ADALID Musa CNP   baclofen (LIORESAL) 10 MG tablet Take 1 tablet by mouth 2 times daily  Patient not taking: Reported on 6/21/2021  ADALID Musa CNP   Phenylephrine-Acetaminophen (TYLENOL SINUS CONGESTION/PAIN PO) Take 1 tablet by mouth 2 times daily as needed  Patient not taking: Reported on 2021  Historical Provider, MD dixonzine (ANTIVERT) 25 MG tablet TAKE ONE TABLET THREE TIMES A DAY AS NEEDED FOR DIZZINESS AVOID ALCOHOL/CAUSES DROWSINESS  Patient not taking: Reported on 2021  ADALID Lebron - CNP       Social History     Tobacco Use    Smoking status: Former Smoker     Packs/day: 0.25     Years: 39.00     Pack years: 9.75     Types: Cigarettes     Quit date: 2019     Years since quittin.5    Smokeless tobacco: Never Used   Substance Use Topics    Alcohol use: Yes     Comment: socially    Drug use: No          PHYSICAL EXAMINATION:  [ INSTRUCTIONS:  \"[x]\" Indicates a positive item  \"[]\" Indicates a negative item  -- DELETE ALL ITEMS NOT EXAMINED]  Vital Signs: (As obtained by patient/caregiver or practitioner observation)    Blood pressure-  Heart rate-    Respiratory rate-    Temperature-  Pulse oximetry-     Constitutional: [x] Appears well-developed and well-nourished [x] No apparent distress      [] Abnormal-   Mental status  [x] Alert and awake  [x] Oriented to person/place/time []Able to follow commands      Eyes:  EOM    []  Normal  [] Abnormal-  Sclera  []  Normal  [] Abnormal -         Discharge [x]  None visible  [] Abnormal -    HENT:   [x] Normocephalic, atraumatic.   [] Abnormal   [] Mouth/Throat: Mucous membranes are moist.     External Ears [x] Normal  [] Abnormal-     Neck: [x] No visualized mass     Pulmonary/Chest: [x] Respiratory effort normal.  [x] No visualized signs of difficulty breathing or respiratory distress        [] Abnormal-      Musculoskeletal:   [] Normal gait with no signs of ataxia         [x] Normal range of motion of neck        [] Abnormal-       Neurological:        [x] No Facial Asymmetry (Cranial nerve 7 motor function) (limited exam to video visit)          [] No gaze palsy        [] Abnormal-         Skin:        [x] No significant exanthematous lesions or discoloration noted on facial skin         [] Abnormal-            Psychiatric:       [x] Normal Affect [] No Hallucinations        [] Abnormal-     Other pertinent observable physical exam findings-     ASSESSMENT/PLAN:  1. Chronic bronchitis, unspecified chronic bronchitis type ([facility]  - ipratropium-albuterol (DUONEB) 0.5-2.5 (3) MG/3ML SOLN nebulizer solution; Inhale 3 mLs into the lungs 4 times daily  Dispense: 120 vial; Refill: 1    2. Headache syndrome  - butalbital-acetaminophen-caffeine (FIORICET, ESGIC) -40 MG per tablet; Take 1 tablet by mouth 3 times daily as needed for Headaches  Dispense: 30 tablet; Refill: 0    Fluids, rest  Advised to use medications as directed  Continue current medication regimen  If no improvement, encouraged to contact pulmonology  Verbalized understanding and agreement with plan    No follow-ups on file. Shady Kapoor, was evaluated through a synchronous (real-time) audio-video encounter. The patient (or guardian if applicable) is aware that this is a billable service. Verbal consent to proceed has been obtained within the past 12 months. The visit was conducted pursuant to the emergency declaration under the [address] [place] [address], [address] [place] [address] authority and the [employer] and [employer] General Act. Patient identification was verified, and a caregiver was present when appropriate. The patient was located in a state where the provider was credentialed to provide care. Total time spent on this encounter: Not billed by time    --ADALID Hamlin CNP on 6/21/2021 at 5:35 PM    An electronic signature was used to authenticate this note.

## 2021-07-13 DIAGNOSIS — Z76.0 MEDICATION REFILL: ICD-10-CM

## 2021-07-13 RX ORDER — CLOPIDOGREL BISULFATE 75 MG/1
75 TABLET ORAL DAILY
Qty: 90 TABLET | Refills: 1 | Status: SHIPPED | OUTPATIENT
Start: 2021-07-13 | End: 2021-11-03

## 2021-07-19 ENCOUNTER — TELEPHONE (OUTPATIENT)
Dept: FAMILY MEDICINE CLINIC | Age: 71
End: 2021-07-19

## 2021-07-19 DIAGNOSIS — Z12.31 BREAST CANCER SCREENING BY MAMMOGRAM: Primary | ICD-10-CM

## 2021-07-26 ENCOUNTER — HOSPITAL ENCOUNTER (OUTPATIENT)
Dept: MRI IMAGING | Age: 71
Discharge: HOME | End: 2021-07-26
Payer: MEDICARE

## 2021-07-26 DIAGNOSIS — S83.91XA SPRAIN OF RIGHT KNEE, UNSPECIFIED LIGAMENT, INITIAL ENCOUNTER: ICD-10-CM

## 2021-07-26 DIAGNOSIS — M25.561 MEDIAL JOINT LINE TENDERNESS OF RIGHT KNEE: ICD-10-CM

## 2021-07-26 PROCEDURE — 73721 MRI JNT OF LWR EXTRE W/O DYE: CPT

## 2021-08-02 DIAGNOSIS — J45.30: ICD-10-CM

## 2021-08-02 DIAGNOSIS — Z76.0 MEDICATION REFILL: ICD-10-CM

## 2021-08-03 ENCOUNTER — TELEPHONE (OUTPATIENT)
Dept: FAMILY MEDICINE CLINIC | Age: 71
End: 2021-08-03

## 2021-08-03 DIAGNOSIS — J32.9 CHRONIC SINUSITIS, UNSPECIFIED LOCATION: Primary | ICD-10-CM

## 2021-08-03 RX ORDER — ALBUTEROL SULFATE 90 UG/1
2 AEROSOL, METERED RESPIRATORY (INHALATION) EVERY 4 HOURS PRN
Qty: 1 INHALER | Refills: 2 | Status: SHIPPED | OUTPATIENT
Start: 2021-08-03 | End: 2022-02-23 | Stop reason: SDUPTHER

## 2021-08-03 RX ORDER — FLUTICASONE PROPIONATE 50 MCG
1 SPRAY, SUSPENSION (ML) NASAL DAILY
Qty: 1 BOTTLE | Refills: 1 | Status: SHIPPED | OUTPATIENT
Start: 2021-08-03 | End: 2022-03-12

## 2021-08-17 ENCOUNTER — CARE COORDINATION (OUTPATIENT)
Dept: CARE COORDINATION | Age: 71
End: 2021-08-17

## 2021-08-17 ENCOUNTER — OFFICE VISIT (OUTPATIENT)
Dept: FAMILY MEDICINE CLINIC | Age: 71
End: 2021-08-17
Payer: MEDICARE

## 2021-08-17 VITALS
HEIGHT: 69 IN | DIASTOLIC BLOOD PRESSURE: 80 MMHG | SYSTOLIC BLOOD PRESSURE: 134 MMHG | HEART RATE: 62 BPM | OXYGEN SATURATION: 95 % | BODY MASS INDEX: 39.49 KG/M2 | TEMPERATURE: 97.2 F | WEIGHT: 266.6 LBS

## 2021-08-17 DIAGNOSIS — G47.9 SLEEP DISTURBANCE: Primary | ICD-10-CM

## 2021-08-17 DIAGNOSIS — F41.8 SITUATIONAL ANXIETY: ICD-10-CM

## 2021-08-17 DIAGNOSIS — F41.9 ANXIETY: ICD-10-CM

## 2021-08-17 PROCEDURE — G8399 PT W/DXA RESULTS DOCUMENT: HCPCS | Performed by: NURSE PRACTITIONER

## 2021-08-17 PROCEDURE — 3017F COLORECTAL CA SCREEN DOC REV: CPT | Performed by: NURSE PRACTITIONER

## 2021-08-17 PROCEDURE — 1036F TOBACCO NON-USER: CPT | Performed by: NURSE PRACTITIONER

## 2021-08-17 PROCEDURE — G8427 DOCREV CUR MEDS BY ELIG CLIN: HCPCS | Performed by: NURSE PRACTITIONER

## 2021-08-17 PROCEDURE — 1123F ACP DISCUSS/DSCN MKR DOCD: CPT | Performed by: NURSE PRACTITIONER

## 2021-08-17 PROCEDURE — 1090F PRES/ABSN URINE INCON ASSESS: CPT | Performed by: NURSE PRACTITIONER

## 2021-08-17 PROCEDURE — 99214 OFFICE O/P EST MOD 30 MIN: CPT | Performed by: NURSE PRACTITIONER

## 2021-08-17 PROCEDURE — G8417 CALC BMI ABV UP PARAM F/U: HCPCS | Performed by: NURSE PRACTITIONER

## 2021-08-17 PROCEDURE — 4040F PNEUMOC VAC/ADMIN/RCVD: CPT | Performed by: NURSE PRACTITIONER

## 2021-08-17 RX ORDER — HYDROXYZINE HYDROCHLORIDE 25 MG/1
25 TABLET, FILM COATED ORAL NIGHTLY
Qty: 30 TABLET | Refills: 1 | Status: SHIPPED | OUTPATIENT
Start: 2021-08-17 | End: 2021-10-12 | Stop reason: SDUPTHER

## 2021-08-17 ASSESSMENT — ENCOUNTER SYMPTOMS
COUGH: 0
TROUBLE SWALLOWING: 0
SHORTNESS OF BREATH: 0
WHEEZING: 0
CHEST TIGHTNESS: 0

## 2021-08-17 ASSESSMENT — PATIENT HEALTH QUESTIONNAIRE - PHQ9
SUM OF ALL RESPONSES TO PHQ QUESTIONS 1-9: 13
SUM OF ALL RESPONSES TO PHQ9 QUESTIONS 1 & 2: 6
6. FEELING BAD ABOUT YOURSELF - OR THAT YOU ARE A FAILURE OR HAVE LET YOURSELF OR YOUR FAMILY DOWN: 0
10. IF YOU CHECKED OFF ANY PROBLEMS, HOW DIFFICULT HAVE THESE PROBLEMS MADE IT FOR YOU TO DO YOUR WORK, TAKE CARE OF THINGS AT HOME, OR GET ALONG WITH OTHER PEOPLE: 0
9. THOUGHTS THAT YOU WOULD BE BETTER OFF DEAD, OR OF HURTING YOURSELF: 0
8. MOVING OR SPEAKING SO SLOWLY THAT OTHER PEOPLE COULD HAVE NOTICED. OR THE OPPOSITE, BEING SO FIGETY OR RESTLESS THAT YOU HAVE BEEN MOVING AROUND A LOT MORE THAN USUAL: 0
SUM OF ALL RESPONSES TO PHQ QUESTIONS 1-9: 13
SUM OF ALL RESPONSES TO PHQ QUESTIONS 1-9: 13
7. TROUBLE CONCENTRATING ON THINGS, SUCH AS READING THE NEWSPAPER OR WATCHING TELEVISION: 1
5. POOR APPETITE OR OVEREATING: 1
4. FEELING TIRED OR HAVING LITTLE ENERGY: 2
1. LITTLE INTEREST OR PLEASURE IN DOING THINGS: 3
2. FEELING DOWN, DEPRESSED OR HOPELESS: 3
3. TROUBLE FALLING OR STAYING ASLEEP: 3

## 2021-08-17 ASSESSMENT — COLUMBIA-SUICIDE SEVERITY RATING SCALE - C-SSRS
1. WITHIN THE PAST MONTH, HAVE YOU WISHED YOU WERE DEAD OR WISHED YOU COULD GO TO SLEEP AND NOT WAKE UP?: NO
6. HAVE YOU EVER DONE ANYTHING, STARTED TO DO ANYTHING, OR PREPARED TO DO ANYTHING TO END YOUR LIFE?: NO
2. HAVE YOU ACTUALLY HAD ANY THOUGHTS OF KILLING YOURSELF?: NO

## 2021-08-17 NOTE — CARE COORDINATION
Spoke with patient per Provider request for follow up. Spoke with patient. Oriented to the role of ACM. Identified patient by name and . Patient reports concerns in r/t current living situation. Reports that she feels safe at home and is able to attend to her own care needs; however she does not feel equipped to care for others. Support given. Patient verbalized plan to further discuss situation with  . Denies further questions or needs at this time. ACM contact information provided should needs arise. Plan for next outreach:  Further assess patient support needs; enroll for ACM .

## 2021-08-18 ENCOUNTER — CARE COORDINATION (OUTPATIENT)
Dept: CARE COORDINATION | Age: 71
End: 2021-08-18

## 2021-08-18 SDOH — ECONOMIC STABILITY: FOOD INSECURITY: WITHIN THE PAST 12 MONTHS, YOU WORRIED THAT YOUR FOOD WOULD RUN OUT BEFORE YOU GOT MONEY TO BUY MORE.: SOMETIMES TRUE

## 2021-08-18 SDOH — ECONOMIC STABILITY: TRANSPORTATION INSECURITY
IN THE PAST 12 MONTHS, HAS THE LACK OF TRANSPORTATION KEPT YOU FROM MEDICAL APPOINTMENTS OR FROM GETTING MEDICATIONS?: NO

## 2021-08-18 SDOH — SOCIAL STABILITY: SOCIAL NETWORK: HOW OFTEN DO YOU GET TOGETHER WITH FRIENDS OR RELATIVES?: TWICE A WEEK

## 2021-08-18 SDOH — ECONOMIC STABILITY: HOUSING INSECURITY
IN THE LAST 12 MONTHS, WAS THERE A TIME WHEN YOU DID NOT HAVE A STEADY PLACE TO SLEEP OR SLEPT IN A SHELTER (INCLUDING NOW)?: NO

## 2021-08-18 SDOH — ECONOMIC STABILITY: HOUSING INSECURITY: IN THE LAST 12 MONTHS, HOW MANY PLACES HAVE YOU LIVED?: 1

## 2021-08-18 SDOH — SOCIAL STABILITY: SOCIAL NETWORK: IN A TYPICAL WEEK, HOW MANY TIMES DO YOU TALK ON THE PHONE WITH FAMILY, FRIENDS, OR NEIGHBORS?: TWICE A WEEK

## 2021-08-18 SDOH — SOCIAL STABILITY: SOCIAL NETWORK: ARE YOU MARRIED, WIDOWED, DIVORCED, SEPARATED, NEVER MARRIED, OR LIVING WITH A PARTNER?: WIDOWED

## 2021-08-18 SDOH — ECONOMIC STABILITY: FOOD INSECURITY: WITHIN THE PAST 12 MONTHS, THE FOOD YOU BOUGHT JUST DIDN'T LAST AND YOU DIDN'T HAVE MONEY TO GET MORE.: SOMETIMES TRUE

## 2021-08-18 SDOH — HEALTH STABILITY: PHYSICAL HEALTH: ON AVERAGE, HOW MANY MINUTES DO YOU ENGAGE IN EXERCISE AT THIS LEVEL?: 10 MIN

## 2021-08-18 SDOH — HEALTH STABILITY: MENTAL HEALTH: HOW OFTEN DO YOU HAVE A DRINK CONTAINING ALCOHOL?: NEVER

## 2021-08-18 SDOH — ECONOMIC STABILITY: INCOME INSECURITY: IN THE LAST 12 MONTHS, WAS THERE A TIME WHEN YOU WERE NOT ABLE TO PAY THE MORTGAGE OR RENT ON TIME?: NO

## 2021-08-18 SDOH — HEALTH STABILITY: PHYSICAL HEALTH: ON AVERAGE, HOW MANY DAYS PER WEEK DO YOU ENGAGE IN MODERATE TO STRENUOUS EXERCISE (LIKE A BRISK WALK)?: 2 DAYS

## 2021-08-18 SDOH — ECONOMIC STABILITY: TRANSPORTATION INSECURITY
IN THE PAST 12 MONTHS, HAS LACK OF TRANSPORTATION KEPT YOU FROM MEETINGS, WORK, OR FROM GETTING THINGS NEEDED FOR DAILY LIVING?: NO

## 2021-08-18 SDOH — ECONOMIC STABILITY: INCOME INSECURITY: HOW HARD IS IT FOR YOU TO PAY FOR THE VERY BASICS LIKE FOOD, HOUSING, MEDICAL CARE, AND HEATING?: SOMEWHAT HARD

## 2021-08-18 NOTE — CARE COORDINATION
Ambulatory Care Coordination Note  8/18/2021  CM Risk Score: 2  Charlson 10 Year Mortality Risk Score: 79%     ACC: Liborio Cogan, RN    Summary Note:   ACM follow up; PCP referral for enrollment. Patient is agreeable to ongoing ACM follow up. Reviewed medications:  Patient confirms that she has all of her medications and is taking them as directed. Denies any barriers to obtaining her prescriptions. Asthma:  Patient has inhalers; using med neb. Treatments as directed. Denies increased SOB, CP, or wheezing. Patient reports that she started smoking again d/t stress. Discussed smoking cessation; support available; [employer], 1-800-quit now. Patient denies need for support. Patient reports that her main concern at this time is being a caregiver for her roommate. Patient reports that her roommate's health is deteriorating and she feels that she can no longer safely care for her. Reports that she has had issue with the cost of her gas bill. Discussed LSW support. Patient reports that she is in the process of obtaining an eviction notice; but may not be able to cover the cost .  Reports that she is barely able to cover the cost of rent and utilities. Patient is agreeable to LSW referral to assess resources for support. Patient denies further questions or needs at this time. Plan for next outreach:   [facility] support    Referral to [place] with request for support. Ambulatory Care Coordination Assessment    Care Coordination Protocol  Program Enrollment: Complex Care  Referral from Primary Care Provider: No  Week 1 - Initial Assessment     Do you have all of your prescriptions and are they filled?: Yes  Barriers to medication adherence: None  Are you able to afford your medications?: Yes  How often do you have trouble taking your medications the way you have been told to take them?: I always take them as prescribed.      Do you have Home O2 Therapy?: No      Ability to seek help/take action for Emergent Urgent situations i.e. fire, crime, inclement weather or health crisis. : Independent  Ability to ambulate to restroom: Independent  Ability handle personal hygeine needs (bathing/dressing/grooming): Independent  Ability to manage Medications: Independent  Ability to prepare Food Preparation: Independent  Ability to maintain home (clean home, laundry): Independent  Ability to drive and/or has transportation: Dependent  Ability to do shopping: Dependent  Ability to manage finances: Independent     Current Housing: Private Residence  For whom are you the caregiver?: Working on plan to live alone  Does the person that you care for see a [facility] PCP?: No        Per the Fall Risk Screening, did the patient have 2 or more falls or 1 fall with injury in the past year?: Yes  How often do you think you are about to fall and you do NOT fall?  For example, you grab something to stabilize yourself or hold onto a wall/furniture?: Rarely  Use of a Mobility Aid: No  Difficulty walking/impaired gait: No  Issues with feet or shoes like numbness, edema, shoes not fitting: No  Changes in vision, poor vision or poor lighting in environment: No  Dizziness: No     Frequent urination at night?: No  Do you use rails/bars?: No  Do you have a non-slip tub mat?: Yes     Are you experiencing loss of meaning?: No  Are you experiencing loss of hope and peace?: Yes     Thinking about your patient's physical health needs, are there any symptoms or problems (risk indicators) you are unsure about that require further investigation?: No identified areas of uncertainly or problems already being investigated   Are the patients physical health problems impacting on their mental well-being?: No identified areas of concern   Are there any problems with your patients lifestyle behaviors (alcohol, drugs, diet, exercise) that are impacting on physical or mental well-being?: No identified areas of concern   Do you have any other concerns about your patients mental well-being? How would you rate their severity and impact on the patient?: Mild problems - don't interfere with function   How would you rate their home environment in terms of safety and stability (including domestic violence, insecure housing, neighbor harassment)?: Safe, stable, but with some inconsistency   How do daily activities impact on the patient's well-being? (include current or anticipated unemployment, work, caregiving, access to transportation or other): No identified problems or perceived positive benefits   How would you rate their social network (family, work, friends)?: Adequate participation with social networks   How would you rate their financial resources (including ability to afford all required medical care)?: Financially secure, some resource challenges   How wells does the patient now understand their health and well-being (symptoms, signs or risk factors) and what they need to do to manage their health?: Reasonable to good understanding and already engages in managing health or is willing to undertake better management   How well do you think your patient can engage in healthcare discussions? (Barriers include language, deafness, aphasia, alcohol or drug problems, learning difficulties, concentration): Clear and open communication, no identified barriers   Do other services need to be involved to help this patient?: Other care/services not in place and required   Are current services involved with this patient well-coordinated? (Include coordination with other services you are now recommendation): Required care/services missing and/or fragmented   Suggested Interventions and Community Resources   Social Work: In Process         Set up/Review an Education Plan, Set up/Review Goals              Prior to Admission medications    Medication Sig Start Date End Date Taking?  Authorizing Provider   hydrOXYzine (ATARAX) 25 MG tablet Take 1 tablet by mouth nightly 8/17/21 DROWSINESS 12/9/20  Yes ADALID Cunningham CNP   lisinopril (PRINIVIL;ZESTRIL) 10 MG tablet Take 1 tablet by mouth daily 11/6/20  Yes ADALID Cunningham CNP   hydroCHLOROthiazide (HYDRODIURIL) 12.5 MG tablet Take 1 tablet by mouth every other day 11/6/20  Yes ADALID Cunningham CNP   simvastatin (ZOCOR) 40 MG tablet Take 1 tablet by mouth nightly 11/6/20  Yes ADALID Cunningham CNP   aspirin 81 MG tablet Take 1 tablet by mouth daily 9/4/19  Yes ADALID Cunningham CNP   diphenhydrAMINE (BENADRYL) 25 MG tablet Take 1 tablet by mouth nightly as needed for Itching 9/4/19  Yes ADALID Cunningham CNP       Future Appointments   Date Time Provider [place] Weiss   9/2/2021 10:30 AM Isaiah Fish MD [address] [place] [address]   9/7/2021 10:20 AM Cassi Jurado MD [facility]   9/14/2021 10:00 AM Iesha Salvador MD [facility]   10/12/2021 10:00 AM ADALID Cunningham CNP [facility]      and   General Assessment    Do you have any symptoms that are causing concern?: Yes  Progression since Onset: Gradually Worsening  Reported Symptoms: Other (Comment: HA; stress; depression)

## 2021-08-20 ENCOUNTER — CARE COORDINATION (OUTPATIENT)
Dept: CARE COORDINATION | Age: 71
End: 2021-08-20

## 2021-08-20 NOTE — CARE COORDINATION
Spoke to patient regarding SW referral for utility assistance. Patient reported that her has was shut off and she owes $3,000. Not eligible for PIPP. She has not contacted any local agencies. Educated patient on the process to obtain assistance from local agencies. Patient asked that resources be emailed and she will begin calling on Monday. Educated patient on [employer] information line for future needs. Resources sent to patient email. Patient discussed roommate situation. Roommate currently in hospital. Patient stated landlord will evict roommate and change locks. She will contact S to take her address off roommate's dtr mail. SW will f/u with patient.

## 2021-08-27 DIAGNOSIS — Z76.0 MEDICATION REFILL: ICD-10-CM

## 2021-08-27 RX ORDER — HYDROCHLOROTHIAZIDE 12.5 MG/1
12.5 TABLET ORAL EVERY OTHER DAY
Qty: 90 TABLET | Refills: 0 | Status: SHIPPED | OUTPATIENT
Start: 2021-08-27 | End: 2022-02-23 | Stop reason: SDUPTHER

## 2021-09-02 ENCOUNTER — OFFICE VISIT (OUTPATIENT)
Dept: PULMONOLOGY | Age: 71
End: 2021-09-02
Payer: MEDICARE

## 2021-09-02 VITALS
WEIGHT: 260 LBS | HEART RATE: 96 BPM | DIASTOLIC BLOOD PRESSURE: 66 MMHG | BODY MASS INDEX: 39.4 KG/M2 | HEIGHT: 68 IN | OXYGEN SATURATION: 99 % | SYSTOLIC BLOOD PRESSURE: 110 MMHG

## 2021-09-02 DIAGNOSIS — R06.09 DYSPNEA ON EXERTION: ICD-10-CM

## 2021-09-02 DIAGNOSIS — Z72.0 TOBACCO ABUSE: ICD-10-CM

## 2021-09-02 DIAGNOSIS — J45.30 MILD PERSISTENT ASTHMA WITHOUT COMPLICATION: Primary | ICD-10-CM

## 2021-09-02 DIAGNOSIS — J30.1 ALLERGIC RHINITIS DUE TO POLLEN, UNSPECIFIED SEASONALITY: ICD-10-CM

## 2021-09-02 DIAGNOSIS — R05.3 CHRONIC COUGH: ICD-10-CM

## 2021-09-02 DIAGNOSIS — Z87.891 PERSONAL HISTORY OF TOBACCO USE: ICD-10-CM

## 2021-09-02 DIAGNOSIS — G47.33 OBSTRUCTIVE SLEEP APNEA: ICD-10-CM

## 2021-09-02 PROCEDURE — G8427 DOCREV CUR MEDS BY ELIG CLIN: HCPCS | Performed by: INTERNAL MEDICINE

## 2021-09-02 PROCEDURE — 1036F TOBACCO NON-USER: CPT | Performed by: INTERNAL MEDICINE

## 2021-09-02 PROCEDURE — 99213 OFFICE O/P EST LOW 20 MIN: CPT | Performed by: INTERNAL MEDICINE

## 2021-09-02 PROCEDURE — 1123F ACP DISCUSS/DSCN MKR DOCD: CPT | Performed by: INTERNAL MEDICINE

## 2021-09-02 PROCEDURE — 4040F PNEUMOC VAC/ADMIN/RCVD: CPT | Performed by: INTERNAL MEDICINE

## 2021-09-02 PROCEDURE — G0296 VISIT TO DETERM LDCT ELIG: HCPCS | Performed by: INTERNAL MEDICINE

## 2021-09-02 PROCEDURE — G8417 CALC BMI ABV UP PARAM F/U: HCPCS | Performed by: INTERNAL MEDICINE

## 2021-09-02 PROCEDURE — 3017F COLORECTAL CA SCREEN DOC REV: CPT | Performed by: INTERNAL MEDICINE

## 2021-09-02 PROCEDURE — G8399 PT W/DXA RESULTS DOCUMENT: HCPCS | Performed by: INTERNAL MEDICINE

## 2021-09-02 PROCEDURE — 1090F PRES/ABSN URINE INCON ASSESS: CPT | Performed by: INTERNAL MEDICINE

## 2021-09-02 NOTE — PROGRESS NOTES
Low Dose CT (LDCT) Lung Screening criteria met   Age 50-69   Pack year smoking >30   Still smoking or less than 15 year since quit   No sign or symptoms of lung cancer   > 11 months since last LDCT     Risks and benefits of lung cancer screening with LDCT scans discussed:    Significance of positive screen - False-positive LDCT results often occur. 95% of all positive results do not lead to a diagnosis of cancer. Usually further imaging can resolve most false-positive results; however, some patients may require invasive procedures. Over diagnosis risk - 10% to 12% of screen-detected lung cancer cases are over diagnosedthat is, the cancer would not have been detected in the patient's lifetime without the screening. Need for follow up screens annually to continue lung cancer screening effectiveness     Risks associated with radiation from annual LDCT- Radiation exposure is about the same as for a mammogram, which is about 1/3 of the annual background radiation exposure from everyday life. Starting screening at age 54 is not likely to increase cancer risk from radiation exposure. Patients with comorbidities resulting in life expectancy of < 10 years, or that would preclude treatment of an abnormality identified on CT, should not be screened due to lack of benefit. To obtain maximal benefit from this screening, smoking cessation and long-term abstinence from smoking is critical        SUBJECTIVE:  Chief Complaint: Mild persistent asthma, dyspnea on exertion, allergic rhinitis, tobacco abuse, chronic cough, mild obstructive sleep apnea  Mrs. Jitendra Carlin states that she has had problems with her allergic rhinitis and is now on Flonase, Claritin and continues on her Advair Diskus, Singulair and albuterol rescue. For a time during the winter she did not use her Advair and simply was using DuoNeb per nebulizer and did get through that time.   She denies worsening shortness of breath although she does have dyspnea on exertion. She states that she has had no recent bronchitic infections. She has had no known COVID-19 exposure or infection and has received both Pfizer COVID-19 vaccinations the last one being on 3/10/2021  She was a previous pack-a-day smoker for 30 years but states he quit smoking about a month ago. She does carry history of mild COPD but has not required CPAP therapy and denies worsening daytime sleepiness or increased snoring or witnessed apnea    ROS:  Constitution:  HEENT: Negative for ear, throat pain  Cardiovascular: Negative for chest pain, syncope, edema  Pulmonary: See HPI  Musculoskeletal: Negative for DVT, myalgias, arthralgias    OBJECTIVE:  /66   Pulse 96   Ht 5' 8\" (1.727 m)   Wt 260 lb (117.9 kg)   SpO2 99%   BMI 39.53 kg/m²      Physical Exam:  Constitutional:  She appears well developed and well-nourished. Neck:  Supple, No palpable lymphadenopathy, No JVD  Cardiovascular:  S1, S2 Normal, Regular rhythm, no murmurs or gallops, No pericardial  rubs. Pulmonary: Breath sounds are mildly diminished but otherwise clear throughout all lung areas without wheezing or rhonchi  Abdomen: Not examined  Extremities: no edema, No DVT  Neurologic: Oriented x3, No focal deficits    Radiology: Chest x-ray last obtained on 10/24/2019 showed no acute cardiopulmonary process  PFT: Office spirometry on 10/4/2019 demonstrated a minimal obstructive defect with a significant response of bronchodilators in her small airways. Because her FEV1 and FVC were both reduced I cannot rule out a restrictive lung process without further testing      Echocardiogram: 7/13/2020  Left ventricular systolic function is normal.   Ejection fraction is visually estimated at 50-55%. No evidence of any pericardial effusion. No significant valvular disease noted. Pericardial fat pad visualized. ASSESSMENT:    1. Mild persistent asthma without complication    2. Tobacco abuse    3.  Allergic rhinitis due to pollen, unspecified seasonality    4. Chronic cough    5. Dyspnea on exertion    6. Personal history of tobacco use    7. Obstructive sleep apnea          PLAN:   I would like to repeat her pulmonary function test in the near future. I did advise her to continue to be smoke-free. She is up-to-date with her current medications. I recommend she get the flu vaccine but she states that she has allergies to that and will not do so. I also recommended she get the COVID-19 booster vaccination later this fall. Because of her long smoking history I will obtain low-dose CT lung screening. I will continue to follow her    We have discussed the need to maintain yearly flu immunization, pneumococcal vaccination. We have discussed Coronavirus precaution including handwashing practice, wiping items touched in public such as gas pumps, door handles, shopping carts, etc. Self monitoring for infection - fever, chills, cough, SOB. Should they develop symptoms they should call office for further instructions. Return in about 3 months (around 12/2/2021) for Recheck for Asthma, Recheck for Shortness of Breath, Tobacco abuse. This dictation was performed with a verbal recognition program and it was checked for errors. It is possible that there are still dictated errors within this office note. Any errors should be brought immediately to my attention for correction. All efforts were made to ensure that this office note is accurate.

## 2021-09-07 ENCOUNTER — OFFICE VISIT (OUTPATIENT)
Dept: ORTHOPEDIC SURGERY | Age: 71
End: 2021-09-07
Payer: MEDICARE

## 2021-09-07 ENCOUNTER — CARE COORDINATION (OUTPATIENT)
Dept: CARE COORDINATION | Age: 71
End: 2021-09-07

## 2021-09-07 VITALS
RESPIRATION RATE: 16 BRPM | OXYGEN SATURATION: 93 % | WEIGHT: 263 LBS | HEART RATE: 62 BPM | HEIGHT: 69 IN | BODY MASS INDEX: 38.95 KG/M2

## 2021-09-07 DIAGNOSIS — M17.11 PRIMARY OSTEOARTHRITIS OF RIGHT KNEE: Primary | ICD-10-CM

## 2021-09-07 DIAGNOSIS — S83.231A COMPLEX TEAR OF MEDIAL MENISCUS OF RIGHT KNEE AS CURRENT INJURY, INITIAL ENCOUNTER: ICD-10-CM

## 2021-09-07 PROCEDURE — 1090F PRES/ABSN URINE INCON ASSESS: CPT | Performed by: ORTHOPAEDIC SURGERY

## 2021-09-07 PROCEDURE — 3017F COLORECTAL CA SCREEN DOC REV: CPT | Performed by: ORTHOPAEDIC SURGERY

## 2021-09-07 PROCEDURE — 4040F PNEUMOC VAC/ADMIN/RCVD: CPT | Performed by: ORTHOPAEDIC SURGERY

## 2021-09-07 PROCEDURE — G8427 DOCREV CUR MEDS BY ELIG CLIN: HCPCS | Performed by: ORTHOPAEDIC SURGERY

## 2021-09-07 PROCEDURE — G8417 CALC BMI ABV UP PARAM F/U: HCPCS | Performed by: ORTHOPAEDIC SURGERY

## 2021-09-07 PROCEDURE — 99214 OFFICE O/P EST MOD 30 MIN: CPT | Performed by: ORTHOPAEDIC SURGERY

## 2021-09-07 PROCEDURE — 1036F TOBACCO NON-USER: CPT | Performed by: ORTHOPAEDIC SURGERY

## 2021-09-07 PROCEDURE — G8399 PT W/DXA RESULTS DOCUMENT: HCPCS | Performed by: ORTHOPAEDIC SURGERY

## 2021-09-07 PROCEDURE — 1123F ACP DISCUSS/DSCN MKR DOCD: CPT | Performed by: ORTHOPAEDIC SURGERY

## 2021-09-07 ASSESSMENT — ENCOUNTER SYMPTOMS
COLOR CHANGE: 0
EYE PAIN: 0
WHEEZING: 0
CHEST TIGHTNESS: 0
BACK PAIN: 1
EYE REDNESS: 0
SHORTNESS OF BREATH: 0
VOMITING: 0

## 2021-09-07 NOTE — PATIENT INSTRUCTIONS
Will submit for approval for gel injections  Once approval is received from your insurance, we will contact you to schedule an appointment to begin series  Continues Tylenol as needed  Rest, ice, and elevate as needed  Weightbearing and activities as tolerated

## 2021-09-07 NOTE — CARE COORDINATION
F/u call to patient. She reported that she has made a few calls to community orgs  However has been sick and just now beginning to feel better. Patient stated that roommate is in SNF however will not be returning. She reports being in better spirits. No further needs for SW to address, per patient. Final outreach.

## 2021-09-07 NOTE — PROGRESS NOTES
Patient returns to the office to discuss her chronic right knee pain that began in April after she injured the knee when she struck the extremity on the floor. Since her last office visit on April 23, 2021, she has continued to have chronic knee pain with pain varying in sensation from an aching to a sharp depending upon movement upon the medial joint line. Pain is aggravated by prolonged by standing or transitioning from seated to standing position after periods of rest with the knee becoming unstable. Associated sx: swelling of the ankle and knee which remains constant. She has been conservatively treating her symptoms by the use of Tylenol, rest, ice application, and elevation. Recent MRI results completed on July 26, 2021 revealed results as follows:     Pain is rated at a 6/10 with patient wishing to proceed with meniscus repair at this time. Hx of previous arthroscopic surgery with meniscus repair.          MRI KNEE RIGHT WO CONTRAST  Impression   Medial meniscus tear.       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       Trace knee joint effusion with small loose body posteriorly in the medial   compartment.       Trace Hinton's cyst.

## 2021-09-07 NOTE — PROGRESS NOTES
9/7/2021   Chief Complaint   Patient presents with    Knee Pain     right        History of Present Illness:                             Jessee Strauss is a 65 y.o. female who presents today for evaluation of her right knee pain, swelling, and stiffness. She sustained an injury 4 months ago when she had a fall and had a direct blow injury to the anterior aspect of her right knee. She developed swelling and tightness in the knee and subsequently has dealt with deep aching pain worse with repetitive activities and deep knee bending. She has trouble going up and down stairs. She does have increased pain with twisting and pivoting movements with occasional sharp severely stabbing pain along the medial joint line. She has had multiple injuries in the past to the same right knee. She has previously undergone knee arthroscopy for what sounds to be a partial medial meniscectomy many years ago. Subsequent to this she is dealt with history of degenerative joint disease and has been treated with both steroid and viscosupplementation injections in the past.  Her last injections were multiple years ago and she felt that the viscosupplementation injections worked better for her knee at that time. Patient returns to the office to discuss her chronic right knee pain that began in April after she injured the knee when she struck the extremity on the floor. Since her last office visit on April 23, 2021, she has continued to have chronic knee pain with pain varying in sensation from an aching to a sharp depending upon movement. Pain is aggravated by prolonged by standing with the knee becoming unstable. Associated sx: swelling of the ankle and knee which remains constant. She has been conservatively treating her symptoms by the use of Tylenol, rest, ice application, and elevation.  Recent MRI results completed on July 26, 2021 revealed results as follows:      Pain is rated at a 6/10 with patient wishing to proceed with meniscus repair at this time.                    Medical History  Patient's medications, allergies, past medical, surgical, social and family histories were reviewed and updated as appropriate. Past Medical History:   Diagnosis Date    Active smoker     Active smoker     Ankle fracture, left 2006    Asthma     Chondromalacia of right knee     Dr. Sofy Patton + MRI    Chronic back pain     Chronic cough 10/4/2019    Depression     has seen psychiatry in [place] 6 months    Dizziness     CT brain normal -6/18/2012    Dyspnea on exertion 9/2/2021    Family history of early CAD     Father - 4st MI age 50, Massive MI age 71    H/O cardiovascular stress test 8/7/2012 8/7/2012-Lexiscan-Normal perfusion. LVSF normal.EF 58%    H/O Doppler ultrasound 12/11/2019     Abnormal left lower extremity arterial Doppler ultrasound. The Left lower extremity arteries have a Monophasic waveform suggestive of inflow disease, The Left BARBER shows Severe peripheral arterial disease. Normal right lower extremity arterial Doppler ultrasound. Normal right lower extremity ankle brachial indices    H/O echocardiogram 8/7/2012 8/7/2012-LVSF normal. EF =>55%. impaired LV relaxation.  Herniated intervertebral disk     thoracic and lumbar    Hypertension     Mild persistent asthma without complication 38/6/6726    Normal echocardiogram 8/2012    EF=> 55%-Dr. Yaw Win    Obesity (BMI 30-39. 9)     Obstructive sleep apnea 10/4/2019    Other screening mammogram     PAD (peripheral artery disease) ([address] Angioplasty 01/06/2020     LCIA 90% INSTENT RESTENOSED TO 0% WITH PTA.     Peripheral vascular disease ([address]     Postmenopausal     Shoulder fracture, left 2008    Tobacco abuse 10/4/2019     Past Surgical History:   Procedure Laterality Date    CHOLECYSTECTOMY  1991    KNEE SURGERY  1998    right knee    TONSILLECTOMY AND ADENOIDECTOMY  1963    TUBAL LIGATION  1977    TUMOR REMOVAL  1999    parotid    TUMOR REMOVAL 1976    left thigh     Family History   Problem Relation Age of Onset    High Blood Pressure Mother     Allergies Mother    Louann See Migraines Mother     Obesity Mother     Heart Disease Father     High Blood Pressure Father     Allergies Father     Obesity Father     Diabetes Daughter     High Blood Pressure Sister     Allergies Sister     Bleeding Prob Sister    Louann See Migraines Sister     Obesity Sister     Cancer Brother     High Blood Pressure Brother     Allergies Brother     Bleeding Prob Brother     Obesity Brother     Thyroid Disease Maternal Aunt     Kidney Disease Maternal Aunt     Heart Disease Maternal Uncle     Kidney Disease Maternal Uncle     Cancer Maternal Grandmother     Glaucoma Maternal Grandmother     Diabetes Maternal Grandfather     Glaucoma Maternal Grandfather     Glaucoma Paternal Grandmother     Glaucoma Paternal Grandfather      Social History     Socioeconomic History    Marital status:      Spouse name: None    Number of children: 1    Years of education: None    Highest education level: None   Occupational History    Occupation: Customer Service   Tobacco Use    Smoking status: Former Smoker     Packs/day: 1.00     Years: 30.00     Pack years: 30.00     Types: Cigarettes     Quit date: 2021     Years since quittin.1    Smokeless tobacco: Never Used   Substance and Sexual Activity    Alcohol use: Yes     Comment: socially    Drug use: No    Sexual activity: None   Other Topics Concern    None   Social History Narrative    Working now at [employer] jobs from [employer]      Has a daughter and Daughter in law.           Social Determinants of Health     Financial Resource Strain: Medium Risk    Difficulty of Paying Living Expenses: Somewhat hard   Food Insecurity: Food Insecurity Present    Worried About Running Out of Food in the Last Year: Sometimes true    [place]alis of Food in the Last Year: Sometimes true   Transportation Needs: No Transportation Needs    Lack of Transportation (Medical): No    Lack of Transportation (Non-Medical): No   Physical Activity: Insufficiently Active    Days of Exercise per Week: 2 days    Minutes of Exercise per Session: 10 min   Stress:     Feeling of Stress :    Social Connections: Unknown    Frequency of Communication with Friends and Family: Twice a week    Frequency of Social Gatherings with Friends and Family: Twice a week    Attends Worship Services: Not on file   [employer]s or Organizations: Not on file    Attends Club or Organization Meetings: Not on file    Marital Status:    Intimate Partner Violence:     Fear of Current or Ex-Partner:     Emotionally Abused:     Physically Abused:     Sexually Abused:      Current Outpatient Medications   Medication Sig Dispense Refill    hydroCHLOROthiazide (HYDRODIURIL) 12.5 MG tablet Take 1 tablet by mouth every other day 90 tablet 0    lisinopril (PRINIVIL;ZESTRIL) 10 MG tablet Take 1 tablet by mouth daily 90 tablet 0    simvastatin (ZOCOR) 40 MG tablet Take 1 tablet by mouth nightly 90 tablet 0    meclizine (ANTIVERT) 25 MG tablet Take 1 tablet by mouth 2 times daily 30 tablet 0    cetirizine (ZYRTEC) 10 MG tablet Take one tablet by mouth daily.  90 tablet 0    hydrOXYzine (ATARAX) 25 MG tablet Take 1 tablet by mouth nightly 30 tablet 1    ADVAIR DISKUS 500-50 MCG/DOSE diskus inhaler Inhale 1 puff into the lungs every 12 hours After inhalation then gargle 1 Inhaler 11    albuterol sulfate HFA (PROVENTIL HFA) 108 (90 Base) MCG/ACT inhaler Inhale 2 puffs into the lungs every 4 hours as needed for Wheezing or Shortness of Breath (cough) 1 Inhaler 2    fluticasone (FLONASE) 50 MCG/ACT nasal spray 1 spray by Each Nostril route daily 1 Bottle 1    clopidogrel (PLAVIX) 75 MG tablet Take 1 tablet by mouth daily 90 tablet 1    ipratropium-albuterol (DUONEB) 0.5-2.5 (3) MG/3ML SOLN nebulizer solution Inhale 3 mLs into the lungs 4 times daily 120 vial 1    butalbital-acetaminophen-caffeine (FIORICET, ESGIC) -40 MG per tablet Take 1 tablet by mouth 3 times daily as needed for Headaches 30 tablet 0    albuterol (PROVENTIL) (5 MG/ML) 0.5% nebulizer solution Take 1 mL by nebulization every 6 hours as needed for Wheezing or Shortness of Breath 100 vial 1    baclofen (LIORESAL) 10 MG tablet Take 1 tablet by mouth 2 times daily 60 tablet 2    montelukast (SINGULAIR) 10 MG tablet Take 1 tablet by mouth nightly 90 tablet 1    albuterol (PROVENTIL) (5 MG/ML) 0.5% nebulizer solution Take 1 mL by nebulization every 6 hours as needed for Wheezing or Shortness of Breath 100 vial 1    Multiple Vitamin (MULTI-VITAMIN DAILY PO) Take 1 tablet by mouth daily      aspirin 81 MG tablet Take 1 tablet by mouth daily 90 tablet 2    diphenhydrAMINE (BENADRYL) 25 MG tablet Take 1 tablet by mouth nightly as needed for Itching 90 tablet 2    NASAL SALINE NA by Nasal route (Patient not taking: Reported on 9/7/2021)       No current facility-administered medications for this visit. Allergies   Allergen Reactions    Latex Hives and Rash    Cipro Xr Hives and Shortness Of Breath    Soap Hives and Shortness Of Breath     [facility] Soap, Tide detergent      Tomato Hives and Shortness Of Breath    Influenza Vaccines Hives    Soybean-Containing Drug Products          Review of Systems   Constitutional: Negative for chills and fever. HENT: Negative for congestion and sneezing. Eyes: Negative for pain and redness. Respiratory: Negative for chest tightness, shortness of breath and wheezing. Cardiovascular: Negative for chest pain and palpitations. Gastrointestinal: Negative for vomiting. Musculoskeletal: Positive for arthralgias, back pain and gait problem. Skin: Negative for color change and rash. Neurological: Negative for weakness and numbness. Psychiatric/Behavioral: Negative for agitation. The patient is not nervous/anxious. Examination:  General Exam:  Vitals: Pulse 62   Resp 16   Ht 5' 8.5\" (1.74 m)   Wt 263 lb (119.3 kg)   SpO2 93%   BMI 39.41 kg/m²    Physical Exam  Vitals and nursing note reviewed. Constitutional:       Appearance: Normal appearance. HENT:      Head: Normocephalic and atraumatic. Eyes:      Conjunctiva/sclera: Conjunctivae normal.      Pupils: Pupils are equal, round, and reactive to light. Pulmonary:      Effort: Pulmonary effort is normal.   Musculoskeletal:      Cervical back: Normal range of motion. Right hip: No deformity, tenderness, bony tenderness or crepitus. Normal range of motion. Normal strength. Left hip: Normal.      Left knee: No swelling, deformity, effusion, ecchymosis or lacerations. Normal range of motion. No tenderness. No medial joint line or lateral joint line tenderness. No LCL laxity or MCL laxity. Normal alignment and normal meniscus. Comments: Right Lower Extremity:  There is moderate to severe tenderness to palpation throughout the knee most significant along the medial joint line and along the lateral patellofemoral joint. There is a mild effusion present and mild global swelling at the knee anteriorly. Mild restricted range of motion at the knee with approximately 3 degrees short of full extension and knee flexion up to 120 degrees with pain at the extremes of motion. There is mild crepitation at the knee during active range of motion. There is normal knee alignment. There is 5 out of 5 strength with knee flexion and extension. There is no instability to varus or valgus stress testing or anterior and posterior drawer testing. Sensation is intact to light touch throughout the lower extremity. There is a moderately positive Lora's test with tenderness to palpation and pain along the medial joint line. Skin is intact. Pulses intact    No pain with active range of motion of the hip.   Strength and range of motion of the hip are intact. No tenderness to palpation at the hip. Skin:     General: Skin is warm and dry. Neurological:      Mental Status: She is alert and oriented to person, place, and time. Psychiatric:         Mood and Affect: Mood normal.         Behavior: Behavior normal.            Diagnostic testing:  X-ray images were reviewed by myself and discussed with the patient:  X-ray images of the right knee from 4/23/2021 were reviewed by myself and discussed with the patient:  There is evidence of tricompartmental degenerative joint disease moderate in nature with mild joint effusion present and evidence of chondrocalcinosis. MRI images of the right knee were reviewed by myself and discussed with the patient:  MRI KNEE RIGHT WO CONTRAST  Impression   Medial meniscus tear.       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       Trace knee joint effusion with small loose body posteriorly in the medial   compartment.       Trace Hinton's cyst.               Office Procedures:  No orders of the defined types were placed in this encounter. Assessment and Plan  1. Right knee primary osteoarthritis    2. Right knee degenerative medial meniscus tear    I reviewed the x-ray and MRI findings with the patient explained that her main issue is degenerative joint disease which involves all 3 compartments of her knee. I have recommended that we not consider any arthroscopic surgical intervention for her degenerative medial meniscus tear. We discussed maximizing her conservative treatments and considering surgery only if these fail. If she does require surgical intervention this would consist of the total knee replacement. She has had steroid and viscosupplementation injections in the past.  She has responded better to viscosupplementation injections and would prefer to move forward with these.     We will seek approval for viscosupplementation injections and call her once they are approved. Continue with over-the-counter anti-inflammatory medications as needed. We discussed the importance of weight loss is an important component of her treatment plan and she is aware of this. I recommended low impact exercising including stretching and walking.         Electronically signed by Lilly Sims MD on 9/7/2021 at 10:48 AM

## 2021-09-08 DIAGNOSIS — R42 DIZZINESS: ICD-10-CM

## 2021-09-09 RX ORDER — MECLIZINE HYDROCHLORIDE 25 MG/1
25 TABLET ORAL 2 TIMES DAILY
Qty: 30 TABLET | Refills: 0 | Status: SHIPPED | OUTPATIENT
Start: 2021-09-09 | End: 2021-10-12 | Stop reason: SDUPTHER

## 2021-09-14 ENCOUNTER — OFFICE VISIT (OUTPATIENT)
Dept: CARDIOLOGY CLINIC | Age: 71
End: 2021-09-14
Payer: MEDICARE

## 2021-09-14 VITALS
BODY MASS INDEX: 40.22 KG/M2 | WEIGHT: 265.4 LBS | HEART RATE: 55 BPM | HEIGHT: 68 IN | DIASTOLIC BLOOD PRESSURE: 80 MMHG | SYSTOLIC BLOOD PRESSURE: 128 MMHG

## 2021-09-14 DIAGNOSIS — E78.2 MIXED HYPERLIPIDEMIA: ICD-10-CM

## 2021-09-14 DIAGNOSIS — I10 ESSENTIAL HYPERTENSION: Primary | ICD-10-CM

## 2021-09-14 PROCEDURE — 3017F COLORECTAL CA SCREEN DOC REV: CPT | Performed by: INTERNAL MEDICINE

## 2021-09-14 PROCEDURE — G8427 DOCREV CUR MEDS BY ELIG CLIN: HCPCS | Performed by: INTERNAL MEDICINE

## 2021-09-14 PROCEDURE — 1123F ACP DISCUSS/DSCN MKR DOCD: CPT | Performed by: INTERNAL MEDICINE

## 2021-09-14 PROCEDURE — G8399 PT W/DXA RESULTS DOCUMENT: HCPCS | Performed by: INTERNAL MEDICINE

## 2021-09-14 PROCEDURE — 1036F TOBACCO NON-USER: CPT | Performed by: INTERNAL MEDICINE

## 2021-09-14 PROCEDURE — 1090F PRES/ABSN URINE INCON ASSESS: CPT | Performed by: INTERNAL MEDICINE

## 2021-09-14 PROCEDURE — 99214 OFFICE O/P EST MOD 30 MIN: CPT | Performed by: INTERNAL MEDICINE

## 2021-09-14 PROCEDURE — 4040F PNEUMOC VAC/ADMIN/RCVD: CPT | Performed by: INTERNAL MEDICINE

## 2021-09-14 PROCEDURE — G8417 CALC BMI ABV UP PARAM F/U: HCPCS | Performed by: INTERNAL MEDICINE

## 2021-09-14 NOTE — PROGRESS NOTES
CARDIOLOGY NOTE      9/14/2021    RE: Jessee Strauss  (1950)                               TO:  Dr. Vernadine Cogan, APRN - CNP            Yudith Rizo is a 65 y.o. female who was seen today for management of  htn                            HPI:   Patient is here for    - Hypertension,is  well controlled, pt is  compliant with medicines  - Hyperlipidimea, lipids are in acceptable range. Pt  is  compliant with medicines                    The patient does not have cardiac complaints      Jessee Strauss has the following history recorded in care path:  Patient Active Problem List    Diagnosis Date Noted    Complex tear of medial meniscus of right knee as current injury 09/07/2021    Primary osteoarthritis of right knee 09/07/2021    Dyspnea on exertion 09/02/2021    Chronic pain of left ankle 01/21/2021    Chronic bronchitis ([facility] 10/19/2020    Morbidly obese ([facility] 10/19/2020    Syncope and collapse 07/10/2020    Urticaria 01/31/2020    Claudication ([facility]     Dizziness 10/25/2019    Mild persistent asthma without complication 52/50/9963    Obstructive sleep apnea 10/04/2019    Tobacco abuse 10/04/2019    Chronic cough 10/04/2019    Conjunctivitis 08/01/2012    Corneal abrasion 08/01/2012    Allergic rhinitis 05/22/2012    HTN (hypertension) 04/24/2012    Chronic back pain 04/24/2012     Current Outpatient Medications   Medication Sig Dispense Refill    meclizine (ANTIVERT) 25 MG tablet Take 1 tablet by mouth 2 times daily 30 tablet 0    hydroCHLOROthiazide (HYDRODIURIL) 12.5 MG tablet Take 1 tablet by mouth every other day 90 tablet 0    lisinopril (PRINIVIL;ZESTRIL) 10 MG tablet Take 1 tablet by mouth daily 90 tablet 0    simvastatin (ZOCOR) 40 MG tablet Take 1 tablet by mouth nightly 90 tablet 0    cetirizine (ZYRTEC) 10 MG tablet Take one tablet by mouth daily.  90 tablet 0    hydrOXYzine (ATARAX) 25 MG tablet Take 1 tablet by mouth nightly 30 tablet 1    ADVAIR DISKUS 500-50 MCG/DOSE diskus inhaler Inhale 1 puff into the lungs every 12 hours After inhalation then gargle 1 Inhaler 11    albuterol sulfate HFA (PROVENTIL HFA) 108 (90 Base) MCG/ACT inhaler Inhale 2 puffs into the lungs every 4 hours as needed for Wheezing or Shortness of Breath (cough) 1 Inhaler 2    fluticasone (FLONASE) 50 MCG/ACT nasal spray 1 spray by Each Nostril route daily 1 Bottle 1    clopidogrel (PLAVIX) 75 MG tablet Take 1 tablet by mouth daily 90 tablet 1    ipratropium-albuterol (DUONEB) 0.5-2.5 (3) MG/3ML SOLN nebulizer solution Inhale 3 mLs into the lungs 4 times daily 120 vial 1    butalbital-acetaminophen-caffeine (FIORICET, ESGIC) -40 MG per tablet Take 1 tablet by mouth 3 times daily as needed for Headaches 30 tablet 0    albuterol (PROVENTIL) (5 MG/ML) 0.5% nebulizer solution Take 1 mL by nebulization every 6 hours as needed for Wheezing or Shortness of Breath 100 vial 1    baclofen (LIORESAL) 10 MG tablet Take 1 tablet by mouth 2 times daily 60 tablet 2    montelukast (SINGULAIR) 10 MG tablet Take 1 tablet by mouth nightly 90 tablet 1    albuterol (PROVENTIL) (5 MG/ML) 0.5% nebulizer solution Take 1 mL by nebulization every 6 hours as needed for Wheezing or Shortness of Breath 100 vial 1    Multiple Vitamin (MULTI-VITAMIN DAILY PO) Take 1 tablet by mouth daily      aspirin 81 MG tablet Take 1 tablet by mouth daily 90 tablet 2    diphenhydrAMINE (BENADRYL) 25 MG tablet Take 1 tablet by mouth nightly as needed for Itching 90 tablet 2    NASAL SALINE NA by Nasal route (Patient not taking: Reported on 9/7/2021)       No current facility-administered medications for this visit.      Allergies: Latex, Cipro xr, Soap, Tomato, Influenza vaccines, and Soybean-containing drug products  Past Medical History:   Diagnosis Date    Active smoker     Active smoker     Ankle fracture, left 2006    Asthma     Chondromalacia of right knee     Dr. Lucas Cam + MRI    Chronic back pain     Chronic cough 10/4/2019    Depression     has seen psychiatry in [address] 6 months    Dizziness     CT brain normal -6/18/2012    Dyspnea on exertion 9/2/2021    Family history of early CAD     Father - 4st MI age 50, Massive MI age 71    H/O cardiovascular stress test 8/7/2012 8/7/2012-Lexiscan-Normal perfusion. LVSF normal.EF 58%    H/O Doppler ultrasound 12/11/2019     Abnormal left lower extremity arterial Doppler ultrasound. The Left lower extremity arteries have a Monophasic waveform suggestive of inflow disease, The Left BARBER shows Severe peripheral arterial disease. Normal right lower extremity arterial Doppler ultrasound. Normal right lower extremity ankle brachial indices    H/O echocardiogram 8/7/2012 8/7/2012-LVSF normal. EF =>55%. impaired LV relaxation.  Herniated intervertebral disk     thoracic and lumbar    Hypertension     Mild persistent asthma without complication 62/2/2206    Normal echocardiogram 8/2012    EF=> 55%-Dr. Jean Wong    Obesity (BMI 30-39. 9)     Obstructive sleep apnea 10/4/2019    Other screening mammogram     PAD (peripheral artery disease) ([address] Angioplasty 01/06/2020     LCIA 90% INSTENT RESTENOSED TO 0% WITH PTA.     Peripheral vascular disease ([address]     Postmenopausal     Shoulder fracture, left 2008    Tobacco abuse 10/4/2019     Past Surgical History:   Procedure Laterality Date    CHOLECYSTECTOMY  1991    KNEE SURGERY  1998    right knee    TONSILLECTOMY AND ADENOIDECTOMY  1963    TUBAL LIGATION  1977    TUMOR REMOVAL  1999    parotid    TUMOR REMOVAL  1976    left thigh      As reviewed   Family History   Problem Relation Age of Onset    High Blood Pressure Mother     Allergies Mother    Elizabeth Rosario Migraines Mother     Obesity Mother     Heart Disease Father     High Blood Pressure Father     Allergies Father     Obesity Father     Diabetes Daughter     High Blood Pressure Sister     Allergies Sister     Bleeding Prob Sister     Migraines Sister     Obesity Sister     Cancer Brother     High Blood Pressure Brother     Allergies Brother     Bleeding Prob Brother     Obesity Brother     Thyroid Disease Maternal Aunt     Kidney Disease Maternal Aunt     Heart Disease Maternal Uncle     Kidney Disease Maternal Uncle     Cancer Maternal Grandmother     Glaucoma Maternal Grandmother     Diabetes Maternal Grandfather     Glaucoma Maternal Grandfather     Glaucoma Paternal Grandmother     Glaucoma Paternal Grandfather      Social History     Tobacco Use    Smoking status: Former Smoker     Packs/day: 1.00     Years: 30.00     Pack years: 30.00     Types: Cigarettes     Quit date: 2021     Years since quittin.1    Smokeless tobacco: Never Used   Substance Use Topics    Alcohol use: Yes     Comment: socially      Review of Systems:    Constitutional: Negative for diaphoresis and fatigue  Psychological:Negative for anxiety or depression  HENT: Negative for headaches, nasal congestion, sinus pain or vertigo  Eyes: Negative for visual disturbance. Endocrine: Negative for polydipsia/polyuria  Respiratory: Negative for shortness of breath  Cardiovascular: Negative for chest pain, dyspnea on exertion, claudication, edema, irregular heartbeat, murmur, palpitations or shortness of breath  Gastrointestinal: Negative for abdominal pain or heartburn  Genito-Urinary: Negative for urinary frequency/urgency  Musculoskeletal:  LE painNeurological: Negative for dizziness, headaches, memory loss, numbness/tingling, visual changes, syncope  Dermatological: Negative for rash    Objective:  /80   Pulse 55   Ht 5' 8\" (1.727 m)   Wt 265 lb 6.4 oz (120.4 kg)   BMI 40.35 kg/m²   Wt Readings from Last 3 Encounters:   21 265 lb 6.4 oz (120.4 kg)   21 263 lb (119.3 kg)   21 260 lb (117.9 kg)     Body mass index is 40.35 kg/m². GENERAL - Alert, oriented, pleasant, in no apparent distress.   EYES: No jaundice, no conjunctival pallor. SKIN: It is warm & dry. No rashes. No Echhymosis    HEENT  No clinically significant abnormalities seen. Neck - Supple. No jugular venous distention noted. No carotid bruits. Cardiovascular  Normal S1 and S2 without obvious murmur or gallop. Extremities - No cyanosis, clubbing, or significant edema. Pulmonary  No respiratory distress. No wheezes or rales. Abdomen  No masses, tenderness, or organomegaly. Musculoskeletal  No significant edema. No joint deformities. No muscle wasting. Neurologic  Cranial nerves II through XII are grossly intact. There were no gross focal neurologic abnormalities. Lab Review   Lab Results   Component Value Date    TROPONINT <0.010 07/11/2020     BNP:    Lab Results   Component Value Date    BNP 5 04/18/2013     PT/INR:    Lab Results   Component Value Date    INR 0.80 01/03/2020     No results found for: LABA1C  Lab Results   Component Value Date    WBC 6.6 10/19/2020    HCT 44.6 10/19/2020    MCV 83.4 10/19/2020     10/19/2020     Lab Results   Component Value Date    CHOL 126 10/19/2020    TRIG 81 10/19/2020    HDL 59 10/19/2020    LDLCALC 51 10/19/2020     Lab Results   Component Value Date    ALT 8 (L) 07/21/2020    AST 16 07/21/2020     BMP:    Lab Results   Component Value Date     07/21/2020    K 4.1 07/21/2020     07/21/2020    CO2 23 07/21/2020    BUN 13 07/21/2020    CREATININE 0.9 07/21/2020     CMP:   Lab Results   Component Value Date     07/21/2020    K 4.1 07/21/2020     07/21/2020    CO2 23 07/21/2020    BUN 13 07/21/2020    PROT 7.2 07/21/2020    PROT 7.0 08/11/2012     TSH:    Lab Results   Component Value Date    TSH 3.16 09/25/2019    TSHHS 1.769 08/11/2012           Impression:    No diagnosis found. Patient Active Problem List   Diagnosis Code    HTN (hypertension) I10    Chronic back pain M54.9, G89.29    Allergic rhinitis J30.9    Conjunctivitis H10.9    Corneal abrasion S05. 00XA    Mild persistent asthma without complication Y90.11    Obstructive sleep apnea G47.33    Tobacco abuse Z72.0    Chronic cough R05    Dizziness R42    Claudication ([facility]) I73.9    Urticaria L50.9    Syncope and collapse R55    Chronic bronchitis ([facility]) J42    Morbidly obese ([facility]) E66.01    Chronic pain of left ankle M25.572, G89.29    Dyspnea on exertion R06.00    Complex tear of medial meniscus of right knee as current injury S83.231A    Primary osteoarthritis of right knee M17.11       Assessment & Plan:    -  Hypertension: Patients blood pressure is stable. Patient is advised about low sodium diet. Present medical regimen will not be changed. -  LIPID MANAGEMENT:  Available lipid  lab data reviewed  and patient was given dietary advice. NCEP- ATP III guidelines reviewed with patient. -   Changes  in medicines made:  No                         - PVD   Stable no issues                                 Dick Loza MA  [facility] [place]

## 2021-09-14 NOTE — PATIENT INSTRUCTIONS
**It is YOUR responsibilty to bring medication bottles and/or updated medication list to [address] [place] [address]. This will allow us to better serve you and all your healthcare needs**    Please be informed that if you contact our office outside of normal business hours the physician on call cannot help with any scheduling or rescheduling issues, procedure instruction questions or any type of medication issue. We advise you for any urgent/emergency that you go to the nearest emergency room!     PLEASE CALL OUR OFFICE DURING NORMAL BUSINESS HOURS    Monday - Friday   8 am to 5 pm    [place]: [place] [address]: [phone]    [place]:  [phone]

## 2021-09-22 ENCOUNTER — CARE COORDINATION (OUTPATIENT)
Dept: CARE COORDINATION | Age: 71
End: 2021-09-22

## 2021-10-12 ENCOUNTER — OFFICE VISIT (OUTPATIENT)
Dept: FAMILY MEDICINE CLINIC | Age: 71
End: 2021-10-12
Payer: MEDICARE

## 2021-10-12 VITALS
SYSTOLIC BLOOD PRESSURE: 130 MMHG | WEIGHT: 263 LBS | HEART RATE: 61 BPM | OXYGEN SATURATION: 91 % | HEIGHT: 68 IN | BODY MASS INDEX: 39.86 KG/M2 | DIASTOLIC BLOOD PRESSURE: 72 MMHG

## 2021-10-12 DIAGNOSIS — G89.29 CHRONIC LOW BACK PAIN WITH SCIATICA, SCIATICA LATERALITY UNSPECIFIED, UNSPECIFIED BACK PAIN LATERALITY: ICD-10-CM

## 2021-10-12 DIAGNOSIS — M54.40 CHRONIC LOW BACK PAIN WITH SCIATICA, SCIATICA LATERALITY UNSPECIFIED, UNSPECIFIED BACK PAIN LATERALITY: ICD-10-CM

## 2021-10-12 DIAGNOSIS — R42 DIZZINESS: ICD-10-CM

## 2021-10-12 DIAGNOSIS — F41.9 ANXIETY: ICD-10-CM

## 2021-10-12 DIAGNOSIS — R05.9 COUGH: ICD-10-CM

## 2021-10-12 DIAGNOSIS — Z23 NEED FOR INFLUENZA VACCINATION: ICD-10-CM

## 2021-10-12 DIAGNOSIS — I10 ESSENTIAL HYPERTENSION: ICD-10-CM

## 2021-10-12 DIAGNOSIS — J45.30: Primary | ICD-10-CM

## 2021-10-12 DIAGNOSIS — Z12.11 SCREENING FOR COLON CANCER: ICD-10-CM

## 2021-10-12 DIAGNOSIS — Z76.0 MEDICATION REFILL: ICD-10-CM

## 2021-10-12 DIAGNOSIS — G47.9 SLEEP DISTURBANCE: ICD-10-CM

## 2021-10-12 DIAGNOSIS — Z13.6 SCREENING FOR CARDIOVASCULAR CONDITION: ICD-10-CM

## 2021-10-12 LAB
A/G RATIO: 1.3 (ref 1.1–2.2)
ALBUMIN SERPL-MCNC: 4.1 G/DL (ref 3.4–5)
ALP BLD-CCNC: 82 U/L (ref 40–129)
ALT SERPL-CCNC: 7 U/L (ref 10–40)
ANION GAP SERPL CALCULATED.3IONS-SCNC: 14 MMOL/L (ref 3–16)
AST SERPL-CCNC: 16 U/L (ref 15–37)
BASOPHILS ABSOLUTE: 0 K/UL (ref 0–0.2)
BASOPHILS RELATIVE PERCENT: 0.5 %
BILIRUB SERPL-MCNC: 0.6 MG/DL (ref 0–1)
BUN BLDV-MCNC: 11 MG/DL (ref 7–20)
CALCIUM SERPL-MCNC: 9.5 MG/DL (ref 8.3–10.6)
CHLORIDE BLD-SCNC: 100 MMOL/L (ref 99–110)
CHOLESTEROL, TOTAL: 131 MG/DL (ref 0–199)
CO2: 26 MMOL/L (ref 21–32)
CREAT SERPL-MCNC: 0.9 MG/DL (ref 0.6–1.2)
EOSINOPHILS ABSOLUTE: 0.2 K/UL (ref 0–0.6)
EOSINOPHILS RELATIVE PERCENT: 2.7 %
GFR AFRICAN AMERICAN: >60
GFR NON-AFRICAN AMERICAN: >60
GLOBULIN: 3.2 G/DL
GLUCOSE BLD-MCNC: 92 MG/DL (ref 70–99)
HCT VFR BLD CALC: 44.5 % (ref 36–48)
HDLC SERPL-MCNC: 53 MG/DL (ref 40–60)
HEMOGLOBIN: 14.1 G/DL (ref 12–16)
LDL CHOLESTEROL CALCULATED: 55 MG/DL
LYMPHOCYTES ABSOLUTE: 2.6 K/UL (ref 1–5.1)
LYMPHOCYTES RELATIVE PERCENT: 34.1 %
MCH RBC QN AUTO: 26.7 PG (ref 26–34)
MCHC RBC AUTO-ENTMCNC: 31.7 G/DL (ref 31–36)
MCV RBC AUTO: 84.1 FL (ref 80–100)
MONOCYTES ABSOLUTE: 0.5 K/UL (ref 0–1.3)
MONOCYTES RELATIVE PERCENT: 7.1 %
NEUTROPHILS ABSOLUTE: 4.3 K/UL (ref 1.7–7.7)
NEUTROPHILS RELATIVE PERCENT: 55.6 %
PDW BLD-RTO: 14.6 % (ref 12.4–15.4)
PLATELET # BLD: 232 K/UL (ref 135–450)
PMV BLD AUTO: 8.7 FL (ref 5–10.5)
POTASSIUM SERPL-SCNC: 3.9 MMOL/L (ref 3.5–5.1)
RBC # BLD: 5.29 M/UL (ref 4–5.2)
SODIUM BLD-SCNC: 140 MMOL/L (ref 136–145)
TOTAL PROTEIN: 7.3 G/DL (ref 6.4–8.2)
TRIGL SERPL-MCNC: 113 MG/DL (ref 0–150)
VLDLC SERPL CALC-MCNC: 23 MG/DL
WBC # BLD: 7.7 K/UL (ref 4–11)

## 2021-10-12 PROCEDURE — G8484 FLU IMMUNIZE NO ADMIN: HCPCS | Performed by: NURSE PRACTITIONER

## 2021-10-12 PROCEDURE — 1036F TOBACCO NON-USER: CPT | Performed by: NURSE PRACTITIONER

## 2021-10-12 PROCEDURE — 99214 OFFICE O/P EST MOD 30 MIN: CPT | Performed by: NURSE PRACTITIONER

## 2021-10-12 PROCEDURE — G8417 CALC BMI ABV UP PARAM F/U: HCPCS | Performed by: NURSE PRACTITIONER

## 2021-10-12 PROCEDURE — 36415 COLL VENOUS BLD VENIPUNCTURE: CPT | Performed by: NURSE PRACTITIONER

## 2021-10-12 PROCEDURE — 4040F PNEUMOC VAC/ADMIN/RCVD: CPT | Performed by: NURSE PRACTITIONER

## 2021-10-12 PROCEDURE — G8399 PT W/DXA RESULTS DOCUMENT: HCPCS | Performed by: NURSE PRACTITIONER

## 2021-10-12 PROCEDURE — 3017F COLORECTAL CA SCREEN DOC REV: CPT | Performed by: NURSE PRACTITIONER

## 2021-10-12 PROCEDURE — 1123F ACP DISCUSS/DSCN MKR DOCD: CPT | Performed by: NURSE PRACTITIONER

## 2021-10-12 PROCEDURE — 1090F PRES/ABSN URINE INCON ASSESS: CPT | Performed by: NURSE PRACTITIONER

## 2021-10-12 PROCEDURE — G8427 DOCREV CUR MEDS BY ELIG CLIN: HCPCS | Performed by: NURSE PRACTITIONER

## 2021-10-12 RX ORDER — MECLIZINE HYDROCHLORIDE 25 MG/1
25 TABLET ORAL 2 TIMES DAILY
Qty: 30 TABLET | Refills: 0 | Status: SHIPPED | OUTPATIENT
Start: 2021-10-12

## 2021-10-12 RX ORDER — BACLOFEN 10 MG/1
10 TABLET ORAL 2 TIMES DAILY
Qty: 180 TABLET | Refills: 1 | Status: SHIPPED | OUTPATIENT
Start: 2021-10-12 | End: 2022-04-11

## 2021-10-12 RX ORDER — SIMVASTATIN 40 MG
40 TABLET ORAL NIGHTLY
Qty: 90 TABLET | Refills: 1 | Status: SHIPPED | OUTPATIENT
Start: 2021-10-12 | End: 2022-02-02 | Stop reason: SDUPTHER

## 2021-10-12 RX ORDER — CETIRIZINE HYDROCHLORIDE 10 MG/1
TABLET ORAL
Qty: 90 TABLET | Refills: 1 | Status: SHIPPED | OUTPATIENT
Start: 2021-10-12 | End: 2022-02-23 | Stop reason: SDUPTHER

## 2021-10-12 RX ORDER — MONTELUKAST SODIUM 10 MG/1
10 TABLET ORAL NIGHTLY
Qty: 90 TABLET | Refills: 1 | Status: SHIPPED | OUTPATIENT
Start: 2021-10-12 | End: 2022-02-23 | Stop reason: SDUPTHER

## 2021-10-12 RX ORDER — HYDROXYZINE HYDROCHLORIDE 25 MG/1
25 TABLET, FILM COATED ORAL NIGHTLY
Qty: 30 TABLET | Refills: 1 | Status: SHIPPED | OUTPATIENT
Start: 2021-10-12 | End: 2021-12-13

## 2021-10-12 ASSESSMENT — ENCOUNTER SYMPTOMS
WHEEZING: 0
EYE DISCHARGE: 0
EYE REDNESS: 0
CHEST TIGHTNESS: 0
PHOTOPHOBIA: 0
EYE PAIN: 0

## 2021-10-12 NOTE — PROGRESS NOTES
Betty Duane  1950  65 y.o. SUBJECT JEMIMA:    Chief Complaint   Patient presents with    Hypertension    Sarabjit Bruner is a 65year old female who is in for hypertension, follow up and medication refill. She states she is scheduled for her mammogram and low dose CT. She states she had a TDap about 5 years ago when her nephew was born. She gives a history of an eye discoloration on the retina and was sent by her eye doctor to see a specialist in [facility] who told her there were no treatments needed. She states she continues to have right knee pain and swelling. She has seen ortho and will be having gel injections. She states she will eventually need a knee replacement. She continues to follow up with pulmonology, no changes made. She states she continues to have a chronic cough and some shortness of breath on exertion, worse symptoms with season changes. She is recommended to get a flu shot but reports allergies to the flu shot and has some concerns. She states she will get the Covid booster later this fall. She has a long history of smoking. She denies dizziness or recent falls. She states her living situation has improved, the last roommate is still in the hospital. She states her landlord will be following up and making sure she has not problems with the past roommate moving back in.      Current Outpatient Medications on File Prior to Visit   Medication Sig Dispense Refill    hydroCHLOROthiazide (HYDRODIURIL) 12.5 MG tablet Take 1 tablet by mouth every other day 90 tablet 0    lisinopril (PRINIVIL;ZESTRIL) 10 MG tablet Take 1 tablet by mouth daily 90 tablet 0    ADVAIR DISKUS 500-50 MCG/DOSE diskus inhaler Inhale 1 puff into the lungs every 12 hours After inhalation then gargle 1 Inhaler 11    albuterol sulfate HFA (PROVENTIL HFA) 108 (90 Base) MCG/ACT inhaler Inhale 2 puffs into the lungs every 4 hours as needed for Wheezing or Shortness of Breath (cough) 1 Inhaler 2    fluticasone (FLONASE) 50 MCG/ACT nasal spray 1 spray by Each Nostril route daily 1 Bottle 1    clopidogrel (PLAVIX) 75 MG tablet Take 1 tablet by mouth daily 90 tablet 1    ipratropium-albuterol (DUONEB) 0.5-2.5 (3) MG/3ML SOLN nebulizer solution Inhale 3 mLs into the lungs 4 times daily 120 vial 1    butalbital-acetaminophen-caffeine (FIORICET, ESGIC) -40 MG per tablet Take 1 tablet by mouth 3 times daily as needed for Headaches 30 tablet 0    NASAL SALINE NA by Nasal route       albuterol (PROVENTIL) (5 MG/ML) 0.5% nebulizer solution Take 1 mL by nebulization every 6 hours as needed for Wheezing or Shortness of Breath 100 vial 1    Multiple Vitamin (MULTI-VITAMIN DAILY PO) Take 1 tablet by mouth daily      aspirin 81 MG tablet Take 1 tablet by mouth daily 90 tablet 2    diphenhydrAMINE (BENADRYL) 25 MG tablet Take 1 tablet by mouth nightly as needed for Itching 90 tablet 2    albuterol (PROVENTIL) (5 MG/ML) 0.5% nebulizer solution Take 1 mL by nebulization every 6 hours as needed for Wheezing or Shortness of Breath 100 vial 1     No current facility-administered medications on file prior to visit. Past Medical History:   Diagnosis Date    Active smoker     Active smoker     Ankle fracture, left 2006    Asthma     Chondromalacia of right knee     Dr. Angelina Mcadams + MRI    Chronic back pain     Chronic cough 10/4/2019    Depression     has seen psychiatry in [place] 6 months    Dizziness     CT brain normal -6/18/2012    Dyspnea on exertion 9/2/2021    Family history of early CAD     Father - 4st MI age 50, Massive MI age 71    H/O cardiovascular stress test 8/7/2012 8/7/2012-Lexiscan-Normal perfusion. LVSF normal.EF 58%    H/O Doppler ultrasound 12/11/2019     Abnormal left lower extremity arterial Doppler ultrasound. The Left lower extremity arteries have a Monophasic waveform suggestive of inflow disease, The Left BARBER shows Severe peripheral arterial disease.  Normal right lower extremity arterial Doppler ultrasound. Normal right lower extremity ankle brachial indices    H/O echocardiogram 8/7/2012 8/7/2012-LVSF normal. EF =>55%. impaired LV relaxation.  Herniated intervertebral disk     thoracic and lumbar    Hypertension     Mild persistent asthma without complication 78/8/0838    Normal echocardiogram 8/2012    EF=> 55%-Dr. Caesar Murphy    Obesity (BMI 30-39. 9)     Obstructive sleep apnea 10/4/2019    Other screening mammogram     PAD (peripheral artery disease) ([facility] [address] Angioplasty 01/06/2020     LCIA 90% INSTENT RESTENOSED TO 0% WITH PTA.  Peripheral vascular disease ([address]     Postmenopausal     Shoulder fracture, left 2008    Tobacco abuse 10/4/2019     Past Surgical History:   Procedure Laterality Date    CHOLECYSTECTOMY  1991    KNEE SURGERY  1998    right knee    TONSILLECTOMY AND ADENOIDECTOMY  1963    TUBAL LIGATION  1977    TUMOR REMOVAL  1999    parotid    TUMOR REMOVAL  1976    left thigh     Family History   Problem Relation Age of Onset    High Blood Pressure Mother     Allergies Mother    Bryant Migraines Mother     Obesity Mother     Heart Disease Father     High Blood Pressure Father     Allergies Father     Obesity Father     Diabetes Daughter     High Blood Pressure Sister     Allergies Sister     Bleeding Prob Sister    Bryant Migraines Sister     Obesity Sister     Cancer Brother     High Blood Pressure Brother     Allergies Brother     Bleeding Prob Brother     Obesity Brother     Thyroid Disease Maternal Aunt     Kidney Disease Maternal Aunt     Heart Disease Maternal Uncle     Kidney Disease Maternal Uncle     Cancer Maternal Grandmother     Glaucoma Maternal Grandmother     Diabetes Maternal Grandfather     Glaucoma Maternal Grandfather     Glaucoma Paternal Grandmother     Glaucoma Paternal Grandfather      Social History     Socioeconomic History    Marital status:       Spouse name: Not on file    Number of children: 1    Years of education: Not on file    Highest education level: Not on file   Occupational History    Occupation: Customer Service   Tobacco Use    Smoking status: Former Smoker     Packs/day: 1.00     Years: 30.00     Pack years: 30.00     Types: Cigarettes     Quit date: 2021     Years since quittin.2    Smokeless tobacco: Never Used   Substance and Sexual Activity    Alcohol use: Yes     Comment: socially    Drug use: No    Sexual activity: Not on file   Other Topics Concern    Not on file   Social History Narrative    Working now at [employer] jobs from [employer]      Has a daughter and Daughter in law. Social Determinants of Health     Financial Resource Strain: Medium Risk    Difficulty of Paying Living Expenses: Somewhat hard   Food Insecurity: Food Insecurity Present    Worried About Running Out of Food in the Last Year: Sometimes true    Magalis of Food in the Last Year: Sometimes true   Transportation Needs: No Transportation Needs    Lack of Transportation (Medical): No    Lack of Transportation (Non-Medical): No   Physical Activity: Insufficiently Active    Days of Exercise per Week: 2 days    Minutes of Exercise per Session: 10 min   Stress:     Feeling of Stress :    Social Connections: Unknown    Frequency of Communication with Friends and Family: Twice a week    Frequency of Social Gatherings with Friends and Family: Twice a week    Attends Yazidi Services: Not on file   [employer] or Organizations: Not on file    Attends Club or Organization Meetings: Not on file    Marital Status:    Intimate Partner Violence:     Fear of Current or Ex-Partner:     Emotionally Abused:     Physically Abused:     Sexually Abused:        Review of Systems   Constitutional: Negative for activity change, appetite change, chills, diaphoresis, fatigue, fever and unexpected weight change.    Eyes: Negative for photophobia, pain, normal.         Results in Past 30 Days  Result Component Current Result Ref Range Previous Result Ref Range   Albumin/Globulin Ratio 1.3 (10/12/2021) 1.1 - 2.2 Not in Time Range    Albumin 4.1 (10/12/2021) 3.4 - 5.0 g/dL Not in Time Range    Alkaline Phosphatase 82 (10/12/2021) 40 - 129 U/L Not in Time Range    ALT 7 (L) (10/12/2021) 10 - 40 U/L Not in Time Range    AST 16 (10/12/2021) 15 - 37 U/L Not in Time Range    BUN 11 (10/12/2021) 7 - 20 mg/dL Not in Time Range    Calcium 9.5 (10/12/2021) 8.3 - 10.6 mg/dL Not in Time Range    Chloride 100 (10/12/2021) 99 - 110 mmol/L Not in Time Range    CO2 26 (10/12/2021) 21 - 32 mmol/L Not in Time Range    CREATININE 0.9 (10/12/2021) 0.6 - 1.2 mg/dL Not in Time Range    GFR  >60 (10/12/2021) >60 Not in Time Range    GFR Non- >60 (10/12/2021) >60 Not in Time Range    Globulin 3.2 (10/12/2021) Not Established g/dL Not in Time Range    Glucose 92 (10/12/2021) 70 - 99 mg/dL Not in Time Range    Potassium 3.9 (10/12/2021) 3.5 - 5.1 mmol/L Not in Time Range    Sodium 140 (10/12/2021) 136 - 145 mmol/L Not in Time Range    Total Bilirubin 0.6 (10/12/2021) 0.0 - 1.0 mg/dL Not in Time Range    Total Protein 7.3 (10/12/2021) 6.4 - 8.2 g/dL Not in Time Range      LDL Calculated (mg/dL)   Date Value   10/12/2021 55       Lab Results   Component Value Date    WBC 7.7 10/12/2021    WBC 6.6 10/19/2020    WBC 5.7 07/21/2020    NEUTROABS 4.3 10/12/2021    NEUTROABS 3.9 10/19/2020    NEUTROABS 3.1 09/25/2019    HGB 14.1 10/12/2021    HGB 14.8 10/19/2020    HGB 14.5 07/21/2020    HCT 44.5 10/12/2021    HCT 44.6 10/19/2020    HCT 44.9 07/21/2020    MCV 84.1 10/12/2021    MCV 83.4 10/19/2020    MCV 85.1 07/21/2020     10/12/2021     10/19/2020     07/21/2020    SEGSABS 3.1 07/11/2020    SEGSABS 3.5 07/10/2020    SEGSABS 6.4 01/03/2020    LYMPHSABS 2.6 10/12/2021    MONOSABS 0.5 10/12/2021    EOSABS 0.2 10/12/2021    BASOSABS 0.0 10/12/2021 Lab Results   Component Value Date    TSH 3.16 09/25/2019    TSHHS 1.769 08/11/2012     Lab Results   Component Value Date    LABALBU 4.1 10/12/2021    BILITOT 0.6 10/12/2021    AST 16 10/12/2021    ALT 7 10/12/2021    ALKPHOS 82 10/12/2021             Results for orders placed or performed in visit on 10/12/21   Lipid Panel   Result Value Ref Range    Cholesterol, Total 131 0 - 199 mg/dL    Triglycerides 113 0 - 150 mg/dL    HDL 53 40 - 60 mg/dL    LDL Calculated 55 <100 mg/dL    VLDL Cholesterol Calculated 23 Not Established mg/dL   Comprehensive Metabolic Panel   Result Value Ref Range    Sodium 140 136 - 145 mmol/L    Potassium 3.9 3.5 - 5.1 mmol/L    Chloride 100 99 - 110 mmol/L    CO2 26 21 - 32 mmol/L    Anion Gap 14 3 - 16    Glucose 92 70 - 99 mg/dL    BUN 11 7 - 20 mg/dL    CREATININE 0.9 0.6 - 1.2 mg/dL    GFR Non-African American >60 >60    GFR African American >60 >60    Calcium 9.5 8.3 - 10.6 mg/dL    Total Protein 7.3 6.4 - 8.2 g/dL    Albumin 4.1 3.4 - 5.0 g/dL    Albumin/Globulin Ratio 1.3 1.1 - 2.2    Total Bilirubin 0.6 0.0 - 1.0 mg/dL    Alkaline Phosphatase 82 40 - 129 U/L    ALT 7 (L) 10 - 40 U/L    AST 16 15 - 37 U/L    Globulin 3.2 Not Established g/dL   CBC Auto Differential   Result Value Ref Range    WBC 7.7 4.0 - 11.0 K/uL    RBC 5.29 (H) 4.00 - 5.20 M/uL    Hemoglobin 14.1 12.0 - 16.0 g/dL    Hematocrit 44.5 36.0 - 48.0 %    MCV 84.1 80.0 - 100.0 fL    MCH 26.7 26.0 - 34.0 pg    MCHC 31.7 31.0 - 36.0 g/dL    RDW 14.6 12.4 - 15.4 %    Platelets 683 970 - 044 K/uL    MPV 8.7 5.0 - 10.5 fL    Neutrophils % 55.6 %    Lymphocytes % 34.1 %    Monocytes % 7.1 %    Eosinophils % 2.7 %    Basophils % 0.5 %    Neutrophils Absolute 4.3 1.7 - 7.7 K/uL    Lymphocytes Absolute 2.6 1.0 - 5.1 K/uL    Monocytes Absolute 0.5 0.0 - 1.3 K/uL    Eosinophils Absolute 0.2 0.0 - 0.6 K/uL    Basophils Absolute 0.0 0.0 - 0.2 K/uL       ASSESSMENT AND PLAN:     1.  Mild persistent mixed asthma without complication  - CBC Auto Differential    2. Essential hypertension  - stable  - Comprehensive Metabolic Panel  - CBC Auto Differential    3. Cough  - Comprehensive Metabolic Panel    4. Screening for cardiovascular condition  - Lipid Panel    5. Chronic low back pain with sciatica, sciatica laterality unspecified, unspecified back pain laterality  - baclofen (LIORESAL) 10 MG tablet; Take 1 tablet by mouth 2 times daily  Dispense: 180 tablet; Refill: 1    6. Medication refill  - baclofen (LIORESAL) 10 MG tablet; Take 1 tablet by mouth 2 times daily  Dispense: 180 tablet; Refill: 1  - montelukast (SINGULAIR) 10 MG tablet; Take 1 tablet by mouth nightly  Dispense: 90 tablet; Refill: 1  - simvastatin (ZOCOR) 40 MG tablet; Take 1 tablet by mouth nightly  Dispense: 90 tablet; Refill: 1  - cetirizine (ZYRTEC) 10 MG tablet; Take one tablet by mouth daily. Dispense: 90 tablet; Refill: 1    7. Sleep disturbance  - hydrOXYzine (ATARAX) 25 MG tablet; Take 1 tablet by mouth nightly  Dispense: 30 tablet; Refill: 1    8. Anxiety  - Comprehensive Metabolic Panel  - hydrOXYzine (ATARAX) 25 MG tablet; Take 1 tablet by mouth nightly  Dispense: 30 tablet; Refill: 1  - CBC Auto Differential    9. Dizziness  - Comprehensive Metabolic Panel  - meclizine (ANTIVERT) 25 MG tablet; Take 1 tablet by mouth 2 times daily  Dispense: 30 tablet; Refill: 0  - CBC Auto Differential    10. Screening for colon cancer  - POCT Fecal Immunochemical Test (FIT); Future    11. Need for influenza vaccination  - will think about getting the flu vaccine, concern is past delayed reaction to flu vaccine    Fluids, rest   Medication refill provided  Will call results of labs  Return FIT sample  Get Covid booster  Verbalized understanding and agreement with plan    Return in about 6 months (around 4/12/2022) for HTN, follow up. Care discussed with patient. Patient educated on signs and symptoms of exacerbation and when to seek further medical attention.  Advised to call for any problems, questions, or concerns. Patient verbalizes understanding and agrees with plan. Medications reviewed and reconciled. Continue current medications. Appropriate prescriptions are ordered. Risks and benefits of meds are discussed. After visit summary provided.

## 2021-10-13 PROBLEM — G47.9 SLEEP DISTURBANCE: Status: ACTIVE | Noted: 2021-10-13

## 2021-10-13 ASSESSMENT — ENCOUNTER SYMPTOMS
GASTROINTESTINAL NEGATIVE: 1
BACK PAIN: 1
COUGH: 1
SHORTNESS OF BREATH: 1

## 2021-10-14 ENCOUNTER — CARE COORDINATION (OUTPATIENT)
Dept: CARE COORDINATION | Age: 71
End: 2021-10-14

## 2021-10-25 ENCOUNTER — CARE COORDINATION (OUTPATIENT)
Dept: CARE COORDINATION | Age: 71
End: 2021-10-25

## 2021-10-25 ENCOUNTER — TELEPHONE (OUTPATIENT)
Dept: ORTHOPEDIC SURGERY | Age: 71
End: 2021-10-25

## 2021-10-25 ENCOUNTER — TELEPHONE (OUTPATIENT)
Dept: FAMILY MEDICINE CLINIC | Age: 71
End: 2021-10-25

## 2021-10-25 NOTE — TELEPHONE ENCOUNTER
I called [employer] at [phone] and spoke with Jessi YOUNG  Who informed my that no precert is needed for Gelsyn 3  as a buy and bill medication     Call Ref# [id number] [phone]

## 2021-10-27 ENCOUNTER — VIRTUAL VISIT (OUTPATIENT)
Dept: FAMILY MEDICINE CLINIC | Age: 71
End: 2021-10-27
Payer: MEDICARE

## 2021-10-27 DIAGNOSIS — Z00.00 ROUTINE GENERAL MEDICAL EXAMINATION AT A HEALTH CARE FACILITY: ICD-10-CM

## 2021-10-27 DIAGNOSIS — I10 ESSENTIAL HYPERTENSION: Primary | ICD-10-CM

## 2021-10-27 DIAGNOSIS — Z87.891 PERSONAL HISTORY OF TOBACCO USE: ICD-10-CM

## 2021-10-27 PROCEDURE — G8484 FLU IMMUNIZE NO ADMIN: HCPCS | Performed by: NURSE PRACTITIONER

## 2021-10-27 PROCEDURE — 1123F ACP DISCUSS/DSCN MKR DOCD: CPT | Performed by: NURSE PRACTITIONER

## 2021-10-27 PROCEDURE — 4040F PNEUMOC VAC/ADMIN/RCVD: CPT | Performed by: NURSE PRACTITIONER

## 2021-10-27 PROCEDURE — 3017F COLORECTAL CA SCREEN DOC REV: CPT | Performed by: NURSE PRACTITIONER

## 2021-10-27 PROCEDURE — G0439 PPPS, SUBSEQ VISIT: HCPCS | Performed by: NURSE PRACTITIONER

## 2021-10-27 ASSESSMENT — PATIENT HEALTH QUESTIONNAIRE - PHQ9
4. FEELING TIRED OR HAVING LITTLE ENERGY: 1
SUM OF ALL RESPONSES TO PHQ QUESTIONS 1-9: 8
7. TROUBLE CONCENTRATING ON THINGS, SUCH AS READING THE NEWSPAPER OR WATCHING TELEVISION: 1
10. IF YOU CHECKED OFF ANY PROBLEMS, HOW DIFFICULT HAVE THESE PROBLEMS MADE IT FOR YOU TO DO YOUR WORK, TAKE CARE OF THINGS AT HOME, OR GET ALONG WITH OTHER PEOPLE: 1
SUM OF ALL RESPONSES TO PHQ QUESTIONS 1-9: 8
1. LITTLE INTEREST OR PLEASURE IN DOING THINGS: 1
5. POOR APPETITE OR OVEREATING: 3
SUM OF ALL RESPONSES TO PHQ9 QUESTIONS 1 & 2: 3
8. MOVING OR SPEAKING SO SLOWLY THAT OTHER PEOPLE COULD HAVE NOTICED. OR THE OPPOSITE, BEING SO FIGETY OR RESTLESS THAT YOU HAVE BEEN MOVING AROUND A LOT MORE THAN USUAL: 0
SUM OF ALL RESPONSES TO PHQ QUESTIONS 1-9: 8
6. FEELING BAD ABOUT YOURSELF - OR THAT YOU ARE A FAILURE OR HAVE LET YOURSELF OR YOUR FAMILY DOWN: 0
9. THOUGHTS THAT YOU WOULD BE BETTER OFF DEAD, OR OF HURTING YOURSELF: 0
2. FEELING DOWN, DEPRESSED OR HOPELESS: 2
3. TROUBLE FALLING OR STAYING ASLEEP: 0

## 2021-10-27 NOTE — PATIENT INSTRUCTIONS
Learning About Benefits From Quitting Smoking  How does quitting smoking make you healthier? If you're thinking about quitting smoking, you may have a few reasons to be smoke-free. Your health may be one of them. · When you quit smoking, you lower your risks for cancer, lung disease, heart attack, stroke, blood vessel disease, and blindness from macular degeneration. · When you're smoke-free, you get sick less often, and you heal faster. You are less likely to get colds, flu, bronchitis, and pneumonia. · As a nonsmoker, you may find that your mood is better and you are less stressed. When and how will you feel healthier? Quitting has real health benefits that start from day 1 of being smoke-free. And the longer you stay smoke-free, the healthier you get and the better you feel. The first hours  · After just 20 minutes, your blood pressure and heart rate go down. That means there's less stress on your heart and blood vessels. · Within 12 hours, the level of carbon monoxide in your blood drops back to normal. That makes room for more oxygen. With more oxygen in your body, you may notice that you have more energy than when you smoked. After 2 weeks  · Your lungs start to work better. · Your risk of heart attack starts to drop. After 1 month  · When your lungs are clear, you cough less and breathe deeper, so it's easier to be active. · Your sense of taste and smell return. That means you can enjoy food more than you have since you started smoking. Over the years  · Over the years, your risks of heart disease, heart attack, and stroke are lower. · After 10 years, your risk of dying from lung cancer is cut by about half. And your risk for many other types of cancer is lower too. How would quitting help others in your life? When you quit smoking, you improve the health of everyone who now breathes in your smoke. · Their heart, lung, and cancer risks drop, much like yours. · They are sick less. For babies and small children, living smoke-free means they're less likely to have ear infections, pneumonia, and bronchitis. · If you're a woman who is or will be pregnant someday, quitting smoking means a healthier . · Children who are close to you are less likely to become adult smokers. Where can you learn more? Go to https://chpepiceweb.[email or web address] org and sign in to your [employer] account. Enter [id number] 11 in the [employer] box to learn more about \"Learning About Benefits From Quitting Smoking. \"     If you do not have an account, please click on the \"Sign Up Now\" link. Current as of: 2021               Content Version: 13.0  © 9642-5116 Healthwise, Incorporated. Care instructions adapted under license by [facility] ([facility]). If you have questions about a medical condition or this instruction, always ask your healthcare professional. [place] [address] any warranty or liability for your use of this information. Personalized Preventive Plan for Beni Rincon - 10/27/2021  Medicare offers a range of preventive health benefits. Some of the tests and screenings are paid in full while other may be subject to a deductible, co-insurance, and/or copay. Some of these benefits include a comprehensive review of your medical history including lifestyle, illnesses that may run in your family, and various assessments and screenings as appropriate. After reviewing your medical record and screening and assessments performed today your provider may have ordered immunizations, labs, imaging, and/or referrals for you. A list of these orders (if applicable) as well as your Preventive Care list are included within your After Visit Summary for your review. Other Preventive Recommendations:    · A preventive eye exam performed by an eye specialist is recommended every 1-2 years to screen for glaucoma; cataracts, macular degeneration, and other eye disorders.   · A

## 2021-10-27 NOTE — PROGRESS NOTES
Medicare Annual Wellness Visit  Name: Gareth Tate Date: 10/27/2021   MRN: [id number] Sex: Female   Age: 65 y.o. Ethnicity: Non- / Non    : 1950 Race: Betty Rojas / Camila Breaker is here for [employer] for behavioral, psychosocial and functional/safety risks, and cognitive dysfunction are all negative except as indicated below. These results, as well as other patient data from the [address] form, are documented in Flowsheets linked to this Encounter. Allergies   Allergen Reactions    Latex Hives and Rash    Cipro Xr Hives and Shortness Of Breath    Soap Hives and Shortness Of Breath     [place] Soap, Tide detergent      Tomato Hives and Shortness Of Breath    Influenza Vaccines Hives    Soybean-Containing Drug Products          Prior to Visit Medications    Medication Sig Taking? Authorizing Provider   baclofen (LIORESAL) 10 MG tablet Take 1 tablet by mouth 2 times daily Yes ADALID Prater CNP   montelukast (SINGULAIR) 10 MG tablet Take 1 tablet by mouth nightly Yes ADALID Prater CNP   simvastatin (ZOCOR) 40 MG tablet Take 1 tablet by mouth nightly Yes ADALID Prater CNP   cetirizine (ZYRTEC) 10 MG tablet Take one tablet by mouth daily.  Yes ADALID Prater CNP   hydrOXYzine (ATARAX) 25 MG tablet Take 1 tablet by mouth nightly Yes ADALID Prater CNP   meclizine (ANTIVERT) 25 MG tablet Take 1 tablet by mouth 2 times daily Yes ADALID Prater CNP   hydroCHLOROthiazide (HYDRODIURIL) 12.5 MG tablet Take 1 tablet by mouth every other day Yes ADALID Prater CNP   lisinopril (PRINIVIL;ZESTRIL) 10 MG tablet Take 1 tablet by mouth daily Yes ADALID Prater CNP   ADVAIR DISKUS 500-50 MCG/DOSE diskus inhaler Inhale 1 puff into the lungs every 12 hours After inhalation then gargle Yes ADALID Prater CNP   albuterol sulfate HFA (PROVENTIL HFA) 108 (90 Base) MCG/ACT inhaler Inhale 2 puffs into the lungs every 4 hours as needed for Wheezing or Shortness of Breath (cough) Yes Kayli Postin, APRN - CNP   fluticasone (FLONASE) 50 MCG/ACT nasal spray 1 spray by Each Nostril route daily Yes Kayli Postin, APRN - CNP   clopidogrel (PLAVIX) 75 MG tablet Take 1 tablet by mouth daily Yes Kayli Postin, APRN - CNP   ipratropium-albuterol (DUONEB) 0.5-2.5 (3) MG/3ML SOLN nebulizer solution Inhale 3 mLs into the lungs 4 times daily Yes Kayli Postin, APRN - CNP   butalbital-acetaminophen-caffeine (FIORICET, ESGIC) -40 MG per tablet Take 1 tablet by mouth 3 times daily as needed for Headaches Yes Kayli Postin, APRN - CNP   albuterol (PROVENTIL) (5 MG/ML) 0.5% nebulizer solution Take 1 mL by nebulization every 6 hours as needed for Wheezing or Shortness of Breath Yes Kayli Postin, APRN - CNP   albuterol (PROVENTIL) (5 MG/ML) 0.5% nebulizer solution Take 1 mL by nebulization every 6 hours as needed for Wheezing or Shortness of Breath Yes Kayli Postin, APRN - CNP   aspirin 81 MG tablet Take 1 tablet by mouth daily Yes Kayli Postin, APRN - CNP   diphenhydrAMINE (BENADRYL) 25 MG tablet Take 1 tablet by mouth nightly as needed for Itching Yes Kayli Postin, APRN - CNP   NASAL SALINE NA by Nasal route   Historical Provider, MD   Multiple Vitamin (MULTI-VITAMIN DAILY PO) Take 1 tablet by mouth daily  Historical Provider, MD         Past Medical History:   Diagnosis Date    Active smoker     Active smoker     Ankle fracture, left 2006    Asthma     Chondromalacia of right knee     Dr. Cozetta Fothergill + MRI    Chronic back pain     Chronic cough 10/4/2019    Depression     has seen psychiatry in [facility] 6 months    Dizziness     CT brain normal -6/18/2012    Dyspnea on exertion 9/2/2021    Family history of early CAD     Father - 1st MI age 50, Massive MI age 76    H/O cardiovascular stress test 8/7/2012 8/7/2012-Lexiscan-Normal perfusion. LVSF normal.EF 58%    H/O Doppler ultrasound 12/11/2019  Abnormal left lower extremity arterial Doppler ultrasound. The Left lower extremity arteries have a Monophasic waveform suggestive of inflow disease, The Left BARBER shows Severe peripheral arterial disease. Normal right lower extremity arterial Doppler ultrasound. Normal right lower extremity ankle brachial indices    H/O echocardiogram 8/7/2012 8/7/2012-LVSF normal. EF =>55%. impaired LV relaxation.  Herniated intervertebral disk     thoracic and lumbar    Hypertension     Mild persistent asthma without complication 85/0/4069    Normal echocardiogram 8/2012    EF=> 55%-Dr. Esperanza Avalos    Obesity (BMI 30-39. 9)     Obstructive sleep apnea 10/4/2019    Other screening mammogram     PAD (peripheral artery disease) ([address] Angioplasty 01/06/2020     LCIA 90% INSTENT RESTENOSED TO 0% WITH PTA.     Peripheral vascular disease ([address]     Postmenopausal     Shoulder fracture, left 2008    Tobacco abuse 10/4/2019       Past Surgical History:   Procedure Laterality Date    CHOLECYSTECTOMY  1991    KNEE SURGERY  1998    right knee    TONSILLECTOMY AND ADENOIDECTOMY  1963    TUBAL LIGATION  1977    TUMOR REMOVAL  1999    parotid    TUMOR REMOVAL  1976    left thigh         Family History   Problem Relation Age of Onset    High Blood Pressure Mother     Allergies Mother    Clayton Merissa Migraines Mother     Obesity Mother     Heart Disease Father     High Blood Pressure Father     Allergies Father     Obesity Father     Diabetes Daughter     High Blood Pressure Sister     Allergies Sister     Bleeding Prob Sister    Clayton Merissa Migraines Sister     Obesity Sister     Cancer Brother     High Blood Pressure Brother     Allergies Brother     Bleeding Prob Brother     Obesity Brother     Thyroid Disease Maternal Aunt     Kidney Disease Maternal Aunt     Heart Disease Maternal Uncle     Kidney Disease Maternal Uncle     Cancer Maternal Grandmother     Glaucoma Maternal Grandmother     Diabetes Maternal Grandfather  Glaucoma Maternal Grandfather     Glaucoma Paternal Grandmother     Glaucoma Paternal Grandfather        CareTeam (Including outside providers/suppliers regularly involved in providing care):   Patient Care Team:  ADALID Hughes CNP as PCP - General (Family Medicine)  ADALID Hughes CNP [facility] PCP [facility]  Yoni Brown RN as [facility]    Wt Readings from Last 3 Encounters:   10/12/21 263 lb (119.3 kg)   09/14/21 265 lb 6.4 oz (120.4 kg)   09/07/21 263 lb (119.3 kg)     There were no vitals filed for this visit. There is no height or weight on file to calculate BMI. Based upon direct observation of the patient, evaluation of cognition reveals recent and remote memory intact. General Appearance: alert and oriented to person, place and time, well developed and well- nourished, in no acute distress  Skin: warm and dry, no rash or erythema  Head: normocephalic and atraumatic  Eyes: pupils equal, round, and reactive to light, extraocular eye movements intact, conjunctivae normal  Neck: supple and non-tender without mass, no thyromegaly or thyroid nodules, no cervical lymphadenopathy  Pulmonary/Chest: clear to auscultation bilaterally- no wheezes, rales or rhonchi, normal air movement, no respiratory distress  Cardiovascular: normal rate, regular rhythm, normal S1 and S2, no murmurs, rubs, clicks, or gallops, distal pulses intact, no carotid bruits  Neurologic: no cranial nerve deficit, gait, coordination and speech normal    Patient's complete Health Risk Assessment and screening values have been reviewed and are found in Flowsheets. The following problems were reviewed today and where indicated follow up appointments were made and/or referrals ordered.     Positive Risk Factor Screenings with Interventions:       Depression:  PHQ-2 Score: 3  PHQ-9 Total Score: 8    Severity:1-4 = minimal depression, 5-9 = mild depression, 10-14 = moderate depression, 15-19 = moderately severe depression, 20-27 = severe depression  Depression Interventions:  · Regular exercise recommended- 3-5 times per week, 30-45 minutes per session  · Relaxation techniques discussed        General Health and ACP:     Advance Directives     Power of Usman Rapp Will ACP-Advance Directive ACP-Power of     Not on File Not on File Not on File Not on File      General Health Risk Interventions:  · No Living Will: Advance Care Planning addressed with patient today    Health Habits/Nutrition:        Health Habits/Nutrition Interventions:  · Inadequate physical activity:  patient agrees to increase physical activity as follows: will increase walking in a week. Personalized Preventive Plan   Current Health Maintenance Status  Immunization History   Administered Date(s) Administered    COVID-19, Antonio Pineda PF, 30mcg/0.3mL 02/17/2021, 03/10/2021    Pneumococcal Polysaccharide ([id number]) 10/19/2020        Health Maintenance   Topic Date Due    DTaP/Tdap/Td vaccine (1 - Tdap) Never done    Shingles Vaccine (1 of 2) Never done    Low dose CT lung screening  Never done    COVID-19 Vaccine (3 - Antonio Pineda booster) 09/10/2021    Annual Wellness Visit (AWV)  10/20/2021    Colon Cancer Screen FIT/FOBT  12/28/2021    Lipid screen  10/12/2022    Potassium monitoring  10/12/2022    Creatinine monitoring  10/12/2022    Breast cancer screen  12/10/2022    DEXA (modify frequency per FRAX score)  Completed    Pneumococcal 65+ years Vaccine  Completed    Hepatitis C screen  Completed    Hepatitis A vaccine  Aged Out    Hepatitis B vaccine  Aged Out    Hib vaccine  Aged Out    Meningococcal (ACWY) vaccine  Aged Out     Recommendations for [employer] Due: see orders and patient instructions/AVS.  . Recommended screening schedule for the next 5-10 years is provided to the patient in written form: see Patient Sheldon Holcomb was seen today for medicare awv.     Diagnoses and all orders for this visit:    Essential hypertension    Personal history of tobacco use    Routine general medical examination at a health care facility               Advance Care Planning   Advanced Care Planning: Discussed the patients choices for care and treatment in case of a health event that adversely affects decision-making abilities. Also discussed the patients long-term treatment options. Reviewed with the patient the [facility] [place] of [address] [place] [address] Declaration forms  Reviewed the process of designating a competent adult as an Agent (or -in-fact) that could take make health care decisions for the patient if incompetent. Patient was asked to complete the declaration forms, either acknowledge the forms by a public notary or an eligible witness and provide a signed copy to the practice office. Time spent (minutes): 10 minutes     Cardiovascular Disease Risk Counseling: Assessed the patient's risk to develop cardiovascular disease and reviewed main risk factors. Reviewed steps to reduce disease risk including:   · Quitting tobacco use, reducing amount smoked, or not starting the habit  · Making healthy food choices  · Being physically active and gradualy increasing activity levels   · Reduce weight and determine a healthy BMI goal  · Monitor blood pressure and treat if higher than 140/90 mmHg  · Maintain blood total cholesterol levels under 5 mmol/l or 190 mg/dl  · Maintain LDL cholesterol levels under 3.0 mmol/l or 115 mg/dl   · Control blood glucose levels  · Consider taking aspirin (75 mg daily), once blood pressure is controlled   Provided a follow up plan.   Time spent (minutes): 10 minutes

## 2021-10-29 ENCOUNTER — HOSPITAL ENCOUNTER (OUTPATIENT)
Dept: CT IMAGING | Age: 71
Discharge: HOME | End: 2021-10-29
Payer: MEDICARE

## 2021-10-29 ENCOUNTER — HOSPITAL ENCOUNTER (OUTPATIENT)
Dept: WOMENS IMAGING | Age: 71
Discharge: HOME | End: 2021-10-29
Payer: MEDICARE

## 2021-10-29 DIAGNOSIS — Z12.31 BREAST CANCER SCREENING BY MAMMOGRAM: ICD-10-CM

## 2021-10-29 DIAGNOSIS — Z87.891 PERSONAL HISTORY OF TOBACCO USE: ICD-10-CM

## 2021-10-29 PROCEDURE — 77067 SCR MAMMO BI INCL CAD: CPT

## 2021-10-29 PROCEDURE — 71271 CT THORAX LUNG CANCER SCR C-: CPT

## 2021-10-29 NOTE — TELEPHONE ENCOUNTER
I called pt and LMOVM that gel injections were approved and in stock. I requested that she call our office back to schedule appointment. Gelsyn 1x/week. 3 weeks.  R knee

## 2021-11-01 NOTE — RESULT ENCOUNTER NOTE
I spoke to Mrs. Nadine Granger over the phone and let her know that her CT lung screening shows no worrisome nodules or masses with mild emphysematous changes. I did recommend yearly CT lung screening.   She states that she remains off cigarettes

## 2021-11-03 DIAGNOSIS — Z76.0 MEDICATION REFILL: ICD-10-CM

## 2021-11-03 RX ORDER — CLOPIDOGREL BISULFATE 75 MG/1
75 TABLET ORAL DAILY
Qty: 90 TABLET | Refills: 1 | Status: SHIPPED | OUTPATIENT
Start: 2021-11-03 | End: 2022-04-13 | Stop reason: SDUPTHER

## 2021-11-08 ENCOUNTER — CARE COORDINATION (OUTPATIENT)
Dept: CARE COORDINATION | Age: 71
End: 2021-11-08

## 2021-11-08 NOTE — CARE COORDINATION
Progress     [employer]   On track     I will complete referral to [place] [id number] for assistance. Barriers: overwhelmed by complexity of regimen and stress  Plan for overcoming my barriers: ACM referral to LSW. Patient to complete applications for support as per  ACM/LSW advisement. Confidence: 7/10  Anticipated Goal Completion Date:   11/18/21            Prior to Admission medications    Medication Sig Start Date End Date Taking? Authorizing Provider   clopidogrel (PLAVIX) 75 MG tablet TAKE 1 TABLET BY MOUTH DAILY 11/3/21   ADALID Cobb CNP   baclofen (LIORESAL) 10 MG tablet Take 1 tablet by mouth 2 times daily 10/12/21   ADALID Cobb CNP   montelukast (SINGULAIR) 10 MG tablet Take 1 tablet by mouth nightly 10/12/21   ADALID Cobb CNP   simvastatin (ZOCOR) 40 MG tablet Take 1 tablet by mouth nightly 10/12/21   ADALID Cobb CNP   cetirizine (ZYRTEC) 10 MG tablet Take one tablet by mouth daily.  10/12/21   ADALID Cobb CNP   hydrOXYzine (ATARAX) 25 MG tablet Take 1 tablet by mouth nightly 10/12/21 12/11/21  ADALID Cobb CNP   meclizine (ANTIVERT) 25 MG tablet Take 1 tablet by mouth 2 times daily 10/12/21   ADALID Cobb CNP   hydroCHLOROthiazide (HYDRODIURIL) 12.5 MG tablet Take 1 tablet by mouth every other day 8/27/21   ADALID Cobb CNP   lisinopril (PRINIVIL;ZESTRIL) 10 MG tablet Take 1 tablet by mouth daily 8/23/21   ADALID Cobb CNP   ADVAIR DISKUS 500-50 MCG/DOSE diskus inhaler Inhale 1 puff into the lungs every 12 hours After inhalation then gargle 8/3/21   ADALID Cobb CNP   albuterol sulfate HFA (PROVENTIL HFA) 108 (90 Base) MCG/ACT inhaler Inhale 2 puffs into the lungs every 4 hours as needed for Wheezing or Shortness of Breath (cough) 8/3/21   ADALID Cobb CNP   fluticasone [facility]) 50 MCG/ACT nasal spray 1 spray by Each Nostril route daily 8/3/21   ADALID Cobb - CNP   ipratropium-albuterol (DUONEB) 0.5-2.5 (3) MG/3ML SOLN nebulizer solution Inhale 3 mLs into the lungs 4 times daily 6/21/21   ADALID Hughes CNP   butalbital-acetaminophen-caffeine (FIORICET, ESGIC) -55 MG per tablet Take 1 tablet by mouth 3 times daily as needed for Headaches 6/21/21   ADALID Hughes CNP   NASAL SALINE NA by Nasal route     Historical Provider, MD   albuterol (PROVENTIL) (5 MG/ML) 0.5% nebulizer solution Take 1 mL by nebulization every 6 hours as needed for Wheezing or Shortness of Breath 5/21/21 8/18/26  ADALID Hughes CNP   albuterol (PROVENTIL) (5 MG/ML) 0.5% nebulizer solution Take 1 mL by nebulization every 6 hours as needed for Wheezing or Shortness of Breath 5/21/21 8/18/26  ADALID Hughes CNP   Multiple Vitamin (MULTI-VITAMIN DAILY PO) Take 1 tablet by mouth daily    Historical Provider, MD   aspirin 81 MG tablet Take 1 tablet by mouth daily 9/4/19   ADALID Hughes CNP   diphenhydrAMINE (BENADRYL) 25 MG tablet Take 1 tablet by mouth nightly as needed for Itching 9/4/19   ADALID Hughes CNP       Future Appointments   Date Time Provider [place] Weiss   12/2/2021 10:50 AM Lazaro Saez MD [facility]   12/9/2021 10:50 AM Lazaro Saez MD [facility]   12/15/2021  9:30 AM Farzad Diana MD [facility]   12/16/2021 11:00 AM Lazaro Saez MD [facility]   3/2/2022 10:30 AM Farzad Diana MD [facility]   3/29/2022 11:40 AM Mary Lainez MD [facility]   4/12/2022 10:00 AM ADALID Hughes CNP [facility]      and   General Assessment    Do you have any symptoms that are causing concern?: Yes  Reported Symptoms: Other (Comment: back pain)

## 2021-11-18 DIAGNOSIS — Z76.0 MEDICATION REFILL: ICD-10-CM

## 2021-11-18 RX ORDER — LISINOPRIL 10 MG/1
10 TABLET ORAL DAILY
Qty: 90 TABLET | Refills: 0 | Status: SHIPPED | OUTPATIENT
Start: 2021-11-18 | End: 2022-02-23 | Stop reason: SDUPTHER

## 2021-11-23 ENCOUNTER — CARE COORDINATION (OUTPATIENT)
Dept: CARE COORDINATION | Age: 71
End: 2021-11-23

## 2021-12-02 ENCOUNTER — OFFICE VISIT (OUTPATIENT)
Dept: ORTHOPEDIC SURGERY | Age: 71
End: 2021-12-02
Payer: MEDICARE

## 2021-12-02 VITALS
OXYGEN SATURATION: 95 % | HEART RATE: 77 BPM | HEIGHT: 68 IN | RESPIRATION RATE: 15 BRPM | WEIGHT: 263 LBS | BODY MASS INDEX: 39.86 KG/M2

## 2021-12-02 DIAGNOSIS — S83.231A COMPLEX TEAR OF MEDIAL MENISCUS OF RIGHT KNEE AS CURRENT INJURY, INITIAL ENCOUNTER: ICD-10-CM

## 2021-12-02 DIAGNOSIS — M17.11 PRIMARY OSTEOARTHRITIS OF RIGHT KNEE: Primary | ICD-10-CM

## 2021-12-02 PROCEDURE — 20610 DRAIN/INJ JOINT/BURSA W/O US: CPT | Performed by: ORTHOPAEDIC SURGERY

## 2021-12-02 PROCEDURE — 99999 PR OFFICE/OUTPT VISIT,PROCEDURE ONLY: CPT | Performed by: ORTHOPAEDIC SURGERY

## 2021-12-02 NOTE — PROGRESS NOTES
VISCO-SUPPLEMENTATION INJECTION (Number 1)    HISTORY OF PRESENT ILLNESS (HPI):   Anamika Adames is a 65y.o. year old female who is here for follow up for visco-supplementation injection number 1 for   1. Primary osteoarthritis of right knee    2. Complex tear of medial meniscus of right knee as current injury, initial encounter    . PAST HISTORY:   Unless otherwise specified in this note, the past history is reviewed today with the patient and is as follows-    Allergies   Allergen Reactions    Latex Hives and Rash    Cipro Xr Hives and Shortness Of Breath    Soap Hives and Shortness Of Breath     [facility] Soap, Tide detergent      Tomato Hives and Shortness Of Breath    Influenza Vaccines Hives    Soybean-Containing Drug Products        Current Outpatient Medications   Medication Sig Dispense Refill    lisinopril (PRINIVIL;ZESTRIL) 10 MG tablet Take 1 tablet by mouth daily 90 tablet 0    clopidogrel (PLAVIX) 75 MG tablet TAKE 1 TABLET BY MOUTH DAILY 90 tablet 1    baclofen (LIORESAL) 10 MG tablet Take 1 tablet by mouth 2 times daily 180 tablet 1    montelukast (SINGULAIR) 10 MG tablet Take 1 tablet by mouth nightly 90 tablet 1    simvastatin (ZOCOR) 40 MG tablet Take 1 tablet by mouth nightly 90 tablet 1    cetirizine (ZYRTEC) 10 MG tablet Take one tablet by mouth daily.  90 tablet 1    hydrOXYzine (ATARAX) 25 MG tablet Take 1 tablet by mouth nightly 30 tablet 1    meclizine (ANTIVERT) 25 MG tablet Take 1 tablet by mouth 2 times daily 30 tablet 0    hydroCHLOROthiazide (HYDRODIURIL) 12.5 MG tablet Take 1 tablet by mouth every other day 90 tablet 0    ADVAIR DISKUS 500-50 MCG/DOSE diskus inhaler Inhale 1 puff into the lungs every 12 hours After inhalation then gargle 1 Inhaler 11    albuterol sulfate HFA (PROVENTIL HFA) 108 (90 Base) MCG/ACT inhaler Inhale 2 puffs into the lungs every 4 hours as needed for Wheezing or Shortness of Breath (cough) 1 Inhaler 2    fluticasone (FLONASE) 50 MCG/ACT nasal spray 1 spray by Each Nostril route daily 1 Bottle 1    ipratropium-albuterol (DUONEB) 0.5-2.5 (3) MG/3ML SOLN nebulizer solution Inhale 3 mLs into the lungs 4 times daily 120 vial 1    butalbital-acetaminophen-caffeine (FIORICET, ESGIC) -40 MG per tablet Take 1 tablet by mouth 3 times daily as needed for Headaches 30 tablet 0    NASAL SALINE NA by Nasal route       albuterol (PROVENTIL) (5 MG/ML) 0.5% nebulizer solution Take 1 mL by nebulization every 6 hours as needed for Wheezing or Shortness of Breath 100 vial 1    albuterol (PROVENTIL) (5 MG/ML) 0.5% nebulizer solution Take 1 mL by nebulization every 6 hours as needed for Wheezing or Shortness of Breath 100 vial 1    Multiple Vitamin (MULTI-VITAMIN DAILY PO) Take 1 tablet by mouth daily      aspirin 81 MG tablet Take 1 tablet by mouth daily 90 tablet 2    diphenhydrAMINE (BENADRYL) 25 MG tablet Take 1 tablet by mouth nightly as needed for Itching 90 tablet 2     No current facility-administered medications for this visit. PHYSICAL EXAM:   Pulse 77   Resp 15   Ht 5' 8\" (1.727 m)   Wt 263 lb (119.3 kg)   SpO2 95%   BMI 39.99 kg/m²     The right knee is examined:  Inspection:  Skin is intact. No erythema. Palpation:  No swelling or acute tenderness. Neuro/Vascular/Motor:  Sensation to light touch intact. Capillary refill brisk. No focal motor deficits. DIAGNOSIS:     1. Primary osteoarthritis of right knee    2. Complex tear of medial meniscus of right knee as current injury, initial encounter        PROCEDURE:   Right Knee  Injection Procedure:  Multiple treatment options were discussed. Joint injection was recommended. Details of the procedure, potential risks, and potential benefits were discussed. Patient's questions were answered. Patient elected to proceed with procedure.   Medication: [facility] #:[id number]  Lot #:[id number]  Procedure:  Sterile technique was used as the skin over the injection site was double preped with betadine and alcohol. The knee joint was then injected with the above listed medication. A sterile bandage was placed over the injection site. The patient tolerated the procedure well without complication. The patient is scheduled for the second injection in one week.

## 2021-12-09 ENCOUNTER — OFFICE VISIT (OUTPATIENT)
Dept: ORTHOPEDIC SURGERY | Age: 71
End: 2021-12-09
Payer: MEDICARE

## 2021-12-09 VITALS
WEIGHT: 263 LBS | HEIGHT: 68 IN | BODY MASS INDEX: 39.86 KG/M2 | OXYGEN SATURATION: 97 % | RESPIRATION RATE: 16 BRPM | HEART RATE: 76 BPM

## 2021-12-09 DIAGNOSIS — M17.11 PRIMARY OSTEOARTHRITIS OF RIGHT KNEE: Primary | ICD-10-CM

## 2021-12-09 PROCEDURE — 99999 PR OFFICE/OUTPT VISIT,PROCEDURE ONLY: CPT | Performed by: ORTHOPAEDIC SURGERY

## 2021-12-09 PROCEDURE — 20610 DRAIN/INJ JOINT/BURSA W/O US: CPT | Performed by: ORTHOPAEDIC SURGERY

## 2021-12-09 NOTE — PROGRESS NOTES
nebulizer solution Inhale 3 mLs into the lungs 4 times daily 120 vial 1    butalbital-acetaminophen-caffeine (FIORICET, ESGIC) -40 MG per tablet Take 1 tablet by mouth 3 times daily as needed for Headaches 30 tablet 0    NASAL SALINE NA by Nasal route       albuterol (PROVENTIL) (5 MG/ML) 0.5% nebulizer solution Take 1 mL by nebulization every 6 hours as needed for Wheezing or Shortness of Breath 100 vial 1    albuterol (PROVENTIL) (5 MG/ML) 0.5% nebulizer solution Take 1 mL by nebulization every 6 hours as needed for Wheezing or Shortness of Breath 100 vial 1    Multiple Vitamin (MULTI-VITAMIN DAILY PO) Take 1 tablet by mouth daily      aspirin 81 MG tablet Take 1 tablet by mouth daily 90 tablet 2    diphenhydrAMINE (BENADRYL) 25 MG tablet Take 1 tablet by mouth nightly as needed for Itching 90 tablet 2     No current facility-administered medications for this visit. PHYSICAL EXAM:   Pulse 76   Resp 16   Ht 5' 8\" (1.727 m)   Wt 263 lb (119.3 kg)   SpO2 97%   BMI 39.99 kg/m²     The right knee is examined:  Inspection:  Skin is intact. No erythema. Palpation:  No swelling or acute tenderness. Neuro/Vascular/Motor:  Sensation to light touch intact. Capillary refill brisk. No focal motor deficits. DIAGNOSIS:   No diagnosis found. PROCEDURE:   Right Knee Aspiration / Injection Procedure:  Multiple treatment options were discussed. Joint injection was recommended. Details of the procedure, potential risks, and potential benefits were discussed. Patient's questions were answered. Patient elected to proceed with procedure. [address] #:[id number]  Lot #:[id number]  Procedure:  Sterile technique was used as the skin over the injection site was double preped with betadine and alcohol. The knee joint was then injected with the above listed medication. A sterile bandage was placed over the injection site. The patient tolerated the procedure well without complication.   The

## 2021-12-09 NOTE — PATIENT INSTRUCTIONS
Continue weight-bearing as tolerated. Continue range of motion exercises as instructed. Ice and elevate as needed. Tylenol or Motrin for pain.   Gel #2 given today in the right knee  Follow up in one week

## 2021-12-12 DIAGNOSIS — G47.9 SLEEP DISTURBANCE: ICD-10-CM

## 2021-12-12 DIAGNOSIS — F41.9 ANXIETY: ICD-10-CM

## 2021-12-13 RX ORDER — HYDROXYZINE HYDROCHLORIDE 25 MG/1
25 TABLET, FILM COATED ORAL NIGHTLY
Qty: 30 TABLET | Refills: 1 | Status: SHIPPED | OUTPATIENT
Start: 2021-12-13 | End: 2022-02-23

## 2021-12-15 ENCOUNTER — PROCEDURE VISIT (OUTPATIENT)
Dept: PULMONOLOGY | Age: 71
End: 2021-12-15
Payer: MEDICARE

## 2021-12-15 DIAGNOSIS — J45.30 MILD PERSISTENT ASTHMA WITHOUT COMPLICATION: ICD-10-CM

## 2021-12-15 DIAGNOSIS — J45.30 MILD PERSISTENT ASTHMA WITHOUT COMPLICATION: Primary | ICD-10-CM

## 2021-12-15 DIAGNOSIS — Z72.0 TOBACCO ABUSE: ICD-10-CM

## 2021-12-15 DIAGNOSIS — R06.09 DYSPNEA ON EXERTION: ICD-10-CM

## 2021-12-15 DIAGNOSIS — G47.33 OBSTRUCTIVE SLEEP APNEA: ICD-10-CM

## 2021-12-15 DIAGNOSIS — R05.3 CHRONIC COUGH: ICD-10-CM

## 2021-12-15 LAB
EXPIRATORY TIME-POST: NORMAL
EXPIRATORY TIME: NORMAL
FEF 25-75% %CHNG: NORMAL
FEF 25-75% %PRED-POST: NORMAL
FEF 25-75% %PRED-PRE: NORMAL
FEF 25-75% PRED: NORMAL
FEF 25-75%-POST: NORMAL
FEF 25-75%-PRE: NORMAL
FEV1 %PRED-POST: 67.6 %
FEV1 %PRED-PRE: 70.5 %
FEV1 PRED: 2.15 L
FEV1-POST: 1.45 L
FEV1-PRE: 1.52 L
FEV1/FVC %PRED-POST: 107.4 %
FEV1/FVC %PRED-PRE: 105 %
FEV1/FVC PRED: 76.9 %
FEV1/FVC-POST: 82.6 %
FEV1/FVC-PRE: 80.7 %
FVC %PRED-POST: 63.6 L
FVC %PRED-PRE: 67.9 %
FVC PRED: 2.77 L
FVC-POST: 1.76 L
FVC-PRE: 1.88 L
PEF %PRED-POST: NORMAL
PEF %PRED-PRE: NORMAL
PEF PRED: NORMAL
PEF%CHNG: NORMAL
PEF-POST: NORMAL
PEF-PRE: NORMAL

## 2021-12-15 PROCEDURE — 1123F ACP DISCUSS/DSCN MKR DOCD: CPT | Performed by: INTERNAL MEDICINE

## 2021-12-15 PROCEDURE — 1036F TOBACCO NON-USER: CPT | Performed by: INTERNAL MEDICINE

## 2021-12-15 PROCEDURE — G8399 PT W/DXA RESULTS DOCUMENT: HCPCS | Performed by: INTERNAL MEDICINE

## 2021-12-15 PROCEDURE — 1090F PRES/ABSN URINE INCON ASSESS: CPT | Performed by: INTERNAL MEDICINE

## 2021-12-15 PROCEDURE — G8427 DOCREV CUR MEDS BY ELIG CLIN: HCPCS | Performed by: INTERNAL MEDICINE

## 2021-12-15 PROCEDURE — 3017F COLORECTAL CA SCREEN DOC REV: CPT | Performed by: INTERNAL MEDICINE

## 2021-12-15 PROCEDURE — G8417 CALC BMI ABV UP PARAM F/U: HCPCS | Performed by: INTERNAL MEDICINE

## 2021-12-15 PROCEDURE — 4040F PNEUMOC VAC/ADMIN/RCVD: CPT | Performed by: INTERNAL MEDICINE

## 2021-12-15 PROCEDURE — G8484 FLU IMMUNIZE NO ADMIN: HCPCS | Performed by: INTERNAL MEDICINE

## 2021-12-15 PROCEDURE — 99213 OFFICE O/P EST LOW 20 MIN: CPT | Performed by: INTERNAL MEDICINE

## 2021-12-15 ASSESSMENT — PULMONARY FUNCTION TESTS
FEV1_POST: 1.45
FVC_POST: 1.76
FVC_PREDICTED: 2.77
FVC_PERCENT_PREDICTED_POST: 63.6
FEV1_PERCENT_PREDICTED_PRE: 70.5
FEV1_PERCENT_PREDICTED_POST: 67.6
FEV1_PREDICTED: 2.15
FEV1/FVC_PREDICTED: 76.9
FEV1/FVC_PRE: 80.7
FEV1/FVC_POST: 82.6
FEV1/FVC_PERCENT_PREDICTED_POST: 107.4
FVC_PERCENT_PREDICTED_PRE: 67.9
FEV1/FVC_PERCENT_PREDICTED_PRE: 105.0
FEV1_PRE: 1.52
FVC_PRE: 1.88

## 2021-12-15 NOTE — PROGRESS NOTES
CHIEF COMPLIANT:  Laura Roberts presents to the pulmonary clinic today for evaluation, spirometry testing, review of testing results and pulmonary medications. Her major complaint today is mild persistent asthma, dyspnea on exertion, chronic cough, mild but untreated obstructive sleep apnea, tobacco abuse    HPI: Mrs. Kareen Littlejohn states that she has had a sinus infection recently but denies any recent episodes of asthma exacerbation or bronchitis. She continues on Advair Diskus, Singulair and albuterol rescue. She also mentions that she uses a nebulizer with DuoNeb solution as needed and states that her nebulizer is malfunctioning and sounds like it should stop completely in the near future. She has had no known COVID-19 exposure or infection and is received both initial Alvarez Edwin COVID-19 vaccinations but has not received her booster vaccine yet    Physical Exam:  Constitutional:  She appears well developed and well-nourished. Respiratory: No acute respiratory distress noted  Neurologic: Oriented x3, normal speech    OFFICE SPIROMETRY:  Laura Roberts demonstrates an FEV1 of 1.52 L with an FVC of 1.88 liters. She demonstrates no significant obstructive lung defect. Because her FEV1 and FVC are both mildly reduced I cannot rule out a restrictive lung process without further testing She shows no significant response to bronchodilators. Overall, her lung function has remained stable over the past year. ASSESSMENT:    1. Mild persistent asthma without complication    2. Dyspnea on exertion    3. Obstructive sleep apnea    4. Chronic cough    5. Tobacco abuse          PLAN:   I did advise her to get the Alvarez Peter COVID-19 booster vaccination as soon as possible. I will make no change in her current bronchodilator therapy. I will continue to follow her  Her nebulizer machine will/ would stop in the very near future as it is malfunctioning significantly.   She will need a new med nebulizer and should benefit from its use with albuterol solution. We have discussed the need to maintain yearly flu immunization, pneumococcal vaccination and coronavirus vaccination. We have discussed Coronavirus precaution including handwashing practice, wiping items touched in public such as gas pumps, door handles, shopping carts, etc. Self monitoring for infection - fever, chills, cough, SOB. Should they develop symptoms they should call office for further instructions. Return in about 11 weeks (around 3/2/2022) for Recheck for Asthma, Recheck for Shortness of Breath, Recheck for Obstructive Sleep Apnea. This dictation was performed with a verbal recognition program and it was checked for errors. It is possible that there are still dictated errors within this office note. Any errors should be brought immediately to my attention for correction. All efforts were made to ensure that this office note is accurate.

## 2021-12-16 ENCOUNTER — CARE COORDINATION (OUTPATIENT)
Dept: CARE COORDINATION | Age: 71
End: 2021-12-16

## 2021-12-16 ENCOUNTER — TELEPHONE (OUTPATIENT)
Dept: PULMONOLOGY | Age: 71
End: 2021-12-16

## 2021-12-16 ENCOUNTER — OFFICE VISIT (OUTPATIENT)
Dept: ORTHOPEDIC SURGERY | Age: 71
End: 2021-12-16
Payer: MEDICARE

## 2021-12-16 VITALS
WEIGHT: 263 LBS | HEIGHT: 68 IN | HEART RATE: 64 BPM | BODY MASS INDEX: 39.86 KG/M2 | RESPIRATION RATE: 16 BRPM | OXYGEN SATURATION: 95 %

## 2021-12-16 DIAGNOSIS — M17.11 PRIMARY OSTEOARTHRITIS OF RIGHT KNEE: Primary | ICD-10-CM

## 2021-12-16 PROCEDURE — 96372 THER/PROPH/DIAG INJ SC/IM: CPT | Performed by: ORTHOPAEDIC SURGERY

## 2021-12-16 NOTE — PATIENT INSTRUCTIONS
Final Gelsyn-3 injection  Weightbearing and activities as tolerated  May take Ibuprofen or Motrin as needed  Rest, ice, and elevate as needed  Follow up as needed

## 2021-12-16 NOTE — TELEPHONE ENCOUNTER
Called Jackie at [facility] to notify her that pt did pay for nebulizer out of pocket about 10 years ago and got that from "[employer].

## 2021-12-16 NOTE — PROGRESS NOTES
VISCO-SUPPLEMENTATION INJECTION (Number 3)    HISTORY OF PRESENT ILLNESS (HPI):   Rosmery Elder is a 65y.o. year old female who is here for follow up for visco-supplementation injection number 3 for right knee arthritis    PAST HISTORY:   Unless otherwise specified in this note, the past history is reviewed today with the patient and is as follows-    Allergies   Allergen Reactions    Latex Hives and Rash    Cipro Xr Hives and Shortness Of Breath    Soap Hives and Shortness Of Breath     [facility] So[facility], Tide detergent      Tomato Hives and Shortness Of Breath    Influenza Vaccines Hives    Soybean-Containing Drug Products        Current Outpatient Medications   Medication Sig Dispense Refill    hydrOXYzine (ATARAX) 25 MG tablet Take 1 tablet by mouth nightly 30 tablet 1    lisinopril (PRINIVIL;ZESTRIL) 10 MG tablet Take 1 tablet by mouth daily 90 tablet 0    clopidogrel (PLAVIX) 75 MG tablet TAKE 1 TABLET BY MOUTH DAILY 90 tablet 1    baclofen (LIORESAL) 10 MG tablet Take 1 tablet by mouth 2 times daily 180 tablet 1    montelukast (SINGULAIR) 10 MG tablet Take 1 tablet by mouth nightly 90 tablet 1    simvastatin (ZOCOR) 40 MG tablet Take 1 tablet by mouth nightly 90 tablet 1    cetirizine (ZYRTEC) 10 MG tablet Take one tablet by mouth daily.  90 tablet 1    meclizine (ANTIVERT) 25 MG tablet Take 1 tablet by mouth 2 times daily 30 tablet 0    hydroCHLOROthiazide (HYDRODIURIL) 12.5 MG tablet Take 1 tablet by mouth every other day 90 tablet 0    ADVAIR DISKUS 500-50 MCG/DOSE diskus inhaler Inhale 1 puff into the lungs every 12 hours After inhalation then gargle 1 Inhaler 11    albuterol sulfate HFA (PROVENTIL HFA) 108 (90 Base) MCG/ACT inhaler Inhale 2 puffs into the lungs every 4 hours as needed for Wheezing or Shortness of Breath (cough) 1 Inhaler 2    fluticasone (FLONASE) 50 MCG/ACT nasal spray 1 spray by Each Nostril route daily 1 Bottle 1    ipratropium-albuterol (DUONEB) 0.5-2.5 (3) MG/3ML SOLN nebulizer solution Inhale 3 mLs into the lungs 4 times daily 120 vial 1    butalbital-acetaminophen-caffeine (FIORICET, ESGIC) -40 MG per tablet Take 1 tablet by mouth 3 times daily as needed for Headaches 30 tablet 0    NASAL SALINE NA by Nasal route       albuterol (PROVENTIL) (5 MG/ML) 0.5% nebulizer solution Take 1 mL by nebulization every 6 hours as needed for Wheezing or Shortness of Breath 100 vial 1    albuterol (PROVENTIL) (5 MG/ML) 0.5% nebulizer solution Take 1 mL by nebulization every 6 hours as needed for Wheezing or Shortness of Breath 100 vial 1    Multiple Vitamin (MULTI-VITAMIN DAILY PO) Take 1 tablet by mouth daily      aspirin 81 MG tablet Take 1 tablet by mouth daily 90 tablet 2    diphenhydrAMINE (BENADRYL) 25 MG tablet Take 1 tablet by mouth nightly as needed for Itching 90 tablet 2     No current facility-administered medications for this visit. PHYSICAL EXAM:   There were no vitals taken for this visit. The right knee is examined:  Inspection:  Skin is intact. No erythema. Palpation:  No swelling or acute tenderness. Neuro/Vascular/Motor:  Sensation to light touch intact. Capillary refill brisk. No focal motor deficits. DIAGNOSIS:   No diagnosis found. PROCEDURE:   Right Knee Injection Procedure:  Multiple treatment options were discussed. Joint injection was recommended. Details of the procedure, potential risks, and potential benefits were discussed. Patient's questions were answered. Patient elected to proceed with procedure. [facility] #: [id number]  Lot #: [id number]  Procedure:  Sterile technique was used as the skin over the injection site was double preped with betadine and alcohol. A sterile bandage was placed over the injection site. The patient tolerated the procedure well without complication. She can follow-up as needed for repeat evaluation if symptoms worsen.   We can repeat this round of injections in 6 months or a year if desired.

## 2021-12-30 ENCOUNTER — CARE COORDINATION (OUTPATIENT)
Dept: CARE COORDINATION | Age: 71
End: 2021-12-30

## 2022-01-04 ENCOUNTER — TELEPHONE (OUTPATIENT)
Dept: PULMONOLOGY | Age: 72
End: 2022-01-04

## 2022-01-04 NOTE — TELEPHONE ENCOUNTER
Please addend last note dictating that nebulizer machine will/would stop in the near future. Insurance is being technical about what it should say.    Please advise

## 2022-01-06 ENCOUNTER — VIRTUAL VISIT (OUTPATIENT)
Dept: FAMILY MEDICINE CLINIC | Age: 72
End: 2022-01-06
Payer: MEDICARE

## 2022-01-06 DIAGNOSIS — M54.50 CHRONIC MIDLINE LOW BACK PAIN WITHOUT SCIATICA: Primary | ICD-10-CM

## 2022-01-06 DIAGNOSIS — G89.29 CHRONIC MIDLINE LOW BACK PAIN WITHOUT SCIATICA: Primary | ICD-10-CM

## 2022-01-06 PROCEDURE — G8484 FLU IMMUNIZE NO ADMIN: HCPCS | Performed by: NURSE PRACTITIONER

## 2022-01-06 PROCEDURE — 99213 OFFICE O/P EST LOW 20 MIN: CPT | Performed by: NURSE PRACTITIONER

## 2022-01-06 PROCEDURE — 4040F PNEUMOC VAC/ADMIN/RCVD: CPT | Performed by: NURSE PRACTITIONER

## 2022-01-06 PROCEDURE — G8427 DOCREV CUR MEDS BY ELIG CLIN: HCPCS | Performed by: NURSE PRACTITIONER

## 2022-01-06 PROCEDURE — 1036F TOBACCO NON-USER: CPT | Performed by: NURSE PRACTITIONER

## 2022-01-06 PROCEDURE — G8399 PT W/DXA RESULTS DOCUMENT: HCPCS | Performed by: NURSE PRACTITIONER

## 2022-01-06 PROCEDURE — 1123F ACP DISCUSS/DSCN MKR DOCD: CPT | Performed by: NURSE PRACTITIONER

## 2022-01-06 PROCEDURE — 3017F COLORECTAL CA SCREEN DOC REV: CPT | Performed by: NURSE PRACTITIONER

## 2022-01-06 PROCEDURE — G8417 CALC BMI ABV UP PARAM F/U: HCPCS | Performed by: NURSE PRACTITIONER

## 2022-01-06 PROCEDURE — 1090F PRES/ABSN URINE INCON ASSESS: CPT | Performed by: NURSE PRACTITIONER

## 2022-01-06 ASSESSMENT — ENCOUNTER SYMPTOMS
BACK PAIN: 1
GASTROINTESTINAL NEGATIVE: 1
CHEST TIGHTNESS: 0
WHEEZING: 0
SHORTNESS OF BREATH: 0
COUGH: 0

## 2022-01-06 NOTE — PROGRESS NOTES
2022    TELEHEALTH EVALUATION -- Audio/Visual (During PFJOU-55 public health emergency)    HPI:    Yonathan Ariza (:  1950) has requested an audio/video evaluation for the following concern(s): Jelena Lara is a 66year old female who is in as a virtual visit to address low back pain. She states her pain started about 2 weeks ago. She states she has a long history of low back pain and told she had a herniated disc. She states years ago she went through physical therapy which did help. She rates her pain today as a 9/10. She states she has tried tylenol, is on a muscle relaxant, and has used topical cream as well as heat application with temporary relief. She denies pain, numbness or tingling down legs. She denies falls. She denies loss of bowel or bladder function. She denies changes in urine, no painful urination or odor. Review of Systems   Constitutional: Negative for activity change, appetite change, chills, diaphoresis, fatigue, fever and unexpected weight change. Respiratory: Negative for cough, chest tightness, shortness of breath and wheezing. Cardiovascular: Negative for chest pain and palpitations. Gastrointestinal: Negative. Genitourinary: Negative. Musculoskeletal: Positive for back pain. Negative for gait problem, joint swelling, neck pain and neck stiffness. Skin: Negative. Neurological: Negative for dizziness, numbness and headaches. Prior to Visit Medications    Medication Sig Taking?  Authorizing Provider   hydrOXYzine (ATARAX) 25 MG tablet Take 1 tablet by mouth nightly Yes Yeyo Light, APRN - CNP   lisinopril (PRINIVIL;ZESTRIL) 10 MG tablet Take 1 tablet by mouth daily Yes Yeyo Light APRN - CNP   clopidogrel (PLAVIX) 75 MG tablet TAKE 1 TABLET BY MOUTH DAILY Yes Yeyo Light, APRN - CNP   baclofen (LIORESAL) 10 MG tablet Take 1 tablet by mouth 2 times daily Yes Yeyo Light APRN - CNP   montelukast (SINGULAIR) 10 MG tablet Take 1 tablet by mouth nightly Yes ADALID Valentino CNP   simvastatin (ZOCOR) 40 MG tablet Take 1 tablet by mouth nightly Yes ADALID Valentino CNP   cetirizine (ZYRTEC) 10 MG tablet Take one tablet by mouth daily.  Yes ADALID Valentino CNP   meclizine (ANTIVERT) 25 MG tablet Take 1 tablet by mouth 2 times daily  Patient taking differently: Take 25 mg by mouth as needed  Yes ADALID Valentino CNP   hydroCHLOROthiazide (HYDRODIURIL) 12.5 MG tablet Take 1 tablet by mouth every other day  Patient taking differently: Take 12.5 mg by mouth every 48 hours  Yes ADALID Valentino CNP   ADVAIR DISKUS 500-50 MCG/DOSE diskus inhaler Inhale 1 puff into the lungs every 12 hours After inhalation then gargle Yes ADALID Valentino CNP   albuterol sulfate HFA (PROVENTIL HFA) 108 (90 Base) MCG/ACT inhaler Inhale 2 puffs into the lungs every 4 hours as needed for Wheezing or Shortness of Breath (cough) Yes ADALID Valentino CNP   fluticasone (FLONASE) 50 MCG/ACT nasal spray 1 spray by Each Nostril route daily Yes ADALID Valentino CNP   butalbital-acetaminophen-caffeine (FIORICET, ESGIC) -40 MG per tablet Take 1 tablet by mouth 3 times daily as needed for Headaches Yes ADALID Valentino CNP   Multiple Vitamin (MULTI-VITAMIN DAILY PO) Take 1 tablet by mouth daily Yes Historical Provider, MD   aspirin 81 MG tablet Take 1 tablet by mouth daily Yes ADALID Valentino CNP   diphenhydrAMINE (BENADRYL) 25 MG tablet Take 1 tablet by mouth nightly as needed for Itching Yes ADALID Valentino CNP   ipratropium-albuterol (DUONEB) 0.5-2.5 (3) MG/3ML SOLN nebulizer solution Inhale 3 mLs into the lungs 4 times daily  Patient not taking: Reported on 1/6/2022  ADALID Valentino CNP   NASAL SALINE NA by Nasal route   Patient not taking: Reported on 1/6/2022  Historical Provider, MD   albuterol (PROVENTIL) (5 MG/ML) 0.5% nebulizer solution Take 1 mL by nebulization every 6 hours as needed for Wheezing or Shortness of Breath  Patient not taking: Reported on 2022  ADALID Larson - CNP       Social History     Tobacco Use    Smoking status: Former Smoker     Packs/day: 1.00     Years: 30.00     Pack years: 30.00     Types: Cigarettes     Quit date: 2021     Years since quittin.4    Smokeless tobacco: Never Used   Vaping Use    Vaping Use: Never used   Substance Use Topics    Alcohol use: Yes     Comment: socially    Drug use: No          PHYSICAL EXAMINATION:  [ INSTRUCTIONS:  \"[x]\" Indicates a positive item  \"[]\" Indicates a negative item  -- DELETE ALL ITEMS NOT EXAMINED]  Vital Signs: (As obtained by patient/caregiver or practitioner observation)    Blood pressure-  Heart rate-    Respiratory rate-    Temperature-  Pulse oximetry-     Constitutional: [x] Appears well-developed and well-nourished [x] No apparent distress      [] Abnormal-   Mental status  [x] Alert and awake  [x] Oriented to person/place/time [x]Able to follow commands      Eyes:  EOM    []  Normal  [] Abnormal-  Sclera  [x]  Normal  [] Abnormal -         Discharge []  None visible  [] Abnormal -    HENT:   [x] Normocephalic, atraumatic.   [] Abnormal   [] Mouth/Throat: Mucous membranes are moist.     External Ears [x] Normal  [] Abnormal-     Neck: [x] No visualized mass     Pulmonary/Chest: [x] Respiratory effort normal.  [x] No visualized signs of difficulty breathing or respiratory distress        [] Abnormal-      Musculoskeletal:   [] Normal gait with no signs of ataxia         [x] Normal range of motion of neck        [] Abnormal-       Neurological:        [x] No Facial Asymmetry (Cranial nerve 7 motor function) (limited exam to video visit)          [] No gaze palsy        [] Abnormal-         Skin:        [x] No significant exanthematous lesions or discoloration noted on facial skin         [] Abnormal-            Psychiatric:       [x] Normal Affect [] No Hallucinations        [] Abnormal-     Other pertinent observable physical exam findings- ASSESSMENT/PLAN:  1. Chronic midline low back pain without sciatica  - [facility] [place]    Fluids, rest  Referred for physical therapy - wishes to go the location where she had therapy before  Continue current medication regimen  Continue to use heat to low back at least 3 times a day  Verbalized understanding and agreement with plan      No follow-ups on file. Shawnee Eddy, was evaluated through a synchronous (real-time) audio-video encounter. The patient (or guardian if applicable) is aware that this is a billable service. Verbal consent to proceed has been obtained within the past 12 months. The visit was conducted pursuant to the emergency declaration under the [facility], [address] [place] [address] authority and the [employer] General Act. Patient identification was verified, and a caregiver was present when appropriate. The patient was located in a state where the provider was credentialed to provide care. Total time spent on this encounter: Not billed by time    --ADALID Castrejon CNP on 1/6/2022 at 6:18 PM    An electronic signature was used to authenticate this note.

## 2022-01-18 ENCOUNTER — CARE COORDINATION (OUTPATIENT)
Dept: CARE COORDINATION | Age: 72
End: 2022-01-18

## 2022-01-18 NOTE — CARE COORDINATION
Completion Date:   11/18/21            Prior to Admission medications    Medication Sig Start Date End Date Taking? Authorizing Provider   hydrOXYzine (ATARAX) 25 MG tablet Take 1 tablet by mouth nightly 12/13/21 2/11/22  ADALID Garcia CNP   lisinopril (PRINIVIL;ZESTRIL) 10 MG tablet Take 1 tablet by mouth daily 11/18/21   ADALID Garcia CNP   clopidogrel (PLAVIX) 75 MG tablet TAKE 1 TABLET BY MOUTH DAILY 11/3/21   ADALID Garcia CNP   baclofen (LIORESAL) 10 MG tablet Take 1 tablet by mouth 2 times daily 10/12/21   ADALID Garcia CNP   montelukast (SINGULAIR) 10 MG tablet Take 1 tablet by mouth nightly 10/12/21   ADALID Garcia CNP   simvastatin (ZOCOR) 40 MG tablet Take 1 tablet by mouth nightly 10/12/21   ADALID Garcia CNP   cetirizine (ZYRTEC) 10 MG tablet Take one tablet by mouth daily.  10/12/21   ADALID Garcia CNP   meclizine (ANTIVERT) 25 MG tablet Take 1 tablet by mouth 2 times daily  Patient taking differently: Take 25 mg by mouth as needed  10/12/21   ADALID Garcia CNP   hydroCHLOROthiazide (HYDRODIURIL) 12.5 MG tablet Take 1 tablet by mouth every other day  Patient taking differently: Take 12.5 mg by mouth every 48 hours  8/27/21   ADALID Garcia CNP   ADVAIR DISKUS 500-50 MCG/DOSE diskus inhaler Inhale 1 puff into the lungs every 12 hours After inhalation then gargle 8/3/21   ADALID Garcia CNP   albuterol sulfate HFA (PROVENTIL HFA) 108 (90 Base) MCG/ACT inhaler Inhale 2 puffs into the lungs every 4 hours as needed for Wheezing or Shortness of Breath (cough) 8/3/21   ADALID Garcia CNP   fluticasone (FLONASE) 50 MCG/ACT nasal spray 1 spray by Each Nostril route daily 8/3/21   ADALID Garcia CNP   ipratropium-albuterol (DUONEB) 0.5-2.5 (3) MG/3ML SOLN nebulizer solution Inhale 3 mLs into the lungs 4 times daily  Patient not taking: Reported on 1/6/2022 6/21/21   Yeyo Light, ADALID - CNP butalbital-acetaminophen-caffeine (FIORICET, ESGIC) -40 MG per tablet Take 1 tablet by mouth 3 times daily as needed for Headaches 6/21/21   ADALID Sinha CNP   NASAL SALINE NA by Nasal route   Patient not taking: Reported on 1/6/2022    Historical Provider, MD   albuterol (PROVENTIL) (5 MG/ML) 0.5% nebulizer solution Take 1 mL by nebulization every 6 hours as needed for Wheezing or Shortness of Breath  Patient not taking: Reported on 1/6/2022 5/21/21 8/18/26  ADALID Sinha CNP   Multiple Vitamin (MULTI-VITAMIN DAILY PO) Take 1 tablet by mouth daily    Historical Provider, MD   aspirin 81 MG tablet Take 1 tablet by mouth daily 9/4/19   ADALID Sinha CNP   diphenhydrAMINE (BENADRYL) 25 MG tablet Take 1 tablet by mouth nightly as needed for Itching 9/4/19   ADALID Sinha CNP       Future Appointments   Date Time Provider [place] Weiss   1/21/2022 11:15 AM Abner Mcintyre, PT [facility]   3/2/2022 10:30 AM Scout Berger MD [facility]   3/29/2022 11:40 AM Vania Lima MD [facility]   4/12/2022 10:00 AM ADALID Sinha CNP [facility]      and   General Assessment    Do you have any symptoms that are causing concern?: No

## 2022-01-21 ENCOUNTER — HOSPITAL ENCOUNTER (OUTPATIENT)
Dept: PHYSICAL THERAPY | Age: 72
Setting detail: THERAPIES SERIES
Discharge: HOME | End: 2022-01-21
Payer: MEDICARE

## 2022-01-21 PROCEDURE — 97110 THERAPEUTIC EXERCISES: CPT

## 2022-01-21 PROCEDURE — 97162 PT EVAL MOD COMPLEX 30 MIN: CPT

## 2022-01-21 PROCEDURE — 97140 MANUAL THERAPY 1/> REGIONS: CPT

## 2022-01-21 ASSESSMENT — PAIN DESCRIPTION - FREQUENCY: FREQUENCY: CONTINUOUS

## 2022-01-21 ASSESSMENT — PAIN DESCRIPTION - PROGRESSION: CLINICAL_PROGRESSION: GRADUALLY WORSENING

## 2022-01-21 ASSESSMENT — PAIN DESCRIPTION - LOCATION: LOCATION: BACK

## 2022-01-21 ASSESSMENT — PAIN SCALES - GENERAL: PAINLEVEL_OUTOF10: 5

## 2022-01-21 ASSESSMENT — PAIN DESCRIPTION - ORIENTATION: ORIENTATION: LOWER

## 2022-01-21 ASSESSMENT — PAIN DESCRIPTION - PAIN TYPE: TYPE: CHRONIC PAIN

## 2022-01-21 ASSESSMENT — PAIN DESCRIPTION - ONSET: ONSET: GRADUAL

## 2022-01-21 ASSESSMENT — PAIN DESCRIPTION - DESCRIPTORS: DESCRIPTORS: ACHING;SORE

## 2022-01-21 ASSESSMENT — PAIN - FUNCTIONAL ASSESSMENT: PAIN_FUNCTIONAL_ASSESSMENT: PREVENTS OR INTERFERES SOME ACTIVE ACTIVITIES AND ADLS

## 2022-01-21 NOTE — PROGRESS NOTES
Physical Therapy  Initial Assessment  Date: 2022  Patient Name: Sebastian Nicholson  MRN: [id number]  : 1950     Treatment Diagnosis: LBP. pelvic misalignment    Restrictions: allergic to latex       Subjective   General  Chart Reviewed: Yes  Patient assessed for rehabilitation services?: Yes  Additional Pertinent Hx: HTN, arthritis, asthma, allergic to latex  Referring Practitioner: ADALID Chamberlain CNP  Referral Date : 22  Diagnosis: Chronic midline LBP w/o sciatica  Follows Commands: Within Functional Limits  PT Visit Information  PT Insurance Information: [facility] Medicare - [place] Stamp  Subjective  Subjective: Pt notes that she has had a bad back for the last 40-50 years. Its usually not an issue but she is currently in a flare up. She notes that her current pain got bad a week ago but gradually increased. The pain has affected her walking, cleaning and standing. The cold is making her pain worse, heating pack and topical cream have helped some. She was trying her old therapy exercises for her back, but they have not helped. She is not having her usual sciatic pain down her L leg but is noting similar pain in BL (L>R) buttock and groin area. Pt denies any loss of bowel or bladder. She did not do anything different to start the pain. Pain Screening  Patient Currently in Pain: Yes  Pain Assessment  Pain Assessment: 0-10  Pain Level: 5  Pain Type: Chronic pain  Pain Location: Back  Pain Orientation: Lower  Pain Descriptors: Aching; Sore  Pain Frequency: Continuous  Pain Onset: Gradual  Clinical Progression: Gradually worsening  Functional Pain Assessment: Prevents or interferes some active activities and ADLs  Vital Signs  Patient Currently in Pain: Yes    Vision/Hearing  Vision  Vision: Within Functional Limits  Hearing  Hearing: Within functional limits    Orientation  Orientation  Overall Orientation Status: Within Normal Limits    Social/Functional History  Social/Functional History  Lives With: Alone  ADL Assistance: Independent  Homemaking Assistance: Independent  Ambulation Assistance: Independent  Transfer Assistance: Independent    Objective     Observation/Palpation  Palpation: TTP over sacrum and spineous process of lower L spine, none into glutes. mild increased tension BL lumbar paraspinals  Observation: gait: antalgic w/ reduced trunk rotation and step length BL    AROM RLE (degrees)  RLE AROM: WFL  AROM LLE (degrees)  LLE AROM : WFL  Spine  Lumbar: Flex: 25%. ext: neutral. L SB: 25%. R SB: 75%. L Rot: 50% . R Rot: 25%  Joint Mobility  Spine: L posterior innominate rotation    Strength RLE  Strength RLE: Exception  R Hip Flexion: 4-/5  R Hip ABduction: 4-/5  R Hip ADduction: 4/5  R Knee Flexion: 4-/5  R Knee Extension: 4/5  Strength LLE  Strength LLE: Exception  L Hip Flexion: 3+/5  L Hip ABduction: 4-/5  L Hip ADduction: 4/5  L Knee Flexion: 3+/5  L Knee Extension: 4-/5  Strength Other  Other: poor core strenght/ stability and control     Additional Measures  Flexibility: hamstring 90/90: R lacking 28 deg. L lacking 31 deg  Sensation  Overall Sensation Status: WFL       Assessment   Conditions Requiring Skilled Therapeutic Intervention  Body structures, Functions, Activity limitations: Decreased functional mobility ; Decreased ADL status; Decreased ROM; Decreased strength;Decreased high-level IADLs;Decreased balance; Increased pain;Decreased posture  Assessment: Pt is a 58-year-old female who presents to therapy with acute on chronic LBP starting last week that has affected her ADLs and IADLs. Upon assessment, pt does present with pelvic misalignment, impaired lumbar/ spinal ROM, BLE and core weakness, hamstring tightness and LBP. Pt would benefit from skilled therapy interventions to address listed impairments, progress toward goal completion and improve ADL/IADL status. PT also warranted to reduce risk for further injury or decline. Treatment Diagnosis: LBP.  pelvic misalignment  Prognosis: Good  Decision Making: Medium Complexity  History: see above. PLOF: independent  Exam: see above  Clinical Presentation: see above  Barriers to Learning: none. style: demo  REQUIRES PT FOLLOW UP: Yes  Treatment Initiated : yes on 1/21    Patient agrees with established plan of care and assisted in the development of their short term and long term goals. Patient had no adverse reaction with initial treatment and there are no barriers to learning. Learning preferences include demonstration, practice, and handouts. Patient expressed understanding of HEP and appears to be motivated to participate in an active PT program including compliance with HEP expectations.           Plan   Plan  Times per week: 2  Times per day: Daily  Plan weeks: 5  Specific instructions for Next Treatment: nustep, core strength, BLE strength, pelvic alignment  Current Treatment Recommendations: Strengthening,IADL Training,Neuromuscular Re-education,Home Exercise Program,ROM,Manual Therapy - Soft Tissue Mobilization,Safety Education & Training,Balance Training,Patient/Caregiver Education & Training,Functional Mobility Training,Gait Training,Modalities,Pain Management,ADL/Self-care Training      OutComes Score   Oswestry: 22/50               Goals  Short term goals  Time Frame for Short term goals: 5 visits  Short term goal 1: Pt will be IND with HEP in order to maximize recovery outside of clinic  Long term goals  Time Frame for Long term goals : 10 visits  Long term goal 1: Pt will improve BLE gross strength to 4/5 to aide in gait  Long term goal 2: Pt will improve gross Lumbar spinal motion to 50% or more to aide in IADLs  Long term goal 3: Pt will improve hamstring 90/90 BL to lacking 20 or less to show improved ham length and reduced pelvic pull  Long term goal 4: Pt will present with neutral pelvic alignment to improve normal resting position of SIJ and reduce pain  Long term goal 5: Pt will improve Oswestry to 12 or less to show [address] and subjective improvement  Patient Goals   Patient goals : reduce back pain       Therapy Time: 6965 - 7003    Michael Lanza, PT, DPT

## 2022-01-21 NOTE — PLAN OF CARE
Outpatient Physical Therapy           [place]           [x] Phone: [phone]   Fax: [phone]  [facility]           [] Phone: [phone]   Fax: [phone]     To: Referring Practitioner: ADALID Em CNP  From: Kassidy Rowland PT, DPT     Patient: Annette Leblanc       : 1950  Diagnosis: Diagnosis: Chronic midline LBP w/o sciatica   Treatment Diagnosis: Treatment Diagnosis: LBP. pelvic misalignment   Date: 2022    Physical Therapy Certification/Re-Certification Form  Dear Moshe Brown,  The following patient has been evaluated for physical therapy services and for therapy to continue, insurance requires physician review of the treatment plan initially and every 90 days. Please review the attached evaluation and/or summary of the patient's plan of care, and verify that you agree therapy should continue by signing the attached document and sending it back to our office. Assessment:    Assessment: Pt is a 58-year-old female who presents to therapy with acute on chronic LBP starting last week that has affected her ADLs and IADLs. Upon assessment, pt does present with pelvic misalignment, impaired lumbar/ spinal ROM, BLE and core weakness, hamstring tightness and LBP. Pt would benefit from skilled therapy interventions to address listed impairments, progress toward goal completion and improve ADL/IADL status. PT also warranted to reduce risk for further injury or decline. Patient agrees with established plan of care and assisted in the development of their short term and long term goals. Patient had no adverse reaction with initial treatment and there are no barriers to learning. Learning preferences include demonstration, practice, and handouts. Patient expressed understanding of HEP and appears to be motivated to participate in an active PT program including compliance with HEP expectations.       Plan of Care/Treatment to date:  [x] Therapeutic Exercise  [x] Modalities:  [x] Therapeutic Activity     [] Ultrasound  [x] Electrical Stimulation  [x] Gait Training      [] Cervical Traction [] Lumbar Traction  [x] Neuromuscular Re-education    [x] Cold/hotpack [] Iontophoresis   [x] Instruction in HEP      [x] Vasopneumatic    [] Dry Needling  [x] Manual Therapy               [] Aquatic Therapy       Other:          Frequency/Duration:  # Days per week: [] 1 day # Weeks: [] 1 week [x] 5 weeks     [x] 2 days   [] 2 weeks [] 6 weeks     [] 3 days   [] 3 weeks [] 7 weeks     [] 4 days   [] 4 weeks [] 8 weeks         [] 9 weeks [] 10 weeks         [] 11 weeks [] 12 weeks    Rehab Potential/Progress: [] Excellent [x] Good [] Fair  [] Poor     Goals:    Patient goals : reduce back pain  Short term goals  Time Frame for Short term goals: 5 visits  Short term goal 1: Pt will be IND with HEP in order to maximize recovery outside of clinic  Long term goals  Time Frame for Long term goals : 10 visits  Long term goal 1: Pt will improve BLE gross strength to 4/5 to aide in gait  Long term goal 2: Pt will improve gross Lumbar spinal motion to 50% or more to aide in IADLs  Long term goal 3: Pt will improve hamstring 90/90 BL to lacking 20 or less to show improved ham length and reduced pelvic pull  Long term goal 4: Pt will present with neutral pelvic alignment to improve normal resting position of SIJ and reduce pain  Long term goal 5: Pt will improve Oswestry to 12 or less to show Sheela Her[address] and subjective improvement      Electronically signed by:  Jules Nelson PT, DPT, 1/21/2022, 12:17 PM        If you have any questions or concerns, please don't hesitate to call.   Thank you for your referral.      Physician Signature:________________________________Date:_________ TIME: _____  By signing above, therapists plan is approved by physician

## 2022-01-21 NOTE — FLOWSHEET NOTE
Outpatient Physical Therapy  [place]           [x] Phone: [phone]   Fax: [phone]  Tanisha shankar           [] Phone: [phone]   Fax: [phone]        Physical Therapy Daily Treatment Note  Date:  2022    Patient Name:  Stefanie Patton    :  1950  MRN: [id number]  Restrictions/Precautions:  none  Diagnosis:   Diagnosis: Chronic midline LBP w/o sciatica  Date of Injury/Surgery:   Treatment Diagnosis: Treatment Diagnosis: LBP. pelvic misalignment    Insurance/Certification information: PT Insurance Information: 5 [facility]   Referring Physician:  Referring Practitioner: ADALID Rios CNP  Next Doctor Visit:    Plan of care signed (Y/N):  Sent   Outcome Measure: Oswestry:   Visit# / total visits:  1 /10  Pain level: 5/10     Goals:     Patient goals : reduce back pain  Short term goals  Time Frame for Short term goals: 5 visits  Short term goal 1: Pt will be IND with HEP in order to maximize recovery outside of clinic  Long term goals  Time Frame for Long term goals : 10 visits  Long term goal 1: Pt will improve BLE gross strength to 4/5 to aide in gait  Long term goal 2: Pt will improve gross Lumbar spinal motion to 50% or more to aide in IADLs  Long term goal 3: Pt will improve hamstring 90/90 BL to lacking 20 or less to show improved ham length and reduced pelvic pull  Long term goal 4: Pt will present with neutral pelvic alignment to improve normal resting position of SIJ and reduce pain  Long term goal 5: Pt will improve Oswestry to 12 or less to show MDC and subjective improvement    Summary of Evaluation: Assessment: Pt is a 68-year-old female who presents to therapy with acute on chronic LBP starting last week that has affected her ADLs and IADLs. Upon assessment, pt does present with pelvic misalignment, impaired lumbar/ spinal ROM, BLE and core weakness, hamstring tightness and LBP.  Pt would benefit from skilled therapy interventions to address listed impairments, progress toward goal completion and improve ADL/IADL status. PT also warranted to reduce risk for further injury or decline. Subjective:  See eval         Any changes in Ambulatory Summary Sheet? None        Objective:  See eval   COVID screening questions were asked and patient attested that there had been no contact or symptoms        Exercises: (No more than 4 columns)   Exercise/Equipment 1/21/22 #1 Date Date           WARM UP                     TABLE      PPT X10, 5\"     bridges X10, 3\"     Ham stretch X30\" BL                    STANDING                                                     PROPRIOCEPTION                                    MODALITIES                      Other Therapeutic Activities/Education:  HEP and importance of completion, POC and goals, anatomy and physiology related to condition      Home Exercise Program: Issued, practiced and pt demo ability to perform 1/21/2022      Manual Treatments:  MET for L post innominate rot w/ shot gun follow      Modalities:  none      Communication with other providers:  POC sent      Assessment: Pt tolerated today's treatment without any adverse reactions or complications this date. End pain: 2-3/10 \"felt much better and less pain\"    Assessment: Pt is a 75-year-old female who presents to therapy with acute on chronic LBP starting last week that has affected her ADLs and IADLs. Upon assessment, pt does present with pelvic misalignment, impaired lumbar/ spinal ROM, BLE and core weakness, hamstring tightness and LBP. Pt would benefit from skilled therapy interventions to address listed impairments, progress toward goal completion and improve ADL/IADL status. PT also warranted to reduce risk for further injury or decline.       Plan for Next Session: Specific instructions for Next Treatment: nustep, core strength, BLE strength, pelvic alignment      Time In / Time Out:  1115 - 1154      Timed Code/Total Treatment Minutes:  39': 8' TE x1, 8' MT x1, 23' Eval x1      Next Progress Note due:  10th visit      Plan of Care Interventions:  [x] Therapeutic Exercise  [x] Modalities:  [x] Therapeutic Activity     [x] Ultrasound  [x] Estim  [x] Gait Training      [] Cervical Traction [] Lumbar Traction  [x] Neuromuscular Re-education    [x] Cold/hotpack [] Iontophoresis   [x] Instruction in HEP      [x] Vasopneumatic   [] Dry Needling    [x] Manual Therapy               [] Aquatic Therapy              Electronically signed by:  Zoila Talamantes PT, DPT 1/21/2022, 12:18 PM

## 2022-01-28 ENCOUNTER — HOSPITAL ENCOUNTER (OUTPATIENT)
Dept: PHYSICAL THERAPY | Age: 72
Discharge: HOME | End: 2022-01-28

## 2022-01-28 ENCOUNTER — TELEPHONE (OUTPATIENT)
Dept: FAMILY MEDICINE CLINIC | Age: 72
End: 2022-01-28

## 2022-01-28 NOTE — TELEPHONE ENCOUNTER
Per Robin Dailey from [address] [place] [address] on Aging [phone] requesting a verbal ok to enroll in [employer]. Will need to verify regular diet, and diagnosis Herniated disc, HTN and Asthma. Twilla Phoenix, APRN consulted verbally and approved. Note to Twilla Phoenix, APRN.

## 2022-01-28 NOTE — FLOWSHEET NOTE
Physical Therapy  Cancellation/No-show Note  Patient Name:  Sebastian Nicholson  :  1950   Date:  2022  Cancelled visits to date: 1  No-shows to date: 0    For today's appointment patient:  [x]  Cancelled  []  Rescheduled appointment  []  No-show     Reason given by patient:  [x]  Patient ill  []  Conflicting appointment  []  No transportation    []  Conflict with work  []  No reason given  []  Other:     Comments:      Electronically signed by:  Raciel Stein PTA    9:35 AM  2022

## 2022-02-01 ENCOUNTER — HOSPITAL ENCOUNTER (OUTPATIENT)
Dept: PHYSICAL THERAPY | Age: 72
Setting detail: THERAPIES SERIES
Discharge: HOME | End: 2022-02-01
Payer: MEDICARE

## 2022-02-01 PROCEDURE — 97112 NEUROMUSCULAR REEDUCATION: CPT

## 2022-02-01 PROCEDURE — 97140 MANUAL THERAPY 1/> REGIONS: CPT

## 2022-02-01 PROCEDURE — 97110 THERAPEUTIC EXERCISES: CPT

## 2022-02-01 NOTE — FLOWSHEET NOTE
Outpatient Physical Therapy  [place]           [x] Phone: [phone]   Fax: [phone]  [facility]           [] Phone: [phone]   Fax: [phone]        Physical Therapy Daily Treatment Note  Date:  2022    Patient Name:  Belen Sheehan    :  1950  MRN: [id number]  Restrictions/Precautions:  none  Diagnosis:   Diagnosis: Chronic midline LBP w/o sciatica  Date of Injury/Surgery:   Treatment Diagnosis: Treatment Diagnosis: LBP. pelvic misalignment    Insurance/Certification information: PT Insurance Information: 5 [employer]   Referring Physician:  Referring Practitioner: ADALID Cruz CNP  Next Doctor Visit:    Plan of care signed (Y/N):  Sent   Outcome Measure: Oswestry:   Visit# / total visits:  2/10  Pain level: 0/10     Goals:     Patient goals : reduce back pain  Short term goals  Time Frame for Short term goals: 5 visits  Short term goal 1: Pt will be IND with HEP in order to maximize recovery outside of clinic  Long term goals  Time Frame for Long term goals : 10 visits  Long term goal 1: Pt will improve BLE gross strength to 4/5 to aide in gait  Long term goal 2: Pt will improve gross Lumbar spinal motion to 50% or more to aide in IADLs  Long term goal 3: Pt will improve hamstring 90/90 BL to lacking 20 or less to show improved ham length and reduced pelvic pull  Long term goal 4: Pt will present with neutral pelvic alignment to improve normal resting position of SIJ and reduce pain  Long term goal 5: Pt will improve Oswestry to 12 or less to show Sheela Her[address] and subjective improvement    Summary of Evaluation:          Subjective: Johanny Bueno arrives to therapy stating that the back doesn't really hurt, it's just stiff. Any changes in Ambulatory Summary Sheet? None        Objective:   COVID screening questions were asked and patient attested that there had been no contact or symptoms    Up slip noted on L, some correction with leg pull.    Significant difficulty with proper muscle recruitment for PPT despite verbal and tactile cues. Exercises: (No more than 4 columns) MERISSA  Exercise/Equipment 1/21/22 #1 2/1/2022 #2 Date           WARM UP      Nu-step  S12/A11/L5  5'          TABLE      PPT X10, 5\" 10*5\"    Add squeeze with TA  10*5\"    bridges X10, 3\" 10*3\"    Ham stretch seated  X30\" BL 2*30\" ea     STS  10*    Ball push into lap pink ball  10*3\"             STANDING      Palloff press   RTB 10* ea                                             PROPRIOCEPTION                                    MODALITIES                      Other Therapeutic Activities/Education:        Home Exercise Program: Issued, practiced and pt demo ability to perform 1/21/2022      Manual Treatments:  MET for L post innominate rot w/ shot gun follow. Leg pull on the L as well. Modalities:  none      Communication with other providers:  POC sent      Assessment: Merissa tolerated today's session well with no reports of increased pain. She is weak in her core muscles with difficulty with proper muscle activation for isolated activities. She demonstrates a moderate up slip which only corrects ~ 50% with leg pull.  End session pain: 3/10 back soreness       Plan for Next Session: Specific instructions for Next Treatment: nustep, core strength, BLE strength, pelvic alignment      Time In / Time Out:   1035/1115    Timed Code/Total Treatment Minutes:  36' 1 man 10' 1 NR 12' 1 TE 18'     Next Progress Note due:  10th visit      Plan of Care Interventions:  [x] Therapeutic Exercise  [x] Modalities:  [x] Therapeutic Activity     [x] Ultrasound  [x] Estim  [x] Gait Training      [] Cervical Traction [] Lumbar Traction  [x] Neuromuscular Re-education    [x] Cold/hotpack [] Iontophoresis   [x] Instruction in HEP      [x] Vasopneumatic   [] Dry Needling    [x] Manual Therapy               [] Aquatic Therapy              Electronically signed by:  Yazmin Morton PTA     2/1/2022, 9:02 AM

## 2022-02-02 DIAGNOSIS — Z76.0 MEDICATION REFILL: ICD-10-CM

## 2022-02-02 RX ORDER — SIMVASTATIN 40 MG
40 TABLET ORAL NIGHTLY
Qty: 90 TABLET | Refills: 1 | Status: SHIPPED | OUTPATIENT
Start: 2022-02-02 | End: 2022-04-13 | Stop reason: SDUPTHER

## 2022-02-03 ENCOUNTER — CARE COORDINATION (OUTPATIENT)
Dept: CARE COORDINATION | Age: 72
End: 2022-02-03

## 2022-02-07 ENCOUNTER — CARE COORDINATION (OUTPATIENT)
Dept: CARE COORDINATION | Age: 72
End: 2022-02-07

## 2022-02-07 ENCOUNTER — HOSPITAL ENCOUNTER (OUTPATIENT)
Dept: PHYSICAL THERAPY | Age: 72
Setting detail: THERAPIES SERIES
Discharge: HOME | End: 2022-02-07
Payer: MEDICARE

## 2022-02-07 PROCEDURE — 97112 NEUROMUSCULAR REEDUCATION: CPT

## 2022-02-07 PROCEDURE — 97110 THERAPEUTIC EXERCISES: CPT

## 2022-02-07 NOTE — CARE COORDINATION
Ambulatory Care Coordination Note  2/7/2022  CM Risk Score: 2  Charlson 10 Year Mortality Risk Score: 79%     ACC: Ilene Maher RN    Summary Note:    Return call received from patient. Patient reports that she is feeling ok. Reports that she is in the process of having some dental work done and was contacted by Provider that insurance denied her claim. Patient is inquiring as to how to appeal. Claims generally must be managed by patient directly or Payor CM. Confirmed that patient has assigned CM per [facility]. Instructed patient to reach out for further advisement. Patient denies questions or concerns. Further assessment deferred per patient. AC contact information confirmed should questions arise. Plan for next outreach: address care gaps, ACP support; progress toward graduation if no needs arise. Care Coordination Interventions    Program Enrollment: Complex Care  Referral from Primary Care Provider: No  Suggested Interventions and Community Resources  Fall Risk Prevention: In Process  Marketplace Navigator: Completed  Medi Set or Pill Pack: Declined  Pharmacist: Declined  Physical Therapy: In Process  Registered Dietician: Declined  Social Work: Completed  Other Services: In Process (Comment: AAA)         Goals Addressed                 This Visit's Progress     Community Resource Goal   On track     I will complete referral to [place] [id number] for assistance. Barriers: overwhelmed by complexity of regimen and stress  Plan for overcoming my barriers: AC referral to LSW. Patient to complete applications for support as per  ACM/LSW advisement. Confidence: 7/10  Anticipated Goal Completion Date:   11/18/21            Prior to Admission medications    Medication Sig Start Date End Date Taking?  Authorizing Provider   simvastatin (ZOCOR) 40 MG tablet Take 1 tablet by mouth nightly 2/2/22   ADALID Gasca - CNP   hydrOXYzine (ATARAX) 25 MG tablet Take 1 tablet by mouth nightly 12/13/21 2/11/22  Harden Carry, APRN - CNP   lisinopril (PRINIVIL;ZESTRIL) 10 MG tablet Take 1 tablet by mouth daily 11/18/21   Harden Carry, APRN - CNP   clopidogrel (PLAVIX) 75 MG tablet TAKE 1 TABLET BY MOUTH DAILY 11/3/21   Harden Carry, APRN - CNP   baclofen (LIORESAL) 10 MG tablet Take 1 tablet by mouth 2 times daily 10/12/21   Harden Carry, APRN - CNP   montelukast (SINGULAIR) 10 MG tablet Take 1 tablet by mouth nightly 10/12/21   Harden Carry, APRN - CNP   cetirizine (ZYRTEC) 10 MG tablet Take one tablet by mouth daily.  10/12/21   Harden Carry, APRN - CNP   meclizine (ANTIVERT) 25 MG tablet Take 1 tablet by mouth 2 times daily  Patient taking differently: Take 25 mg by mouth as needed  10/12/21   Harden Carry, APRN - CNP   hydroCHLOROthiazide (HYDRODIURIL) 12.5 MG tablet Take 1 tablet by mouth every other day  Patient taking differently: Take 12.5 mg by mouth every 48 hours  8/27/21   Harden Carry, APRN - CNP   ADVAIR DISKUS 500-50 MCG/DOSE diskus inhaler Inhale 1 puff into the lungs every 12 hours After inhalation then gargle 8/3/21   Harden Carry, APRN - CNP   albuterol sulfate HFA (PROVENTIL HFA) 108 (90 Base) MCG/ACT inhaler Inhale 2 puffs into the lungs every 4 hours as needed for Wheezing or Shortness of Breath (cough) 8/3/21   Harden Carry, APRN - CNP   fluticasone (FLONASE) 50 MCG/ACT nasal spray 1 spray by Each Nostril route daily 8/3/21   Harden Carry, APRN - CNP   ipratropium-albuterol (DUONEB) 0.5-2.5 (3) MG/3ML SOLN nebulizer solution Inhale 3 mLs into the lungs 4 times daily  Patient not taking: Reported on 1/6/2022 6/21/21   Harden Carry, APRN - CNP   butalbital-acetaminophen-caffeine (FIORICET, ESGIC) -18 MG per tablet Take 1 tablet by mouth 3 times daily as needed for Headaches 6/21/21   ADALID Cormier - CNP   NASAL SALINE NA by Nasal route   Patient not taking: Reported on 1/6/2022    Historical Provider, MD   albuterol (PROVENTIL) (5 MG/ML) 0.5%

## 2022-02-07 NOTE — FLOWSHEET NOTE
Outpatient Physical Therapy  [place]           [x] Phone: [phone]   Fax: [phone]  [facility] Keyes           [] Phone: [phone]   Fax: [phone]        Physical Therapy Daily Treatment Note  Date:  2022    Patient Name:  Crisoforo Snellen    :  1950  MRN: [id number]  Restrictions/Precautions:  none  Diagnosis:   Diagnosis: Chronic midline LBP w/o sciatica  Date of Injury/Surgery:   Treatment Diagnosis: Treatment Diagnosis: LBP. pelvic misalignment    Insurance/Certification information: PT Insurance Information: 5 [facility]   Referring Physician:  Referring Practitioner: ADALID Barakat CNP  Next Doctor Visit:    Plan of care signed (Y/N):  Y  Outcome Measure: Oswestry:   Visit# / total visits:  3/10  Pain level: 6/10 low back      Goals:     Patient goals : reduce back pain  Short term goals  Time Frame for Short term goals: 5 visits  Short term goal 1: Pt will be IND with HEP in order to maximize recovery outside of clinic Met - reports compliance   Long term goals  Time Frame for Long term goals : 10 visits  Long term goal 1: Pt will improve BLE gross strength to 4/5 to aide in gait   Long term goal 2: Pt will improve gross Lumbar spinal motion to 50% or more to aide in IADLs  Long term goal 3: Pt will improve hamstring 90/90 BL to lacking 20 or less to show improved ham length and reduced pelvic pull  Long term goal 4: Pt will present with neutral pelvic alignment to improve normal resting position of SIJ and reduce pain  Long term goal 5: Pt will improve Oswestry to 12 or less to show Sheela Her[address] and subjective improvement    Summary of Evaluation:        Subjective: Abdulkadir Anguiano arrives to therapy stating that the low back is hurting today, all across the low back. Feels like the cold weather is what is causing her increased pain. Any changes in Ambulatory Summary Sheet?   None        Objective:   COVID screening questions were asked and patient attested that there had been no contact or symptoms    Posterior innominate on the R. Improved activation of abdominal muscles for PPT. Exercises: (No more than 4 columns) MERISSA  Exercise/Equipment 1/21/22 #1 2/1/2022 #2 2/7/2022 #3           WARM UP      Nu-step  S12/A11/L5  5' 5'         TABLE      PPT X10, 5\" 10*5\" 2*10 5\"   Add squeeze with TA  10*5\" 2*10 5\"   bridges X10, 3\" 10*3\" 10*3\"    Ham stretch seated  X30\" BL 2*30\" ea  2*30\" ea    STS  10* 10*   Ball push into lap pink ball  10*3\" Small red ball 10*5\"   LTR    10* ea             STANDING      Palloff press   RTB 10* ea RTB 10* ea                                            PROPRIOCEPTION                                    MODALITIES                      Other Therapeutic Activities/Education:        Home Exercise Program: Issued, practiced and pt demo ability to perform 1/21/2022      Manual Treatments:  MET for R post innominate       Modalities:  none      Communication with other providers:  POC sent      Assessment:  Merissa tolerated today's session well. She presented with altered pelvic alignment today; however, it was minimal and corrected easily. She continues to be weak and deconditioned in her gluts and core muscles. She will continue to benefit from skilled PT to work on functional strengthening.  End session pain: same       Plan for Next Session: Specific instructions for Next Treatment: nustep, core strength, BLE strength, pelvic alignment      Time In / Time Out:   1115/1153    Timed Code/Total Treatment Minutes: 1 NR 15' 2 TE 23'    Next Progress Note due:  10th visit      Plan of Care Interventions:  [x] Therapeutic Exercise  [x] Modalities:  [x] Therapeutic Activity     [x] Ultrasound  [x] Estim  [x] Gait Training      [] Cervical Traction [] Lumbar Traction  [x] Neuromuscular Re-education    [x] Cold/hotpack [] Iontophoresis   [x] Instruction in HEP      [x] Vasopneumatic   [] Dry Needling    [x] Manual Therapy               [] Aquatic Therapy

## 2022-02-09 ENCOUNTER — TELEPHONE (OUTPATIENT)
Dept: CARDIOLOGY CLINIC | Age: 72
End: 2022-02-09

## 2022-02-09 ENCOUNTER — HOSPITAL ENCOUNTER (OUTPATIENT)
Dept: PHYSICAL THERAPY | Age: 72
Setting detail: THERAPIES SERIES
Discharge: HOME | End: 2022-02-09
Payer: MEDICARE

## 2022-02-09 PROCEDURE — 97112 NEUROMUSCULAR REEDUCATION: CPT

## 2022-02-09 PROCEDURE — 97110 THERAPEUTIC EXERCISES: CPT

## 2022-02-09 NOTE — FLOWSHEET NOTE
Outpatient Physical Therapy  [place]           [x] Phone: [phone]   Fax: [phone]  [facility]           [] Phone: [phone]   Fax: [phone]        Physical Therapy Daily Treatment Note  Date:  2022    Patient Name:  Lopez Mosley    :  1950  MRN: [id number]  Restrictions/Precautions:  none  Diagnosis:   Diagnosis: Chronic midline LBP w/o sciatica  Date of Injury/Surgery:   Treatment Diagnosis: Treatment Diagnosis: LBP. pelvic misalignment    Insurance/Certification information: PT Insurance Information: 5 [facility]   Referring Physician:  Referring Practitioner: ADALID Pak CNP  Next Doctor Visit:    Plan of care signed (Y/N):  Y  Outcome Measure: Oswestry:   Visit# / total visits:  4/10  Pain level: 7/10 low back      Goals:     Patient goals : reduce back pain  Short term goals  Time Frame for Short term goals: 5 visits  Short term goal 1: Pt will be IND with HEP in order to maximize recovery outside of clinic Met - reports compliance   Long term goals  Time Frame for Long term goals : 10 visits  Long term goal 1: Pt will improve BLE gross strength to 4/5 to aide in gait   Long term goal 2: Pt will improve gross Lumbar spinal motion to 50% or more to aide in IADLs  Long term goal 3: Pt will improve hamstring 90/90 BL to lacking 20 or less to show improved ham length and reduced pelvic pull  Long term goal 4: Pt will present with neutral pelvic alignment to improve normal resting position of SIJ and reduce pain  Long term goal 5: Pt will improve Oswestry to 12 or less to show [address] [employer] and subjective improvement    Summary of Evaluation:        Subjective: Es Hill arrives to therapy stating that the back is really bothering her today, hurting and stiff. Did a little bit of shoveling yesterday and that flared things up. Any changes in Ambulatory Summary Sheet?   None        Objective:   COVID screening questions were asked and patient attested that there had been no contact or symptoms    Posterior innominate on the R. Exercises: (No more than 4 columns) MERISSA  Exercise/Equipment 2/1/2022 #2 2/7/2022 #3 2/9/2022 #4           WARM UP      Nu-step  S12/A11/L5 5' 5' 5'         TABLE      PPT 10*5\" 2*10 5\" 10*5\"   Add squeeze with TA 10*5\" 2*10 5\" 10*5\"    TA with march    10* ea LE alternating    bridges 10*3\" 10*3\"  10*   Ham stretch seated  2*30\" ea  2*30\" ea  30\" ea    STS 10* 10*    Ball push into lap pink ball 10*3\" Small red ball 10*5\" 10*5\"   LTR   10* ea  10*   Ball rolls on large stability ball into forward flexion  5*5\" 10*5\"    DKTC with red stability ball    10*3\"            STANDING      Palloff press  RTB 10* ea RTB 10* ea BlkTB 10* ea    Hip abduction Cezar                                         PROPRIOCEPTION                                    MODALITIES                      Other Therapeutic Activities/Education:        Home Exercise Program: Issued, practiced and pt demo ability to perform 1/21/2022      Manual Treatments:  MET for R post innominate       Modalities:  none      Communication with other providers:        Assessment:   Merissa tolerated today's session well. Her posterior pelvic rotation corrected more easily today with MET. She is gaining more awareness of her abdominal muscles and how to keep them tight during exercise. She is unable to contract her abdominals without holding her breath. She will continue to benefit from skilled PT services to progress core strengthening.  End session pain: 5/10      Plan for Next Session: Specific instructions for Next Treatment: nustep, core strength, BLE strength, pelvic alignment      Time In / Time Out:  7035/1153    Timed Code/Total Treatment Minutes: 45' 1 NR 15' 2 TE 23'    Next Progress Note due:  10th visit      Plan of Care Interventions:  [x] Therapeutic Exercise  [x] Modalities:  [x] Therapeutic Activity     [x] Ultrasound  [x] Estim  [x] Gait Training      [] Cervical Traction [] Lumbar Traction  [x]

## 2022-02-14 ENCOUNTER — HOSPITAL ENCOUNTER (OUTPATIENT)
Dept: PHYSICAL THERAPY | Age: 72
Setting detail: THERAPIES SERIES
Discharge: HOME | End: 2022-02-14
Payer: MEDICARE

## 2022-02-14 PROCEDURE — 97140 MANUAL THERAPY 1/> REGIONS: CPT

## 2022-02-14 PROCEDURE — 97110 THERAPEUTIC EXERCISES: CPT

## 2022-02-14 PROCEDURE — 97112 NEUROMUSCULAR REEDUCATION: CPT

## 2022-02-14 NOTE — FLOWSHEET NOTE
Outpatient Physical Therapy  [place]           [x] Phone: [phone]   Fax: [phone]  [facility]           [] Phone: [phone]   Fax: [phone]        Physical Therapy Daily Treatment Note  Date:  2022    Patient Name:  Elizabeth Michelle    :  1950  MRN: [id number]  Restrictions/Precautions:  none  Diagnosis:   Diagnosis: Chronic midline LBP w/o sciatica  Date of Injury/Surgery:   Treatment Diagnosis: Treatment Diagnosis: LBP. pelvic misalignment    Insurance/Certification information: PT Insurance Information: 5 [facility]   Referring Physician:  Referring Practitioner: ADALID Valverde CNP  Next Doctor Visit:    Plan of care signed (Y/N):  Y  Outcome Measure: Oswestry:   Visit# / total visits:  5/10  Pain level: stiff low back    Goals:     Patient goals : reduce back pain  Short term goals  Time Frame for Short term goals: 5 visits  Short term goal 1: Pt will be IND with HEP in order to maximize recovery outside of clinic Met - reports compliance   Long term goals  Time Frame for Long term goals : 10 visits  Long term goal 1: Pt will improve BLE gross strength to 4/5 to aide in gait   Long term goal 2: Pt will improve gross Lumbar spinal motion to 50% or more to aide in IADLs  Long term goal 3: Pt will improve hamstring 90/90 BL to lacking 20 or less to show improved ham length and reduced pelvic pull  Long term goal 4: Pt will present with neutral pelvic alignment to improve normal resting position of SIJ and reduce pain  Long term goal 5: Pt will improve Oswestry to 12 or less to show MDC and subjective improvement    Summary of Evaluation: Pt is a 73-year-old female who presents to therapy with acute on chronic LBP starting last week that has affected her ADLs and IADLs. Upon assessment, pt does present with pelvic misalignment, impaired lumbar/ spinal ROM, BLE and core weakness, hamstring tightness and LBP.  Pt would benefit from skilled therapy interventions to address listed impairments, progress toward goal completion and improve ADL/IADL status. PT also warranted to reduce risk for further injury or decline. Subjective:  Pt reports being stiff in lower back today and she thinks its from the weather. She wishes to start on the table today since she is feeling so stiff. Any changes in Ambulatory Summary Sheet? None        Objective:   COVID screening questions were asked and patient attested that there had been no contact or symptoms    R anterior innominate     Exercises: (No more than 4 columns) MERISSA  Exercise/Equipment 2/7/2022 #3 2/9/2022 #4 2/14/22 #5           WARM UP2/1      Nu-step  S12/A11/L5 5' 5'          TABLE      PPT 2*10 5\" 10*5\" 10x5\"   Add squeeze with TA 2*10 5\" 10*5\"  10x5\"   TA with march   10* ea LE alternating  10x ea alternating   bridges 10*3\"  10* 10x3\"   Ham stretch seated  2*30\" ea  30\" ea  2x30\" ea   STS 10*     Ball push into lap pink ball Small red ball 10*5\" 10*5\"    LTR  10* ea  10* 10x ea   Ball rolls on large stability ball into forward flexion 5*5\" 10*5\"  10x5\"   DKTC with red stability ball   10*3\" 10x3\"- had to stop due to pain            STANDING      Palloff press  RTB 10* ea BlkTB 10* ea     Hip abduction Cezar                                         PROPRIOCEPTION                                    MODALITIES                      Other Therapeutic Activities/Education:        Home Exercise Program: Issued, practiced and pt demo ability to perform 1/21/2022      Manual Treatments:  MET for R anterior innominate w/ shot gun follow, low back + glutes rolling on L      Modalities:  none      Communication with other providers:        Assessment:   Pt tolerated treatment well today with some increased stiffness in the lower back, L superior glut region. Pt requires verbal and visual cues for PPT. Pt demo R anterior innominate rotation, fixed with MET and shotgun.  Pt could benefit from continued physical therapy to improve flexibility in low back and bilateral lower extremities, and improve strength of core to reduce back stiffness, pain and prevent further injury.    End pain: no pain reported, stiffness improved      Plan for Next Session: Specific instructions for Next Treatment: nustep, core strength, BLE strength, pelvic alignment    Time In / Time Out:  1030/1110    Timed Code/Total Treatment Minutes: 40': 24' MAN x1, 8' NR x1, 8' TE x1    Next Progress Note due:  10th visit      Plan of Care Interventions:  [x] Therapeutic Exercise  [x] Modalities:  [x] Therapeutic Activity     [x] Ultrasound  [x] Estim  [x] Gait Training      [] Cervical Traction [] Lumbar Traction  [x] Neuromuscular Re-education    [x] Cold/hotpack [] Iontophoresis   [x] Instruction in HEP      [x] Vasopneumatic   [] Dry Needling    [x] Manual Therapy               [] Aquatic Therapy              Electronically signed by:  Keerthi Chong, SPT     2/14/2022, 8:47 AM

## 2022-02-16 ENCOUNTER — HOSPITAL ENCOUNTER (OUTPATIENT)
Dept: PHYSICAL THERAPY | Age: 72
Setting detail: THERAPIES SERIES
Discharge: HOME | End: 2022-02-16
Payer: MEDICARE

## 2022-02-16 PROCEDURE — 97140 MANUAL THERAPY 1/> REGIONS: CPT

## 2022-02-16 PROCEDURE — 97110 THERAPEUTIC EXERCISES: CPT

## 2022-02-16 PROCEDURE — 97112 NEUROMUSCULAR REEDUCATION: CPT

## 2022-02-16 NOTE — FLOWSHEET NOTE
Outpatient Physical Therapy  [place]           [x] Phone: [phone]   Fax: [phone]  [place]           [] Phone: [phone]   Fax: [phone]        Physical Therapy Daily Treatment Note  Date:  2022    Patient Name:  Lupe Frankel    :  1950  MRN: [id number]  Restrictions/Precautions:  none  Diagnosis:   Diagnosis: Chronic midline LBP w/o sciatica  Date of Injury/Surgery:   Treatment Diagnosis: Treatment Diagnosis: LBP. pelvic misalignment    Insurance/Certification information: PT Insurance Information: 5 [facility]   Referring Physician:  Referring Practitioner: ADALID Champion CNP  Next Doctor Visit:    Plan of care signed (Y/N):  Y  Outcome Measure: Oswestry:   Visit# / total visits:  6/10  Pain level: 7/10 L Low back    Goals:     Patient goals : reduce back pain  Short term goals  Time Frame for Short term goals: 5 visits  Short term goal 1: Pt will be IND with HEP in order to maximize recovery outside of clinic Met - reports compliance   Long term goals  Time Frame for Long term goals : 10 visits  Long term goal 1: Pt will improve BLE gross strength to 4/5 to aide in gait   Long term goal 2: Pt will improve gross Lumbar spinal motion to 50% or more to aide in IADLs  Long term goal 3: Pt will improve hamstring 90/90 BL to lacking 20 or less to show improved ham length and reduced pelvic pull  Long term goal 4: Pt will present with neutral pelvic alignment to improve normal resting position of SIJ and reduce pain  Long term goal 5: Pt will improve Oswestry to 12 or less to show MDC and subjective improvement    Summary of Evaluation: Pt is a 55-year-old female who presents to therapy with acute on chronic LBP starting last week that has affected her ADLs and IADLs. Upon assessment, pt does present with pelvic misalignment, impaired lumbar/ spinal ROM, BLE and core weakness, hamstring tightness and LBP.  Pt would benefit from skilled therapy interventions to address listed impairments, progress toward goal completion and improve ADL/IADL status. PT also warranted to reduce risk for further injury or decline. Subjective: Dalila Flores arrives to therapy stating that she is really hurting today, feels like the stick rolling put her back, she hasn't slept well the past few days and is not able to do some of the things she was finally able to start doing again. Any changes in Ambulatory Summary Sheet? None        Objective:   COVID screening questions were asked and patient attested that there had been no contact or symptoms    R Posterior innominate     Exercises: (No more than 4 columns) MERISSA  Exercise/Equipment 2/9/2022 #4 2/14/22 #5 2/16/2022 #6           WARM UP2/1      Nu-step  S12/A11/L5 5'  --         TABLE      PPT 10*5\" 10x5\" 10*5\"   Add squeeze with TA 10*5\"  10x5\" 10*5\"    TA with march  10* ea LE alternating  10x ea alternating 10* ea    bridges 10* 10x3\" --   Ham stretch seated  30\" ea  2x30\" ea --   STS   --   Ball push into lap pink ball 10*5\"  --   LTR  10* 10x ea 10* ea    Ball rolls on large stability ball into forward flexion 10*5\"  10x5\" 10*5\"    DKTC with red stability ball  10*3\" 10x3\"- had to stop due to pain 10*3\"            STANDING      Palloff press  BlkTB 10* ea   BlkTB 10* ea   Hip abduction Cezar   --                                      PROPRIOCEPTION                                    MODALITIES                      Other Therapeutic Activities/Education:        Home Exercise Program: Issued, practiced and pt demo ability to perform 1/21/2022      Manual Treatments:  MET for R posterior innominate this date with shot gun to follow       Modalities:  none      Communication with other providers:        Assessment:   Dalila Flores was in more pain today so we held on a few activities and focused on gentle motion and muscle setting exercises. She had a significant pelvic rotation today which fixed mostly with MET.  She felt better after the session with pain decreasing from a 7/10 to a 4/10 afterwards.        Plan for Next Session: Specific instructions for Next Treatment: nustep, core strength, BLE strength, pelvic alignment    Time In / Time Out:  1030/1108    Timed Code/Total Treatment Minutes: 45' 1 man 8' 1 NR 12' 1 TE  18'    Next Progress Note due:  10th visit      Plan of Care Interventions:  [x] Therapeutic Exercise  [x] Modalities:  [x] Therapeutic Activity     [x] Ultrasound  [x] Estim  [x] Gait Training      [] Cervical Traction [] Lumbar Traction  [x] Neuromuscular Re-education    [x] Cold/hotpack [] Iontophoresis   [x] Instruction in HEP      [x] Vasopneumatic   [] Dry Needling    [x] Manual Therapy               [] Aquatic Therapy              Electronically signed by:  Tonio Hutchison           2/16/2022, 9:09 AM

## 2022-02-23 ENCOUNTER — HOSPITAL ENCOUNTER (OUTPATIENT)
Dept: PHYSICAL THERAPY | Age: 72
Setting detail: THERAPIES SERIES
Discharge: HOME | End: 2022-02-23
Payer: MEDICARE

## 2022-02-23 ENCOUNTER — CARE COORDINATION (OUTPATIENT)
Dept: CARE COORDINATION | Age: 72
End: 2022-02-23

## 2022-02-23 DIAGNOSIS — F41.9 ANXIETY: ICD-10-CM

## 2022-02-23 DIAGNOSIS — Z76.0 MEDICATION REFILL: ICD-10-CM

## 2022-02-23 DIAGNOSIS — G44.89 HEADACHE SYNDROME: ICD-10-CM

## 2022-02-23 DIAGNOSIS — J45.30: ICD-10-CM

## 2022-02-23 DIAGNOSIS — G47.9 SLEEP DISTURBANCE: ICD-10-CM

## 2022-02-23 PROCEDURE — 97112 NEUROMUSCULAR REEDUCATION: CPT

## 2022-02-23 PROCEDURE — 97110 THERAPEUTIC EXERCISES: CPT

## 2022-02-23 RX ORDER — HYDROXYZINE HYDROCHLORIDE 25 MG/1
25 TABLET, FILM COATED ORAL NIGHTLY
Qty: 30 TABLET | Refills: 1 | Status: SHIPPED | OUTPATIENT
Start: 2022-02-23 | End: 2022-04-13 | Stop reason: SDUPTHER

## 2022-02-23 NOTE — CARE COORDINATION
to [facility] Social [employer] for assistance. Barriers: overwhelmed by complexity of regimen and stress  Plan for overcoming my barriers: ACM referral to LSW. Patient to complete applications for support as per  ACM/LSW advisement. Confidence: 7/10  Anticipated Goal Completion Date:   11/18/21            Prior to Admission medications    Medication Sig Start Date End Date Taking? Authorizing Provider   hydrOXYzine (ATARAX) 25 MG tablet Take 1 tablet by mouth nightly 2/23/22 4/24/22  [facility], APRN - CNP   simvastatin (ZOCOR) 40 MG tablet Take 1 tablet by mouth nightly 2/2/22   [facility], APRN - CNP   lisinopril (PRINIVIL;ZESTRIL) 10 MG tablet Take 1 tablet by mouth daily 11/18/21   [facility], APRN - CNP   clopidogrel (PLAVIX) 75 MG tablet TAKE 1 TABLET BY MOUTH DAILY 11/3/21   [facility], APRN - CNP   baclofen (LIORESAL) 10 MG tablet Take 1 tablet by mouth 2 times daily 10/12/21   [facility], APRN - CNP   montelukast (SINGULAIR) 10 MG tablet Take 1 tablet by mouth nightly 10/12/21   [facility], APRN - CNP   cetirizine (ZYRTEC) 10 MG tablet Take one tablet by mouth daily.  10/12/21   [facility], APRN - CNP   meclizine (ANTIVERT) 25 MG tablet Take 1 tablet by mouth 2 times daily  Patient taking differently: Take 25 mg by mouth as needed  10/12/21   [facility], APRN - MIRELLA   hydroCHLOROthiazide (HYDRODIURIL) 12.5 MG tablet Take 1 tablet by mouth every other day  Patient taking differently: Take 12.5 mg by mouth every 48 hours  8/27/21   [facility], APRRIZWANA - CNP   ADVAIR DISKUS 500-50 MCG/DOSE diskus inhaler Inhale 1 puff into the lungs every 12 hours After inhalation then gargle 8/3/21   [facility], APRN - CNP   albuterol sulfate HFA (PROVENTIL HFA) 108 (90 Base) MCG/ACT inhaler Inhale 2 puffs into the lungs every 4 hours as needed for Wheezing or Shortness of Breath (cough) 8/3/21   [facility], APRN - CNP   fluticasone (FLONASE) 50 MCG/ACT nasal spray 1 spray by Each Nostril route daily 8/3/21   ADALID Montgomery CNP   ipratropium-albuterol (DUONEB) 0.5-2.5 (3) MG/3ML SOLN nebulizer solution Inhale 3 mLs into the lungs 4 times daily  Patient not taking: Reported on 1/6/2022 6/21/21   ADALID Montgomery CNP   butalbital-acetaminophen-caffeine (FIORICET, ESGIC) -71 MG per tablet Take 1 tablet by mouth 3 times daily as needed for Headaches 6/21/21   ADALID Montgomery CNP   NASAL SALINE NA by Nasal route   Patient not taking: Reported on 1/6/2022    Historical Provider, MD   albuterol (PROVENTIL) (5 MG/ML) 0.5% nebulizer solution Take 1 mL by nebulization every 6 hours as needed for Wheezing or Shortness of Breath  Patient not taking: Reported on 1/6/2022 5/21/21 8/18/26  ADALID Montgomery CNP   Multiple Vitamin (MULTI-VITAMIN DAILY PO) Take 1 tablet by mouth daily    Historical Provider, MD   aspirin 81 MG tablet Take 1 tablet by mouth daily 9/4/19   ADALID Montgomery CNP   diphenhydrAMINE (BENADRYL) 25 MG tablet Take 1 tablet by mouth nightly as needed for Itching 9/4/19   ADALID Montgomery CNP       Future Appointments   Date Time Provider [place] Weiss   2/25/2022 10:30 AM Dominick Carver [facility]   2/28/2022 10:15 AM Jaida Ying [facility]   3/2/2022 10:30 AM Yared Lopez MD [facility]   3/29/2022 11:40 AM Dora Tanner MD [facility]   4/12/2022 10:00 AM ADALID Montgomery CNP [facility]      and   General Assessment    Do you have any symptoms that are causing concern?: No

## 2022-02-23 NOTE — FLOWSHEET NOTE
Outpatient Physical Therapy  [place]           [x] Phone: [phone]   Fax: [phone]  [facility]           [] Phone: [phone]   Fax: [phone]        Physical Therapy Daily Treatment Note  Date:  2022    Patient Name:  Leeann Capellan    :  1950  MRN: [id number]  Restrictions/Precautions:  none  Diagnosis:   Diagnosis: Chronic midline LBP w/o sciatica  Date of Injury/Surgery:   Treatment Diagnosis: Treatment Diagnosis: LBP. pelvic misalignment    Insurance/Certification information: PT Insurance Information: 5 [facility]   Referring Physician:  Referring Practitioner: ADALID Weinstein CNP  Next Doctor Visit:    Plan of care signed (Y/N):  Y  Outcome Measure: Oswestry:   Visit# / total visits:  7/10  Pain level:   3/10 L Low back    Goals:     Patient goals : reduce back pain  Short term goals  Time Frame for Short term goals: 5 visits  Short term goal 1: Pt will be IND with HEP in order to maximize recovery outside of clinic Met - reports compliance   Long term goals  Time Frame for Long term goals : 10 visits  Long term goal 1: Pt will improve BLE gross strength to 4/5 to aide in gait   Long term goal 2: Pt will improve gross Lumbar spinal motion to 50% or more to aide in IADLs  Long term goal 3: Pt will improve hamstring 90/90 BL to lacking 20 or less to show improved ham length and reduced pelvic pull  Long term goal 4: Pt will present with neutral pelvic alignment to improve normal resting position of SIJ and reduce pain Min progress   Long term goal 5: Pt will improve Oswestry to 12 or less to show MDC and subjective improvement    Summary of Evaluation: Pt is a 70-year-old female who presents to therapy with acute on chronic LBP starting last week that has affected her ADLs and IADLs. Upon assessment, pt does present with pelvic misalignment, impaired lumbar/ spinal ROM, BLE and core weakness, hamstring tightness and LBP.  Pt would benefit from skilled therapy interventions to address listed impairments, progress toward goal completion and improve ADL/IADL status. PT also warranted to reduce risk for further injury or decline. Subjective: Es Hill arrives to therapy stating that the back is still painful, better than it was but still painful. Any changes in Ambulatory Summary Sheet? None    4' late     Objective:   COVID screening questions were asked and patient attested that there had been no contact or symptoms    Still has difficulty achieving good PPT. L up slip - unable to fully correct pelvic alignment, may have LLD with L being shorter than the R. Patient has tried a heel lift in the past with little success. Exercises: (No more than 4 columns) MERISSA  Exercise/Equipment 2/14/22 #5 2/16/2022 #6 2/23/2022 #7           WARM UP2/1      Nu-step  S12/A11/L5  -- 5'         TABLE      PPT 10x5\" 10*5\" 10*5\"   Add squeeze with TA 10x5\" 10*5\"  10*5\"    TA with march  10x ea alternating 10* ea  10* ea alternating    bridges 10x3\" -- 10*   Ham stretch seated  2x30\" ea -- --   STS  -- --   Ball push into lap pink ball  -- --   LTR  10x ea 10* ea  10* ea    Ball rolls on large stability ball into forward flexion 10x5\" 10*5\"  10*5\"    DKTC with red stability ball  10x3\"- had to stop due to pain 10*3\" --            STANDING      Palloff press   BlkTB 10* ea BlkTB 10* ea   Hip abduction Cezar  -- 10* ea                                      PROPRIOCEPTION                                    MODALITIES                      Other Therapeutic Activities/Education:        Home Exercise Program: Issued, practiced and pt demo ability to perform 1/21/2022      Manual Treatments:  Leg pull for L up slip       Modalities:  none      Communication with other providers:        Assessment:  Es Hill was able to tolerate progression back into more exercises this date with less pain than in her previous session. She appears to have come back from her most recent flare up.  She remains weak in her core musculature with difficulty achieving a good PPT and using the proper muscles for this activity.  End session pain: 0/10    Plan for Next Session: Specific instructions for Next Treatment: nustep, core strength, BLE strength, pelvic alignment    Time In / Time Out:  1119/1154    Timed Code/Total Treatment Minutes:  28' 1 NR 12' 1 TE 23'      Next Progress Note due:  10th visit      Plan of Care Interventions:  [x] Therapeutic Exercise  [x] Modalities:  [x] Therapeutic Activity     [x] Ultrasound  [x] Estim  [x] Gait Training      [] Cervical Traction [] Lumbar Traction  [x] Neuromuscular Re-education    [x] Cold/hotpack [] Iontophoresis   [x] Instruction in HEP      [x] Vasopneumatic   [] Dry Needling    [x] Manual Therapy               [] Aquatic Therapy              Electronically signed by:  Jesica Washburn           2/23/2022, 10:00 AM

## 2022-02-24 ENCOUNTER — CARE COORDINATION (OUTPATIENT)
Dept: CARE COORDINATION | Age: 72
End: 2022-02-24

## 2022-02-28 ENCOUNTER — HOSPITAL ENCOUNTER (OUTPATIENT)
Dept: PHYSICAL THERAPY | Age: 72
Setting detail: THERAPIES SERIES
Discharge: HOME | End: 2022-02-28
Payer: MEDICARE

## 2022-02-28 DIAGNOSIS — Z76.0 MEDICATION REFILL: ICD-10-CM

## 2022-02-28 PROCEDURE — 97110 THERAPEUTIC EXERCISES: CPT

## 2022-02-28 PROCEDURE — 97112 NEUROMUSCULAR REEDUCATION: CPT

## 2022-02-28 PROCEDURE — 97140 MANUAL THERAPY 1/> REGIONS: CPT

## 2022-02-28 RX ORDER — CETIRIZINE HYDROCHLORIDE 10 MG/1
TABLET ORAL
Qty: 90 TABLET | Refills: 1 | Status: SHIPPED | OUTPATIENT
Start: 2022-02-28 | End: 2022-02-28

## 2022-02-28 RX ORDER — MONTELUKAST SODIUM 10 MG/1
10 TABLET ORAL NIGHTLY
Qty: 90 TABLET | Refills: 1 | Status: SHIPPED | OUTPATIENT
Start: 2022-02-28 | End: 2022-10-12 | Stop reason: SDUPTHER

## 2022-02-28 RX ORDER — BUTALBITAL, ACETAMINOPHEN AND CAFFEINE 50; 325; 40 MG/1; MG/1; MG/1
1 TABLET ORAL 3 TIMES DAILY PRN
Qty: 30 TABLET | Refills: 0 | Status: SHIPPED | OUTPATIENT
Start: 2022-02-28 | End: 2022-06-07 | Stop reason: SDUPTHER

## 2022-02-28 RX ORDER — ALBUTEROL SULFATE 90 UG/1
2 AEROSOL, METERED RESPIRATORY (INHALATION) EVERY 4 HOURS PRN
Qty: 1 EACH | Refills: 2 | Status: SHIPPED | OUTPATIENT
Start: 2022-02-28 | End: 2022-08-08 | Stop reason: SDUPTHER

## 2022-02-28 RX ORDER — LISINOPRIL 10 MG/1
10 TABLET ORAL DAILY
Qty: 90 TABLET | Refills: 0 | Status: SHIPPED | OUTPATIENT
Start: 2022-02-28 | End: 2022-06-07 | Stop reason: SDUPTHER

## 2022-02-28 RX ORDER — HYDROCHLOROTHIAZIDE 12.5 MG/1
12.5 TABLET ORAL EVERY OTHER DAY
Qty: 90 TABLET | Refills: 0 | Status: SHIPPED | OUTPATIENT
Start: 2022-02-28 | End: 2022-10-12 | Stop reason: SDUPTHER

## 2022-02-28 RX ORDER — CETIRIZINE HYDROCHLORIDE 10 MG/1
TABLET ORAL
Qty: 90 TABLET | Refills: 1 | Status: SHIPPED | OUTPATIENT
Start: 2022-02-28 | End: 2022-10-12 | Stop reason: SDUPTHER

## 2022-02-28 NOTE — PROGRESS NOTES
Outpatient Physical Therapy           [place]           [x] Phone: [phone]   Fax: [phone]  Estelle Johnson           [] Phone: [phone]   Fax: [phone]      To:   ADALID Mccoy CNP     From: SANJUANA Pedro   Patient: Abdulaziz Duty                  : 1950  Diagnosis:    Chronic midline LBP w/o sciatica       Date: 2022  Treatment Diagnosis:     LBP. pelvic misalignment     [x]  Progress Note                []  Discharge Note    Evaluation Date:  22  Total Visits to date:  8 Cancels/No-shows to date:  2    Subjective:  pt reports feeling fine, she states the pain is not enough to complain about. She does feel a hard pain \"like lying on a metal sheet\" on L side during LTR. Oswestry:       Plan of Care/Treatment to date:  [x] Therapeutic Exercise    [x] Modalities:  [x] Therapeutic Activity     [] Ultrasound  [x] Electrical Stimulation  [x] Gait Training       [] Cervical Traction   [] Lumbar Traction  [x] Neuromuscular Re-education  [x] Cold/hotpack [] Iontophoresis  [x] Instruction in HEP      Other:  [x] Manual Therapy       [x]  Vasopneumatic  [] Aquatic Therapy       []   Dry Needle Therapy                      Objective/Significant Findings At Last Visit/Comments:        Lumbar AROM:   Flex: 75%. ext: 50%. L SB: 25%. R SB: 75%. L Rot: 75% . R Rot: 75%     Bilateral LE strength  Hip Flexion: 3+/5  Hip ABduction: 4-/5  Hip ADduction: 4/5  Knee Flexion: 3+/5   Knee Extension: 4-/5     Flexibility: hamstring 90/90: R lacking 19 deg. L lacking 19 deg     L posterior innominate pelvic rotation with L pelvic upslip     Assessment:     Pt tolerated treatment well today with no adverse reactions. Pt is progressing toward goals to improve lumbar spine ROM, and bilateral LE strength. Pt continues to demo pelvic malalignment on L side that is not completely corrected with manual therapy.  Pt would benefit from continued skilled physical therapy to address remaining limitations of lumbar ROM, bilateral LE strength and pelvic alignment in order to prevent further injury and decline. Goal Status:  [] Achieved [x] Partially Achieved  [] Not Achieved     Changes to goals:     Patient goals : reduce back pain  Short term goals  Time Frame for Short term goals: 5 visits  Short term goal 1: Pt will be IND with HEP in order to maximize recovery outside of clinic Met - reports compliance   Long term goals  Time Frame for Long term goals : 10 visits  Long term goal 1: Pt will improve BLE gross strength to 4/5 to aide in gait PROGRESSING 2/28  Long term goal 2: Pt will improve gross Lumbar spinal motion to 50% or more to aide in IADLs PROGRESSING 2/28  Long term goal 3: Pt will improve hamstring 90/90 BL to lacking 20 or less to show improved ham length and reduced pelvic pull MET 2/28  Long term goal 4: Pt will present with neutral pelvic alignment to improve normal resting position of SIJ and reduce pain SLIGHT PROGRESS 2/28  Long term goal 5: Pt will improve Oswestry to 12 or less to show [address] and subjective improvement PROGRESSING 2/28       Frequency/Duration:  # Days per week: [] 1 day # Weeks: [] 1 week [] 4 weeks [] 8 weeks     [x] 2 days   [] 2 weeks [x] 5 weeks [] 10 weeks     [] 3 days   [] 3 weeks [] 6 weeks [] 12 weeks       Rehab Potential: [] Excellent [x] Good [] Fair  [] Poor       Patient Status: [] Continue per initial plan of Care     [] Patient now discharged     [x] Additional visits requested, Please re-certify for additional visits:      Requested frequency/duration:  2/week for 5 weeks    If we are requesting more visits, we fully anticipate the patient's condition is expected to improve within the treatment timeframe we are requesting. Electronically signed by:  SANJUANA Gamez . Fidencio Ped, PT, DPT   2/28/2022, 3:29 PM    If you have any questions or concerns, please don't hesitate to call.   Thank you for your referral.    Physician Signature:______________________ Date:______ Time: ________  By signing above, therapists plan is approved by physician

## 2022-02-28 NOTE — FLOWSHEET NOTE
Outpatient Physical Therapy  [place]           [x] Phone: [phone]   Fax: [phone]  Rob Arcadio           [] Phone: [phone]   Fax: [phone]        Physical Therapy Daily Treatment Note  Date:  2022    Patient Name:  Leeann Capellan    :  1950  MRN: [id number]  Restrictions/Precautions:  none  Diagnosis:   Diagnosis: Chronic midline LBP w/o sciatica  Date of Injury/Surgery:   Treatment Diagnosis: Treatment Diagnosis: LBP. pelvic misalignment    Insurance/Certification information: PT Insurance Information: 5 [facility]   Referring Physician:  Referring Practitioner: ADALID Weinstein CNP  Next Doctor Visit:    Plan of care signed (Y/N):  Y  Outcome Measure: Oswestry:   Visit# / total visits:  8/10  Pain level:  010    Goals:     Patient goals : reduce back pain  Short term goals  Time Frame for Short term goals: 5 visits  Short term goal 1: Pt will be IND with HEP in order to maximize recovery outside of clinic Met - reports compliance   Long term goals  Time Frame for Long term goals : 10 visits  Long term goal 1: Pt will improve BLE gross strength to 4/5 to aide in gait PROGRESSING   Long term goal 2: Pt will improve gross Lumbar spinal motion to 50% or more to aide in IADLs PROGRESSING   Long term goal 3: Pt will improve hamstring 90/90 BL to lacking 20 or less to show improved ham length and reduced pelvic pull MET   Long term goal 4: Pt will present with neutral pelvic alignment to improve normal resting position of SIJ and reduce pain SLIGHT PROGRESS   Long term goal 5: Pt will improve Oswestry to 12 or less to show [address] and subjective improvement PROGRESSING     Summary of Evaluation: Pt is a 75-year-old female who presents to therapy with acute on chronic LBP starting last week that has affected her ADLs and IADLs.  Upon assessment, pt does present with pelvic misalignment, impaired lumbar/ spinal ROM, BLE and core weakness, hamstring tightness and LBP. Pt would benefit from skilled therapy interventions to address listed impairments, progress toward goal completion and improve ADL/IADL status. PT also warranted to reduce risk for further injury or decline. Subjective: pt reports feeling fine, she states the pain is not enough to complain about. She does feel a hard pain \"like lying on a metal sheet\" on L side during LTR. Oswestry: 14/50    Any changes in Ambulatory Summary Sheet? None      Objective:   COVID screening questions were asked and patient attested that there had been no contact or symptoms    Lumbar AROM:   Flex: 75%. ext: 50%. L SB: 25%. R SB: 75%. L Rot: 75% . R Rot: 75%    Bilateral LE strength  Hip Flexion: 3+/5  Hip ABduction: 4-/5  Hip ADduction: 4/5  Knee Flexion: 3+/5   Knee Extension: 4-/5      Flexibility: hamstring 90/90: R lacking 19 deg.  L lacking 19 deg    L posterior innominate pelvic rotation with L pelvic upslip     Exercises: (No more than 4 columns) MERISSA  Exercise/Equipment 2/16/2022 #6 2/23/2022 #7 2/28/22 #8            WARM UP2/1      Nu-step  S12/A11/L5 -- 5' 5'         TABLE      PPT 10*5\" 10*5\" 10x5\"   Add squeeze with TA 10*5\"  10*5\"  10x5\"   TA with march  10* ea  10* ea alternating     bridges -- 10*    Ham stretch seated  -- --    STS -- --    Ball push into lap pink ball -- --    LTR  10* ea  10* ea  10x ea   Ball rolls on large stability ball into forward flexion 10*5\"  10*5\"  10x5\"   DKTC with red stability ball  10*3\" -- 10x3\"            STANDING      Palloff press  BlkTB 10* ea BlkTB 10* ea    Hip abduction Cezar -- 10* ea                                       PROPRIOCEPTION                                    MODALITIES                      Other Therapeutic Activities/Education:  HEP and importance of completion, POC and goals      Home Exercise Program: Issued, practiced and pt demo ability to perform 1/21/2022      Manual Treatments:  MET L posterior innominate followed by shotgun and L leg pull, not fully corrected       Modalities:  none      Communication with other providers:  POC sent      Assessment:  Pt tolerated treatment well today with no adverse reactions. Pt is progressing toward goals to improve lumbar spine ROM, and bilateral LE strength. Pt continues to demo pelvic malalignment on L side that is not completely corrected with manual therapy. Pt would benefit from continued skilled physical therapy to address remaining limitations of lumbar ROM, bilateral LE strength and pelvic alignment in order to prevent further injury and decline.  End pain: 0/10    Plan for Next Session: Specific instructions for Next Treatment: nustep, core strength, BLE strength, pelvic alignment, SELF PELVIC MET    Time In / Time Out:  1023/1101    Timed Code/Total Treatment Minutes:  45': 10' manual x1, 15' NR x1, 13' TE x1      Next Progress Note due:  10th visit      Plan of Care Interventions:  [x] Therapeutic Exercise  [x] Modalities:  [x] Therapeutic Activity     [x] Ultrasound  [x] Estim  [x] Gait Training      [] Cervical Traction [] Lumbar Traction  [x] Neuromuscular Re-education    [x] Cold/hotpack [] Iontophoresis   [x] Instruction in HEP      [x] Vasopneumatic   [] Dry Needling    [x] Manual Therapy               [] Aquatic Therapy              Electronically signed by:  SANJUANA Monroe       2/28/2022, 10:22 AM

## 2022-03-01 ENCOUNTER — CARE COORDINATION (OUTPATIENT)
Dept: CARE COORDINATION | Age: 72
End: 2022-03-01

## 2022-03-02 ENCOUNTER — OFFICE VISIT (OUTPATIENT)
Dept: PULMONOLOGY | Age: 72
End: 2022-03-02
Payer: MEDICARE

## 2022-03-02 VITALS
WEIGHT: 256 LBS | OXYGEN SATURATION: 95 % | DIASTOLIC BLOOD PRESSURE: 68 MMHG | SYSTOLIC BLOOD PRESSURE: 136 MMHG | HEIGHT: 69 IN | HEART RATE: 65 BPM | BODY MASS INDEX: 37.92 KG/M2

## 2022-03-02 DIAGNOSIS — J42 CHRONIC BRONCHITIS, UNSPECIFIED CHRONIC BRONCHITIS TYPE (HCC): ICD-10-CM

## 2022-03-02 DIAGNOSIS — G47.33 OBSTRUCTIVE SLEEP APNEA: ICD-10-CM

## 2022-03-02 DIAGNOSIS — R06.09 DYSPNEA ON EXERTION: ICD-10-CM

## 2022-03-02 DIAGNOSIS — Z87.891 FORMER SMOKER: ICD-10-CM

## 2022-03-02 DIAGNOSIS — J45.30 MILD PERSISTENT ASTHMA WITHOUT COMPLICATION: Primary | ICD-10-CM

## 2022-03-02 PROBLEM — Z72.0 TOBACCO ABUSE: Status: RESOLVED | Noted: 2019-10-04 | Resolved: 2022-03-02

## 2022-03-02 PROCEDURE — G8427 DOCREV CUR MEDS BY ELIG CLIN: HCPCS | Performed by: INTERNAL MEDICINE

## 2022-03-02 PROCEDURE — G8484 FLU IMMUNIZE NO ADMIN: HCPCS | Performed by: INTERNAL MEDICINE

## 2022-03-02 PROCEDURE — 1036F TOBACCO NON-USER: CPT | Performed by: INTERNAL MEDICINE

## 2022-03-02 PROCEDURE — 4040F PNEUMOC VAC/ADMIN/RCVD: CPT | Performed by: INTERNAL MEDICINE

## 2022-03-02 PROCEDURE — 3023F SPIROM DOC REV: CPT | Performed by: INTERNAL MEDICINE

## 2022-03-02 PROCEDURE — 99213 OFFICE O/P EST LOW 20 MIN: CPT | Performed by: INTERNAL MEDICINE

## 2022-03-02 PROCEDURE — G8399 PT W/DXA RESULTS DOCUMENT: HCPCS | Performed by: INTERNAL MEDICINE

## 2022-03-02 PROCEDURE — 3017F COLORECTAL CA SCREEN DOC REV: CPT | Performed by: INTERNAL MEDICINE

## 2022-03-02 PROCEDURE — 1090F PRES/ABSN URINE INCON ASSESS: CPT | Performed by: INTERNAL MEDICINE

## 2022-03-02 PROCEDURE — 1123F ACP DISCUSS/DSCN MKR DOCD: CPT | Performed by: INTERNAL MEDICINE

## 2022-03-02 PROCEDURE — G8417 CALC BMI ABV UP PARAM F/U: HCPCS | Performed by: INTERNAL MEDICINE

## 2022-03-02 NOTE — PROGRESS NOTES
SUBJECTIVE:  Chief Complaint: Mild persistent asthma, dyspnea exertion, former smoker, chronic cough, mild obstructive sleep apnea untreated  Mrs. Keith Houser is done extremely well over the past 6 months. She states that it has been almost a year since he quit smoking. She has had no recent asthmatic or bronchitic exacerbation and denies worsening shortness of breath or chest discomfort. She does continue on Advair Diskus, Singulair and albuterol rescue inhaler but does not require albuterol very often. She also has DuoNeb for her nebulizer but again does not require that very often. She does carry history of mild obstructive sleep apnea but has not required CPAP therapy and denies excessive daytime sleepiness. Her daytime energy is fairly good. She has had no known COVID-19 exposure or infection and is received all 3 Pfizer COVID-19 vaccinations including her booster vaccination      ROS:  Constitution:  HEENT: Negative for ear, throat pain  Cardiovascular: Negative for chest pain, syncope, edema  Pulmonary: See HPI  Musculoskeletal: Negative for DVT, myalgias, arthralgias    OBJECTIVE:  /68   Pulse 65   Ht 5' 9\" (1.753 m)   Wt 256 lb (116.1 kg)   SpO2 95%   BMI 37.80 kg/m²      Physical Exam:  Constitutional:  She appears well developed and well-nourished. Mildly overweight, in no respiratory distress at rest  Neck:  Supple, No palpable lymphadenopathy, No JVD  Cardiovascular:  S1, S2 Normal, Regular rhythm, no murmurs or gallops, No pericardial  rubs. Pulmonary: Breath sounds are clear throughout all areas without wheezing or rhonchi  Abdomen: Not examined  Extremities: no edema, No DVT  Neurologic: Oriented x3, No focal deficits    Radiology: Low-dose CT lung screening on 10/30/2021 showed mild emphysematous changes with no worrisome lung mass or nodules. PFT: Office spirometry on 12/15/2021 demonstrated no significant airways obstruction with no significant response of bronchodilators. Because her FEV1 and FVC are both mildly reduced I cannot rule out a restrictive lung process without further testing. Echocardiogram: 7/13/2020  Left ventricular systolic function is normal.   Ejection fraction is visually estimated at 50-55%. No evidence of any pericardial effusion. No significant valvular disease noted. Pericardial fat pad visualized. RVSP reported as normal  ASSESSMENT:    1. Mild persistent asthma without complication    2. Chronic bronchitis, unspecified chronic bronchitis type ([address]    3. Obstructive sleep apnea    4. Dyspnea on exertion    5. Former smoker          PLAN:   I will make no change in her current bronchodilator therapy. I did recommend yearly low-dose CT lung screening. We have discussed the need to maintain yearly flu immunization, pneumococcal vaccination. We have discussed Coronavirus precaution including handwashing practice, wiping items touched in public such as gas pumps, door handles, shopping carts, etc. Self monitoring for infection - fever, chills, cough, SOB. Should they develop symptoms they should call office for further instructions. Return in about 6 months (around 9/2/2022) for Recheck for Asthma, Recheck for Shortness of Breath, Recheck for Obstructive Sleep Apnea. This dictation was performed with a verbal recognition program and it was checked for errors. It is possible that there are still dictated errors within this office note. Any errors should be brought immediately to my attention for correction. All efforts were made to ensure that this office note is accurate.

## 2022-03-08 ENCOUNTER — HOSPITAL ENCOUNTER (OUTPATIENT)
Dept: PHYSICAL THERAPY | Age: 72
Setting detail: THERAPIES SERIES
Discharge: HOME | End: 2022-03-08
Payer: MEDICARE

## 2022-03-08 PROCEDURE — 97110 THERAPEUTIC EXERCISES: CPT

## 2022-03-08 PROCEDURE — 97112 NEUROMUSCULAR REEDUCATION: CPT

## 2022-03-08 NOTE — FLOWSHEET NOTE
Outpatient Physical Therapy  [place]           [x] Phone: [phone]   Fax: [phone]  Jonah Milan           [] Phone: [phone]   Fax: [phone]        Physical Therapy Daily Treatment Note  Date:  3/8/2022    Patient Name:  Belen Sheehan    :  1950  MRN: [id number]  Restrictions/Precautions:  none  Diagnosis:   Diagnosis: Chronic midline LBP w/o sciatica  Date of Injury/Surgery:   Treatment Diagnosis: Treatment Diagnosis: LBP. pelvic misalignment    Insurance/Certification information: PT Insurance Information: 5 [facility]   Referring Physician:  Referring Practitioner: ADALID Cruz CNP  Next Doctor Visit:    Plan of care signed (Y/N):  Y  Outcome Measure: Oswestry:   Visit# / total visits:    Pain level:  0/10 stiffness    Goals:     Patient goals : reduce back pain  Short term goals  Time Frame for Short term goals: 5 visits  Short term goal 1: Pt will be IND with HEP in order to maximize recovery outside of clinic Met - reports compliance   Long term goals  Time Frame for Long term goals : 10 visits  Long term goal 1: Pt will improve BLE gross strength to 4/5 to aide in gait PROGRESSING   Long term goal 2: Pt will improve gross Lumbar spinal motion to 50% or more to aide in IADLs PROGRESSING   Long term goal 3: Pt will improve hamstring 90/90 BL to lacking 20 or less to show improved ham length and reduced pelvic pull MET   Long term goal 4: Pt will present with neutral pelvic alignment to improve normal resting position of SIJ and reduce pain SLIGHT PROGRESS   Long term goal 5: Pt will improve Oswestry to 12 or less to show [address] and subjective improvement PROGRESSING     Summary of Evaluation: Pt is a 73-year-old female who presents to therapy with acute on chronic LBP starting last week that has affected her ADLs and IADLs.  Upon assessment, pt does present with pelvic misalignment, impaired lumbar/ spinal ROM, BLE and core weakness, hamstring tightness and LBP. Pt would benefit from skilled therapy interventions to address listed impairments, progress toward goal completion and improve ADL/IADL status. PT also warranted to reduce risk for further injury or decline. Subjective: Pt is doing well this date. She notes she is stiff from the rain yesterday but no significant pain. Any changes in Ambulatory Summary Sheet? None      Objective:   COVID screening questions were asked and patient attested that there had been no contact or symptoms    Neutral pelvic alignment     Exercises: (No more than 4 columns) MERISSA  Exercise/Equipment 2/23/2022 #7 2/28/22 #8  3/8/22 #9           WARM UP2/1      Nu-step  S12/A11/L5 5' 5' 5'         TABLE      PPT 10*5\" 10x5\" X15, 5\"   Add squeeze with TA 10*5\"  10x5\" X10, 3\"   TA with march  10* ea alternating   2x10 alternating ea   bridges 10*  x10   Ham stretch seated  --  X30\" ea BL   STS --  x10   Ball push into lap pink ball --  x10   LTR  10* ea  10x ea x10 ea   Ball rolls on large stability ball into forward flexion 10*5\"  10x5\" x15 + x10   DKTC with red stability ball  -- 10x3\" hold            STANDING      Palloff press  BlkTB 10* ea  Hold due to pain from TB on shoulder   Hip abduction Cezar 10* ea     Trunk rotations   No band x10 ea direction                                PROPRIOCEPTION                                    MODALITIES                      Other Therapeutic Activities/Education:  HEP and importance of completion      Home Exercise Program: Issued, practiced and pt demo ability to perform 1/21/2022      Manual Treatments:        Modalities:  none      Communication with other providers:  POC sent      Assessment:  Pt tolerated today's treatment without any adverse reactions or complications this date. Pt tolerated exercises well with new additions, no pain increase. Reduced stiffness and improved quality of movement after session.  Pt would continue to benefit from skilled therapy interventions to address remaining impairments, improve mobility and strength and progress toward goal completion while reducing risk for re-injury or further decline.   End pain: 0/10    Plan for Next Session: Specific instructions for Next Treatment: nustep, core strength, BLE strength, pelvic alignment, SELF PELVIC MET (if pelvis is out)    Time In / Time Out:  1113 - 1151    Timed Code/Total Treatment Minutes:  45': 13' NMR x1 , 25' TE x2      Next Progress Note due:  10th visit      Plan of Care Interventions:  [x] Therapeutic Exercise  [x] Modalities:  [x] Therapeutic Activity     [x] Ultrasound  [x] Estim  [x] Gait Training      [] Cervical Traction [] Lumbar Traction  [x] Neuromuscular Re-education    [x] Cold/hotpack [] Iontophoresis   [x] Instruction in HEP      [x] Vasopneumatic   [] Dry Needling    [x] Manual Therapy               [] Aquatic Therapy              Electronically signed by:  Yanet Rendon PT, DPT       3/8/2022, 7:05 AM

## 2022-03-10 ENCOUNTER — HOSPITAL ENCOUNTER (OUTPATIENT)
Dept: PHYSICAL THERAPY | Age: 72
Discharge: HOME | End: 2022-03-10

## 2022-03-10 NOTE — FLOWSHEET NOTE
Physical Therapy  Cancellation/No-show Note  Patient Name:  Yonathan Ariza  :  1950   Date:  3/10/2022  Cancelled visits to date: 2  No-shows to date: 0    For today's appointment patient:  [x]  Cancelled  []  Rescheduled appointment  []  No-show     Reason given by patient:  [x]  Patient ill  []  Conflicting appointment  []  No transportation    []  Conflict with work  []  No reason given  []  Other:     Comments: Ear ache     Electronically signed by:  Myah Gilbert PTA    9:19 AM  3/10/2022

## 2022-03-12 DIAGNOSIS — J32.9 CHRONIC SINUSITIS, UNSPECIFIED LOCATION: ICD-10-CM

## 2022-03-12 RX ORDER — FLUTICASONE PROPIONATE 50 MCG
SPRAY, SUSPENSION (ML) NASAL
Qty: 16 G | Refills: 1 | Status: SHIPPED | OUTPATIENT
Start: 2022-03-12 | End: 2022-10-12 | Stop reason: SDUPTHER

## 2022-03-14 ENCOUNTER — E-VISIT (OUTPATIENT)
Dept: FAMILY MEDICINE CLINIC | Age: 72
End: 2022-03-14

## 2022-03-14 ASSESSMENT — LIFESTYLE VARIABLES
PACKS_PER_DAY: 1
SMOKING_YEARS: 30
SMOKING_STATUS: NO, I'M A FORMER SMOKER

## 2022-03-15 ENCOUNTER — TELEMEDICINE (OUTPATIENT)
Dept: FAMILY MEDICINE CLINIC | Age: 72
End: 2022-03-15
Payer: MEDICARE

## 2022-03-15 DIAGNOSIS — J01.40 ACUTE PANSINUSITIS, RECURRENCE NOT SPECIFIED: Primary | ICD-10-CM

## 2022-03-15 PROCEDURE — 1090F PRES/ABSN URINE INCON ASSESS: CPT | Performed by: NURSE PRACTITIONER

## 2022-03-15 PROCEDURE — 1123F ACP DISCUSS/DSCN MKR DOCD: CPT | Performed by: NURSE PRACTITIONER

## 2022-03-15 PROCEDURE — G8399 PT W/DXA RESULTS DOCUMENT: HCPCS | Performed by: NURSE PRACTITIONER

## 2022-03-15 PROCEDURE — 1036F TOBACCO NON-USER: CPT | Performed by: NURSE PRACTITIONER

## 2022-03-15 PROCEDURE — G8484 FLU IMMUNIZE NO ADMIN: HCPCS | Performed by: NURSE PRACTITIONER

## 2022-03-15 PROCEDURE — G8427 DOCREV CUR MEDS BY ELIG CLIN: HCPCS | Performed by: NURSE PRACTITIONER

## 2022-03-15 PROCEDURE — 99214 OFFICE O/P EST MOD 30 MIN: CPT | Performed by: NURSE PRACTITIONER

## 2022-03-15 PROCEDURE — 4040F PNEUMOC VAC/ADMIN/RCVD: CPT | Performed by: NURSE PRACTITIONER

## 2022-03-15 PROCEDURE — G8417 CALC BMI ABV UP PARAM F/U: HCPCS | Performed by: NURSE PRACTITIONER

## 2022-03-15 PROCEDURE — 3017F COLORECTAL CA SCREEN DOC REV: CPT | Performed by: NURSE PRACTITIONER

## 2022-03-15 RX ORDER — AMOXICILLIN AND CLAVULANATE POTASSIUM 875; 125 MG/1; MG/1
1 TABLET, FILM COATED ORAL 2 TIMES DAILY
Qty: 20 TABLET | Refills: 0 | Status: SHIPPED | OUTPATIENT
Start: 2022-03-15 | End: 2022-03-25

## 2022-03-15 RX ORDER — PREDNISONE 20 MG/1
20 TABLET ORAL DAILY
Qty: 5 TABLET | Refills: 0 | Status: SHIPPED | OUTPATIENT
Start: 2022-03-15 | End: 2022-03-20

## 2022-03-15 ASSESSMENT — PATIENT HEALTH QUESTIONNAIRE - PHQ9
2. FEELING DOWN, DEPRESSED OR HOPELESS: 3
6. FEELING BAD ABOUT YOURSELF - OR THAT YOU ARE A FAILURE OR HAVE LET YOURSELF OR YOUR FAMILY DOWN: 0
SUM OF ALL RESPONSES TO PHQ QUESTIONS 1-9: 6
SUM OF ALL RESPONSES TO PHQ QUESTIONS 1-9: 6
SUM OF ALL RESPONSES TO PHQ9 QUESTIONS 1 & 2: 3
5. POOR APPETITE OR OVEREATING: 0
4. FEELING TIRED OR HAVING LITTLE ENERGY: 3
1. LITTLE INTEREST OR PLEASURE IN DOING THINGS: 0
9. THOUGHTS THAT YOU WOULD BE BETTER OFF DEAD, OR OF HURTING YOURSELF: 0
3. TROUBLE FALLING OR STAYING ASLEEP: 0
SUM OF ALL RESPONSES TO PHQ QUESTIONS 1-9: 6
7. TROUBLE CONCENTRATING ON THINGS, SUCH AS READING THE NEWSPAPER OR WATCHING TELEVISION: 0
SUM OF ALL RESPONSES TO PHQ QUESTIONS 1-9: 6
8. MOVING OR SPEAKING SO SLOWLY THAT OTHER PEOPLE COULD HAVE NOTICED. OR THE OPPOSITE, BEING SO FIGETY OR RESTLESS THAT YOU HAVE BEEN MOVING AROUND A LOT MORE THAN USUAL: 0
10. IF YOU CHECKED OFF ANY PROBLEMS, HOW DIFFICULT HAVE THESE PROBLEMS MADE IT FOR YOU TO DO YOUR WORK, TAKE CARE OF THINGS AT HOME, OR GET ALONG WITH OTHER PEOPLE: 0

## 2022-03-22 ENCOUNTER — OFFICE VISIT (OUTPATIENT)
Dept: CARDIOLOGY CLINIC | Age: 72
End: 2022-03-22
Payer: MEDICARE

## 2022-03-22 VITALS
BODY MASS INDEX: 40.16 KG/M2 | SYSTOLIC BLOOD PRESSURE: 138 MMHG | OXYGEN SATURATION: 92 % | DIASTOLIC BLOOD PRESSURE: 68 MMHG | HEIGHT: 68 IN | HEART RATE: 74 BPM | WEIGHT: 265 LBS

## 2022-03-22 DIAGNOSIS — I10 PRIMARY HYPERTENSION: Primary | ICD-10-CM

## 2022-03-22 DIAGNOSIS — I73.9 PAD (PERIPHERAL ARTERY DISEASE) (HCC): ICD-10-CM

## 2022-03-22 PROCEDURE — 1090F PRES/ABSN URINE INCON ASSESS: CPT | Performed by: NURSE PRACTITIONER

## 2022-03-22 PROCEDURE — 4040F PNEUMOC VAC/ADMIN/RCVD: CPT | Performed by: NURSE PRACTITIONER

## 2022-03-22 PROCEDURE — 1036F TOBACCO NON-USER: CPT | Performed by: NURSE PRACTITIONER

## 2022-03-22 PROCEDURE — G8399 PT W/DXA RESULTS DOCUMENT: HCPCS | Performed by: NURSE PRACTITIONER

## 2022-03-22 PROCEDURE — 99214 OFFICE O/P EST MOD 30 MIN: CPT | Performed by: NURSE PRACTITIONER

## 2022-03-22 PROCEDURE — 93000 ELECTROCARDIOGRAM COMPLETE: CPT | Performed by: NURSE PRACTITIONER

## 2022-03-22 PROCEDURE — G8427 DOCREV CUR MEDS BY ELIG CLIN: HCPCS | Performed by: NURSE PRACTITIONER

## 2022-03-22 PROCEDURE — G8484 FLU IMMUNIZE NO ADMIN: HCPCS | Performed by: NURSE PRACTITIONER

## 2022-03-22 PROCEDURE — 1123F ACP DISCUSS/DSCN MKR DOCD: CPT | Performed by: NURSE PRACTITIONER

## 2022-03-22 PROCEDURE — 3017F COLORECTAL CA SCREEN DOC REV: CPT | Performed by: NURSE PRACTITIONER

## 2022-03-22 PROCEDURE — G8417 CALC BMI ABV UP PARAM F/U: HCPCS | Performed by: NURSE PRACTITIONER

## 2022-03-22 ASSESSMENT — ENCOUNTER SYMPTOMS
ORTHOPNEA: 0
SHORTNESS OF BREATH: 1
BACK PAIN: 1

## 2022-03-22 NOTE — PROGRESS NOTES
3/22/2022  Primary cardiologist: Dr. Tennie Scheuermann  is an established 66 y.o.  female here for follow-up on PAD, hypertension, hyperlipidemia      SUBJECTIVE/OBJECTIVE:    HPI : And is a pleasant 61-year-old female with a history of syncope, hypertension, hyperlipidemia, PAD/PTA of bilateral lower extremities, obesity,  andobstructive sleep apnea  In February 2020 she underwent PTA of LCIA. She underwent a Tilt table test July 2020 which was positive for cardioinhibitory response. Eamon Car reports she is feeling well. She denies claudication. She has lower back pain that is not new- has herniated disc and arthritis. She has stopped stopped smoking. She recently was noted to have sinusitis/ ear infection. Review of Systems   Constitutional: Negative for diaphoresis and malaise/fatigue. Cardiovascular: Negative for chest pain, claudication, dyspnea on exertion, irregular heartbeat, leg swelling, near-syncope, orthopnea, palpitations and paroxysmal nocturnal dyspnea. Respiratory: Positive for shortness of breath. Musculoskeletal: Positive for arthritis and back pain. Neurological: Negative for dizziness and light-headedness. Vitals:    03/22/22 0959   BP: 138/68   Site: Left Upper Arm   Position: Sitting   Cuff Size: Medium Adult   Pulse: 74   SpO2: 92%   Weight: 265 lb (120.2 kg)   Height: 5' 8\" (1.727 m)     Patient-Reported Vitals 3/15/2022   Patient-Reported Weight 256lb   Patient-Reported Height 5 9   Patient-Reported Temperature 102     Wt Readings from Last 3 Encounters:   03/22/22 265 lb (120.2 kg)   03/02/22 256 lb (116.1 kg)   12/16/21 263 lb (119.3 kg)     Body mass index is 40.29 kg/m². Physical Exam  Constitutional:       Appearance: She is obese. HENT:      Head: Normocephalic. Mouth/Throat:      Mouth: Mucous membranes are moist.   Eyes:      Extraocular Movements: Extraocular movements intact. Neck:      Vascular: No carotid bruit.    Cardiovascular:      Rate and Rhythm: Normal rate and regular rhythm. Pulses: Normal pulses. Heart sounds: Normal heart sounds. Pulmonary:      Effort: Pulmonary effort is normal.      Breath sounds: Normal breath sounds. Abdominal:      General: There is no distension. Musculoskeletal:      Right lower leg: No edema. Left lower leg: No edema. Skin:     General: Skin is warm and dry. Capillary Refill: Capillary refill takes less than 2 seconds. Neurological:      General: No focal deficit present. Mental Status: She is alert. Psychiatric:         Mood and Affect: Mood normal.                Current Outpatient Medications   Medication Sig Dispense Refill    amoxicillin-clavulanate (AUGMENTIN) 875-125 MG per tablet Take 1 tablet by mouth 2 times daily for 10 days 20 tablet 0    fluticasone (FLONASE) 50 MCG/ACT nasal spray USE 1 SPRAY IN EACH NOSTRIL EVERY DAY 16 g 1    butalbital-acetaminophen-caffeine (FIORICET, ESGIC) -40 MG per tablet Take 1 tablet by mouth 3 times daily as needed for Headaches 30 tablet 0    ADVAIR DISKUS 500-50 MCG/DOSE diskus inhaler Inhale 1 puff into the lungs every 12 hours After inhalation then gargle 1 each 3    albuterol sulfate HFA (PROVENTIL HFA) 108 (90 Base) MCG/ACT inhaler Inhale 2 puffs into the lungs every 4 hours as needed for Wheezing or Shortness of Breath (cough) 1 each 2    hydroCHLOROthiazide (HYDRODIURIL) 12.5 MG tablet Take 1 tablet by mouth every other day 90 tablet 0    montelukast (SINGULAIR) 10 MG tablet Take 1 tablet by mouth nightly 90 tablet 1    lisinopril (PRINIVIL;ZESTRIL) 10 MG tablet Take 1 tablet by mouth daily 90 tablet 0    cetirizine (HM CETIRIZINE HCL) 10 MG tablet Take one tablet by mouth daily.  90 tablet 1    hydrOXYzine (ATARAX) 25 MG tablet Take 1 tablet by mouth nightly 30 tablet 1    simvastatin (ZOCOR) 40 MG tablet Take 1 tablet by mouth nightly 90 tablet 1    clopidogrel (PLAVIX) 75 MG tablet TAKE 1 TABLET BY MOUTH DAILY 90 tablet 1    baclofen (LIORESAL) 10 MG tablet Take 1 tablet by mouth 2 times daily 180 tablet 1    meclizine (ANTIVERT) 25 MG tablet Take 1 tablet by mouth 2 times daily (Patient taking differently: Take 25 mg by mouth as needed ) 30 tablet 0    ipratropium-albuterol (DUONEB) 0.5-2.5 (3) MG/3ML SOLN nebulizer solution Inhale 3 mLs into the lungs 4 times daily 120 vial 1    albuterol (PROVENTIL) (5 MG/ML) 0.5% nebulizer solution Take 1 mL by nebulization every 6 hours as needed for Wheezing or Shortness of Breath 100 vial 1    Multiple Vitamin (MULTI-VITAMIN DAILY PO) Take 1 tablet by mouth daily      aspirin 81 MG tablet Take 1 tablet by mouth daily 90 tablet 2    NASAL SALINE NA by Nasal route  (Patient not taking: Reported on 1/6/2022)       No current facility-administered medications for this visit. All pertinent data reviewed and discussed with patient       ASSESSMENT/PLAN:    PAD  Stable - denies claudication-   Back pain limits activity    On plavix and simvastatin - encouraged walking program     Hypertension   Blood pressure is Controlled  She is on lisinopril/ hydrodiuril . No reported adverse events or reported side effects from medication(s). She is stable on current dose. Encouraged a low sodium diet    Dyslipidemia   Results for Beau Shabazz \"MERISSA\" (MRN [id number])    Ref. Range 10/12/2021 11:07   CHOLESTEROL, TOTAL, 358624 Latest Ref Range: 0 - 199 mg/dL 131   HDL Cholesterol Latest Ref Range: 40 - 60 mg/dL 53   LDL Calculated Latest Ref Range: <100 mg/dL 55   Triglycerides Latest Ref Range: 0 - 150 mg/dL 113   VLDL Cholesterol Calculated Latest Ref Range: Not Established mg/dL 23     Lipids are at or near goal  She is on simvastatin. No reported adverse events or reported side effects from medication(s) and is stable on current dose.   encouraged healthy eating habits including low fat -low /cholesterol diet    OTTO  Mild - not on cpap  Following with Dr Ed Patel       Obesity  bmi 40 class 3       Medications reviewed and confirmed with patient     Tests ordered:none      Follow-up  6 months      Signed:  ADALID Huynh CNP, 3/22/2022, 10:25 AM    An electronic signature was used to authenticate this note. Please note this report has been partially produced using speech recognition software and may contain errors related to that system including errors in grammar, punctuation, and spelling, as well as words and phrases that may be inappropriate. If there are any questions or concerns please feel free to contact the dictating provider for clarification.

## 2022-03-22 NOTE — PATIENT INSTRUCTIONS
Please be informed that if you contact our office outside of normal business hours the physician on call cannot help with any scheduling or rescheduling issues, procedure instruction questions or any type of medication issue. We advise you for any urgent/emergency that you go to the nearest emergency room! PLEASE CALL OUR OFFICE DURING NORMAL BUSINESS HOURS    Monday - Friday   8 am to 5 pm    [place]: [address]: [phone]    [place]:  [phone]      **It is YOUR responsibilty to bring medication bottles and/or updated medication list to [address] [place] [address].  This will allow us to better serve you and all your healthcare needs**

## 2022-03-22 NOTE — LETTER
Ciaran Rincon  1950  [address]    Have you had any Chest Pain that is not new? - No    Have you had any Shortness of Breath - No    Have you had any dizziness - No    Have you had any palpitations that are not new?  - No    Do you have any edema - swelling in ankles    If Yes - CHECK TO SEE IF THE EDEMA IS PITTING  How long have they been having edema - Years  If Yes - Have they worn compression stockings No           When did you have your last labs drawn 10/12/21     Do you have a surgery or procedure scheduled in the near future - No

## 2022-04-11 DIAGNOSIS — G89.29 CHRONIC LOW BACK PAIN WITH SCIATICA, SCIATICA LATERALITY UNSPECIFIED, UNSPECIFIED BACK PAIN LATERALITY: ICD-10-CM

## 2022-04-11 DIAGNOSIS — Z76.0 MEDICATION REFILL: ICD-10-CM

## 2022-04-11 DIAGNOSIS — M54.40 CHRONIC LOW BACK PAIN WITH SCIATICA, SCIATICA LATERALITY UNSPECIFIED, UNSPECIFIED BACK PAIN LATERALITY: ICD-10-CM

## 2022-04-11 RX ORDER — BACLOFEN 10 MG/1
10 TABLET ORAL 2 TIMES DAILY
Qty: 180 TABLET | Refills: 1 | Status: SHIPPED | OUTPATIENT
Start: 2022-04-11 | End: 2022-04-13 | Stop reason: SDUPTHER

## 2022-04-12 ENCOUNTER — OFFICE VISIT (OUTPATIENT)
Dept: FAMILY MEDICINE CLINIC | Age: 72
End: 2022-04-12
Payer: MEDICARE

## 2022-04-12 VITALS
SYSTOLIC BLOOD PRESSURE: 138 MMHG | DIASTOLIC BLOOD PRESSURE: 70 MMHG | TEMPERATURE: 97.3 F | HEIGHT: 68 IN | BODY MASS INDEX: 40.89 KG/M2 | HEART RATE: 77 BPM | OXYGEN SATURATION: 93 % | WEIGHT: 269.8 LBS

## 2022-04-12 DIAGNOSIS — J45.30: ICD-10-CM

## 2022-04-12 DIAGNOSIS — E66.01 OBESITY, CLASS III, BMI 40-49.9 (MORBID OBESITY) (HCC): ICD-10-CM

## 2022-04-12 DIAGNOSIS — S00.412A ABRASION OF LEFT EAR CANAL, INITIAL ENCOUNTER: ICD-10-CM

## 2022-04-12 DIAGNOSIS — M54.40 CHRONIC LOW BACK PAIN WITH SCIATICA, SCIATICA LATERALITY UNSPECIFIED, UNSPECIFIED BACK PAIN LATERALITY: Primary | ICD-10-CM

## 2022-04-12 DIAGNOSIS — Z12.11 SCREENING FOR COLON CANCER: ICD-10-CM

## 2022-04-12 DIAGNOSIS — G89.29 CHRONIC LOW BACK PAIN WITH SCIATICA, SCIATICA LATERALITY UNSPECIFIED, UNSPECIFIED BACK PAIN LATERALITY: Primary | ICD-10-CM

## 2022-04-12 DIAGNOSIS — G47.9 SLEEP DISTURBANCE: ICD-10-CM

## 2022-04-12 DIAGNOSIS — F41.9 ANXIETY: ICD-10-CM

## 2022-04-12 PROCEDURE — 4040F PNEUMOC VAC/ADMIN/RCVD: CPT | Performed by: NURSE PRACTITIONER

## 2022-04-12 PROCEDURE — G8417 CALC BMI ABV UP PARAM F/U: HCPCS | Performed by: NURSE PRACTITIONER

## 2022-04-12 PROCEDURE — 1036F TOBACCO NON-USER: CPT | Performed by: NURSE PRACTITIONER

## 2022-04-12 PROCEDURE — 1090F PRES/ABSN URINE INCON ASSESS: CPT | Performed by: NURSE PRACTITIONER

## 2022-04-12 PROCEDURE — G8399 PT W/DXA RESULTS DOCUMENT: HCPCS | Performed by: NURSE PRACTITIONER

## 2022-04-12 PROCEDURE — 3017F COLORECTAL CA SCREEN DOC REV: CPT | Performed by: NURSE PRACTITIONER

## 2022-04-12 PROCEDURE — 82274 ASSAY TEST FOR BLOOD FECAL: CPT | Performed by: NURSE PRACTITIONER

## 2022-04-12 PROCEDURE — G8427 DOCREV CUR MEDS BY ELIG CLIN: HCPCS | Performed by: NURSE PRACTITIONER

## 2022-04-12 PROCEDURE — 99214 OFFICE O/P EST MOD 30 MIN: CPT | Performed by: NURSE PRACTITIONER

## 2022-04-12 PROCEDURE — 1123F ACP DISCUSS/DSCN MKR DOCD: CPT | Performed by: NURSE PRACTITIONER

## 2022-04-12 ASSESSMENT — ENCOUNTER SYMPTOMS
BACK PAIN: 1
COUGH: 0
CHEST TIGHTNESS: 0
WHEEZING: 0
SHORTNESS OF BREATH: 0

## 2022-04-12 NOTE — PROGRESS NOTES
Dagoberto Caydenraymond  1950  66 y.o. SUBJECT JEMIMA:    Chief Complaint   Patient presents with    Hypertension    6 Month Follow-Up    Fatigue     Pt feels its due to her weight    Back Pain     Pt states PT didnt help       Fitz Chowdhury is a 66year old female who is in for 6 month follow up. She states she has had increase in fatigue but attributes this to weight gain. She states she has been well controlled with asthma and is compliant with all medications. She states she is very stressed because she needs dental work and where she was going did not follow through on paperwork from cardiology. She states \"I am so upset that I have to wait to have my teeth pulled. \" She states she been working on getting this set up since January and now will have to wait additional time. She states because of poor dental she is unable to eat solid foods. She states because of this she is eating a lot of carbs which she knows puts weight on her. She states she purchased a gym membership and plans to start in a week or two. She states exercise may help with her back pain and weight loss. She states physical therapy did not help. She complains of left ear discomfort     Hypertension  This is a chronic problem. The current episode started more than 1 year ago. The problem has been gradually improving since onset. Associated symptoms include anxiety. Pertinent negatives include no chest pain, neck pain, palpitations or shortness of breath. There are no associated agents to hypertension. Risk factors for coronary artery disease include obesity and sedentary lifestyle. Past treatments include ACE inhibitors and diuretics. The current treatment provides significant improvement. There are no compliance problems. Fatigue  Pertinent negatives include no chest pain, chills, coughing, diaphoresis, fatigue, fever or neck pain. Back Pain  This is a chronic problem. The current episode started more than 1 year ago.  The problem occurs intermittently. The problem has been waxing and waning since onset. The pain is present in the lumbar spine. The pain does not radiate. The pain is at a severity of 4/10. The pain is moderate. The symptoms are aggravated by standing. Pertinent negatives include no chest pain or fever. Risk factors include menopause, obesity and lack of exercise. She has tried heat and walking for the symptoms. The treatment provided mild relief. Current Outpatient Medications on File Prior to Visit   Medication Sig Dispense Refill    fluticasone (FLONASE) 50 MCG/ACT nasal spray USE 1 SPRAY IN EACH NOSTRIL EVERY DAY 16 g 1    butalbital-acetaminophen-caffeine (FIORICET, ESGIC) -40 MG per tablet Take 1 tablet by mouth 3 times daily as needed for Headaches 30 tablet 0    ADVAIR DISKUS 500-50 MCG/DOSE diskus inhaler Inhale 1 puff into the lungs every 12 hours After inhalation then gargle 1 each 3    albuterol sulfate HFA (PROVENTIL HFA) 108 (90 Base) MCG/ACT inhaler Inhale 2 puffs into the lungs every 4 hours as needed for Wheezing or Shortness of Breath (cough) 1 each 2    hydroCHLOROthiazide (HYDRODIURIL) 12.5 MG tablet Take 1 tablet by mouth every other day 90 tablet 0    montelukast (SINGULAIR) 10 MG tablet Take 1 tablet by mouth nightly 90 tablet 1    lisinopril (PRINIVIL;ZESTRIL) 10 MG tablet Take 1 tablet by mouth daily 90 tablet 0    cetirizine (HM CETIRIZINE HCL) 10 MG tablet Take one tablet by mouth daily.  90 tablet 1    meclizine (ANTIVERT) 25 MG tablet Take 1 tablet by mouth 2 times daily (Patient taking differently: Take 25 mg by mouth as needed ) 30 tablet 0    ipratropium-albuterol (DUONEB) 0.5-2.5 (3) MG/3ML SOLN nebulizer solution Inhale 3 mLs into the lungs 4 times daily 120 vial 1    albuterol (PROVENTIL) (5 MG/ML) 0.5% nebulizer solution Take 1 mL by nebulization every 6 hours as needed for Wheezing or Shortness of Breath 100 vial 1    Multiple Vitamin (MULTI-VITAMIN DAILY PO) Take 1 tablet by mouth daily      aspirin 81 MG tablet Take 1 tablet by mouth daily 90 tablet 2    NASAL SALINE NA by Nasal route  (Patient not taking: Reported on 1/6/2022)       No current facility-administered medications on file prior to visit. Past Medical History:   Diagnosis Date    Active smoker     Active smoker     Ankle fracture, left 2006    Asthma     Chondromalacia of right knee     Dr. Ryan Meza + MRI    Chronic back pain     Chronic cough 10/4/2019    Depression     has [facility]    Dizziness     CT brain normal -6/18/2012    Dyspnea on exertion 9/2/2021    Family history of early CAD     Father - 4st MI age 50, Massive MI age 76    Former smoker 3/2/2022    H/O cardiovascular stress test 8/7/2012 8/7/2012-Lexiscan-Normal perfusion. LVSF normal.EF 58%    H/O Doppler ultrasound 12/11/2019     Abnormal left lower extremity arterial Doppler ultrasound. The Left lower extremity arteries have a Monophasic waveform suggestive of inflow disease, The Left BARBER shows Severe peripheral arterial disease. Normal right lower extremity arterial Doppler ultrasound. Normal right lower extremity ankle brachial indices    H/O echocardiogram 8/7/2012 8/7/2012-LVSF normal. EF =>55%. impaired LV relaxation.  Herniated intervertebral disk     thoracic and lumbar    Hypertension     Mild persistent asthma without complication 36/2/8079    Normal echocardiogram 8/2012    EF=> 55%-Dr. Ramesh Alberts    Obesity (BMI 30-39. 9)     Obstructive sleep apnea 10/4/2019    Other screening mammogram     PAD (peripheral artery disease) ([address] Angioplasty 01/06/2020     LCIA 90% INSTENT RESTENOSED TO 0% WITH PTA.     Peripheral vascular disease ([address]     Postmenopausal     Shoulder fracture, left 2008    Tobacco abuse 10/4/2019     Past Surgical History:   Procedure Laterality Date    CHOLECYSTECTOMY  1991    KNEE SURGERY  1998    right knee    TONSILLECTOMY AND ADENOIDECTOMY  1963    TUBAL LIGATION  1977    TUMOR REMOVAL      parotid    TUMOR REMOVAL  1976    left thigh     Family History   Problem Relation Age of Onset    High Blood Pressure Mother     Allergies Mother    Kaz Rudder Migraines Mother     Obesity Mother     Heart Disease Father     High Blood Pressure Father     Allergies Father     Obesity Father     Diabetes Daughter     High Blood Pressure Sister     Allergies Sister     Bleeding Prob Sister    Kaz Rudder Migraines Sister     Obesity Sister     Cancer Brother     High Blood Pressure Brother     Allergies Brother     Bleeding Prob Brother     Obesity Brother     Thyroid Disease Maternal Aunt     Kidney Disease Maternal Aunt     Heart Disease Maternal Uncle     Kidney Disease Maternal Uncle     Cancer Maternal Grandmother     Glaucoma Maternal Grandmother     Diabetes Maternal Grandfather     Glaucoma Maternal Grandfather     Glaucoma Paternal Grandmother     Glaucoma Paternal Grandfather      Social History     Socioeconomic History    Marital status:      Spouse name: Not on file    Number of children: 1    Years of education: Not on file    Highest education level: Not on file   Occupational History    Occupation: Customer Service   Tobacco Use    Smoking status: Former Smoker     Packs/day: 1.00     Years: 30.00     Pack years: 30.00     Types: Cigarettes     Quit date: 2021     Years since quittin.6    Smokeless tobacco: Never Used   Vaping Use    Vaping Use: Never used   Substance and Sexual Activity    Alcohol use: Yes     Comment: socially    Drug use: No    Sexual activity: Not Currently   Other Topics Concern    Not on file   Social History Narrative    Working now at [employer] jobs from [employer]      Has a daughter and Daughter in law.           Social Determinants of Health     Financial Resource Strain: Medium Risk    Difficulty of Paying Living Expenses: Somewhat hard   Food Insecurity: Food Insecurity Present  Worried About [address] in the Last Year: Sometimes true    [place] of Food in the Last Year: Sometimes true   Transportation Needs: No Transportation Needs    Lack of Transportation (Medical): No    Lack of Transportation (Non-Medical): No   Physical Activity: Insufficiently Active    Days of Exercise per Week: 2 days    Minutes of Exercise per Session: 10 min   Stress:     Feeling of Stress : Not on file   Social Connections: Unknown    Frequency of Communication with Friends and Family: Twice a week    Frequency of Social Gatherings with Friends and Family: Twice a week    Attends [employer] Services: Not on file   [employer]s or Organizations: Not on file    Attends Club or Organization Meetings: Not on file    Marital Status:    Intimate Partner Violence:     Fear of Current or Ex-Partner: Not on file    Emotionally Abused: Not on file    Physically Abused: Not on file    Sexually Abused: Not on file   Housing Stability: [address] Unable to Pay for Housing in the Last Year: No    Number of [place] in the Last Year: 1    Unstable Housing in the Last Year: No       Review of Systems   Constitutional: Negative for activity change, appetite change, chills, diaphoresis, fatigue, fever and unexpected weight change. HENT: Positive for dental problem and ear pain. Negative for ear discharge, hearing loss and trouble swallowing. Respiratory: Negative for cough, chest tightness, shortness of breath and wheezing. Cardiovascular: Negative for chest pain and palpitations. Gastrointestinal: Negative. Genitourinary: Negative. Musculoskeletal: Positive for back pain. Negative for neck pain. Neurological: Negative.         OBJECTIVE:     /70 (Site: Right Upper Arm, Position: Sitting, Cuff Size: Large Adult)   Pulse 77   Temp 97.3 °F (36.3 °C)   Ht 5' 8\" (1.727 m)   Wt 269 lb 12.8 oz (122.4 kg)   SpO2 93%   BMI 41.02 kg/m²     Physical Exam  Vitals reviewed. Constitutional:       General: She is not in acute distress. Appearance: Normal appearance. She is well-developed. She is not ill-appearing or diaphoretic. HENT:      Head: Normocephalic and atraumatic. Right Ear: Tympanic membrane, ear canal and external ear normal. There is no impacted cerumen. Left Ear: Tympanic membrane and external ear normal. Laceration (small abrasion , left canal) present. There is no impacted cerumen. Nose: Nose normal.   Eyes:      General: No scleral icterus. Conjunctiva/sclera: Conjunctivae normal.      Pupils: Pupils are equal, round, and reactive to light. Cardiovascular:      Rate and Rhythm: Normal rate and regular rhythm. Heart sounds: Normal heart sounds. No murmur heard. No friction rub. No gallop. Pulmonary:      Effort: Pulmonary effort is normal. No respiratory distress. Breath sounds: Normal breath sounds. No wheezing. Chest:      Chest wall: No tenderness. Abdominal:      Palpations: Abdomen is soft. Musculoskeletal:         General: Normal range of motion. Cervical back: Normal range of motion and neck supple. Right lower leg: No edema. Left lower leg: No edema. Skin:     General: Skin is warm and dry. Neurological:      Mental Status: She is alert and oriented to person, place, and time. Psychiatric:         Behavior: Behavior normal.         Thought Content: Thought content normal.         Judgment: Judgment normal.         No results found for requested labs within last 30 days.      LDL Calculated (mg/dL)   Date Value   10/12/2021 55       Lab Results   Component Value Date    WBC 7.7 10/12/2021    WBC 6.6 10/19/2020    WBC 5.7 07/21/2020    NEUTROABS 4.3 10/12/2021    NEUTROABS 3.9 10/19/2020    NEUTROABS 3.1 09/25/2019    HGB 14.1 10/12/2021    HGB 14.8 10/19/2020    HGB 14.5 07/21/2020    HCT 44.5 10/12/2021    HCT 44.6 10/19/2020    HCT 44.9 07/21/2020    MCV 84.1 10/12/2021 MCV 83.4 10/19/2020    MCV 85.1 07/21/2020     10/12/2021     10/19/2020     07/21/2020    SEGSABS 3.1 07/11/2020    SEGSABS 3.5 07/10/2020    SEGSABS 6.4 01/03/2020    LYMPHSABS 2.6 10/12/2021    MONOSABS 0.5 10/12/2021    EOSABS 0.2 10/12/2021    BASOSABS 0.0 10/12/2021     Lab Results   Component Value Date    TSH 3.16 09/25/2019    TSHHS 1.769 08/11/2012     Lab Results   Component Value Date    LABALBU 4.1 10/12/2021    BILITOT 0.6 10/12/2021    AST 16 10/12/2021    ALT 7 10/12/2021    ALKPHOS 82 10/12/2021             No results found for this visit on 04/12/22. ASSESSMENT AND PLAN:     1. Chronic low back pain with sciatica, sciatica laterality unspecified, unspecified back pain laterality    2. Mild persistent mixed asthma without complication  - stable    3. Sleep disturbance  - better    4. Anxiety    5. Screening for colon cancer  - POCT Fecal Immunochemical Test (FIT); Future    6. Abrasion of left ear canal, initial encounter  - neomycin-polymyxin-hydrocortisone (CORTISPORIN) 3.5-64920-1 otic solution; Place 3 drops into the left ear 3 times daily for 5 days  Dispense: 10 mL; Refill: 0    7. Obesity, Class III, BMI 40-49.9 (morbid obesity) ([facility]    Continue current medication regimen. Call if ear drops cause any problems, use as directed. Continue to walk at least 30 miinutes most days of the week. Verbalized understanding and agreement with plan. Return in about 6 months (around 10/12/2022), or return in one month for left ear, for HTN, asthma. Care discussed with patient. Patient educated on signs and symptoms of exacerbation and when to seek further medical attention. Advised to call for any problems, questions, or concerns. Patient verbalizes understanding and agrees with plan. Medications reviewed and reconciled. Continue current medications. Appropriate prescriptions are ordered. Risks and benefits of meds are discussed. After visit summary provided.

## 2022-04-13 DIAGNOSIS — Z76.0 MEDICATION REFILL: ICD-10-CM

## 2022-04-13 DIAGNOSIS — F41.9 ANXIETY: ICD-10-CM

## 2022-04-13 DIAGNOSIS — G89.29 CHRONIC LOW BACK PAIN WITH SCIATICA, SCIATICA LATERALITY UNSPECIFIED, UNSPECIFIED BACK PAIN LATERALITY: ICD-10-CM

## 2022-04-13 DIAGNOSIS — M54.40 CHRONIC LOW BACK PAIN WITH SCIATICA, SCIATICA LATERALITY UNSPECIFIED, UNSPECIFIED BACK PAIN LATERALITY: ICD-10-CM

## 2022-04-13 DIAGNOSIS — G47.9 SLEEP DISTURBANCE: ICD-10-CM

## 2022-04-13 RX ORDER — HYDROXYZINE HYDROCHLORIDE 25 MG/1
25 TABLET, FILM COATED ORAL NIGHTLY
Qty: 30 TABLET | Refills: 1 | Status: SHIPPED | OUTPATIENT
Start: 2022-04-13 | End: 2022-06-07 | Stop reason: SDUPTHER

## 2022-04-13 RX ORDER — BACLOFEN 10 MG/1
10 TABLET ORAL 2 TIMES DAILY
Qty: 180 TABLET | Refills: 1 | Status: SHIPPED | OUTPATIENT
Start: 2022-04-13 | End: 2022-10-12 | Stop reason: SDUPTHER

## 2022-04-13 RX ORDER — SIMVASTATIN 40 MG
40 TABLET ORAL NIGHTLY
Qty: 90 TABLET | Refills: 1 | Status: SHIPPED | OUTPATIENT
Start: 2022-04-13

## 2022-04-13 RX ORDER — CLOPIDOGREL BISULFATE 75 MG/1
75 TABLET ORAL DAILY
Qty: 90 TABLET | Refills: 1 | Status: SHIPPED | OUTPATIENT
Start: 2022-04-13

## 2022-04-13 ASSESSMENT — ENCOUNTER SYMPTOMS
GASTROINTESTINAL NEGATIVE: 1
TROUBLE SWALLOWING: 0

## 2022-05-03 NOTE — DISCHARGE SUMMARY
Outpatient Physical Therapy  [place]           [x] Phone: [phone]   Fax: [phone]  [facility]           [] Phone: [phone]   Fax: [phone]        Physical Therapy Daily Discharge Note  Date:  5/3/2022    Patient Name:  Lupe Frankel    :  1950  MRN: [id number]    Restrictions/Precautions:  none  Diagnosis:   Diagnosis: Chronic midline LBP w/o sciatica  Date of Injury/Surgery:   Treatment Diagnosis: Treatment Diagnosis: LBP. pelvic misalignment    Insurance/Certification information: PT Insurance Information: [facility]   Referring Physician:  Referring Practitioner: ADALID Champion CNP  Next Doctor Visit:    Plan of care signed (Y/N):  Y  Outcome Measure: Oswestry:   Visit# / total visits:    Pain level:  0/10 stiffness     Goals:     Patient goals : reduce back pain  Short term goals  Time Frame for Short term goals: 5 visits  Short term goal 1: Pt will be IND with HEP in order to maximize recovery outside of clinic Met - reports compliance   Long term goals  Time Frame for Long term goals : 10 visits  Long term goal 1: Pt will improve BLE gross strength to 4/5 to aide in gait PROGRESSING   Long term goal 2: Pt will improve gross Lumbar spinal motion to 50% or more to aide in IADLs PROGRESSING   Long term goal 3: Pt will improve hamstring 90/90 BL to lacking 20 or less to show improved ham length and reduced pelvic pull MET   Long term goal 4: Pt will present with neutral pelvic alignment to improve normal resting position of SIJ and reduce pain SLIGHT PROGRESS   Long term goal 5: Pt will improve Oswestry to 12 or less to show [address] and subjective improvement PROGRESSING      Summary of Evaluation: Pt is a 68-year-old female who presents to therapy with acute on chronic LBP starting last week that has affected her ADLs and IADLs.  Upon assessment, pt does present with pelvic misalignment, impaired lumbar/ spinal ROM, BLE and core weakness, hamstring tightness and LBP. Pt would benefit from skilled therapy interventions to address listed impairments, progress toward goal completion and improve ADL/IADL status. PT also warranted to reduce risk for further injury or decline.          Objective:    Unable to complete an assessment of the patient and their progress towards their goals secondary to discontinuation of therapy. Uri To last appointment was on 3/8/22. Pt also has 3 no show/ cancels during POC and dc per clinic policy. Communication with other providers:    Faxed Discharge note secondary to discontinuation of therapy sevices      Assessment:    Uri To has discontinued therapy services and at this time they will be discharged from our facility. If their is any future needs please don't hesitate to call our offices and resubmit a new therapy order. We appreciate your referral and letting us serve your patients.        Interventions PRN:  [x] Therapeutic Exercise  [x] Modalities:  [x] Therapeutic Activity     [x] Ultrasound  [x] Estim  [x] Gait Training      [] Cervical Traction [] Lumbar Traction  [x] Neuromuscular Re-education    [x] Cold/hotpack [] Iontophoresis   [x] Instruction in HEP      [x] Vasopneumatic   [] Dry Needling    [x] Manual Therapy               [] Aquatic Therapy              Electronically signed by:    Cherylynn Lombard, PT,  DPT  ,  5/3/2022, 8:25 AM

## 2022-05-12 ENCOUNTER — OFFICE VISIT (OUTPATIENT)
Dept: FAMILY MEDICINE CLINIC | Age: 72
End: 2022-05-12
Payer: MEDICARE

## 2022-05-12 VITALS
DIASTOLIC BLOOD PRESSURE: 78 MMHG | OXYGEN SATURATION: 92 % | HEART RATE: 90 BPM | BODY MASS INDEX: 40.45 KG/M2 | TEMPERATURE: 97.5 F | SYSTOLIC BLOOD PRESSURE: 130 MMHG | WEIGHT: 266 LBS

## 2022-05-12 DIAGNOSIS — G89.29 CHRONIC LOW BACK PAIN WITH SCIATICA, SCIATICA LATERALITY UNSPECIFIED, UNSPECIFIED BACK PAIN LATERALITY: ICD-10-CM

## 2022-05-12 DIAGNOSIS — M54.40 CHRONIC LOW BACK PAIN WITH SCIATICA, SCIATICA LATERALITY UNSPECIFIED, UNSPECIFIED BACK PAIN LATERALITY: ICD-10-CM

## 2022-05-12 DIAGNOSIS — E66.01 OBESITY, CLASS III, BMI 40-49.9 (MORBID OBESITY) (HCC): ICD-10-CM

## 2022-05-12 DIAGNOSIS — I10 ESSENTIAL HYPERTENSION: ICD-10-CM

## 2022-05-12 DIAGNOSIS — S00.412A ABRASION OF LEFT EAR CANAL, INITIAL ENCOUNTER: ICD-10-CM

## 2022-05-12 DIAGNOSIS — G47.9 SLEEP DISTURBANCE: Primary | ICD-10-CM

## 2022-05-12 DIAGNOSIS — E78.5 HYPERLIPIDEMIA, UNSPECIFIED HYPERLIPIDEMIA TYPE: ICD-10-CM

## 2022-05-12 LAB
CONTROL: NORMAL
HEMOCCULT STL QL: NORMAL

## 2022-05-12 PROCEDURE — 3017F COLORECTAL CA SCREEN DOC REV: CPT | Performed by: NURSE PRACTITIONER

## 2022-05-12 PROCEDURE — G8399 PT W/DXA RESULTS DOCUMENT: HCPCS | Performed by: NURSE PRACTITIONER

## 2022-05-12 PROCEDURE — 1123F ACP DISCUSS/DSCN MKR DOCD: CPT | Performed by: NURSE PRACTITIONER

## 2022-05-12 PROCEDURE — 1090F PRES/ABSN URINE INCON ASSESS: CPT | Performed by: NURSE PRACTITIONER

## 2022-05-12 PROCEDURE — G8417 CALC BMI ABV UP PARAM F/U: HCPCS | Performed by: NURSE PRACTITIONER

## 2022-05-12 PROCEDURE — 4040F PNEUMOC VAC/ADMIN/RCVD: CPT | Performed by: NURSE PRACTITIONER

## 2022-05-12 PROCEDURE — 1036F TOBACCO NON-USER: CPT | Performed by: NURSE PRACTITIONER

## 2022-05-12 PROCEDURE — G8427 DOCREV CUR MEDS BY ELIG CLIN: HCPCS | Performed by: NURSE PRACTITIONER

## 2022-05-12 PROCEDURE — 99214 OFFICE O/P EST MOD 30 MIN: CPT | Performed by: NURSE PRACTITIONER

## 2022-05-12 ASSESSMENT — ENCOUNTER SYMPTOMS
COUGH: 0
WHEEZING: 0
SHORTNESS OF BREATH: 0
CHEST TIGHTNESS: 0

## 2022-05-12 NOTE — PROGRESS NOTES
[facility]  1950  66 y.o. SUBJECT JEMIMA:    Chief Complaint   Patient presents with    1 Month Follow-Up     Lt ear still causing pain. Estefany De Jesus is a 66year old female who is in for one month follow up of left ear pain. She states the discomfort comes and goes. She denies seeing significant drainage. She denies fevers, chills or sweats. She states she is compliant with all medications. Estefany De Jesus continues to complain of low back and hip pain, worse with long walks. She states she believes her weight gain is also contributing to her back pain. She states she has not been able to get to the gym because of transportation but plans to do so.              Current Outpatient Medications on File Prior to Visit   Medication Sig Dispense Refill    clopidogrel (PLAVIX) 75 MG tablet Take 1 tablet by mouth daily 90 tablet 1    simvastatin (ZOCOR) 40 MG tablet Take 1 tablet by mouth nightly 90 tablet 1    hydrOXYzine (ATARAX) 25 MG tablet Take 1 tablet by mouth nightly 30 tablet 1    baclofen (LIORESAL) 10 MG tablet Take 1 tablet by mouth 2 times daily 180 tablet 1    fluticasone (FLONASE) 50 MCG/ACT nasal spray USE 1 SPRAY IN EACH NOSTRIL EVERY DAY 16 g 1    butalbital-acetaminophen-caffeine (FIORICET, ESGIC) -40 MG per tablet Take 1 tablet by mouth 3 times daily as needed for Headaches 30 tablet 0    ADVAIR DISKUS 500-50 MCG/DOSE diskus inhaler Inhale 1 puff into the lungs every 12 hours After inhalation then gargle 1 each 3    albuterol sulfate HFA (PROVENTIL HFA) 108 (90 Base) MCG/ACT inhaler Inhale 2 puffs into the lungs every 4 hours as needed for Wheezing or Shortness of Breath (cough) 1 each 2    hydroCHLOROthiazide (HYDRODIURIL) 12.5 MG tablet Take 1 tablet by mouth every other day 90 tablet 0    montelukast (SINGULAIR) 10 MG tablet Take 1 tablet by mouth nightly 90 tablet 1    lisinopril (PRINIVIL;ZESTRIL) 10 MG tablet Take 1 tablet by mouth daily 90 tablet 0    cetirizine (HM CETIRIZINE HCL) 10 MG tablet Take one tablet by mouth daily. 90 tablet 1    meclizine (ANTIVERT) 25 MG tablet Take 1 tablet by mouth 2 times daily (Patient taking differently: Take 25 mg by mouth as needed ) 30 tablet 0    ipratropium-albuterol (DUONEB) 0.5-2.5 (3) MG/3ML SOLN nebulizer solution Inhale 3 mLs into the lungs 4 times daily 120 vial 1    NASAL SALINE NA by Nasal route       albuterol (PROVENTIL) (5 MG/ML) 0.5% nebulizer solution Take 1 mL by nebulization every 6 hours as needed for Wheezing or Shortness of Breath 100 vial 1    Multiple Vitamin (MULTI-VITAMIN DAILY PO) Take 1 tablet by mouth daily      aspirin 81 MG tablet Take 1 tablet by mouth daily 90 tablet 2     No current facility-administered medications on file prior to visit. Past Medical History:   Diagnosis Date    Active smoker     Active smoker     Ankle fracture, left 2006    Asthma     Chondromalacia of right knee     Dr. John Hui + MRI    Chronic back pain     Chronic cough 10/4/2019    Depression     has seen psychiatry in [place] 6 months    Dizziness     CT brain normal -6/18/2012    Dyspnea on exertion 9/2/2021    Family history of early CAD     Father - 4st MI age 50, Massive MI age 76    Former smoker 3/2/2022    H/O cardiovascular stress test 8/7/2012 8/7/2012-Lexiscan-Normal perfusion. LVSF normal.EF 58%    H/O Doppler ultrasound 12/11/2019     Abnormal left lower extremity arterial Doppler ultrasound. The Left lower extremity arteries have a Monophasic waveform suggestive of inflow disease, The Left BARBER shows Severe peripheral arterial disease. Normal right lower extremity arterial Doppler ultrasound. Normal right lower extremity ankle brachial indices    H/O echocardiogram 8/7/2012 8/7/2012-LVSF normal. EF =>55%. impaired LV relaxation.      Herniated intervertebral disk     thoracic and lumbar    Hypertension     Mild persistent asthma without complication 17/1/8531    Normal echocardiogram 8/2012    EF=> 55%-Dr. Isidra Bill    Obesity (BMI 30-39. 9)     Obstructive sleep apnea 10/4/2019    Other screening mammogram     PAD (peripheral artery disease) ([facility] [address] Angioplasty 2020     LCIA 90% INSTENT RESTENOSED TO 0% WITH PTA.  Peripheral vascular disease ([facility] [address]     Postmenopausal     Shoulder fracture, left 2008    Tobacco abuse 10/4/2019     Past Surgical History:   Procedure Laterality Date    CHOLECYSTECTOMY      KNEE SURGERY      right knee    TONSILLECTOMY AND ADENOIDECTOMY  1963    TUBAL LIGATION  1977    TUMOR REMOVAL      parotid    TUMOR REMOVAL  1976    left thigh     Family History   Problem Relation Age of Onset    High Blood Pressure Mother     Allergies Mother    [facility] Migraines Mother     Obesity Mother     Heart Disease Father     High Blood Pressure Father     Allergies Father     Obesity Father     Diabetes Daughter     High Blood Pressure Sister     Allergies Sister     Bleeding Prob Sister    [facility] Migraines Sister     Obesity Sister     Cancer Brother     High Blood Pressure Brother     Allergies Brother     Bleeding Prob Brother     Obesity Brother     Thyroid Disease Maternal Aunt     Kidney Disease Maternal Aunt     Heart Disease Maternal Uncle     Kidney Disease Maternal Uncle     Cancer Maternal Grandmother     Glaucoma Maternal Grandmother     Diabetes Maternal Grandfather     Glaucoma Maternal Grandfather     Glaucoma Paternal Grandmother     Glaucoma Paternal Grandfather      Social History     Socioeconomic History    Marital status:       Spouse name: Not on file    Number of children: 1    Years of education: Not on file    Highest education level: Not on file   Occupational History    Occupation: Customer Service   Tobacco Use    Smoking status: Former Smoker     Packs/day: 1.00     Years: 30.00     Pack years: 30.00     Types: Cigarettes     Quit date: 2021     Years since quittin.7    Smokeless tobacco: Never Used   Vaping Use    Vaping Use: Never used   Substance and Sexual Activity    Alcohol use: Yes     Comment: socially    Drug use: No    Sexual activity: Not Currently   Other Topics Concern    Not on file   Social History Narrative    Working now at [employer] jobs from [employer]      Has a daughter and Daughter in law. Social Determinants of Health     Financial Resource Strain: Medium Risk    Difficulty of Paying Living Expenses: Somewhat hard   Food Insecurity: Food Insecurity Present    Worried About Running Out of Food in the Last Year: Sometimes true    Magalis of Food in the Last Year: Sometimes true   Transportation Needs: No Transportation Needs    Lack of Transportation (Medical): No    Lack of Transportation (Non-Medical): No   Physical Activity: Insufficiently Active    Days of Exercise per Week: 2 days    Minutes of Exercise per Session: 10 min   Stress:     Feeling of Stress : Not on file   Social Connections: Unknown    Frequency of Communication with Friends and Family: Twice a week    Frequency of Social Gatherings with Friends and Family: Twice a week    Attends Gnosticism Services: Not on file   [employer] or Organizations: Not on file    Attends Club or Organization Meetings: Not on file    Marital Status:    Intimate Partner Violence:     Fear of Current or Ex-Partner: Not on file    Emotionally Abused: Not on file    Physically Abused: Not on file    Sexually Abused: Not on file   Housing Stability: [address] Unable to Pay for Housing in the Last Year: No    Number of [place] in the Last Year: 1    Unstable Housing in the Last Year: No       Review of Systems   Constitutional: Negative for activity change, appetite change, chills, diaphoresis, fatigue, fever and unexpected weight change. HENT: Positive for dental problem, ear discharge and ear pain. Negative for congestion, sore throat and trouble swallowing.     Respiratory: Negative for cough, choking, chest tightness, shortness of breath and wheezing. Cardiovascular: Negative for chest pain and palpitations. Musculoskeletal: Positive for back pain. Negative for gait problem. Psychiatric/Behavioral: Negative. OBJECTIVE:     /78 (Site: Right Upper Arm, Position: Sitting, Cuff Size: Large Adult)   Pulse 90   Temp 97.5 °F (36.4 °C) (Infrared)   Wt 266 lb (120.7 kg)   SpO2 92%   BMI 40.45 kg/m²     Physical Exam  Vitals reviewed. Constitutional:       General: She is not in acute distress. Appearance: Normal appearance. She is well-developed. She is obese. She is not ill-appearing or diaphoretic. HENT:      Head: Normocephalic and atraumatic. Right Ear: Tympanic membrane, ear canal and external ear normal. There is no impacted cerumen. Left Ear: Tympanic membrane, ear canal and external ear normal.      Nose: Nose normal. No congestion or rhinorrhea. Eyes:      General: No scleral icterus. Conjunctiva/sclera: Conjunctivae normal.      Pupils: Pupils are equal, round, and reactive to light. Cardiovascular:      Rate and Rhythm: Normal rate and regular rhythm. Heart sounds: Normal heart sounds. No murmur heard. No friction rub. No gallop. Pulmonary:      Effort: Pulmonary effort is normal. No respiratory distress. Breath sounds: Normal breath sounds. No wheezing. Chest:      Chest wall: No tenderness. Musculoskeletal:         General: Normal range of motion. Cervical back: Normal range of motion and neck supple. No rigidity or tenderness. Right lower leg: No edema. Left lower leg: No edema. Lymphadenopathy:      Cervical: No cervical adenopathy. Skin:     General: Skin is warm and dry. Neurological:      Mental Status: She is alert and oriented to person, place, and time. Psychiatric:         Behavior: Behavior normal.         Thought Content:  Thought content normal.         Judgment: Judgment normal. No results found for requested labs within last 30 days. LDL Calculated (mg/dL)   Date Value   10/12/2021 55       Lab Results   Component Value Date    WBC 7.7 10/12/2021    WBC 6.6 10/19/2020    WBC 5.7 07/21/2020    NEUTROABS 4.3 10/12/2021    NEUTROABS 3.9 10/19/2020    NEUTROABS 3.1 09/25/2019    HGB 14.1 10/12/2021    HGB 14.8 10/19/2020    HGB 14.5 07/21/2020    HCT 44.5 10/12/2021    HCT 44.6 10/19/2020    HCT 44.9 07/21/2020    MCV 84.1 10/12/2021    MCV 83.4 10/19/2020    MCV 85.1 07/21/2020     10/12/2021     10/19/2020     07/21/2020    SEGSABS 3.1 07/11/2020    SEGSABS 3.5 07/10/2020    SEGSABS 6.4 01/03/2020    LYMPHSABS 2.6 10/12/2021    MONOSABS 0.5 10/12/2021    EOSABS 0.2 10/12/2021    BASOSABS 0.0 10/12/2021     Lab Results   Component Value Date    TSH 3.16 09/25/2019    TSHHS 1.769 08/11/2012     Lab Results   Component Value Date    LABALBU 4.1 10/12/2021    BILITOT 0.6 10/12/2021    AST 16 10/12/2021    ALT 7 10/12/2021    ALKPHOS 82 10/12/2021             No results found for this visit on 05/12/22. ASSESSMENT AND PLAN:     1. Abrasion of left ear canal, initial encounter  - resolved    2. Chronic low back pain with sciatica, sciatica laterality unspecified, unspecified back pain laterality  - CBC with Auto Differential; Future  - Comprehensive Metabolic Panel; Future    3. Sleep disturbance    4. Obesity, Class III, BMI 40-49.9 (morbid obesity) ([facility] [address]    5. Essential hypertension  - stable  - CBC with Auto Differential; Future  - Comprehensive Metabolic Panel; Future  - Lipid Panel; Future    6. Hyperlipidemia, unspecified hyperlipidemia type  - Lipid Panel; Future    Continue current medication regimen. Get fasting lab work before next visit. Keep appointments with pulmonologist and cardiologist.  Verbalized understanding and agreement with plan. Return in about 6 months (around 11/12/2022). Care discussed with patient.  Patient educated on signs and symptoms of exacerbation and when to seek further medical attention. Advised to call for any problems, questions, or concerns. Patient verbalizes understanding and agrees with plan. Medications reviewed and reconciled. Continue current medications. Appropriate prescriptions are ordered. Risks and benefits of meds are discussed. After visit summary provided.

## 2022-05-14 ASSESSMENT — ENCOUNTER SYMPTOMS
CHOKING: 0
BACK PAIN: 1
SORE THROAT: 0
TROUBLE SWALLOWING: 0

## 2022-06-07 DIAGNOSIS — Z76.0 MEDICATION REFILL: ICD-10-CM

## 2022-06-07 DIAGNOSIS — G44.89 HEADACHE SYNDROME: ICD-10-CM

## 2022-06-07 DIAGNOSIS — G47.9 SLEEP DISTURBANCE: ICD-10-CM

## 2022-06-07 DIAGNOSIS — F41.9 ANXIETY: ICD-10-CM

## 2022-06-07 RX ORDER — LISINOPRIL 10 MG/1
10 TABLET ORAL DAILY
Qty: 90 TABLET | Refills: 0 | Status: SHIPPED | OUTPATIENT
Start: 2022-06-07 | End: 2022-11-01 | Stop reason: SDUPTHER

## 2022-06-07 RX ORDER — HYDROXYZINE HYDROCHLORIDE 25 MG/1
25 TABLET, FILM COATED ORAL NIGHTLY
Qty: 30 TABLET | Refills: 1 | Status: SHIPPED | OUTPATIENT
Start: 2022-06-07 | End: 2022-09-08

## 2022-06-07 RX ORDER — BUTALBITAL, ACETAMINOPHEN AND CAFFEINE 50; 325; 40 MG/1; MG/1; MG/1
TABLET ORAL
Qty: 30 TABLET | Refills: 0 | Status: SHIPPED | OUTPATIENT
Start: 2022-06-07 | End: 2022-08-12 | Stop reason: SDUPTHER

## 2022-06-07 RX ORDER — BUTALBITAL, ACETAMINOPHEN AND CAFFEINE 50; 325; 40 MG/1; MG/1; MG/1
1 TABLET ORAL 3 TIMES DAILY PRN
Qty: 30 TABLET | Refills: 0 | Status: SHIPPED | OUTPATIENT
Start: 2022-06-07 | End: 2022-08-10

## 2022-06-07 RX ORDER — LISINOPRIL 10 MG/1
10 TABLET ORAL DAILY
Qty: 90 TABLET | Refills: 0 | Status: SHIPPED | OUTPATIENT
Start: 2022-06-07

## 2022-08-08 ENCOUNTER — OFFICE VISIT (OUTPATIENT)
Dept: PULMONOLOGY | Age: 72
End: 2022-08-08
Payer: MEDICARE

## 2022-08-08 VITALS
SYSTOLIC BLOOD PRESSURE: 144 MMHG | OXYGEN SATURATION: 93 % | BODY MASS INDEX: 40.32 KG/M2 | HEIGHT: 68 IN | HEART RATE: 70 BPM | DIASTOLIC BLOOD PRESSURE: 90 MMHG | WEIGHT: 266 LBS

## 2022-08-08 DIAGNOSIS — J45.30 MILD PERSISTENT ASTHMA WITHOUT COMPLICATION: Primary | ICD-10-CM

## 2022-08-08 DIAGNOSIS — J41.0 SIMPLE CHRONIC BRONCHITIS (HCC): ICD-10-CM

## 2022-08-08 DIAGNOSIS — G44.89 HEADACHE SYNDROME: ICD-10-CM

## 2022-08-08 DIAGNOSIS — J30.1 NON-SEASONAL ALLERGIC RHINITIS DUE TO POLLEN: ICD-10-CM

## 2022-08-08 DIAGNOSIS — Z76.0 MEDICATION REFILL: ICD-10-CM

## 2022-08-08 DIAGNOSIS — R06.09 DYSPNEA ON EXERTION: ICD-10-CM

## 2022-08-08 DIAGNOSIS — G47.33 OBSTRUCTIVE SLEEP APNEA: ICD-10-CM

## 2022-08-08 PROCEDURE — G8417 CALC BMI ABV UP PARAM F/U: HCPCS | Performed by: INTERNAL MEDICINE

## 2022-08-08 PROCEDURE — 1090F PRES/ABSN URINE INCON ASSESS: CPT | Performed by: INTERNAL MEDICINE

## 2022-08-08 PROCEDURE — 3017F COLORECTAL CA SCREEN DOC REV: CPT | Performed by: INTERNAL MEDICINE

## 2022-08-08 PROCEDURE — 3023F SPIROM DOC REV: CPT | Performed by: INTERNAL MEDICINE

## 2022-08-08 PROCEDURE — 1036F TOBACCO NON-USER: CPT | Performed by: INTERNAL MEDICINE

## 2022-08-08 PROCEDURE — 1123F ACP DISCUSS/DSCN MKR DOCD: CPT | Performed by: INTERNAL MEDICINE

## 2022-08-08 PROCEDURE — G8427 DOCREV CUR MEDS BY ELIG CLIN: HCPCS | Performed by: INTERNAL MEDICINE

## 2022-08-08 PROCEDURE — G8399 PT W/DXA RESULTS DOCUMENT: HCPCS | Performed by: INTERNAL MEDICINE

## 2022-08-08 PROCEDURE — 99213 OFFICE O/P EST LOW 20 MIN: CPT | Performed by: INTERNAL MEDICINE

## 2022-08-08 RX ORDER — ALBUTEROL SULFATE 90 UG/1
2 AEROSOL, METERED RESPIRATORY (INHALATION) EVERY 4 HOURS PRN
Qty: 1 EACH | Refills: 11 | Status: SHIPPED | OUTPATIENT
Start: 2022-08-08 | End: 2022-10-12 | Stop reason: SDUPTHER

## 2022-08-08 RX ORDER — FLUTICASONE PROPIONATE AND SALMETEROL 50; 500 UG/1; UG/1
1 POWDER RESPIRATORY (INHALATION) EVERY 12 HOURS
Qty: 1 EACH | Refills: 11 | Status: SHIPPED | OUTPATIENT
Start: 2022-08-08 | End: 2022-10-12 | Stop reason: SDUPTHER

## 2022-08-08 NOTE — PROGRESS NOTES
SUBJECTIVE:  Chief Complaint: Mild persistent asthma, chronic bronchitis, chronic cough, mild obstructive sleep apnea untreated, dyspnea on exertion, former smoker  Ean Wheeler states that she has had no recent bronchitic infections and denies worsening shortness of breath or chest discomfort. She does have significant nasal symptoms with her history of allergic rhinitis and has been using over-the-counter decongestants. She states that this has not gotten into her chest.  She continues on Advair Diskus, Singulair and albuterol rescue inhaler but does not require albuterol very often. She also has DuoNeb for her nebulizer but does not require that very often. She has had no known COVID-19 exposure or infection and has received all 3 Pfizer COVID-19 vaccinations. ROS:  Constitution:  HEENT: Negative for ear, throat pain  Cardiovascular: Negative for chest pain, syncope, edema  Pulmonary: See HPI  Musculoskeletal: Negative for DVT, myalgias, arthralgias    OBJECTIVE:  BP (!) 144/90 (Site: Right Upper Arm, Position: Sitting, Cuff Size: Large Adult)   Pulse 70   Ht 5' 8\" (1.727 m)   Wt 266 lb (120.7 kg)   SpO2 93%   BMI 40.45 kg/m²      Physical Exam:  Constitutional:  She appears well developed and well-nourished. Neck:  Supple, No palpable lymphadenopathy, No JVD  Cardiovascular:  S1, S2 Normal, Regular rhythm, no murmurs or gallops, No pericardial  rubs. Pulmonary: Breath sounds are clear throughout all areas without wheezing or rhonchi  Abdomen: Not examined  Extremities: no edema, No DVT  Neurologic: Oriented x3, No focal deficits    Radiology: CT lung screening 10/29/2021 showed mild emphysematous changes and no worrisome lung mass seen  PFT: Office spirometry on 12/15/2021 demonstrated no significant airways obstruction with no significant response to bronchodilators. Overall her lung function had remained stable over the previous year.   Because her FEV1 and FVC are both reduced I cannot rule out a restrictive lung process without further testing      Echocardiogram: 7/13/2020  Normal left ventricular function with normal RVSP    ASSESSMENT:    1. Mild persistent asthma without complication    2. Medication refill    3. Simple chronic bronchitis ([address]    4. Non-seasonal allergic rhinitis due to pollen    5. Obstructive sleep apnea    6. Dyspnea on exertion          PLAN:  I will make no change in her current bronchodilator therapy. I did renew her Advair Diskus and albuterol rescue inhaler. Because of my nursing home in several weeks I will ask SANTOSH Díaz to continue to follow her and renew her bronchodilators as needed and treat any minor bronchitic infections. If she develops any concerns and benefit from pulmonary consultation then she can be referred back to [facility] pulmonary at a later date. We have discussed the need to maintain yearly flu immunization, pneumococcal vaccination. We have discussed Coronavirus precaution including handwashing practice, wiping items touched in public such as gas pumps, door handles, shopping carts, etc. Self monitoring for infection - fever, chills, cough, SOB. Should they develop symptoms they should call office for further instructions. No follow-ups on file. This dictation was performed with a verbal recognition program and it was checked for errors. It is possible that there are still dictated errors within this office note. Any errors should be brought immediately to my attention for correction. All efforts were made to ensure that this office note is accurate.

## 2022-08-10 RX ORDER — BUTALBITAL, ACETAMINOPHEN AND CAFFEINE 50; 325; 40 MG/1; MG/1; MG/1
1 TABLET ORAL 3 TIMES DAILY PRN
Qty: 15 TABLET | Refills: 0 | Status: SHIPPED | OUTPATIENT
Start: 2022-08-10

## 2022-08-12 DIAGNOSIS — G44.89 HEADACHE SYNDROME: ICD-10-CM

## 2022-08-12 RX ORDER — BUTALBITAL, ACETAMINOPHEN AND CAFFEINE 50; 325; 40 MG/1; MG/1; MG/1
TABLET ORAL
Qty: 15 TABLET | Refills: 0 | Status: SHIPPED | OUTPATIENT
Start: 2022-08-12 | End: 2022-11-01 | Stop reason: ALTCHOICE

## 2022-09-01 ENCOUNTER — COMMUNITY OUTREACH (OUTPATIENT)
Dept: FAMILY MEDICINE CLINIC | Age: 72
End: 2022-09-01

## 2022-09-01 NOTE — PROGRESS NOTES
Patient's HM shows they are current for Colorectal Screening and Mammogram Screening. GIVVER and  files searched with success. Results attached to order and HM updated.

## 2022-09-08 DIAGNOSIS — F41.9 ANXIETY: ICD-10-CM

## 2022-09-08 DIAGNOSIS — G47.9 SLEEP DISTURBANCE: ICD-10-CM

## 2022-09-08 RX ORDER — HYDROXYZINE HYDROCHLORIDE 25 MG/1
25 TABLET, FILM COATED ORAL NIGHTLY
Qty: 30 TABLET | Refills: 1 | Status: SHIPPED | OUTPATIENT
Start: 2022-09-08 | End: 2022-11-07

## 2022-09-13 DIAGNOSIS — Z76.0 MEDICATION REFILL: ICD-10-CM

## 2022-09-13 RX ORDER — LISINOPRIL 10 MG/1
10 TABLET ORAL DAILY
Qty: 90 TABLET | Refills: 0 | Status: CANCELLED | OUTPATIENT
Start: 2022-09-13

## 2022-09-14 RX ORDER — LISINOPRIL 10 MG/1
TABLET ORAL
Qty: 90 TABLET | Refills: 1 | Status: SHIPPED | OUTPATIENT
Start: 2022-09-14 | End: 2022-10-12 | Stop reason: SDUPTHER

## 2022-09-22 ENCOUNTER — OFFICE VISIT (OUTPATIENT)
Dept: CARDIOLOGY CLINIC | Age: 72
End: 2022-09-22
Payer: MEDICARE

## 2022-09-22 VITALS
BODY MASS INDEX: 40.92 KG/M2 | SYSTOLIC BLOOD PRESSURE: 136 MMHG | WEIGHT: 270 LBS | HEIGHT: 68 IN | HEART RATE: 68 BPM | DIASTOLIC BLOOD PRESSURE: 70 MMHG

## 2022-09-22 DIAGNOSIS — M79.89 SWELLING OF EXTREMITY: Primary | ICD-10-CM

## 2022-09-22 PROCEDURE — 99214 OFFICE O/P EST MOD 30 MIN: CPT | Performed by: INTERNAL MEDICINE

## 2022-09-22 PROCEDURE — 3017F COLORECTAL CA SCREEN DOC REV: CPT | Performed by: INTERNAL MEDICINE

## 2022-09-22 PROCEDURE — G8417 CALC BMI ABV UP PARAM F/U: HCPCS | Performed by: INTERNAL MEDICINE

## 2022-09-22 PROCEDURE — 1090F PRES/ABSN URINE INCON ASSESS: CPT | Performed by: INTERNAL MEDICINE

## 2022-09-22 PROCEDURE — G8427 DOCREV CUR MEDS BY ELIG CLIN: HCPCS | Performed by: INTERNAL MEDICINE

## 2022-09-22 PROCEDURE — 1036F TOBACCO NON-USER: CPT | Performed by: INTERNAL MEDICINE

## 2022-09-22 PROCEDURE — 1123F ACP DISCUSS/DSCN MKR DOCD: CPT | Performed by: INTERNAL MEDICINE

## 2022-09-22 PROCEDURE — G8399 PT W/DXA RESULTS DOCUMENT: HCPCS | Performed by: INTERNAL MEDICINE

## 2022-09-22 NOTE — PROGRESS NOTES
CARDIOLOGY NOTE      9/22/2022    RE: Son To  (1950)                               TO:  Dr. Agnieszka Kang, APRN - CNP            Griffin Jay is a 66 y.o. female who was seen today for management of hypertension                                    HPI:                   Pt has h/o hypertension, hyperlipidemia, obesity, peripheral vascular disease, seen today for follow-up.  Pt has cardiac complaints    Son To has the following history recorded in care path:  Patient Active Problem List    Diagnosis Date Noted    Hyperlipidemia 05/12/2022    Former smoker 03/02/2022    Sleep disturbance 10/13/2021    Complex tear of medial meniscus of right knee as current injury 09/07/2021    Primary osteoarthritis of right knee 09/07/2021    Dyspnea on exertion 09/02/2021    Chronic pain of left ankle 01/21/2021    Chronic bronchitis ([address] 10/19/2020    Morbidly obese ([address] 10/19/2020    Syncope and collapse 07/10/2020    Urticaria 01/31/2020    PAD (peripheral artery disease) ([facility])     Dizziness 10/25/2019    Mild persistent asthma without complication 42/64/7546    Obstructive sleep apnea 10/04/2019    Chronic cough 10/04/2019    Conjunctivitis 08/01/2012    Corneal abrasion 08/01/2012    Allergic rhinitis 05/22/2012    HTN (hypertension) 04/24/2012    Chronic back pain 04/24/2012     Current Outpatient Medications   Medication Sig Dispense Refill    lisinopril (PRINIVIL;ZESTRIL) 10 MG tablet TAKE 1 TABLET BY MOUTH DAILY 90 tablet 1    hydrOXYzine HCl (ATARAX) 25 MG tablet Take 1 tablet by mouth nightly 30 tablet 1    butalbital-acetaminophen-caffeine (FIORICET, ESGIC) -40 MG per tablet Take 1 tablet by mouth 3 times daily as needed for Headaches AVOID ALCOHOL/CAUSES DROWSINESS 15 tablet 0    butalbital-acetaminophen-caffeine (FIORICET, ESGIC) -40 MG per tablet Take 1 tablet by mouth 3 times daily as needed for Headaches 15 tablet 0    albuterol sulfate HFA (PROVENTIL HFA) 108 (90 Influenza vaccines, and Soybean-containing drug products  Past Medical History:   Diagnosis Date    Active smoker     Active smoker     Ankle fracture, left 2006    Asthma     Chondromalacia of right knee     Dr. Cozetta Fothergill + MRI    Chronic back pain     Chronic cough 10/4/2019    Depression     has seen psychiatry in [place] 6 months    Dizziness     CT brain normal -6/18/2012    Dyspnea on exertion 9/2/2021    Family history of early CAD     Father - 4st MI age 50, Massive MI age 76    Former smoker 3/2/2022    H/O cardiovascular stress test 8/7/2012 8/7/2012-Lexiscan-Normal perfusion. LVSF normal.EF 58%    H/O Doppler ultrasound 12/11/2019     Abnormal left lower extremity arterial Doppler ultrasound. The Left lower extremity arteries have a Monophasic waveform suggestive of inflow disease, The Left BARBER shows Severe peripheral arterial disease. Normal right lower extremity arterial Doppler ultrasound. Normal right lower extremity ankle brachial indices    H/O echocardiogram 8/7/2012 8/7/2012-LVSF normal. EF =>55%. impaired LV relaxation. Herniated intervertebral disk     thoracic and lumbar    Hypertension     Mild persistent asthma without complication 18/8/5077    Normal echocardiogram 8/2012    EF=> 55%-Dr. Bess Tovar    Obesity (BMI 30-39. 9)     Obstructive sleep apnea 10/4/2019    Other screening mammogram     PAD (peripheral artery disease) ([address] Angioplasty 01/06/2020     LCIA 90% INSTENT RESTENOSED TO 0% WITH PTA.     Peripheral vascular disease ([address]     Postmenopausal     Shoulder fracture, left 2008    Tobacco abuse 10/4/2019     Past Surgical History:   Procedure Laterality Date    1935 [facility]    right knee    TONSILLECTOMY AND ADENOIDECTOMY  1963    TUBAL LIGATION  1977    TUMOR REMOVAL  1999    parotid    TUMOR REMOVAL  1976    left thigh      As reviewed   Family History   Problem Relation Age of Onset    High Blood Pressure Mother     Allergies Mother Migraines Mother     Obesity Mother     Heart Disease Father     High Blood Pressure Father     Allergies Father     Obesity Father     Diabetes Daughter     High Blood Pressure Sister     Allergies Sister     Bleeding Prob Sister     Migraines Sister     Obesity Sister     Cancer Brother     High Blood Pressure Brother     Allergies Brother     Bleeding Prob Brother     Obesity Brother     Thyroid Disease Maternal Aunt     Kidney Disease Maternal Aunt     Heart Disease Maternal Uncle     Kidney Disease Maternal Uncle     Cancer Maternal Grandmother     Glaucoma Maternal Grandmother     Diabetes Maternal Grandfather     Glaucoma Maternal Grandfather     Glaucoma Paternal Grandmother     Glaucoma Paternal Grandfather      Social History     Tobacco Use    Smoking status: Former     Packs/day: 1.00     Years: 30.00     Pack years: 30.00     Types: Cigarettes     Quit date: 2021     Years since quittin.1    Smokeless tobacco: Never   Substance Use Topics    Alcohol use: Yes     Comment: socially        Objective:    Vitals:    22 1108   BP: 136/70   Pulse: 68   Weight: 270 lb (122.5 kg)   Height: 5' 8\" (1.727 m)     /70   Pulse 68   Ht 5' 8\" (1.727 m)   Wt 270 lb (122.5 kg)   BMI 41.05 kg/m²     Patient-Reported Vitals 3/15/2022   Patient-Reported Weight 256lb   Patient-Reported Height 5 9   Patient-Reported Temperature 102        Wt Readings from Last 3 Encounters:   22 270 lb (122.5 kg)   22 266 lb (120.7 kg)   22 266 lb (120.7 kg)     Body mass index is 41.05 kg/m². GENERAL - Alert, oriented, pleasant, in no apparent distress. EYES: No jaundice, no conjunctival pallor. SKIN: It is warm & dry. No rashes. No Echhymosis    HEENT - No clinically significant abnormalities seen. Neck - Supple. No jugular venous distention noted. No carotid bruits. Cardiovascular - Normal S1 and S2 without obvious murmur or gallop. Extremities - No cyanosis, clubbing, or significant edema. Pulmonary - No respiratory distress. No wheezes or rales. Abdomen - No masses, tenderness, or organomegaly. Musculoskeletal - No significant edema. No joint deformities. No muscle wasting. Neurologic - Cranial nerves II through XII are grossly intact. There were no gross focal neurologic abnormalities. Lab Review   Lab Results   Component Value Date/Time    TROPONINT <0.010 07/11/2020 10:44 AM     BNP:    Lab Results   Component Value Date/Time    BNP 5 04/18/2013 04:30 PM     PT/INR:    Lab Results   Component Value Date    INR 0.80 01/03/2020     No results found for: LABA1C  Lab Results   Component Value Date    WBC 7.7 10/12/2021    HCT 44.5 10/12/2021    MCV 84.1 10/12/2021     10/12/2021     Lab Results   Component Value Date    CHOL 131 10/12/2021    TRIG 113 10/12/2021    HDL 53 10/12/2021    LDLCALC 55 10/12/2021     Lab Results   Component Value Date    ALT 7 (L) 10/12/2021    AST 16 10/12/2021     BMP:    Lab Results   Component Value Date/Time     10/12/2021 11:07 AM    K 3.9 10/12/2021 11:07 AM     10/12/2021 11:07 AM    CO2 26 10/12/2021 11:07 AM    BUN 11 10/12/2021 11:07 AM    CREATININE 0.9 10/12/2021 11:07 AM     CMP:   Lab Results   Component Value Date/Time     10/12/2021 11:07 AM    K 3.9 10/12/2021 11:07 AM     10/12/2021 11:07 AM    CO2 26 10/12/2021 11:07 AM    BUN 11 10/12/2021 11:07 AM    PROT 7.3 10/12/2021 11:07 AM    PROT 7.0 08/11/2012 08:45 AM     TSH:    Lab Results   Component Value Date/Time    TSH 3.16 09/25/2019 08:42 AM    TSHHS 1.769 08/11/2012 08:45 AM           Assessment & Plan:    -  Hypertension: Patients blood pressure is normal. Patient is advised about low sodium diet. Present medical regimen will not be changed.     On lisinopril 10 mg p.o. daily we will monitor the blood pressure      - PVD had intervention done in January 2020 has remained stable is on antiplatelet with Plavix we will continue we will recheck an ultrasound    - LE swelling and knots v doppler            -  LIPID MANAGEMENT:  Importance of lipid levels discussed with patient   and patient was given dietary advice. NCEP- ATP III guidelines reviewed with patient. -   Changes  in medicines made: No     On Zocor 40 mg p.o. daily we will check the LDL                    Mortality from the morbid obesity is very high: Body mass index is 41.05 kg/m². Mayer Goodpasture MD    [facility]    Please note this report has been partially produced using speech recognition software and may contain errors related to that system including errors in grammar, punctuation, and spelling, as well as words and phrases that may be inappropriate. If there are any questions or concerns please feel free to contact the dictating provider for clarification.

## 2022-10-03 ENCOUNTER — TELEPHONE (OUTPATIENT)
Dept: PULMONOLOGY | Age: 72
End: 2022-10-03

## 2022-10-03 DIAGNOSIS — G47.33 OBSTRUCTIVE SLEEP APNEA: ICD-10-CM

## 2022-10-03 DIAGNOSIS — J45.30 MILD PERSISTENT ASTHMA WITHOUT COMPLICATION: Primary | ICD-10-CM

## 2022-10-03 DIAGNOSIS — Z87.891 FORMER SMOKER: ICD-10-CM

## 2022-10-03 DIAGNOSIS — R06.09 DYSPNEA ON EXERTION: ICD-10-CM

## 2022-10-04 ENCOUNTER — HOSPITAL ENCOUNTER (OUTPATIENT)
Age: 72
Discharge: HOME | End: 2022-10-04
Payer: MEDICARE

## 2022-10-04 DIAGNOSIS — M54.40 CHRONIC LOW BACK PAIN WITH SCIATICA, SCIATICA LATERALITY UNSPECIFIED, UNSPECIFIED BACK PAIN LATERALITY: ICD-10-CM

## 2022-10-04 DIAGNOSIS — G89.29 CHRONIC LOW BACK PAIN WITH SCIATICA, SCIATICA LATERALITY UNSPECIFIED, UNSPECIFIED BACK PAIN LATERALITY: ICD-10-CM

## 2022-10-04 DIAGNOSIS — I10 ESSENTIAL HYPERTENSION: ICD-10-CM

## 2022-10-04 DIAGNOSIS — E78.5 HYPERLIPIDEMIA, UNSPECIFIED HYPERLIPIDEMIA TYPE: ICD-10-CM

## 2022-10-04 LAB
ALBUMIN SERPL-MCNC: 4.4 GM/DL (ref 3.4–5)
ALP BLD-CCNC: 106 IU/L (ref 40–128)
ALT SERPL-CCNC: 9 U/L (ref 10–40)
ANION GAP SERPL CALCULATED.3IONS-SCNC: 9 MMOL/L (ref 4–16)
AST SERPL-CCNC: 19 IU/L (ref 15–37)
BASOPHILS ABSOLUTE: 0 K/CU MM
BASOPHILS RELATIVE PERCENT: 0.5 % (ref 0–1)
BILIRUB SERPL-MCNC: 0.6 MG/DL (ref 0–1)
BUN BLDV-MCNC: 14 MG/DL (ref 6–23)
CALCIUM SERPL-MCNC: 9.5 MG/DL (ref 8.3–10.6)
CHLORIDE BLD-SCNC: 103 MMOL/L (ref 99–110)
CHOLESTEROL: 135 MG/DL
CO2: 29 MMOL/L (ref 21–32)
CREAT SERPL-MCNC: 0.9 MG/DL (ref 0.6–1.1)
DIFFERENTIAL TYPE: ABNORMAL
EOSINOPHILS ABSOLUTE: 0.2 K/CU MM
EOSINOPHILS RELATIVE PERCENT: 2.5 % (ref 0–3)
GFR AFRICAN AMERICAN: >60 ML/MIN/1.73M2
GFR NON-AFRICAN AMERICAN: >60 ML/MIN/1.73M2
GLUCOSE BLD-MCNC: 83 MG/DL (ref 70–99)
HCT VFR BLD CALC: 48.1 % (ref 37–47)
HDLC SERPL-MCNC: 60 MG/DL
HEMOGLOBIN: 15.5 GM/DL (ref 12.5–16)
IMMATURE NEUTROPHIL %: 0.3 % (ref 0–0.43)
LDL CHOLESTEROL CALCULATED: 58 MG/DL
LYMPHOCYTES ABSOLUTE: 2.5 K/CU MM
LYMPHOCYTES RELATIVE PERCENT: 32.3 % (ref 24–44)
MCH RBC QN AUTO: 28.1 PG (ref 27–31)
MCHC RBC AUTO-ENTMCNC: 32.2 % (ref 32–36)
MCV RBC AUTO: 87.1 FL (ref 78–100)
MONOCYTES ABSOLUTE: 0.7 K/CU MM
MONOCYTES RELATIVE PERCENT: 8.4 % (ref 0–4)
NUCLEATED RBC %: 0 %
PDW BLD-RTO: 14.4 % (ref 11.7–14.9)
PLATELET # BLD: 239 K/CU MM (ref 140–440)
PMV BLD AUTO: 10 FL (ref 7.5–11.1)
POTASSIUM SERPL-SCNC: 4.3 MMOL/L (ref 3.5–5.1)
RBC # BLD: 5.52 M/CU MM (ref 4.2–5.4)
SEGMENTED NEUTROPHILS ABSOLUTE COUNT: 4.4 K/CU MM
SEGMENTED NEUTROPHILS RELATIVE PERCENT: 56 % (ref 36–66)
SODIUM BLD-SCNC: 141 MMOL/L (ref 135–145)
TOTAL IMMATURE NEUTOROPHIL: 0.02 K/CU MM
TOTAL NUCLEATED RBC: 0 K/CU MM
TOTAL PROTEIN: 7.1 GM/DL (ref 6.4–8.2)
TRIGL SERPL-MCNC: 87 MG/DL
WBC # BLD: 7.9 K/CU MM (ref 4–10.5)

## 2022-10-04 PROCEDURE — 80061 LIPID PANEL: CPT

## 2022-10-04 PROCEDURE — 36415 COLL VENOUS BLD VENIPUNCTURE: CPT

## 2022-10-04 PROCEDURE — 85025 COMPLETE CBC W/AUTO DIFF WBC: CPT

## 2022-10-04 PROCEDURE — 80053 COMPREHEN METABOLIC PANEL: CPT

## 2022-10-12 ENCOUNTER — OFFICE VISIT (OUTPATIENT)
Dept: FAMILY MEDICINE CLINIC | Age: 72
End: 2022-10-12
Payer: MEDICARE

## 2022-10-12 VITALS
HEART RATE: 92 BPM | OXYGEN SATURATION: 99 % | BODY MASS INDEX: 40.72 KG/M2 | TEMPERATURE: 97.3 F | DIASTOLIC BLOOD PRESSURE: 72 MMHG | WEIGHT: 267.8 LBS | RESPIRATION RATE: 16 BRPM | SYSTOLIC BLOOD PRESSURE: 126 MMHG

## 2022-10-12 DIAGNOSIS — Z23 NEED FOR INFLUENZA VACCINATION: Primary | ICD-10-CM

## 2022-10-12 DIAGNOSIS — Z76.0 MEDICATION REFILL: ICD-10-CM

## 2022-10-12 DIAGNOSIS — J32.9 CHRONIC SINUSITIS, UNSPECIFIED LOCATION: ICD-10-CM

## 2022-10-12 DIAGNOSIS — M54.40 CHRONIC LOW BACK PAIN WITH SCIATICA, SCIATICA LATERALITY UNSPECIFIED, UNSPECIFIED BACK PAIN LATERALITY: ICD-10-CM

## 2022-10-12 DIAGNOSIS — Z23 NEED FOR TDAP VACCINATION: ICD-10-CM

## 2022-10-12 DIAGNOSIS — J42 CHRONIC BRONCHITIS, UNSPECIFIED CHRONIC BRONCHITIS TYPE (HCC): ICD-10-CM

## 2022-10-12 DIAGNOSIS — G89.29 CHRONIC LOW BACK PAIN WITH SCIATICA, SCIATICA LATERALITY UNSPECIFIED, UNSPECIFIED BACK PAIN LATERALITY: ICD-10-CM

## 2022-10-12 PROCEDURE — 99214 OFFICE O/P EST MOD 30 MIN: CPT | Performed by: NURSE PRACTITIONER

## 2022-10-12 PROCEDURE — G8484 FLU IMMUNIZE NO ADMIN: HCPCS | Performed by: NURSE PRACTITIONER

## 2022-10-12 PROCEDURE — G8417 CALC BMI ABV UP PARAM F/U: HCPCS | Performed by: NURSE PRACTITIONER

## 2022-10-12 PROCEDURE — G8399 PT W/DXA RESULTS DOCUMENT: HCPCS | Performed by: NURSE PRACTITIONER

## 2022-10-12 PROCEDURE — 1090F PRES/ABSN URINE INCON ASSESS: CPT | Performed by: NURSE PRACTITIONER

## 2022-10-12 PROCEDURE — 3017F COLORECTAL CA SCREEN DOC REV: CPT | Performed by: NURSE PRACTITIONER

## 2022-10-12 PROCEDURE — 1036F TOBACCO NON-USER: CPT | Performed by: NURSE PRACTITIONER

## 2022-10-12 PROCEDURE — 3023F SPIROM DOC REV: CPT | Performed by: NURSE PRACTITIONER

## 2022-10-12 PROCEDURE — 1123F ACP DISCUSS/DSCN MKR DOCD: CPT | Performed by: NURSE PRACTITIONER

## 2022-10-12 PROCEDURE — G8427 DOCREV CUR MEDS BY ELIG CLIN: HCPCS | Performed by: NURSE PRACTITIONER

## 2022-10-12 PROCEDURE — 90715 TDAP VACCINE 7 YRS/> IM: CPT | Performed by: NURSE PRACTITIONER

## 2022-10-12 PROCEDURE — 90471 IMMUNIZATION ADMIN: CPT | Performed by: NURSE PRACTITIONER

## 2022-10-12 RX ORDER — FLUTICASONE PROPIONATE 50 MCG
SPRAY, SUSPENSION (ML) NASAL
Qty: 16 G | Refills: 1 | Status: SHIPPED | OUTPATIENT
Start: 2022-10-12

## 2022-10-12 RX ORDER — BACLOFEN 10 MG/1
10 TABLET ORAL 2 TIMES DAILY
Qty: 180 TABLET | Refills: 1 | Status: SHIPPED | OUTPATIENT
Start: 2022-10-12

## 2022-10-12 RX ORDER — CETIRIZINE HYDROCHLORIDE 10 MG/1
TABLET ORAL
Qty: 90 TABLET | Refills: 1 | Status: SHIPPED | OUTPATIENT
Start: 2022-10-12

## 2022-10-12 RX ORDER — MONTELUKAST SODIUM 10 MG/1
10 TABLET ORAL NIGHTLY
Qty: 90 TABLET | Refills: 1 | Status: SHIPPED | OUTPATIENT
Start: 2022-10-12

## 2022-10-12 RX ORDER — LISINOPRIL 10 MG/1
TABLET ORAL
Qty: 90 TABLET | Refills: 1 | Status: SHIPPED | OUTPATIENT
Start: 2022-10-12 | End: 2022-11-01 | Stop reason: SDUPTHER

## 2022-10-12 RX ORDER — ALBUTEROL SULFATE 90 UG/1
2 AEROSOL, METERED RESPIRATORY (INHALATION) EVERY 4 HOURS PRN
Qty: 1 EACH | Refills: 11 | Status: SHIPPED | OUTPATIENT
Start: 2022-10-12

## 2022-10-12 RX ORDER — HYDROCHLOROTHIAZIDE 12.5 MG/1
12.5 TABLET ORAL EVERY OTHER DAY
Qty: 90 TABLET | Refills: 1 | Status: SHIPPED | OUTPATIENT
Start: 2022-10-12

## 2022-10-12 RX ORDER — FLUTICASONE PROPIONATE AND SALMETEROL 50; 500 UG/1; UG/1
1 POWDER RESPIRATORY (INHALATION) EVERY 12 HOURS
Qty: 1 EACH | Refills: 11 | Status: SHIPPED | OUTPATIENT
Start: 2022-10-12

## 2022-10-12 RX ORDER — IPRATROPIUM BROMIDE AND ALBUTEROL SULFATE 2.5; .5 MG/3ML; MG/3ML
1 SOLUTION RESPIRATORY (INHALATION) 4 TIMES DAILY
Qty: 120 EACH | Refills: 1 | Status: SHIPPED | OUTPATIENT
Start: 2022-10-12

## 2022-10-12 SDOH — ECONOMIC STABILITY: FOOD INSECURITY: WITHIN THE PAST 12 MONTHS, YOU WORRIED THAT YOUR FOOD WOULD RUN OUT BEFORE YOU GOT MONEY TO BUY MORE.: NEVER TRUE

## 2022-10-12 SDOH — ECONOMIC STABILITY: FOOD INSECURITY: WITHIN THE PAST 12 MONTHS, THE FOOD YOU BOUGHT JUST DIDN'T LAST AND YOU DIDN'T HAVE MONEY TO GET MORE.: NEVER TRUE

## 2022-10-12 ASSESSMENT — PATIENT HEALTH QUESTIONNAIRE - PHQ9
2. FEELING DOWN, DEPRESSED OR HOPELESS: 0
SUM OF ALL RESPONSES TO PHQ QUESTIONS 1-9: 0
1. LITTLE INTEREST OR PLEASURE IN DOING THINGS: 0
SUM OF ALL RESPONSES TO PHQ QUESTIONS 1-9: 0
SUM OF ALL RESPONSES TO PHQ QUESTIONS 1-9: 0
SUM OF ALL RESPONSES TO PHQ9 QUESTIONS 1 & 2: 0
SUM OF ALL RESPONSES TO PHQ QUESTIONS 1-9: 0

## 2022-10-12 ASSESSMENT — SOCIAL DETERMINANTS OF HEALTH (SDOH): HOW HARD IS IT FOR YOU TO PAY FOR THE VERY BASICS LIKE FOOD, HOUSING, MEDICAL CARE, AND HEATING?: NOT VERY HARD

## 2022-10-12 NOTE — PROGRESS NOTES
Augie Andreina  1950  66 y.o. SUBJECT JEMIMA:    Chief Complaint   Patient presents with    6 Month Follow-Up    Hypertension    Asthma    Otalgia     Left Ear only, Not today, but it comes and goes. It drains a lot, usually when allergies act up. Immunizations     Need Tdap and maybe the flu vaccine. Felicia Phalen is a 66year old female who is in for follow up. She states she has been doing well, some flare up of asthma with weather change. She complains of some discomfort of the left ear. Low Back Pain  She states she continues to have some back pain. She states she does some stretches and tries to walk most days. She states the back pain is tolerable. Hypertension  She states she is compliant with medications. Blood pressure today is 126/72. She denies chest pain, shortness of breath or palpitations. Immunizations  She is due for immunizations but is concerned because of having a reaction to the influenza vaccine some years ago. Hypertension  This is a chronic problem. The current episode started more than 1 year ago. The problem has been rapidly improving since onset. The problem is controlled. Pertinent negatives include no chest pain, headaches, malaise/fatigue, neck pain, palpitations, peripheral edema or shortness of breath. There are no associated agents to hypertension. Risk factors for coronary artery disease include obesity and stress. Past treatments include ACE inhibitors. The current treatment provides significant improvement. There are no compliance problems. Asthma  There is no cough, shortness of breath or wheezing. Associated symptoms include ear pain. Pertinent negatives include no appetite change, chest pain, fever, headaches or malaise/fatigue. Her past medical history is significant for asthma. Otalgia   Pertinent negatives include no coughing, headaches or neck pain.        Current Outpatient Medications on File Prior to Visit   Medication Sig Dispense Refill hydrOXYzine HCl (ATARAX) 25 MG tablet Take 1 tablet by mouth nightly 30 tablet 1    butalbital-acetaminophen-caffeine (FIORICET, ESGIC) -40 MG per tablet Take 1 tablet by mouth 3 times daily as needed for Headaches AVOID ALCOHOL/CAUSES DROWSINESS 15 tablet 0    butalbital-acetaminophen-caffeine (FIORICET, ESGIC) -40 MG per tablet Take 1 tablet by mouth 3 times daily as needed for Headaches 15 tablet 0    lisinopril (PRINIVIL;ZESTRIL) 10 MG tablet Take 1 tablet by mouth daily 90 tablet 0    lisinopril (PRINIVIL;ZESTRIL) 10 MG tablet Take 1 tablet by mouth daily 90 tablet 0    clopidogrel (PLAVIX) 75 MG tablet Take 1 tablet by mouth daily 90 tablet 1    simvastatin (ZOCOR) 40 MG tablet Take 1 tablet by mouth nightly 90 tablet 1    ADVAIR DISKUS 500-50 MCG/DOSE diskus inhaler Inhale 1 puff into the lungs every 12 hours After inhalation then gargle 1 each 3    meclizine (ANTIVERT) 25 MG tablet Take 1 tablet by mouth 2 times daily (Patient taking differently: Take 25 mg by mouth as needed) 30 tablet 0    NASAL SALINE NA by Nasal route       albuterol (PROVENTIL) (5 MG/ML) 0.5% nebulizer solution Take 1 mL by nebulization every 6 hours as needed for Wheezing or Shortness of Breath (Patient not taking: Reported on 9/22/2022) 100 vial 1    Multiple Vitamin (MULTI-VITAMIN DAILY PO) Take 1 tablet by mouth daily      aspirin 81 MG tablet Take 1 tablet by mouth daily 90 tablet 2     No current facility-administered medications on file prior to visit.        Past Medical History:   Diagnosis Date    Active smoker     Active smoker     Ankle fracture, left 2006    Asthma     Chondromalacia of right knee     Dr. Nahum Arteaga + MRI    Chronic back pain     Chronic cough 10/4/2019    Depression     has seen psychiatry in [place] 6 months    Dizziness     CT brain normal -6/18/2012    Dyspnea on exertion 9/2/2021    Family history of early CAD     Father - 4st MI age 50, Massive MI age 76    Former smoker 3/2/2022    H/O cardiovascular stress test 8/7/2012 8/7/2012-Lexiscan-Normal perfusion. LVSF normal.EF 58%    H/O Doppler ultrasound 12/11/2019     Abnormal left lower extremity arterial Doppler ultrasound. The Left lower extremity arteries have a Monophasic waveform suggestive of inflow disease, The Left BARBER shows Severe peripheral arterial disease. Normal right lower extremity arterial Doppler ultrasound. Normal right lower extremity ankle brachial indices    H/O echocardiogram 8/7/2012 8/7/2012-LVSF normal. EF =>55%. impaired LV relaxation. Herniated intervertebral disk     thoracic and lumbar    Hypertension     Mild persistent asthma without complication 51/9/7623    Normal echocardiogram 8/2012    EF=> 55%-Dr. Martell Winters    Obesity (BMI 30-39. 9)     Obstructive sleep apnea 10/4/2019    Other screening mammogram     PAD (peripheral artery disease) ([address] Angioplasty 01/06/2020     LCIA 90% INSTENT RESTENOSED TO 0% WITH PTA.     Peripheral vascular disease ([address]     Postmenopausal     Shoulder fracture, left 2008    Tobacco abuse 10/4/2019     Past Surgical History:   Procedure Laterality Date    1935 [facility]    right knee    TONSILLECTOMY AND ADENOIDECTOMY  1963    TUBAL LIGATION  1977    TUMOR REMOVAL  1999    parotid    TUMOR REMOVAL  1976    left thigh     Family History   Problem Relation Age of Onset    High Blood Pressure Mother     Allergies Mother     Migraines Mother     Obesity Mother     Heart Disease Father     High Blood Pressure Father     Allergies Father     Obesity Father     Diabetes Daughter     High Blood Pressure Sister     Allergies Sister     Bleeding Prob Sister     Migraines Sister     Obesity Sister     Cancer Brother     High Blood Pressure Brother     Allergies Brother     Bleeding Prob Brother     Obesity Brother     Thyroid Disease Maternal Aunt     Kidney Disease Maternal Aunt     Heart Disease Maternal Uncle     Kidney Disease Maternal Uncle     Cancer Maternal Grandmother     Glaucoma Maternal Grandmother     Diabetes Maternal Grandfather     Glaucoma Maternal Grandfather     Glaucoma Paternal Grandmother     Glaucoma Paternal Grandfather      Social History     Socioeconomic History    Marital status:      Spouse name: Not on file    Number of children: 1    Years of education: Not on file    Highest education level: Not on file   Occupational History    Occupation: Customer Service   Tobacco Use    Smoking status: Former     Packs/day: 1.00     Years: 30.00     Pack years: 30.00     Types: Cigarettes     Quit date: 2021     Years since quittin.2    Smokeless tobacco: Never   Vaping Use    Vaping Use: Never used   Substance and Sexual Activity    Alcohol use: Yes     Comment: socially    Drug use: No    Sexual activity: Not Currently   Other Topics Concern    Not on file   Social History Narrative    Working now at [employer] from [employer]      Has a daughter and Daughter in law. Social Determinants of Health     Financial Resource Strain: Low Risk     Difficulty of Paying Living Expenses: Not very hard   Food Insecurity: No Food Insecurity    Worried About Running Out of Food in the Last Year: Never true    Ran Out of Food in the Last Year: Never true   Transportation Needs: Not on file   Physical Activity: Not on file   Stress: Not on file   Social Connections: Not on file   Intimate Partner Violence: Not on file   Housing Stability: Not on file       Review of Systems   Constitutional:  Negative for activity change, appetite change, chills, diaphoresis, fatigue, fever, malaise/fatigue and unexpected weight change. HENT:  Positive for ear pain. Respiratory:  Negative for cough, chest tightness, shortness of breath and wheezing. Cardiovascular:  Negative for chest pain and palpitations. Gastrointestinal: Negative. Genitourinary: Negative. Musculoskeletal:  Negative for neck pain. Neurological: Negative. Negative for dizziness, weakness, light-headedness and headaches. Psychiatric/Behavioral: Negative. OBJECTIVE:     /72 (Site: Right Upper Arm, Position: Sitting, Cuff Size: Large Adult)   Pulse 92   Temp 97.3 °F (36.3 °C) (Infrared)   Resp 16   Wt 267 lb 12.8 oz (121.5 kg)   SpO2 99%   BMI 40.72 kg/m²     Physical Exam  Vitals reviewed. Constitutional:       General: She is not in acute distress. Appearance: Normal appearance. She is well-developed. She is not ill-appearing or diaphoretic. HENT:      Head: Normocephalic and atraumatic. Right Ear: Tympanic membrane and external ear normal.      Left Ear: Tympanic membrane and external ear normal.      Nose: No congestion or rhinorrhea. Mouth/Throat:      Mouth: Mucous membranes are moist.   Eyes:      General: No scleral icterus. Conjunctiva/sclera: Conjunctivae normal.      Pupils: Pupils are equal, round, and reactive to light. Cardiovascular:      Rate and Rhythm: Normal rate and regular rhythm. Heart sounds: Normal heart sounds. No murmur heard. No friction rub. No gallop. Pulmonary:      Effort: Pulmonary effort is normal. No respiratory distress. Breath sounds: Normal breath sounds. No wheezing. Chest:      Chest wall: No tenderness. Abdominal:      Palpations: Abdomen is soft. Musculoskeletal:         General: Normal range of motion. Cervical back: Normal range of motion and neck supple. No rigidity or tenderness. Right lower leg: No edema. Left lower leg: No edema. Lymphadenopathy:      Cervical: No cervical adenopathy. Skin:     General: Skin is warm and dry. Neurological:      Mental Status: She is alert and oriented to person, place, and time. Gait: Gait normal.   Psychiatric:         Behavior: Behavior normal.         Thought Content:  Thought content normal.         Judgment: Judgment normal.       Results in Past 30 Days  Result Component Current Result Ref Range Previous Result Ref Range   Albumin 4.4 (10/4/2022) 3.4 - 5.0 GM/DL Not in Time Range    Alkaline Phosphatase 106 (10/4/2022) 40 - 128 IU/L Not in Time Range    ALT 9 (L) (10/4/2022) 10 - 40 U/L Not in Time Range    AST 19 (10/4/2022) 15 - 37 IU/L Not in Time Range    BUN 14 (10/4/2022) 6 - 23 MG/DL Not in Time Range    Calcium 9.5 (10/4/2022) 8.3 - 10.6 MG/DL Not in Time Range    Chloride 103 (10/4/2022) 99 - 110 mMol/L Not in Time Range    CO2 29 (10/4/2022) 21 - 32 MMOL/L Not in Time Range    Creatinine 0.9 (10/4/2022) 0.6 - 1.1 MG/DL Not in Time Range    GFR  >60 (10/4/2022) >60 mL/min/1.73m2 Not in Time Range    GFR Non- >60 (10/4/2022) >60 mL/min/1.73m2 Not in Time Range    Glucose 83 (10/4/2022) 70 - 99 MG/DL Not in Time Range    Potassium 4.3 (10/4/2022) 3.5 - 5.1 MMOL/L Not in Time Range    Sodium 141 (10/4/2022) 135 - 145 MMOL/L Not in Time Range    Total Bilirubin 0.6 (10/4/2022) 0.0 - 1.0 MG/DL Not in Time Range    Total Protein 7.1 (10/4/2022) 6.4 - 8.2 GM/DL Not in Time Range      LDL Calculated (MG/DL)   Date Value   10/04/2022 58       Lab Results   Component Value Date/Time    WBC 7.9 10/04/2022 08:55 AM    WBC 7.7 10/12/2021 11:07 AM    WBC 6.6 10/19/2020 05:06 PM    NEUTROABS 4.3 10/12/2021 11:07 AM    NEUTROABS 3.9 10/19/2020 05:06 PM    NEUTROABS 3.1 09/25/2019 08:42 AM    HGB 15.5 10/04/2022 08:55 AM    HGB 14.1 10/12/2021 11:07 AM    HGB 14.8 10/19/2020 05:06 PM    HCT 48.1 10/04/2022 08:55 AM    HCT 44.5 10/12/2021 11:07 AM    HCT 44.6 10/19/2020 05:06 PM    MCV 87.1 10/04/2022 08:55 AM    MCV 84.1 10/12/2021 11:07 AM    MCV 83.4 10/19/2020 05:06 PM     10/04/2022 08:55 AM     10/12/2021 11:07 AM     10/19/2020 05:06 PM    SEGSABS 4.4 10/04/2022 08:55 AM    SEGSABS 3.1 07/11/2020 10:44 AM    SEGSABS 3.5 07/10/2020 02:12 PM    LYMPHSABS 2.5 10/04/2022 08:55 AM    MONOSABS 0.7 10/04/2022 08:55 AM    EOSABS 0.2 10/04/2022 08:55 AM    BASOSABS 0.0 10/04/2022 08:55 AM     Lab Results   Component Value Date/Time    TSH 3.16 09/25/2019 08:42 AM    TSHHS 1.769 08/11/2012 08:45 AM     Lab Results   Component Value Date/Time    LABALBU 4.4 10/04/2022 08:55 AM    BILITOT 0.6 10/04/2022 08:55 AM    AST 19 10/04/2022 08:55 AM    ALT 9 10/04/2022 08:55 AM    ALKPHOS 106 10/04/2022 08:55 AM             No results found for this visit on 10/12/22. ASSESSMENT AND PLAN:     1. Chronic sinusitis, unspecified location  - fluticasone (FLONASE) 50 MCG/ACT nasal spray; USE 1 SPRAY IN EACH NOSTRIL EVERY DAY  Dispense: 16 g; Refill: 1    2. Medication refill  - lisinopril (PRINIVIL;ZESTRIL) 10 MG tablet; TAKE 1 TABLET BY MOUTH DAILY  Dispense: 90 tablet; Refill: 1  - cetirizine (HM CETIRIZINE HCL) 10 MG tablet; Take one tablet by mouth daily. Dispense: 90 tablet; Refill: 1  - ADVAIR DISKUS 500-50 MCG/ACT AEPB diskus inhaler; Inhale 1 puff into the lungs in the morning and 1 puff in the evening. Dispense: 1 each; Refill: 11  - baclofen (LIORESAL) 10 MG tablet; Take 1 tablet by mouth 2 times daily  Dispense: 180 tablet; Refill: 1  - montelukast (SINGULAIR) 10 MG tablet; Take 1 tablet by mouth nightly  Dispense: 90 tablet; Refill: 1  - hydroCHLOROthiazide (HYDRODIURIL) 12.5 MG tablet; Take 1 tablet by mouth every other day  Dispense: 90 tablet; Refill: 1  - albuterol sulfate HFA (PROVENTIL HFA) 108 (90 Base) MCG/ACT inhaler; Inhale 2 puffs into the lungs every 4 hours as needed for Wheezing or Shortness of Breath (cough)  Dispense: 1 each; Refill: 11    3. Chronic bronchitis, unspecified chronic bronchitis type ([facility]  - ipratropium-albuterol (DUONEB) 0.5-2.5 (3) MG/3ML SOLN nebulizer solution; Inhale 3 mLs into the lungs 4 times daily  Dispense: 120 each; Refill: 1    4. Chronic low back pain with sciatica, sciatica laterality unspecified, unspecified back pain laterality  - baclofen (LIORESAL) 10 MG tablet;  Take 1 tablet by mouth 2 times daily  Dispense: 180 tablet; Refill: 1    5. Need for influenza vaccination    6. Need for Tdap vaccination  - Tdap, BOOSTRIX, (age 8 yrs+), IM    Continue current medication regimen. Medications refilled. Keep follow up with pulmonology. Verbalized understanding and agreement with plan. No follow-ups on file. Affirmation I spent at least 35minutes of time reviewing patient's history, previous & current medical problems & all Labs + testing. This includes chart prep even prior to the visit. Various goals are discussed and multiple questions answered. Relevant counselling performed. Care discussed with patient. Patient educated on signs and symptoms of exacerbation and when to seek further medical attention. Advised to call for any problems, questions, or concerns. Patient verbalizes understanding and agrees with plan. Medications reviewed and reconciled. Continue current medications. Appropriate prescriptions are ordered. Risks and benefits of meds are discussed. After visit summary provided.

## 2022-10-17 ASSESSMENT — ENCOUNTER SYMPTOMS
CHEST TIGHTNESS: 0
COUGH: 0
SHORTNESS OF BREATH: 0
WHEEZING: 0
GASTROINTESTINAL NEGATIVE: 1

## 2022-10-31 ENCOUNTER — HOSPITAL ENCOUNTER (OUTPATIENT)
Dept: WOMENS IMAGING | Age: 72
Discharge: HOME | End: 2022-10-31
Payer: MEDICARE

## 2022-10-31 ENCOUNTER — HOSPITAL ENCOUNTER (OUTPATIENT)
Dept: CT IMAGING | Age: 72
Discharge: HOME | End: 2022-10-31
Payer: MEDICARE

## 2022-10-31 DIAGNOSIS — Z87.891 FORMER SMOKER: ICD-10-CM

## 2022-10-31 DIAGNOSIS — G47.33 OBSTRUCTIVE SLEEP APNEA: ICD-10-CM

## 2022-10-31 DIAGNOSIS — Z12.31 SCREENING MAMMOGRAM, ENCOUNTER FOR: ICD-10-CM

## 2022-10-31 DIAGNOSIS — J45.30 MILD PERSISTENT ASTHMA WITHOUT COMPLICATION: ICD-10-CM

## 2022-10-31 DIAGNOSIS — R06.09 DYSPNEA ON EXERTION: ICD-10-CM

## 2022-10-31 PROCEDURE — 71250 CT THORAX DX C-: CPT

## 2022-10-31 PROCEDURE — 77063 BREAST TOMOSYNTHESIS BI: CPT

## 2022-11-01 ENCOUNTER — OFFICE VISIT (OUTPATIENT)
Dept: FAMILY MEDICINE CLINIC | Age: 72
End: 2022-11-01
Payer: MEDICARE

## 2022-11-01 VITALS
DIASTOLIC BLOOD PRESSURE: 62 MMHG | BODY MASS INDEX: 40.29 KG/M2 | WEIGHT: 265 LBS | SYSTOLIC BLOOD PRESSURE: 124 MMHG | OXYGEN SATURATION: 93 % | RESPIRATION RATE: 20 BRPM | HEART RATE: 70 BPM

## 2022-11-01 DIAGNOSIS — Z20.822 ENCOUNTER FOR LABORATORY TESTING FOR COVID-19 VIRUS: ICD-10-CM

## 2022-11-01 DIAGNOSIS — M54.40 CHRONIC LOW BACK PAIN WITH SCIATICA, SCIATICA LATERALITY UNSPECIFIED, UNSPECIFIED BACK PAIN LATERALITY: ICD-10-CM

## 2022-11-01 DIAGNOSIS — Z23 NEED FOR INFLUENZA VACCINATION: ICD-10-CM

## 2022-11-01 DIAGNOSIS — G89.29 CHRONIC LOW BACK PAIN WITH SCIATICA, SCIATICA LATERALITY UNSPECIFIED, UNSPECIFIED BACK PAIN LATERALITY: ICD-10-CM

## 2022-11-01 DIAGNOSIS — M79.10 MYALGIA: ICD-10-CM

## 2022-11-01 DIAGNOSIS — Z00.00 MEDICARE ANNUAL WELLNESS VISIT, SUBSEQUENT: Primary | ICD-10-CM

## 2022-11-01 PROCEDURE — G0008 ADMIN INFLUENZA VIRUS VAC: HCPCS | Performed by: NURSE PRACTITIONER

## 2022-11-01 PROCEDURE — 1123F ACP DISCUSS/DSCN MKR DOCD: CPT | Performed by: NURSE PRACTITIONER

## 2022-11-01 PROCEDURE — 3074F SYST BP LT 130 MM HG: CPT | Performed by: NURSE PRACTITIONER

## 2022-11-01 PROCEDURE — 3017F COLORECTAL CA SCREEN DOC REV: CPT | Performed by: NURSE PRACTITIONER

## 2022-11-01 PROCEDURE — G0439 PPPS, SUBSEQ VISIT: HCPCS | Performed by: NURSE PRACTITIONER

## 2022-11-01 PROCEDURE — G8484 FLU IMMUNIZE NO ADMIN: HCPCS | Performed by: NURSE PRACTITIONER

## 2022-11-01 PROCEDURE — 90694 VACC AIIV4 NO PRSRV 0.5ML IM: CPT | Performed by: NURSE PRACTITIONER

## 2022-11-01 PROCEDURE — 3078F DIAST BP <80 MM HG: CPT | Performed by: NURSE PRACTITIONER

## 2022-11-01 RX ORDER — PREDNISONE 20 MG/1
20 TABLET ORAL DAILY
Qty: 5 TABLET | Refills: 0 | Status: SHIPPED | OUTPATIENT
Start: 2022-11-01 | End: 2022-11-06

## 2022-11-01 SDOH — HEALTH STABILITY: PHYSICAL HEALTH: ON AVERAGE, HOW MANY MINUTES DO YOU ENGAGE IN EXERCISE AT THIS LEVEL?: 0 MIN

## 2022-11-01 SDOH — HEALTH STABILITY: PHYSICAL HEALTH: ON AVERAGE, HOW MANY DAYS PER WEEK DO YOU ENGAGE IN MODERATE TO STRENUOUS EXERCISE (LIKE A BRISK WALK)?: 0 DAYS

## 2022-11-01 ASSESSMENT — LIFESTYLE VARIABLES
HOW OFTEN DO YOU HAVE A DRINK CONTAINING ALCOHOL: 1
HOW OFTEN DO YOU HAVE SIX OR MORE DRINKS ON ONE OCCASION: 1
HOW MANY STANDARD DRINKS CONTAINING ALCOHOL DO YOU HAVE ON A TYPICAL DAY: 1
HOW OFTEN DO YOU HAVE A DRINK CONTAINING ALCOHOL: NEVER
HOW MANY STANDARD DRINKS CONTAINING ALCOHOL DO YOU HAVE ON A TYPICAL DAY: 1 OR 2

## 2022-11-01 ASSESSMENT — PATIENT HEALTH QUESTIONNAIRE - PHQ9
SUM OF ALL RESPONSES TO PHQ QUESTIONS 1-9: 1
1. LITTLE INTEREST OR PLEASURE IN DOING THINGS: 0
2. FEELING DOWN, DEPRESSED OR HOPELESS: 1
SUM OF ALL RESPONSES TO PHQ QUESTIONS 1-9: 1
SUM OF ALL RESPONSES TO PHQ9 QUESTIONS 1 & 2: 1

## 2022-11-01 NOTE — PROGRESS NOTES
Medicare Annual Wellness Visit    Augie Hdz is here for Medicare AWV and Flu Vaccine    Assessment & Plan   Encounter for laboratory testing for COVID-19 virus  The following orders have not been finalized:  -     COVID-19 At Home Antigen Test KIT    Recommendations for Preventive Services Due: see orders and patient instructions/AVS.  Recommended screening schedule for the next 5-10 years is provided to the patient in written form: see Patient Instructions/AVS.     No follow-ups on file. Tramaine Villa is a 66year old female who is in today for her annual medicare wellness visit. She states everything is going well except for recurrence of low back pain with sciatica nerve pain on the left. She states she is planning to contact her insurance to locate a chiropractor because she has exhausted her physical therapy option - insurance will no longer cover the charge for the rest of the year. Patient's complete Health Risk Assessment and screening values have been reviewed and are found in Flowsheets. The following problems were reviewed today and where indicated follow up appointments were made and/or referrals ordered.     Positive Risk Factor Screenings with Interventions:             General Health and ACP:  General  In general, how would you say your health is?: Very Good  In the past 7 days, have you experienced any of the following: New or Increased Pain, New or Increased Fatigue, Loneliness, Social Isolation, Stress or Anger?: (!) Yes  Select all that apply: (!) New or Increased Pain  Do you get the social and emotional support that you need?: Yes  Do you have a Living Will?: (!) No    Advance Directives       Power of  Living Will ACP-Advance Directive ACP-Power of     Not on File Not on File Not on File Not on File        General Health Risk Interventions:  Pain issues: physical therapy referral ordered    Health Habits/Nutrition:  Physical Activity: Inactive    Days of Exercise per Week: 0 days    Minutes of Exercise per Session: 0 min     Have you lost any weight without trying in the past 3 months?: No     Have you seen the dentist within the past year?: Yes  Health Habits/Nutrition Interventions:  Dental exam overdue:  patient encouraged to make appointment with his/her dentist     Safety:  Do you have working smoke detectors?: Yes  Do you have any tripping hazards - loose or unsecured carpets or rugs?: No  Do you have any tripping hazards - clutter in doorways, halls, or stairs?: (!) Yes  Do you have either shower bars, grab bars, non-slip mats or non-slip surfaces in your shower or bathtub?: Yes  Do all of your stairways have a railing or banister?: Yes  Do you always fasten your seatbelt when you are in a car?: Yes  Safety Interventions:  Home safety tips provided           Objective   Vitals:    11/01/22 0952   BP: 124/62   Pulse: 70   Resp: 20   SpO2: 93%   Weight: 265 lb (120.2 kg)      Body mass index is 40.29 kg/m².       General Appearance: alert and oriented to person, place and time, well developed and well- nourished, in no acute distress  Skin: warm and dry, no rash or erythema  Head: normocephalic and atraumatic  Eyes: pupils equal, round, and reactive to light, extraocular eye movements intact, conjunctivae normal  Neck: supple and non-tender without mass, no thyromegaly or thyroid nodules, no cervical lymphadenopathy  Pulmonary/Chest: clear to auscultation bilaterally- no wheezes, rales or rhonchi, normal air movement, no respiratory distress  Cardiovascular: normal rate, regular rhythm, normal S1 and S2, no murmurs, rubs, clicks, or gallops, distal pulses intact, no carotid bruits  Musculoskeletal: normal range of motion, no joint swelling, deformity or tenderness  Neurologic: reflexes normal and symmetric, no cranial nerve deficit, gait, coordination and speech normal       Allergies   Allergen Reactions    Latex Hives and Rash    Cipro Xr Hives and Shortness Of Breath Soap Hives and Shortness Of Breath     Ivory Soap, Tide detergent      Tomato Hives and Shortness Of Breath    Influenza Vaccines Hives    Soybean-Containing Drug Products      Prior to Visit Medications    Medication Sig Taking? Authorizing Provider   fluticasone (FLONASE) 50 MCG/ACT nasal spray USE 1 SPRAY IN EACH NOSTRIL EVERY DAY Yes ADALID Plata CNP   cetirizine (HM CETIRIZINE HCL) 10 MG tablet Take one tablet by mouth daily. Yes ADALID Plata CNP   ADVAIR DISKUS 500-50 MCG/ACT AEPB diskus inhaler Inhale 1 puff into the lungs in the morning and 1 puff in the evening.  Yes ADALID Plata CNP   ipratropium-albuterol (DUONEB) 0.5-2.5 (3) MG/3ML SOLN nebulizer solution Inhale 3 mLs into the lungs 4 times daily Yes ADALID Plata CNP   baclofen (LIORESAL) 10 MG tablet Take 1 tablet by mouth 2 times daily Yes ADALID Plata CNP   montelukast (SINGULAIR) 10 MG tablet Take 1 tablet by mouth nightly Yes ADALID Plata CNP   hydroCHLOROthiazide (HYDRODIURIL) 12.5 MG tablet Take 1 tablet by mouth every other day Yes ADALID Plata CNP   albuterol sulfate HFA (PROVENTIL HFA) 108 (90 Base) MCG/ACT inhaler Inhale 2 puffs into the lungs every 4 hours as needed for Wheezing or Shortness of Breath (cough) Yes ADALID Plata CNP   hydrOXYzine HCl (ATARAX) 25 MG tablet Take 1 tablet by mouth nightly Yes ADALID Plata CNP   butalbital-acetaminophen-caffeine (FIORICET, ESGIC) -40 MG per tablet Take 1 tablet by mouth 3 times daily as needed for Headaches Yes ADALID Plata CNP   lisinopril (PRINIVIL;ZESTRIL) 10 MG tablet Take 1 tablet by mouth daily Yes ADALID Plata CNP   clopidogrel (PLAVIX) 75 MG tablet Take 1 tablet by mouth daily Yes ADALID Plata CNP   simvastatin (ZOCOR) 40 MG tablet Take 1 tablet by mouth nightly Yes ADALID Plata - CNP   meclizine (ANTIVERT) 25 MG tablet Take 1 tablet by mouth 2 times daily  Patient taking differently: Take 25 mg by mouth as needed Yes ADALID Amanda CNP   NASAL SALINE NA by Nasal route  Yes Historical Provider, MD   albuterol (PROVENTIL) (5 MG/ML) 0.5% nebulizer solution Take 1 mL by nebulization every 6 hours as needed for Wheezing or Shortness of Breath Yes ADALID Amanda CNP   Multiple Vitamin (MULTI-VITAMIN DAILY PO) Take 1 tablet by mouth daily Yes Historical Provider, MD   aspirin 81 MG tablet Take 1 tablet by mouth daily Yes ADALID Amanda CNP       CareTeam (Including outside providers/suppliers regularly involved in providing care):   Patient Care Team:  ADALID Amanda CNP as PCP - General (Family Medicine)  ADALID Amanda CNP as PCP - [facility] Empaneled Provider     Reviewed and updated this visit:  Tobacco  Allergies  Meds  Med Hx  Surg Hx  Soc Hx  Fam Hx               Advance Care Planning   Advanced Care Planning: Discussed the patients choices for care and treatment in case of a health event that adversely affects decision-making abilities. Also discussed the patients long-term treatment options. Reviewed with the patient the appropriate state-specific advance directive documents. Reviewed the process of designating a competent adult as an Agent (or -in-fact) that could take make health care decisions for the patient if incompetent. Patient was asked to complete the declaration forms, either acknowledge the forms by a public notary or an eligible witness and provide a signed copy to the practice office. Time spent (minutes): 10 minutes. States she has most of the paperwork for ACP completed. She states she will complete and have the documents notarized, bring copy to PCP to scan into her chart. Cardiovascular Disease Risk Counseling: Assessed the patient's risk to develop cardiovascular disease and reviewed main risk factors.    Reviewed steps to reduce disease risk including:   Quitting tobacco use, reducing amount smoked, or not starting the habit  Making healthy food choices  Being physically active and gradualy increasing activity levels   Reduce weight and determine a healthy BMI goal  Monitor blood pressure and treat if higher than 140/90 mmHg  Maintain blood total cholesterol levels under 5 mmol/l or 190 mg/dl  Maintain LDL cholesterol levels under 3.0 mmol/l or 115 mg/dl   Control blood glucose levels  Consider taking aspirin (75 mg daily), once blood pressure is controlled   Provided a follow up plan. Time spent (minutes): 5 minutes    Obesity Counseling: Assessed behavioral health risks and factors affecting choice of behavior. Suggested weight control approaches, including dietary changes behavioral modification and follow up plan. Provided educational and support documentation. Time spent (minutes): 5 minutes    Tobacco Cessation Counseling: Patient advised about behavior change, including information about personal health harms, usage of appropriate cessation measures and benefits of cessation. Time spent (minutes): 5 minutes. Has been smoke free for nearly 10 years. Samples of Nurtec ODT 75 mg were given to the patient, quantity 2 tablets, Lot Number [id number].

## 2022-11-01 NOTE — PATIENT INSTRUCTIONS
Personalized Preventive Plan for Campbell Roberts - 11/1/2022  Medicare offers a range of preventive health benefits. Some of the tests and screenings are paid in full while other may be subject to a deductible, co-insurance, and/or copay. Some of these benefits include a comprehensive review of your medical history including lifestyle, illnesses that may run in your family, and various assessments and screenings as appropriate. After reviewing your medical record and screening and assessments performed today your provider may have ordered immunizations, labs, imaging, and/or referrals for you. A list of these orders (if applicable) as well as your Preventive Care list are included within your After Visit Summary for your review. Other Preventive Recommendations:    A preventive eye exam performed by an eye specialist is recommended every 1-2 years to screen for glaucoma; cataracts, macular degeneration, and other eye disorders. A preventive dental visit is recommended every 6 months. Try to get at least 150 minutes of exercise per week or 10,000 steps per day on a pedometer . Order or download the FREE \"Exercise & Physical Activity: Your Everyday Guide\" from [employer]. Call 8-338.256.5094 or search [employer] online. You need 2715-1405 mg of calcium and 2517-6044 IU of vitamin D per day. It is possible to meet your calcium requirement with diet alone, but a vitamin D supplement is usually necessary to meet this goal.  When exposed to the sun, use a sunscreen that protects against both UVA and UVB radiation with an SPF of 30 or greater. Reapply every 2 to 3 hours or after sweating, drying off with a towel, or swimming. Always wear a seat belt when traveling in a car. Always wear a helmet when riding a bicycle or motorcycle.

## 2022-11-01 NOTE — PROGRESS NOTES
Patient ws given the HD influenza vaccine today. She tolerated well. Previously had gotten hives after vaccine. Will yuliya if she has any issues. Sample of Nurtec ODT given today.  1 [facility]#[id number] exp 6/2024

## 2022-11-17 ENCOUNTER — SCHEDULED TELEPHONE ENCOUNTER (OUTPATIENT)
Dept: PULMONOLOGY | Age: 72
End: 2022-11-17
Payer: MEDICARE

## 2022-11-17 DIAGNOSIS — J30.89 NON-SEASONAL ALLERGIC RHINITIS DUE TO OTHER ALLERGIC TRIGGER: ICD-10-CM

## 2022-11-17 DIAGNOSIS — J42 CHRONIC BRONCHITIS, UNSPECIFIED CHRONIC BRONCHITIS TYPE (HCC): ICD-10-CM

## 2022-11-17 DIAGNOSIS — Z87.891 FORMER SMOKER: ICD-10-CM

## 2022-11-17 DIAGNOSIS — J45.30 MILD PERSISTENT ASTHMA WITHOUT COMPLICATION: Primary | ICD-10-CM

## 2022-11-17 PROCEDURE — 99442 PR PHYS/QHP TELEPHONE EVALUATION 11-20 MIN: CPT | Performed by: INTERNAL MEDICINE

## 2022-11-17 NOTE — PROGRESS NOTES
[facility]   Telephone Visit Note      Margaret Lovett is a 66 y.o. female evaluated via my chart telephone visit on 11/17/2022. Consent:  Pursuant to emergency declaration under the [address] in the [employer], 1135 waiver authorization in the [facility] Act, this telephone visit was completed with patient's consent, to reduce the patient's risk of exposure to COVID-19 and provide necessary medical care. She and/or health care decision maker is aware that that she may receive a bill for this telephone service, depending on her insurance coverage, and has provided verbal consent to proceed. Patient is at his residency and Provider is currently in Pulmonary Clinic at [address] [place] [address]. Documentation:  Elia Dominguezork states that over the past half year she has been dealing with recurrent episodes of sinusitis. She does have some cough. She denies worsening shortness of breath or chest discomfort but still has some dyspnea on exertion. She continues on Advair Diskus, Singulair and albuterol rescue inhaler but does not require albuterol very often. She also has DuoNeb for her nebulizer. She has had no known COVID-19 exposure or infection has received all 3 Pfizer COVID-19 vaccinations. Elia Becerra states they are practicing social distancing, wearing a mask when out in public, washing hands frequently or using hand . Denies any fever, chills, malaise, change in sensation of taste or smell, headache or lightheadedness. Denies any known contacts with persons with respiratory infection, positive for coronavirus or under investigation for possible coronavirus exposure.       Past Medical History:   Diagnosis Date    Active smoker     Active smoker     Ankle fracture, left 2006    Asthma     Chondromalacia of right knee     Dr. Jenifer Torres + MRI    Chronic back pain     Chronic cough 10/4/2019    Depression     has seen psychiatry in [place] 6 months    Dizziness     CT brain normal -6/18/2012    Dyspnea on exertion 9/2/2021    Family history of early CAD     Father - 4st MI age 50, Massive MI age 76    Former smoker 3/2/2022    H/O cardiovascular stress test 8/7/2012 8/7/2012-Lexiscan-Normal perfusion. LVSF normal.EF 58%    H/O Doppler ultrasound 12/11/2019     Abnormal left lower extremity arterial Doppler ultrasound. The Left lower extremity arteries have a Monophasic waveform suggestive of inflow disease, The Left BARBER shows Severe peripheral arterial disease. Normal right lower extremity arterial Doppler ultrasound. Normal right lower extremity ankle brachial indices    H/O echocardiogram 8/7/2012 8/7/2012-LVSF normal. EF =>55%. impaired LV relaxation. Herniated intervertebral disk     thoracic and lumbar    Hypertension     Mild persistent asthma without complication 35/6/9258    Normal echocardiogram 8/2012    EF=> 55%-Dr. Zafar Beckwith    Obesity (BMI 30-39. 9)     Obstructive sleep apnea 10/4/2019    Other screening mammogram     PAD (peripheral artery disease) ([address] Angioplasty 01/06/2020     LCIA 90% INSTENT RESTENOSED TO 0% WITH PTA. Peripheral vascular disease ([address]     Postmenopausal     Shoulder fracture, left 2008    Tobacco abuse 10/4/2019       Medication and allergies have been reviewed    Social and family history are unchanged since last visit    ROS:   Constitutional: No fever, no chills   Cardiovascular: No chest pain, no palpitations. Pulmonary: Mild productive cough, mild SOB, no wheeze    Physical exam not complete due to telephone visit  Alert and oriented  No conversational dyspnea, no cough, no audible wheeze  Normal mentation  Radiology: Low-dose CT lung screen 10/31/2022  Asymmetric soft tissue fullness in left piriform recess. Direct visualization or CT neck with contrast should be considered.   No suspicious pulmonary nodule    Diagnosis:    ICD-10-CM    1. Mild persistent asthma without complication J45.30       2. Chronic bronchitis, unspecified chronic bronchitis type ([address]  J42       3. Non-seasonal allergic rhinitis due to other allergic trigger  J30.89       4. Former smoker  Z87.891              Plan:  I discussed with Teri Rivera the results of her low-dose CT lung screening. No suspicious nodules or masses were noted and I did recommend continuing yearly low-dose CT lung screening. What was concerning was the asymmetric soft tissue fullness in the left piriform sinus and I mentioned the need to have a focused ENT exam or CT neck. I will discuss that with her PCP and hopefully we can get that done in the near future. [facility] will continue to follow her. I affirm this is a Patient initiated episode with an established patient who has not had a related appointment within my department in the past 7 days or scheduled within the next 24 hours. I have spent 15 minutes reviewing previous notes, test results and communicating with patient discussing the diagnosis and importance of treatment plan.     Note: not billable if this call serves to triage the patient into an appointment for the relevant concern      Aric Cuello MD

## 2022-11-21 ENCOUNTER — TELEPHONE (OUTPATIENT)
Dept: FAMILY MEDICINE CLINIC | Age: 72
End: 2022-11-21

## 2022-11-21 NOTE — TELEPHONE ENCOUNTER
Called Anuja to discuss last scan. She met with pulmonology who discussed the last scan findings, soft tissue fullness in sinus area on left. Recommend ENT referral. She states she will check with insurance for ENT in her network and call back with ENT contact information.

## 2022-11-22 ENCOUNTER — TELEPHONE (OUTPATIENT)
Dept: FAMILY MEDICINE CLINIC | Age: 72
End: 2022-11-22

## 2022-11-22 DIAGNOSIS — G57.02 PIRIFORMIS SYNDROME OF LEFT SIDE: Primary | ICD-10-CM

## 2022-11-22 NOTE — TELEPHONE ENCOUNTER
Vineet Gentile, DO ENT Referral   Address [address] [facility] [address]   [facility]    Travis Ondina [phone], Fax [phone]. This is the ENT pt wants to see. Kyleigh, please advise. Notified pt referral was sent and to expect a call next week to schedule. Pt voiced understanding.

## 2022-11-26 DIAGNOSIS — G47.9 SLEEP DISTURBANCE: ICD-10-CM

## 2022-11-26 DIAGNOSIS — F41.9 ANXIETY: ICD-10-CM

## 2022-11-29 RX ORDER — HYDROXYZINE HYDROCHLORIDE 25 MG/1
25 TABLET, FILM COATED ORAL NIGHTLY
Qty: 30 TABLET | Refills: 1 | Status: SHIPPED | OUTPATIENT
Start: 2022-11-29 | End: 2023-01-28

## 2023-01-08 DIAGNOSIS — Z76.0 MEDICATION REFILL: ICD-10-CM

## 2023-01-10 RX ORDER — CLOPIDOGREL BISULFATE 75 MG/1
75 TABLET ORAL DAILY
Qty: 90 TABLET | Refills: 1 | Status: SHIPPED | OUTPATIENT
Start: 2023-01-10

## 2023-02-02 NOTE — PROGRESS NOTES
Hospitalist Progress Note      Name:  Graciela Joyce /Age/Sex: 1950  (68 y.o. female)   MRN & CSN:  [id number] & [id number] Admission Date/Time: 7/10/2020  1:09 PM   Location:  [address] [place] [address] PCP: Pavan Adams 112 Day: 3    ASSESSMENT & PLAN:   Graciela Joyce is a 68 y.o.  female  Who was admitted to the hospital for syncopal episode. Disposition: Home  Awaiting Tilt Table Test on Monday. Non-Prodromal Syncope----took a shower and subsequently sat down. She bended while sitting and passed out. No prodromal symptoms. No similar episodes or syncopal episodes in the past.   -Tilt Table Test  -2D ECHO    Hypertension---BP controlled  -Continue Lisinopril/HCTZ    Peripheral Arterial Disease---S/P PCI in 2020  -Continue ASA/Plavix    Obesity---weight 253 pounds, BMI 37.4      MEDICAL DECISION MAKING:  -Labs reviewed  -Imaging reviewed  -Level of risk moderate  Diet DIET GENERAL;  Diet NPO Time Specified   DVT Prophylaxis [x] Lovenox, []  Heparin, [] SCDs, [] Ambulation   GI Prophylaxis [] PPI,  [] H2 Blocker,  [] Carafate,  [] Diet/Tube Feeds   Code Status Full Code   Disposition home   MDM [] Low, [x] Moderate,[]  High     Chief complaint/Interval History/ROS     Chief Complaint:  Syncope      INTERVAL HISTORY: No acute events overnight. Awaiting tilt table test tomorrow       ROS:  No chest pain. No abdominal pain. No nausea or vomiting  Objective: Intake/Output Summary (Last 24 hours) at 2020 1102  Last data filed at 2020 0618  Gross per 24 hour   Intake 420 ml   Output --   Net 420 ml      Vitals:   Vitals:    20 1050   BP:    Pulse: 56   Resp:    Temp:    SpO2:      Physical Exam:   GEN: Awake female, AA female. Obese. Sitting upright at bedside. Eating breakfast.  EYES: Ocular muscles intact. HENT:Moist Mucous membranes. No nasal bleeding. NECK: Supple,  RESP: Clear to auscultation bilaterally. CV: RRR. No pitting lower Ext edema.    GI: Obese abdomen. : No wright in place. MSK: no bony fractures. No gross deformities. SKIN: warm, dry, no rashes,  NEURO: Cranial nerves appear grossly intact, normal speech, no lateralizing weakness.   PSYCH: Awake, alert, oriented normal affect, normal mood    Medications:   Medications:    sodium chloride flush  10 mL Intravenous 2 times per day    enoxaparin  40 mg Subcutaneous Daily    aspirin  81 mg Oral Daily    clopidogrel  75 mg Oral Daily    cetirizine  10 mg Oral Daily    fluticasone  1 spray Each Nostril Daily    lisinopril-hydroCHLOROthiazide  1 tablet Oral Daily    melatonin  3 mg Oral Daily    montelukast  10 mg Oral Nightly      Infusions:     PRN Meds: sodium chloride flush, 10 mL, PRN  acetaminophen, 650 mg, Q6H PRN    Or  acetaminophen, 650 mg, Q6H PRN  polyethylene glycol, 17 g, Daily PRN  promethazine, 12.5 mg, Q6H PRN    Or  ondansetron, 4 mg, Q6H PRN  baclofen, 10 mg, Nightly PRN  hydrOXYzine, 25 mg, Q6H PRN  HYDROcodone 5 mg - acetaminophen, 1 tablet, Q6H PRN          Electronically signed by Johnathan Gomes MD on 7/12/2020 at 11:02 AM Yes

## 2023-02-13 ENCOUNTER — OFFICE VISIT (OUTPATIENT)
Dept: FAMILY MEDICINE CLINIC | Age: 73
End: 2023-02-13
Payer: MEDICARE

## 2023-02-13 VITALS
HEIGHT: 68 IN | WEIGHT: 252.8 LBS | BODY MASS INDEX: 38.31 KG/M2 | SYSTOLIC BLOOD PRESSURE: 138 MMHG | OXYGEN SATURATION: 96 % | DIASTOLIC BLOOD PRESSURE: 76 MMHG | HEART RATE: 77 BPM

## 2023-02-13 DIAGNOSIS — I10 ESSENTIAL HYPERTENSION: ICD-10-CM

## 2023-02-13 DIAGNOSIS — J45.30 MILD PERSISTENT ASTHMA WITHOUT COMPLICATION: ICD-10-CM

## 2023-02-13 DIAGNOSIS — R23.8 IRRITATION SYMPTOM OF SKIN: Primary | ICD-10-CM

## 2023-02-13 PROCEDURE — 99213 OFFICE O/P EST LOW 20 MIN: CPT | Performed by: NURSE PRACTITIONER

## 2023-02-13 PROCEDURE — 3075F SYST BP GE 130 - 139MM HG: CPT | Performed by: NURSE PRACTITIONER

## 2023-02-13 PROCEDURE — G8484 FLU IMMUNIZE NO ADMIN: HCPCS | Performed by: NURSE PRACTITIONER

## 2023-02-13 PROCEDURE — 1123F ACP DISCUSS/DSCN MKR DOCD: CPT | Performed by: NURSE PRACTITIONER

## 2023-02-13 PROCEDURE — 1036F TOBACCO NON-USER: CPT | Performed by: NURSE PRACTITIONER

## 2023-02-13 PROCEDURE — 3078F DIAST BP <80 MM HG: CPT | Performed by: NURSE PRACTITIONER

## 2023-02-13 PROCEDURE — 1090F PRES/ABSN URINE INCON ASSESS: CPT | Performed by: NURSE PRACTITIONER

## 2023-02-13 PROCEDURE — 3017F COLORECTAL CA SCREEN DOC REV: CPT | Performed by: NURSE PRACTITIONER

## 2023-02-13 PROCEDURE — G8427 DOCREV CUR MEDS BY ELIG CLIN: HCPCS | Performed by: NURSE PRACTITIONER

## 2023-02-13 PROCEDURE — G8399 PT W/DXA RESULTS DOCUMENT: HCPCS | Performed by: NURSE PRACTITIONER

## 2023-02-13 PROCEDURE — G8417 CALC BMI ABV UP PARAM F/U: HCPCS | Performed by: NURSE PRACTITIONER

## 2023-02-13 RX ORDER — AMOXICILLIN 250 MG
1 CAPSULE ORAL
COMMUNITY

## 2023-02-13 SDOH — ECONOMIC STABILITY: FOOD INSECURITY: WITHIN THE PAST 12 MONTHS, YOU WORRIED THAT YOUR FOOD WOULD RUN OUT BEFORE YOU GOT MONEY TO BUY MORE.: NEVER TRUE

## 2023-02-13 SDOH — ECONOMIC STABILITY: INCOME INSECURITY: HOW HARD IS IT FOR YOU TO PAY FOR THE VERY BASICS LIKE FOOD, HOUSING, MEDICAL CARE, AND HEATING?: NOT HARD AT ALL

## 2023-02-13 SDOH — ECONOMIC STABILITY: FOOD INSECURITY: WITHIN THE PAST 12 MONTHS, THE FOOD YOU BOUGHT JUST DIDN'T LAST AND YOU DIDN'T HAVE MONEY TO GET MORE.: NEVER TRUE

## 2023-02-13 ASSESSMENT — ENCOUNTER SYMPTOMS
GASTROINTESTINAL NEGATIVE: 1
WHEEZING: 0
COUGH: 0
SHORTNESS OF BREATH: 0
SORE THROAT: 0
CHEST TIGHTNESS: 0
TROUBLE SWALLOWING: 0

## 2023-02-13 ASSESSMENT — PATIENT HEALTH QUESTIONNAIRE - PHQ9
SUM OF ALL RESPONSES TO PHQ9 QUESTIONS 1 & 2: 0
SUM OF ALL RESPONSES TO PHQ QUESTIONS 1-9: 0
2. FEELING DOWN, DEPRESSED OR HOPELESS: 0
1. LITTLE INTEREST OR PLEASURE IN DOING THINGS: 0
SUM OF ALL RESPONSES TO PHQ QUESTIONS 1-9: 0

## 2023-02-13 NOTE — PROGRESS NOTES
Alex Jacobson  1950  74 y.o. SUBJECT JEMIMA:    Chief Complaint   Patient presents with    Hypertension       HPI    Sindy Klein is a 66year old female who is in for follow up. She states she has been doing well, some flare up of asthma with weather change. She complains of some discomfort of the left ear but tolerable. She states her asthma is better. She has been taking B12 which she believes is helping. She states her diet has consisted of vegetables and meats, has cut back on carbohydrates. She has lost 10 pounds. Skin Irritation  She states she has been having skin irritation with itching. She has tried multiple creams without relief. She is requesting a referral to dermatology. Low Back Pain  She states she continues to have some back pain. She states she does some stretches and tries to walk days when the weather is nice. She states the back pain is tolerable. Hypertension  She states she is compliant with medications. Takes lisinopril and hydrochlorothiazide for blood pressure control. Blood pressure today is 138/76. She denies chest pain, shortness of breath or palpitations. Current Outpatient Medications on File Prior to Visit   Medication Sig Dispense Refill    Cobalamin Combinations (B-12) 100-5000 MCG SUBL Take 1 tablet by mouth      clopidogrel (PLAVIX) 75 MG tablet Take 1 tablet by mouth daily 90 tablet 1    fluticasone (FLONASE) 50 MCG/ACT nasal spray USE 1 SPRAY IN EACH NOSTRIL EVERY DAY 16 g 1    cetirizine (HM CETIRIZINE HCL) 10 MG tablet Take one tablet by mouth daily. 90 tablet 1    ADVAIR DISKUS 500-50 MCG/ACT AEPB diskus inhaler Inhale 1 puff into the lungs in the morning and 1 puff in the evening.  1 each 11    ipratropium-albuterol (DUONEB) 0.5-2.5 (3) MG/3ML SOLN nebulizer solution Inhale 3 mLs into the lungs 4 times daily 120 each 1    baclofen (LIORESAL) 10 MG tablet Take 1 tablet by mouth 2 times daily 180 tablet 1    montelukast (SINGULAIR) 10 MG tablet Take 1 tablet by mouth nightly 90 tablet 1    hydroCHLOROthiazide (HYDRODIURIL) 12.5 MG tablet Take 1 tablet by mouth every other day 90 tablet 1    albuterol sulfate HFA (PROVENTIL HFA) 108 (90 Base) MCG/ACT inhaler Inhale 2 puffs into the lungs every 4 hours as needed for Wheezing or Shortness of Breath (cough) 1 each 11    lisinopril (PRINIVIL;ZESTRIL) 10 MG tablet Take 1 tablet by mouth daily 90 tablet 0    simvastatin (ZOCOR) 40 MG tablet Take 1 tablet by mouth nightly 90 tablet 1    meclizine (ANTIVERT) 25 MG tablet Take 1 tablet by mouth 2 times daily (Patient taking differently: Take 25 mg by mouth as needed) 30 tablet 0    NASAL SALINE NA by Nasal route       albuterol (PROVENTIL) (5 MG/ML) 0.5% nebulizer solution Take 1 mL by nebulization every 6 hours as needed for Wheezing or Shortness of Breath 100 vial 1    Multiple Vitamin (MULTI-VITAMIN DAILY PO) Take 1 tablet by mouth daily      aspirin 81 MG tablet Take 1 tablet by mouth daily 90 tablet 2    COVID-19 At Home Antigen Test KIT 1 each by Nasal route daily (Patient not taking: Reported on 2/13/2023) 1 kit 3    butalbital-acetaminophen-caffeine (FIORICET, ESGIC) -40 MG per tablet Take 1 tablet by mouth 3 times daily as needed for Headaches (Patient not taking: No sig reported) 15 tablet 0     No current facility-administered medications on file prior to visit. Past Medical History:   Diagnosis Date    Active smoker     Active smoker     Ankle fracture, left 2006    Asthma     Chondromalacia of right knee     Dr. Lakshmi Bunch + MRI    Chronic back pain     Chronic cough 10/4/2019    Depression     has seen psychiatry in [place] 6 months    Dizziness     CT brain normal -6/18/2012    Dyspnea on exertion 9/2/2021    Family history of early CAD     Father - 4st MI age 50, Massive MI age 76    Former smoker 3/2/2022    H/O cardiovascular stress test 8/7/2012 8/7/2012-Lexiscan-Normal perfusion. LVSF normal.EF 58%    H/O Doppler ultrasound 12/11/2019     Abnormal left lower extremity arterial Doppler ultrasound. The Left lower extremity arteries have a Monophasic waveform suggestive of inflow disease, The Left BARBER shows Severe peripheral arterial disease. Normal right lower extremity arterial Doppler ultrasound. Normal right lower extremity ankle brachial indices    H/O echocardiogram 8/7/2012 8/7/2012-LVSF normal. EF =>55%. impaired LV relaxation. Herniated intervertebral disk     thoracic and lumbar    Hypertension     Mild persistent asthma without complication 30/0/0771    Normal echocardiogram 8/2012    EF=> 55%-Dr. Felipe Mckenzie    Obesity (BMI 30-39. 9)     Obstructive sleep apnea 10/4/2019    Other screening mammogram     PAD (peripheral artery disease) ([address] Angioplasty 01/06/2020     LCIA 90% INSTENT RESTENOSED TO 0% WITH PTA.     Peripheral vascular disease ([address]     Postmenopausal     Shoulder fracture, left 2008    Tobacco abuse 10/4/2019     Past Surgical History:   Procedure Laterality Date    [facility]    right knee    TONSILLECTOMY AND ADENOIDECTOMY  1963    TUBAL LIGATION  1977    TUMOR REMOVAL  1999    parotid    TUMOR REMOVAL  1976    left thigh     Family History   Problem Relation Age of Onset    High Blood Pressure Mother     Allergies Mother     Migraines Mother     Obesity Mother     Heart Disease Father     High Blood Pressure Father     Allergies Father     Obesity Father     Diabetes Daughter     High Blood Pressure Sister     Allergies Sister     Bleeding Prob Sister     Migraines Sister     Obesity Sister     Cancer Brother     High Blood Pressure Brother     Allergies Brother     Bleeding Prob Brother     Obesity Brother     Thyroid Disease Maternal Aunt     Kidney Disease Maternal Aunt     Heart Disease Maternal Uncle     Kidney Disease Maternal Uncle     Cancer Maternal Grandmother     Glaucoma Maternal Grandmother     Diabetes Maternal Grandfather     Glaucoma Maternal Grandfather     Glaucoma Paternal Grandmother     Glaucoma Paternal Grandfather      Social History     Socioeconomic History    Marital status:      Spouse name: Not on file    Number of children: 1    Years of education: Not on file    Highest education level: Not on file   Occupational History    Occupation: Customer Service   Tobacco Use    Smoking status: Former     Packs/day: 1.00     Years: 30.00     Pack years: 30.00     Types: Cigarettes     Quit date: 2021     Years since quittin.5    Smokeless tobacco: Never   Vaping Use    Vaping Use: Never used   Substance and Sexual Activity    Alcohol use: Yes     Comment: socially    Drug use: No    Sexual activity: Not Currently   Other Topics Concern    Not on file   Social History Narrative    Working now at [employer] jobs from [employer]      Has a daughter and Daughter in law. Social Determinants of Health     Financial Resource Strain: Low Risk     Difficulty of Paying Living Expenses: Not hard at all   Food Insecurity: No Food Insecurity    Worried About [address] in the Last Year: Never true    [address] in the Last Year: Never true   Transportation Needs: Unknown    Lack of Transportation (Medical): Not on file    Lack of Transportation (Non-Medical): No   Physical Activity: Inactive    Days of Exercise per Week: 0 days    Minutes of Exercise per Session: 0 min   Stress: Not on file   Social Connections: Not on file   Intimate Partner Violence: Not on file   Housing Stability: Unknown    Unable to Pay for Housing in the Last Year: Not on file    Number of Places Lived in the Last Year: Not on file    Unstable Housing in the Last Year: No       Review of Systems   Constitutional:  Negative for activity change, appetite change, chills, diaphoresis, fatigue, fever and unexpected weight change. HENT:  Negative for congestion, sore throat and trouble swallowing.     Respiratory:  Negative for cough, chest tightness, shortness of breath and wheezing. Cardiovascular:  Negative for chest pain and palpitations. Gastrointestinal: Negative. Neurological: Negative. Psychiatric/Behavioral: Negative. OBJECTIVE:     /76 (Site: Right Upper Arm, Position: Sitting, Cuff Size: Medium Adult)   Pulse 77   Ht 5' 8\" (1.727 m)   Wt 252 lb 12.8 oz (114.7 kg)   SpO2 96%   BMI 38.44 kg/m²     Physical Exam  Vitals reviewed. Constitutional:       General: She is not in acute distress. Appearance: Normal appearance. She is well-developed. She is obese. She is not ill-appearing or diaphoretic. HENT:      Head: Normocephalic and atraumatic. Right Ear: External ear normal.      Left Ear: External ear normal.      Nose: Nose normal.   Eyes:      Conjunctiva/sclera: Conjunctivae normal.      Pupils: Pupils are equal, round, and reactive to light. Cardiovascular:      Rate and Rhythm: Normal rate and regular rhythm. Heart sounds: Normal heart sounds. No murmur heard. No friction rub. No gallop. Pulmonary:      Effort: Pulmonary effort is normal. No respiratory distress. Breath sounds: Normal breath sounds. No wheezing. Chest:      Chest wall: No tenderness. Musculoskeletal:         General: Normal range of motion. Cervical back: Normal range of motion and neck supple. Right lower leg: No edema. Left lower leg: No edema. Skin:     General: Skin is warm and dry. Neurological:      Mental Status: She is alert and oriented to person, place, and time. Psychiatric:         Behavior: Behavior normal.         Thought Content: Thought content normal.         Judgment: Judgment normal.       No results found for requested labs within last 30 days.      LDL Calculated (MG/DL)   Date Value   10/04/2022 58       Lab Results   Component Value Date/Time    WBC 7.9 10/04/2022 08:55 AM    WBC 7.7 10/12/2021 11:07 AM    WBC 6.6 10/19/2020 05:06 PM    NEUTROABS 4.3 10/12/2021 11:07 AM    NEUTROABS 3.9 10/19/2020 05:06 PM    NEUTROABS 3.1 09/25/2019 08:42 AM    HGB 15.5 10/04/2022 08:55 AM    HGB 14.1 10/12/2021 11:07 AM    HGB 14.8 10/19/2020 05:06 PM    HCT 48.1 10/04/2022 08:55 AM    HCT 44.5 10/12/2021 11:07 AM    HCT 44.6 10/19/2020 05:06 PM    MCV 87.1 10/04/2022 08:55 AM    MCV 84.1 10/12/2021 11:07 AM    MCV 83.4 10/19/2020 05:06 PM     10/04/2022 08:55 AM     10/12/2021 11:07 AM     10/19/2020 05:06 PM    SEGSABS 4.4 10/04/2022 08:55 AM    SEGSABS 3.1 07/11/2020 10:44 AM    SEGSABS 3.5 07/10/2020 02:12 PM    LYMPHSABS 2.5 10/04/2022 08:55 AM    MONOSABS 0.7 10/04/2022 08:55 AM    EOSABS 0.2 10/04/2022 08:55 AM    BASOSABS 0.0 10/04/2022 08:55 AM     Lab Results   Component Value Date/Time    TSH 3.16 09/25/2019 08:42 AM    TSHHS 1.769 08/11/2012 08:45 AM     Lab Results   Component Value Date/Time    LABALBU 4.4 10/04/2022 08:55 AM    BILITOT 0.6 10/04/2022 08:55 AM    AST 19 10/04/2022 08:55 AM    ALT 9 10/04/2022 08:55 AM    ALKPHOS 106 10/04/2022 08:55 AM             No results found for this visit on 02/13/23.    ASSESSMENT AND PLAN:     1. Mild persistent asthma without complication  - stable at this time    2. Irritation symptom of skin  - Amb External Referral To Dermatology    3. Essential hypertension  - stable    Continue with current medication regimen.  Referred to dermatology for skin condition.  Verbalized understanding and agreement with plan.    Return in about 4 months (around 6/13/2023) for HTN.    Care discussed with patient. Patient educated on signs and symptoms of exacerbation and when to seek further medical attention. Advised to call for any problems, questions, or concerns. Patient verbalizes understanding and agrees with plan.     Medications reviewed and reconciled. Continue current medications.  Appropriate prescriptions are ordered. Risks and benefits of meds are discussed.     After visit summary provided.

## 2023-03-07 DIAGNOSIS — Z76.0 MEDICATION REFILL: ICD-10-CM

## 2023-03-07 RX ORDER — LISINOPRIL 10 MG/1
10 TABLET ORAL DAILY
Qty: 90 TABLET | Refills: 1 | Status: SHIPPED | OUTPATIENT
Start: 2023-03-07

## 2023-03-27 ENCOUNTER — OFFICE VISIT (OUTPATIENT)
Dept: PULMONOLOGY | Age: 73
End: 2023-03-27
Payer: MEDICARE

## 2023-03-27 VITALS
BODY MASS INDEX: 36.88 KG/M2 | DIASTOLIC BLOOD PRESSURE: 70 MMHG | OXYGEN SATURATION: 96 % | HEART RATE: 65 BPM | SYSTOLIC BLOOD PRESSURE: 122 MMHG | WEIGHT: 249 LBS | HEIGHT: 69 IN

## 2023-03-27 DIAGNOSIS — R06.09 DYSPNEA ON EXERTION: ICD-10-CM

## 2023-03-27 DIAGNOSIS — G47.33 OBSTRUCTIVE SLEEP APNEA: ICD-10-CM

## 2023-03-27 DIAGNOSIS — Z87.891 FORMER SMOKER: ICD-10-CM

## 2023-03-27 DIAGNOSIS — E66.01 SEVERE OBESITY (BMI 35.0-39.9) WITH COMORBIDITY (HCC): ICD-10-CM

## 2023-03-27 DIAGNOSIS — J45.30 MILD PERSISTENT ASTHMA WITHOUT COMPLICATION: Primary | ICD-10-CM

## 2023-03-27 DIAGNOSIS — J42 CHRONIC BRONCHITIS, UNSPECIFIED CHRONIC BRONCHITIS TYPE (HCC): ICD-10-CM

## 2023-03-27 PROCEDURE — 1036F TOBACCO NON-USER: CPT | Performed by: INTERNAL MEDICINE

## 2023-03-27 PROCEDURE — 3023F SPIROM DOC REV: CPT | Performed by: INTERNAL MEDICINE

## 2023-03-27 PROCEDURE — 3078F DIAST BP <80 MM HG: CPT | Performed by: INTERNAL MEDICINE

## 2023-03-27 PROCEDURE — 3017F COLORECTAL CA SCREEN DOC REV: CPT | Performed by: INTERNAL MEDICINE

## 2023-03-27 PROCEDURE — G8399 PT W/DXA RESULTS DOCUMENT: HCPCS | Performed by: INTERNAL MEDICINE

## 2023-03-27 PROCEDURE — 3074F SYST BP LT 130 MM HG: CPT | Performed by: INTERNAL MEDICINE

## 2023-03-27 PROCEDURE — 1123F ACP DISCUSS/DSCN MKR DOCD: CPT | Performed by: INTERNAL MEDICINE

## 2023-03-27 PROCEDURE — G8484 FLU IMMUNIZE NO ADMIN: HCPCS | Performed by: INTERNAL MEDICINE

## 2023-03-27 PROCEDURE — 99213 OFFICE O/P EST LOW 20 MIN: CPT | Performed by: INTERNAL MEDICINE

## 2023-03-27 PROCEDURE — G8427 DOCREV CUR MEDS BY ELIG CLIN: HCPCS | Performed by: INTERNAL MEDICINE

## 2023-03-27 PROCEDURE — 1090F PRES/ABSN URINE INCON ASSESS: CPT | Performed by: INTERNAL MEDICINE

## 2023-03-27 PROCEDURE — G8417 CALC BMI ABV UP PARAM F/U: HCPCS | Performed by: INTERNAL MEDICINE

## 2023-03-27 NOTE — PROGRESS NOTES
Because her FEV1 and FVC were both reduced I cannot rule out a restrictive lung process without further testing      Echocardiogram: 7/13/2020   Left ventricular systolic function is normal.   Ejection fraction is visually estimated at 50-55%. No evidence of any pericardial effusion. No significant valvular disease noted. Pericardial fat pad visualized. Normal RVSP  ASSESSMENT:    1. Mild persistent asthma without complication    2. Chronic bronchitis, unspecified chronic bronchitis type ([address]    3. Severe obesity (BMI 35.0-39. 9) with comorbidity ([address]    4. Dyspnea on exertion    5. Obstructive sleep apnea    6. Former smoker          PLAN:  I will make no change in her current bronchodilator therapy. I did recommend continued yearly low-dose CT lung screening. I also recommended she get the latest hybrid COVID-19 booster vaccination. [facility] will continue to follow her    We have discussed the need to maintain yearly flu immunization, pneumococcal vaccination. We have discussed Coronavirus precaution including handwashing practice, wiping items touched in public such as gas pumps, door handles, shopping carts, etc. Self monitoring for infection - fever, chills, cough, SOB. Should they develop symptoms they should call office for further instructions. Return in about 27 weeks (around 10/2/2023) for Recheck for Asthma, Recheck for Shortness of Breath, Recheck for Obstructive Sleep Apnea. This dictation was performed with a verbal recognition program and it was checked for errors. It is possible that there are still dictated errors within this office note. Any errors should be brought immediately to my attention for correction. All efforts were made to ensure that this office note is accurate.

## 2023-03-28 ENCOUNTER — OFFICE VISIT (OUTPATIENT)
Dept: CARDIOLOGY CLINIC | Age: 73
End: 2023-03-28
Payer: MEDICARE

## 2023-03-28 VITALS
DIASTOLIC BLOOD PRESSURE: 64 MMHG | HEART RATE: 64 BPM | BODY MASS INDEX: 37.62 KG/M2 | WEIGHT: 254 LBS | SYSTOLIC BLOOD PRESSURE: 112 MMHG | HEIGHT: 69 IN

## 2023-03-28 DIAGNOSIS — I73.9 PAD (PERIPHERAL ARTERY DISEASE) (HCC): Primary | ICD-10-CM

## 2023-03-28 PROCEDURE — 3074F SYST BP LT 130 MM HG: CPT | Performed by: INTERNAL MEDICINE

## 2023-03-28 PROCEDURE — G8428 CUR MEDS NOT DOCUMENT: HCPCS | Performed by: INTERNAL MEDICINE

## 2023-03-28 PROCEDURE — G8484 FLU IMMUNIZE NO ADMIN: HCPCS | Performed by: INTERNAL MEDICINE

## 2023-03-28 PROCEDURE — 3017F COLORECTAL CA SCREEN DOC REV: CPT | Performed by: INTERNAL MEDICINE

## 2023-03-28 PROCEDURE — 1123F ACP DISCUSS/DSCN MKR DOCD: CPT | Performed by: INTERNAL MEDICINE

## 2023-03-28 PROCEDURE — 3078F DIAST BP <80 MM HG: CPT | Performed by: INTERNAL MEDICINE

## 2023-03-28 PROCEDURE — 99214 OFFICE O/P EST MOD 30 MIN: CPT | Performed by: INTERNAL MEDICINE

## 2023-03-28 PROCEDURE — G8399 PT W/DXA RESULTS DOCUMENT: HCPCS | Performed by: INTERNAL MEDICINE

## 2023-03-28 PROCEDURE — 1090F PRES/ABSN URINE INCON ASSESS: CPT | Performed by: INTERNAL MEDICINE

## 2023-03-28 PROCEDURE — 1036F TOBACCO NON-USER: CPT | Performed by: INTERNAL MEDICINE

## 2023-03-28 PROCEDURE — G8417 CALC BMI ABV UP PARAM F/U: HCPCS | Performed by: INTERNAL MEDICINE

## 2023-03-28 NOTE — CARE COORDINATION
Attempted to reach patient for ACM follow up. No answer to phone. Message left with ACM contact information and request for call back. Most recent A1c stable. I reviewed with patient again the importance of a lower carbohydrate diet and routine activity, working toward a goal of 150 minutes of exercise per week.

## 2023-03-28 NOTE — PATIENT INSTRUCTIONS
Please be informed that if you contact our office outside of normal business hours the physician on call cannot help with any scheduling or rescheduling issues, procedure instruction questions or any type of medication issue. We advise you for any urgent/emergency that you go to the nearest emergency room! PLEASE CALL OUR OFFICE DURING NORMAL BUSINESS HOURS    Monday - Friday   8 am to 5 pm    [facility] [address]: [phone]    [place]:  [phone]  **It is YOUR responsibilty to bring medication bottles and/or updated medication list to [address] [place] [address]. This will allow us to better serve you and all your healthcare needs**  [facility] Locations - No appointment necessary. Sites open Monday to Friday. Call your preferred location for test preparation, business   hours and other information you need. [employer] [facility] [employer]. [address] Yemi [place]ires, [address] [facility]  Phone: [phone] [place]  [address] [facility]  [address] [place], [address] [facility]  Phone: [phone]     Thank you for allowing us to care for you today! We want to ensure we can follow your treatment plan and we strive to give you the best outcomes and experience possible. If you ever have a life threatening emergency and call 911 - for an ambulance (EMS)   Our providers can only care for you at:   [facility] or [facility]. Even if you have someone take you or you drive yourself we can only care for you in a [facility] facility. Our providers are not setup at the other healthcare locations!

## 2023-03-28 NOTE — PROGRESS NOTES
guidelines reviewed with patient. -   Changes  in medicines made: No     On Zocor 40 mg p.o. 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 Virginia Menendez MD    [facility]    Please note this report has been partially produced using speech recognition software and may contain errors related to that system including errors in grammar, punctuation, and spelling, as well as words and phrases that may be inappropriate. If there are any questions or concerns please feel free to contact the dictating provider for clarification.

## 2023-04-23 DIAGNOSIS — Z76.0 MEDICATION REFILL: ICD-10-CM

## 2023-04-23 DIAGNOSIS — G89.29 CHRONIC LOW BACK PAIN WITH SCIATICA, SCIATICA LATERALITY UNSPECIFIED, UNSPECIFIED BACK PAIN LATERALITY: ICD-10-CM

## 2023-04-23 DIAGNOSIS — M54.40 CHRONIC LOW BACK PAIN WITH SCIATICA, SCIATICA LATERALITY UNSPECIFIED, UNSPECIFIED BACK PAIN LATERALITY: ICD-10-CM

## 2023-04-24 RX ORDER — BACLOFEN 10 MG/1
TABLET ORAL
Qty: 180 TABLET | Refills: 1 | Status: SHIPPED | OUTPATIENT
Start: 2023-04-24

## 2023-04-26 ENCOUNTER — TELEPHONE (OUTPATIENT)
Dept: FAMILY MEDICINE CLINIC | Age: 73
End: 2023-04-26

## 2023-04-26 DIAGNOSIS — I10 ESSENTIAL HYPERTENSION: Primary | ICD-10-CM

## 2023-04-26 DIAGNOSIS — Z76.0 MEDICATION REFILL: ICD-10-CM

## 2023-04-26 RX ORDER — HYDROCHLOROTHIAZIDE 12.5 MG/1
12.5 TABLET ORAL EVERY OTHER DAY
Qty: 90 TABLET | Refills: 1 | Status: SHIPPED | OUTPATIENT
Start: 2023-04-26 | End: 2023-06-13 | Stop reason: SDUPTHER

## 2023-04-26 RX ORDER — HYDROCHLOROTHIAZIDE 12.5 MG/1
12.5 TABLET ORAL EVERY OTHER DAY
Qty: 90 TABLET | Refills: 1 | Status: CANCELLED | OUTPATIENT
Start: 2023-04-26

## 2023-05-23 DIAGNOSIS — Z76.0 MEDICATION REFILL: ICD-10-CM

## 2023-05-23 RX ORDER — SIMVASTATIN 40 MG
40 TABLET ORAL NIGHTLY
Qty: 90 TABLET | Refills: 1 | Status: SHIPPED | OUTPATIENT
Start: 2023-05-23

## 2023-06-27 NOTE — CARE COORDINATION
Attempted to reach patient for ACM follow up. No answer to phone. Message left with ACM contact information and request for call back. Letter sent via My Chart. Detail Level: Zone Continue Regimen: tretinoin 0.025 % topical cream \\nApply pea size amount to affected areas once daily at bedtime\\n\\nclindamycin 1 %-benzoyl peroxide 5 % topical gel \\nApply to affected areas on face and back once daily every morning\\n\\nsulfacetamide sodium 10 % topical cleanser \\nCleanse affected area on face and back once daily\\n\\ndoxycycline monohydrate 100 mg capsule BID\\nTake one capsule by mouth twice daily 30 minutes after a meal

## 2023-07-05 ENCOUNTER — TELEPHONE (OUTPATIENT)
Dept: FAMILY MEDICINE CLINIC | Age: 73
End: 2023-07-05

## 2023-07-12 DIAGNOSIS — G89.29 CHRONIC LOW BACK PAIN WITH SCIATICA, SCIATICA LATERALITY UNSPECIFIED, UNSPECIFIED BACK PAIN LATERALITY: Primary | ICD-10-CM

## 2023-07-12 DIAGNOSIS — M54.40 CHRONIC LOW BACK PAIN WITH SCIATICA, SCIATICA LATERALITY UNSPECIFIED, UNSPECIFIED BACK PAIN LATERALITY: Primary | ICD-10-CM

## 2023-07-13 DIAGNOSIS — G89.29 CHRONIC LOW BACK PAIN WITH SCIATICA, SCIATICA LATERALITY UNSPECIFIED, UNSPECIFIED BACK PAIN LATERALITY: Primary | ICD-10-CM

## 2023-07-13 DIAGNOSIS — M54.40 CHRONIC LOW BACK PAIN WITH SCIATICA, SCIATICA LATERALITY UNSPECIFIED, UNSPECIFIED BACK PAIN LATERALITY: Primary | ICD-10-CM

## 2023-07-14 ENCOUNTER — TELEPHONE (OUTPATIENT)
Dept: FAMILY MEDICINE CLINIC | Age: 73
End: 2023-07-14

## 2023-07-17 ENCOUNTER — HOSPITAL ENCOUNTER (OUTPATIENT)
Age: 73
Discharge: HOME | End: 2023-07-17
Payer: MEDICARE

## 2023-07-17 DIAGNOSIS — R42 DIZZINESS: ICD-10-CM

## 2023-07-17 DIAGNOSIS — G44.89 HEADACHE SYNDROME: ICD-10-CM

## 2023-07-17 DIAGNOSIS — I10 ESSENTIAL HYPERTENSION: ICD-10-CM

## 2023-07-17 LAB
ANION GAP SERPL CALCULATED.3IONS-SCNC: 10 MMOL/L (ref 4–16)
BASOPHILS ABSOLUTE: 0.1 K/CU MM
BASOPHILS RELATIVE PERCENT: 0.6 % (ref 0–1)
BUN SERPL-MCNC: 15 MG/DL (ref 6–23)
CALCIUM SERPL-MCNC: 9.3 MG/DL (ref 8.3–10.6)
CHLORIDE BLD-SCNC: 100 MMOL/L (ref 99–110)
CHOLEST SERPL-MCNC: 121 MG/DL
CO2: 29 MMOL/L (ref 21–32)
CREAT SERPL-MCNC: 1.1 MG/DL (ref 0.6–1.1)
DIFFERENTIAL TYPE: ABNORMAL
EOSINOPHILS ABSOLUTE: 0.2 K/CU MM
EOSINOPHILS RELATIVE PERCENT: 3.1 % (ref 0–3)
GFR SERPL CREATININE-BSD FRML MDRD: 53 ML/MIN/1.73M2
GLUCOSE SERPL-MCNC: 88 MG/DL (ref 70–99)
HCT VFR BLD CALC: 47.7 % (ref 37–47)
HDLC SERPL-MCNC: 50 MG/DL
HEMOGLOBIN: 15.1 GM/DL (ref 12.5–16)
IMMATURE NEUTROPHIL %: 0.3 % (ref 0–0.43)
LDLC SERPL CALC-MCNC: 50 MG/DL
LYMPHOCYTES ABSOLUTE: 3.4 K/CU MM
LYMPHOCYTES RELATIVE PERCENT: 44.1 % (ref 24–44)
MCH RBC QN AUTO: 27.8 PG (ref 27–31)
MCHC RBC AUTO-ENTMCNC: 31.7 % (ref 32–36)
MCV RBC AUTO: 87.8 FL (ref 78–100)
MONOCYTES ABSOLUTE: 0.7 K/CU MM
MONOCYTES RELATIVE PERCENT: 8.4 % (ref 0–4)
NUCLEATED RBC %: 0 %
PDW BLD-RTO: 14 % (ref 11.7–14.9)
PLATELET # BLD: 246 K/CU MM (ref 140–440)
PMV BLD AUTO: 9.6 FL (ref 7.5–11.1)
POTASSIUM SERPL-SCNC: 4.1 MMOL/L (ref 3.5–5.1)
RBC # BLD: 5.43 M/CU MM (ref 4.2–5.4)
SEGMENTED NEUTROPHILS ABSOLUTE COUNT: 3.4 K/CU MM
SEGMENTED NEUTROPHILS RELATIVE PERCENT: 43.5 % (ref 36–66)
SODIUM BLD-SCNC: 139 MMOL/L (ref 135–145)
TOTAL IMMATURE NEUTOROPHIL: 0.02 K/CU MM
TOTAL NUCLEATED RBC: 0 K/CU MM
TRIGL SERPL-MCNC: 106 MG/DL
WBC # BLD: 7.8 K/CU MM (ref 4–10.5)

## 2023-07-17 PROCEDURE — 80048 BASIC METABOLIC PNL TOTAL CA: CPT

## 2023-07-17 PROCEDURE — 36415 COLL VENOUS BLD VENIPUNCTURE: CPT

## 2023-07-17 PROCEDURE — 85025 COMPLETE CBC W/AUTO DIFF WBC: CPT

## 2023-07-17 PROCEDURE — 80061 LIPID PANEL: CPT

## 2023-07-31 DIAGNOSIS — Z76.0 MEDICATION REFILL: ICD-10-CM

## 2023-07-31 DIAGNOSIS — R42 DIZZINESS: ICD-10-CM

## 2023-07-31 RX ORDER — ALBUTEROL SULFATE 90 UG/1
2 AEROSOL, METERED RESPIRATORY (INHALATION) EVERY 4 HOURS PRN
Qty: 1 EACH | Refills: 11 | Status: SHIPPED | OUTPATIENT
Start: 2023-07-31

## 2023-07-31 RX ORDER — FLUTICASONE PROPIONATE AND SALMETEROL 50; 500 UG/1; UG/1
1 POWDER RESPIRATORY (INHALATION) EVERY 12 HOURS
Qty: 1 EACH | Refills: 11 | Status: SHIPPED | OUTPATIENT
Start: 2023-07-31

## 2023-07-31 RX ORDER — MECLIZINE HYDROCHLORIDE 25 MG/1
25 TABLET ORAL PRN
Qty: 30 TABLET | Refills: 0 | Status: SHIPPED | OUTPATIENT
Start: 2023-07-31

## 2023-08-07 NOTE — CARE COORDINATION
Follow up contact per [address] Reports best contact for patient is [phone] or dental [phone]. Also recommended patient contact with [facility] to assess PREMA benefit. Spoke with patient . Confirmed contact information and advisement as above. Patient verbalized plan for follow through. No further needs noted. Confirmed that patient has ACM contact information should needs arise. I will START or STAY ON the medications listed below when I get home from the hospital:    ibuprofen 600 mg oral tablet  -- 1 tab(s) by mouth every 6 hours  -- Indication: For postpartum pain    acetaminophen 325 mg oral tablet  -- 3 tab(s) by mouth every 6 hours  -- Indication: For postpartum pain

## 2023-08-08 RX ORDER — HYDROXYZINE HYDROCHLORIDE 25 MG/1
25 TABLET, FILM COATED ORAL NIGHTLY
Qty: 30 TABLET | Refills: 2 | Status: SHIPPED | OUTPATIENT
Start: 2023-08-08

## 2023-09-07 ENCOUNTER — TELEPHONE (OUTPATIENT)
Dept: FAMILY MEDICINE CLINIC | Age: 73
End: 2023-09-07

## 2023-09-07 NOTE — TELEPHONE ENCOUNTER
Ahsan's Pharmacist told pt her Advair was not MACARIO. Looking at the order and it states  MACARIO. At her next appointment, she plans to change pharmacy.     Kyleigh, please note

## 2023-10-02 ENCOUNTER — OFFICE VISIT (OUTPATIENT)
Dept: PULMONOLOGY | Age: 73
End: 2023-10-02
Payer: MEDICARE

## 2023-10-02 VITALS
WEIGHT: 244 LBS | SYSTOLIC BLOOD PRESSURE: 138 MMHG | DIASTOLIC BLOOD PRESSURE: 70 MMHG | BODY MASS INDEX: 36.14 KG/M2 | RESPIRATION RATE: 16 BRPM | HEIGHT: 69 IN | OXYGEN SATURATION: 98 % | HEART RATE: 78 BPM

## 2023-10-02 DIAGNOSIS — R06.09 DYSPNEA ON EXERTION: ICD-10-CM

## 2023-10-02 DIAGNOSIS — J30.2 SEASONAL ALLERGIC RHINITIS, UNSPECIFIED TRIGGER: ICD-10-CM

## 2023-10-02 DIAGNOSIS — Z87.891 FORMER SMOKER: ICD-10-CM

## 2023-10-02 DIAGNOSIS — G47.33 OBSTRUCTIVE SLEEP APNEA: ICD-10-CM

## 2023-10-02 DIAGNOSIS — J45.30 MILD PERSISTENT ASTHMA WITHOUT COMPLICATION: Primary | ICD-10-CM

## 2023-10-02 PROCEDURE — 1123F ACP DISCUSS/DSCN MKR DOCD: CPT | Performed by: INTERNAL MEDICINE

## 2023-10-02 PROCEDURE — 3017F COLORECTAL CA SCREEN DOC REV: CPT | Performed by: INTERNAL MEDICINE

## 2023-10-02 PROCEDURE — 3075F SYST BP GE 130 - 139MM HG: CPT | Performed by: INTERNAL MEDICINE

## 2023-10-02 PROCEDURE — 3078F DIAST BP <80 MM HG: CPT | Performed by: INTERNAL MEDICINE

## 2023-10-02 PROCEDURE — G8417 CALC BMI ABV UP PARAM F/U: HCPCS | Performed by: INTERNAL MEDICINE

## 2023-10-02 PROCEDURE — 99213 OFFICE O/P EST LOW 20 MIN: CPT | Performed by: INTERNAL MEDICINE

## 2023-10-02 PROCEDURE — 1090F PRES/ABSN URINE INCON ASSESS: CPT | Performed by: INTERNAL MEDICINE

## 2023-10-02 PROCEDURE — G8484 FLU IMMUNIZE NO ADMIN: HCPCS | Performed by: INTERNAL MEDICINE

## 2023-10-02 PROCEDURE — 1036F TOBACCO NON-USER: CPT | Performed by: INTERNAL MEDICINE

## 2023-10-02 PROCEDURE — G8399 PT W/DXA RESULTS DOCUMENT: HCPCS | Performed by: INTERNAL MEDICINE

## 2023-10-02 PROCEDURE — G8427 DOCREV CUR MEDS BY ELIG CLIN: HCPCS | Performed by: INTERNAL MEDICINE

## 2023-10-13 ENCOUNTER — OFFICE VISIT (OUTPATIENT)
Dept: FAMILY MEDICINE CLINIC | Age: 73
End: 2023-10-13

## 2023-10-13 VITALS
BODY MASS INDEX: 25.4 KG/M2 | RESPIRATION RATE: 20 BRPM | OXYGEN SATURATION: 97 % | SYSTOLIC BLOOD PRESSURE: 128 MMHG | WEIGHT: 172 LBS | TEMPERATURE: 97.6 F | HEART RATE: 78 BPM | DIASTOLIC BLOOD PRESSURE: 74 MMHG

## 2023-10-13 DIAGNOSIS — G89.29 CHRONIC PAIN OF LEFT ANKLE: ICD-10-CM

## 2023-10-13 DIAGNOSIS — M25.572 CHRONIC PAIN OF LEFT ANKLE: ICD-10-CM

## 2023-10-13 DIAGNOSIS — J32.9 CHRONIC SINUSITIS, UNSPECIFIED LOCATION: ICD-10-CM

## 2023-10-13 DIAGNOSIS — J42 CHRONIC BRONCHITIS, UNSPECIFIED CHRONIC BRONCHITIS TYPE (HCC): ICD-10-CM

## 2023-10-13 DIAGNOSIS — Z23 NEED FOR INFLUENZA VACCINATION: Primary | ICD-10-CM

## 2023-10-13 DIAGNOSIS — I10 ESSENTIAL HYPERTENSION: ICD-10-CM

## 2023-10-13 DIAGNOSIS — M54.40 CHRONIC LOW BACK PAIN WITH SCIATICA, SCIATICA LATERALITY UNSPECIFIED, UNSPECIFIED BACK PAIN LATERALITY: ICD-10-CM

## 2023-10-13 DIAGNOSIS — G89.29 CHRONIC LOW BACK PAIN WITH SCIATICA, SCIATICA LATERALITY UNSPECIFIED, UNSPECIFIED BACK PAIN LATERALITY: ICD-10-CM

## 2023-10-13 DIAGNOSIS — Z76.0 MEDICATION REFILL: ICD-10-CM

## 2023-10-13 DIAGNOSIS — J30.2 SEASONAL ALLERGIC RHINITIS, UNSPECIFIED TRIGGER: ICD-10-CM

## 2023-10-13 PROCEDURE — G8427 DOCREV CUR MEDS BY ELIG CLIN: HCPCS | Performed by: NURSE PRACTITIONER

## 2023-10-13 PROCEDURE — 3074F SYST BP LT 130 MM HG: CPT | Performed by: NURSE PRACTITIONER

## 2023-10-13 PROCEDURE — 99214 OFFICE O/P EST MOD 30 MIN: CPT | Performed by: NURSE PRACTITIONER

## 2023-10-13 PROCEDURE — 3017F COLORECTAL CA SCREEN DOC REV: CPT | Performed by: NURSE PRACTITIONER

## 2023-10-13 PROCEDURE — 1090F PRES/ABSN URINE INCON ASSESS: CPT | Performed by: NURSE PRACTITIONER

## 2023-10-13 PROCEDURE — 90694 VACC AIIV4 NO PRSRV 0.5ML IM: CPT | Performed by: NURSE PRACTITIONER

## 2023-10-13 PROCEDURE — G8484 FLU IMMUNIZE NO ADMIN: HCPCS | Performed by: NURSE PRACTITIONER

## 2023-10-13 PROCEDURE — G8399 PT W/DXA RESULTS DOCUMENT: HCPCS | Performed by: NURSE PRACTITIONER

## 2023-10-13 PROCEDURE — 3023F SPIROM DOC REV: CPT | Performed by: NURSE PRACTITIONER

## 2023-10-13 PROCEDURE — 1036F TOBACCO NON-USER: CPT | Performed by: NURSE PRACTITIONER

## 2023-10-13 PROCEDURE — G8417 CALC BMI ABV UP PARAM F/U: HCPCS | Performed by: NURSE PRACTITIONER

## 2023-10-13 PROCEDURE — 1123F ACP DISCUSS/DSCN MKR DOCD: CPT | Performed by: NURSE PRACTITIONER

## 2023-10-13 PROCEDURE — 3078F DIAST BP <80 MM HG: CPT | Performed by: NURSE PRACTITIONER

## 2023-10-13 PROCEDURE — G0008 ADMIN INFLUENZA VIRUS VAC: HCPCS | Performed by: NURSE PRACTITIONER

## 2023-10-13 RX ORDER — FLUTICASONE PROPIONATE AND SALMETEROL 50; 500 UG/1; UG/1
1 POWDER RESPIRATORY (INHALATION) EVERY 12 HOURS
Qty: 1 EACH | Refills: 11 | Status: SHIPPED | OUTPATIENT
Start: 2023-10-13 | End: 2023-10-14 | Stop reason: SDUPTHER

## 2023-10-13 RX ORDER — FLUTICASONE PROPIONATE 50 MCG
SPRAY, SUSPENSION (ML) NASAL
Qty: 3 EACH | Refills: 1 | Status: SHIPPED | OUTPATIENT
Start: 2023-10-13

## 2023-10-13 RX ORDER — ALBUTEROL SULFATE 90 UG/1
2 AEROSOL, METERED RESPIRATORY (INHALATION) EVERY 4 HOURS PRN
Qty: 1 EACH | Refills: 11 | Status: SHIPPED | OUTPATIENT
Start: 2023-10-13 | End: 2023-10-14 | Stop reason: SDUPTHER

## 2023-10-13 RX ORDER — LISINOPRIL 10 MG/1
10 TABLET ORAL DAILY
Qty: 90 TABLET | Refills: 1 | Status: SHIPPED | OUTPATIENT
Start: 2023-10-13

## 2023-10-13 RX ORDER — HYDROXYZINE HYDROCHLORIDE 25 MG/1
25 TABLET, FILM COATED ORAL NIGHTLY
Qty: 30 TABLET | Refills: 2 | Status: SHIPPED | OUTPATIENT
Start: 2023-10-13 | End: 2023-10-14 | Stop reason: SDUPTHER

## 2023-10-13 RX ORDER — SIMVASTATIN 40 MG
40 TABLET ORAL NIGHTLY
Qty: 90 TABLET | Refills: 1 | Status: SHIPPED | OUTPATIENT
Start: 2023-10-13

## 2023-10-13 RX ORDER — BACLOFEN 10 MG/1
10 TABLET ORAL 2 TIMES DAILY
Qty: 180 TABLET | Refills: 1 | Status: SHIPPED | OUTPATIENT
Start: 2023-10-13

## 2023-10-13 RX ORDER — MONTELUKAST SODIUM 10 MG/1
10 TABLET ORAL NIGHTLY
Qty: 90 TABLET | Refills: 1 | Status: SHIPPED | OUTPATIENT
Start: 2023-10-13

## 2023-10-13 RX ORDER — CETIRIZINE HYDROCHLORIDE 10 MG/1
TABLET ORAL
Qty: 90 TABLET | Refills: 1 | Status: SHIPPED | OUTPATIENT
Start: 2023-10-13

## 2023-10-13 RX ORDER — HYDROCHLOROTHIAZIDE 12.5 MG/1
12.5 TABLET ORAL EVERY OTHER DAY
Qty: 90 TABLET | Refills: 1 | Status: SHIPPED | OUTPATIENT
Start: 2023-10-13

## 2023-10-13 RX ORDER — CLOPIDOGREL BISULFATE 75 MG/1
75 TABLET ORAL DAILY
Qty: 90 TABLET | Refills: 1 | Status: SHIPPED | OUTPATIENT
Start: 2023-10-13

## 2023-10-14 RX ORDER — HYDROXYZINE HYDROCHLORIDE 25 MG/1
25 TABLET, FILM COATED ORAL NIGHTLY
Qty: 90 TABLET | Refills: 2 | Status: SHIPPED | OUTPATIENT
Start: 2023-10-14

## 2023-10-14 RX ORDER — ALBUTEROL SULFATE 90 UG/1
2 AEROSOL, METERED RESPIRATORY (INHALATION) EVERY 4 HOURS PRN
Qty: 3 EACH | Refills: 3 | Status: SHIPPED | OUTPATIENT
Start: 2023-10-14

## 2023-10-14 RX ORDER — FLUTICASONE PROPIONATE AND SALMETEROL 50; 500 UG/1; UG/1
1 POWDER RESPIRATORY (INHALATION) EVERY 12 HOURS
Qty: 3 EACH | Refills: 3 | Status: SHIPPED | OUTPATIENT
Start: 2023-10-14

## 2023-10-14 ASSESSMENT — ENCOUNTER SYMPTOMS
BACK PAIN: 1
CHEST TIGHTNESS: 0
WHEEZING: 0
FACIAL SWELLING: 0
RHINORRHEA: 0
SORE THROAT: 0
TROUBLE SWALLOWING: 0
SINUS PRESSURE: 1
SHORTNESS OF BREATH: 0
NAUSEA: 0
SINUS PAIN: 0
COUGH: 1

## 2023-10-14 NOTE — PROGRESS NOTES
tablet; Refill: 1    4. Chronic sinusitis, unspecified location  - fluticasone (FLONASE) 50 MCG/ACT nasal spray; USE 1 SPRAY IN EACH NOSTRIL EVERY DAY  Dispense: 3 each; Refill: 1    5. Seasonal allergic rhinitis, unspecified trigger  - cetirizine (HM CETIRIZINE HCL) 10 MG tablet; Take one tablet by mouth daily. Dispense: 90 tablet; Refill: 1    6. Chronic low back pain with sciatica, sciatica laterality unspecified, unspecified back pain laterality  - baclofen (LIORESAL) 10 MG tablet; Take 1 tablet by mouth 2 times daily  Dispense: 180 tablet; Refill: 1    7. Need for influenza vaccination  - Influenza, FLUAD, (age 72 y+), IM, Preservative Free, 0.5 mL    8. Chronic pain of left ankle  - monitor    No follow-ups on file. Care discussed with patient. Patient educated on signs and symptoms of exacerbation and when to seek further medical attention. Advised to call for any problems, questions, or concerns. Patient verbalizes understanding and agrees with plan. Medications reviewed and reconciled. Continue current medications. Appropriate prescriptions are ordered. Risks and benefits of meds are discussed. After visit summary provided.

## 2023-10-16 ENCOUNTER — TELEPHONE (OUTPATIENT)
Dept: FAMILY MEDICINE CLINIC | Age: 73
End: 2023-10-16

## 2023-10-16 NOTE — TELEPHONE ENCOUNTER
Pt called complaining of flu symptoms after getting the flu vaccine 10-. Woke up 10- with fever, chills, headache, fatigue and diarrhea. Pt stated she is following the BRAT Diet and hydrating with fluids. Feeling better today, diarrhea not so much. Kyleigh, please advise.

## 2023-10-30 SDOH — HEALTH STABILITY: PHYSICAL HEALTH: ON AVERAGE, HOW MANY DAYS PER WEEK DO YOU ENGAGE IN MODERATE TO STRENUOUS EXERCISE (LIKE A BRISK WALK)?: 1 DAY

## 2023-10-30 SDOH — HEALTH STABILITY: PHYSICAL HEALTH: ON AVERAGE, HOW MANY MINUTES DO YOU ENGAGE IN EXERCISE AT THIS LEVEL?: 30 MIN

## 2023-10-30 ASSESSMENT — LIFESTYLE VARIABLES
HOW MANY STANDARD DRINKS CONTAINING ALCOHOL DO YOU HAVE ON A TYPICAL DAY: 1 OR 2
HOW OFTEN DO YOU HAVE A DRINK CONTAINING ALCOHOL: MONTHLY OR LESS
HOW MANY STANDARD DRINKS CONTAINING ALCOHOL DO YOU HAVE ON A TYPICAL DAY: 1
HOW OFTEN DO YOU HAVE SIX OR MORE DRINKS ON ONE OCCASION: 1
HOW OFTEN DO YOU HAVE A DRINK CONTAINING ALCOHOL: 2

## 2023-10-30 ASSESSMENT — PATIENT HEALTH QUESTIONNAIRE - PHQ9
SUM OF ALL RESPONSES TO PHQ QUESTIONS 1-9: 2
2. FEELING DOWN, DEPRESSED OR HOPELESS: 1
SUM OF ALL RESPONSES TO PHQ9 QUESTIONS 1 & 2: 2
SUM OF ALL RESPONSES TO PHQ QUESTIONS 1-9: 2
1. LITTLE INTEREST OR PLEASURE IN DOING THINGS: 1

## 2023-11-01 ENCOUNTER — OFFICE VISIT (OUTPATIENT)
Dept: FAMILY MEDICINE CLINIC | Age: 73
End: 2023-11-01
Payer: MEDICARE

## 2023-11-01 VITALS
HEART RATE: 72 BPM | OXYGEN SATURATION: 97 % | WEIGHT: 249 LBS | TEMPERATURE: 98.1 F | SYSTOLIC BLOOD PRESSURE: 124 MMHG | DIASTOLIC BLOOD PRESSURE: 72 MMHG | RESPIRATION RATE: 20 BRPM | BODY MASS INDEX: 36.88 KG/M2 | HEIGHT: 69 IN

## 2023-11-01 DIAGNOSIS — Z87.891 PERSONAL HISTORY OF TOBACCO USE: ICD-10-CM

## 2023-11-01 DIAGNOSIS — Z12.11 SCREENING FOR COLON CANCER: ICD-10-CM

## 2023-11-01 DIAGNOSIS — K04.7 DENTAL ABSCESS: ICD-10-CM

## 2023-11-01 DIAGNOSIS — Z00.00 MEDICARE ANNUAL WELLNESS VISIT, SUBSEQUENT: Primary | ICD-10-CM

## 2023-11-01 LAB
CONTROL: ABNORMAL
FECAL BLOOD IMMUNOCHEMICAL TEST: POSITIVE

## 2023-11-01 PROCEDURE — G0439 PPPS, SUBSEQ VISIT: HCPCS | Performed by: NURSE PRACTITIONER

## 2023-11-01 PROCEDURE — G0296 VISIT TO DETERM LDCT ELIG: HCPCS | Performed by: NURSE PRACTITIONER

## 2023-11-01 PROCEDURE — 1123F ACP DISCUSS/DSCN MKR DOCD: CPT | Performed by: NURSE PRACTITIONER

## 2023-11-01 PROCEDURE — 3017F COLORECTAL CA SCREEN DOC REV: CPT | Performed by: NURSE PRACTITIONER

## 2023-11-01 PROCEDURE — 3078F DIAST BP <80 MM HG: CPT | Performed by: NURSE PRACTITIONER

## 2023-11-01 PROCEDURE — 82274 ASSAY TEST FOR BLOOD FECAL: CPT | Performed by: NURSE PRACTITIONER

## 2023-11-01 PROCEDURE — 3074F SYST BP LT 130 MM HG: CPT | Performed by: NURSE PRACTITIONER

## 2023-11-01 PROCEDURE — G8484 FLU IMMUNIZE NO ADMIN: HCPCS | Performed by: NURSE PRACTITIONER

## 2023-11-01 RX ORDER — AMOXICILLIN 500 MG/1
500 CAPSULE ORAL 3 TIMES DAILY
Qty: 21 CAPSULE | Refills: 0 | Status: SHIPPED | OUTPATIENT
Start: 2023-11-01 | End: 2023-11-08

## 2023-11-23 DIAGNOSIS — Z76.0 MEDICATION REFILL: ICD-10-CM

## 2023-11-27 RX ORDER — LISINOPRIL 10 MG/1
10 TABLET ORAL DAILY
Qty: 100 TABLET | Refills: 2 | Status: SHIPPED | OUTPATIENT
Start: 2023-11-27

## 2023-11-27 RX ORDER — CLOPIDOGREL BISULFATE 75 MG/1
75 TABLET ORAL DAILY
Qty: 100 TABLET | Refills: 2 | Status: SHIPPED | OUTPATIENT
Start: 2023-11-27

## 2023-11-28 ENCOUNTER — HOSPITAL ENCOUNTER (OUTPATIENT)
Dept: CT IMAGING | Age: 73
Discharge: HOME | End: 2023-11-28
Attending: INTERNAL MEDICINE
Payer: MEDICARE

## 2023-11-28 DIAGNOSIS — Z87.891 FORMER SMOKER: ICD-10-CM

## 2023-11-28 DIAGNOSIS — J30.2 SEASONAL ALLERGIC RHINITIS, UNSPECIFIED TRIGGER: ICD-10-CM

## 2023-11-28 DIAGNOSIS — R06.09 DYSPNEA ON EXERTION: ICD-10-CM

## 2023-11-28 DIAGNOSIS — J45.30 MILD PERSISTENT ASTHMA WITHOUT COMPLICATION: ICD-10-CM

## 2023-11-28 PROCEDURE — 71250 CT THORAX DX C-: CPT

## 2024-01-04 ENCOUNTER — OFFICE VISIT (OUTPATIENT)
Dept: FAMILY MEDICINE CLINIC | Age: 74
End: 2024-01-04

## 2024-01-04 VITALS
RESPIRATION RATE: 18 BRPM | BODY MASS INDEX: 37.36 KG/M2 | DIASTOLIC BLOOD PRESSURE: 74 MMHG | TEMPERATURE: 97.8 F | WEIGHT: 253 LBS | OXYGEN SATURATION: 97 % | HEART RATE: 72 BPM | SYSTOLIC BLOOD PRESSURE: 126 MMHG

## 2024-01-04 DIAGNOSIS — H10.023 OTHER MUCOPURULENT CONJUNCTIVITIS OF BOTH EYES: Primary | ICD-10-CM

## 2024-01-04 PROCEDURE — 1036F TOBACCO NON-USER: CPT | Performed by: NURSE PRACTITIONER

## 2024-01-04 PROCEDURE — 3074F SYST BP LT 130 MM HG: CPT | Performed by: NURSE PRACTITIONER

## 2024-01-04 PROCEDURE — 3078F DIAST BP <80 MM HG: CPT | Performed by: NURSE PRACTITIONER

## 2024-01-04 PROCEDURE — 99213 OFFICE O/P EST LOW 20 MIN: CPT | Performed by: NURSE PRACTITIONER

## 2024-01-04 PROCEDURE — G8417 CALC BMI ABV UP PARAM F/U: HCPCS | Performed by: NURSE PRACTITIONER

## 2024-01-04 PROCEDURE — G8399 PT W/DXA RESULTS DOCUMENT: HCPCS | Performed by: NURSE PRACTITIONER

## 2024-01-04 PROCEDURE — 1090F PRES/ABSN URINE INCON ASSESS: CPT | Performed by: NURSE PRACTITIONER

## 2024-01-04 PROCEDURE — G8484 FLU IMMUNIZE NO ADMIN: HCPCS | Performed by: NURSE PRACTITIONER

## 2024-01-04 PROCEDURE — 3017F COLORECTAL CA SCREEN DOC REV: CPT | Performed by: NURSE PRACTITIONER

## 2024-01-04 PROCEDURE — G8427 DOCREV CUR MEDS BY ELIG CLIN: HCPCS | Performed by: NURSE PRACTITIONER

## 2024-01-04 PROCEDURE — 1123F ACP DISCUSS/DSCN MKR DOCD: CPT | Performed by: NURSE PRACTITIONER

## 2024-01-04 RX ORDER — POLYVINYL ALCOHOL 14 MG/ML
1 SOLUTION/ DROPS OPHTHALMIC PRN
COMMUNITY

## 2024-01-04 RX ORDER — POLYMYXIN B SULFATE AND TRIMETHOPRIM 1; 10000 MG/ML; [USP'U]/ML
1 SOLUTION OPHTHALMIC EVERY 4 HOURS
Qty: 3 ML | Refills: 0 | Status: SHIPPED | OUTPATIENT
Start: 2024-01-04 | End: 2024-01-14

## 2024-01-04 ASSESSMENT — ENCOUNTER SYMPTOMS
EYE DISCHARGE: 1
EYE REDNESS: 0
WHEEZING: 0
COUGH: 0
SHORTNESS OF BREATH: 0
SORE THROAT: 1
RHINORRHEA: 1
CHEST TIGHTNESS: 0
EYE ITCHING: 1
EYE PAIN: 0
PHOTOPHOBIA: 0

## 2024-01-04 ASSESSMENT — PATIENT HEALTH QUESTIONNAIRE - PHQ9
SUM OF ALL RESPONSES TO PHQ QUESTIONS 1-9: 0
SUM OF ALL RESPONSES TO PHQ QUESTIONS 1-9: 0
SUM OF ALL RESPONSES TO PHQ9 QUESTIONS 1 & 2: 0
2. FEELING DOWN, DEPRESSED OR HOPELESS: 0
SUM OF ALL RESPONSES TO PHQ QUESTIONS 1-9: 0
1. LITTLE INTEREST OR PLEASURE IN DOING THINGS: 0
SUM OF ALL RESPONSES TO PHQ QUESTIONS 1-9: 0

## 2024-01-04 NOTE — PROGRESS NOTES
Luz Ahmadi  1950  73 y.o.    SUBJECT JEMIMA:    Chief Complaint   Patient presents with    Conjunctivitis     Both eyes, started @ 6 days ago, exposure (grandson). Wake up with matted eyes, thick sticky tears, and especially red eyes at wake up.       HPI  And is a 73-year-old female who is in for evaluation of eye itching and pain.  She states that 6 days ago she started with having exposure to pinkeye.  She states several days following she would wake up with matted shut eyes thick yellow drainage tears and some itching.  She states over the last couple of days the discharge has changed and is less in amount and more of a clear.  She denies visual changes.  She denies runny nose or headache.  She does have a history of allergies and many times will have clear eye drainage and itching.    Current Outpatient Medications on File Prior to Visit   Medication Sig Dispense Refill    Naphazoline-Pheniramine (OPCON-A OP) Apply to eye      polyvinyl alcohol (LIQUIFILM TEARS) 1.4 % ophthalmic solution 1 drop as needed      clopidogrel (PLAVIX) 75 MG tablet TAKE 1 TABLET BY MOUTH DAILY 100 tablet 2    lisinopril (PRINIVIL;ZESTRIL) 10 MG tablet TAKE 1 TABLET BY MOUTH DAILY 100 tablet 2    ADVAIR DISKUS 500-50 MCG/ACT AEPB diskus inhaler Inhale 1 puff into the lungs in the morning and 1 puff in the evening. 3 each 3    hydrOXYzine HCl (ATARAX) 25 MG tablet Take 1 tablet by mouth nightly 90 tablet 2    albuterol sulfate HFA (PROVENTIL HFA) 108 (90 Base) MCG/ACT inhaler Inhale 2 puffs into the lungs every 4 hours as needed for Wheezing or Shortness of Breath (cough) 3 each 3    montelukast (SINGULAIR) 10 MG tablet Take 1 tablet by mouth nightly 90 tablet 1    simvastatin (ZOCOR) 40 MG tablet Take 1 tablet by mouth nightly 90 tablet 1    hydroCHLOROthiazide (HYDRODIURIL) 12.5 MG tablet Take 1 tablet by mouth every other day 90 tablet 1    fluticasone (FLONASE) 50 MCG/ACT nasal spray USE 1 SPRAY IN EACH NOSTRIL EVERY DAY 3

## 2024-01-17 ENCOUNTER — OFFICE VISIT (OUTPATIENT)
Dept: CARDIOLOGY CLINIC | Age: 74
End: 2024-01-17
Payer: MEDICARE

## 2024-01-17 VITALS
SYSTOLIC BLOOD PRESSURE: 130 MMHG | HEIGHT: 70 IN | HEART RATE: 63 BPM | WEIGHT: 258 LBS | DIASTOLIC BLOOD PRESSURE: 68 MMHG | OXYGEN SATURATION: 94 % | BODY MASS INDEX: 36.94 KG/M2

## 2024-01-17 DIAGNOSIS — E66.01 SEVERE OBESITY (BMI 35.0-39.9) WITH COMORBIDITY (HCC): ICD-10-CM

## 2024-01-17 DIAGNOSIS — I73.9 PAD (PERIPHERAL ARTERY DISEASE) (HCC): Primary | ICD-10-CM

## 2024-01-17 DIAGNOSIS — I10 PRIMARY HYPERTENSION: ICD-10-CM

## 2024-01-17 PROCEDURE — 3017F COLORECTAL CA SCREEN DOC REV: CPT | Performed by: NURSE PRACTITIONER

## 2024-01-17 PROCEDURE — G8427 DOCREV CUR MEDS BY ELIG CLIN: HCPCS | Performed by: NURSE PRACTITIONER

## 2024-01-17 PROCEDURE — G8399 PT W/DXA RESULTS DOCUMENT: HCPCS | Performed by: NURSE PRACTITIONER

## 2024-01-17 PROCEDURE — 1036F TOBACCO NON-USER: CPT | Performed by: NURSE PRACTITIONER

## 2024-01-17 PROCEDURE — G8484 FLU IMMUNIZE NO ADMIN: HCPCS | Performed by: NURSE PRACTITIONER

## 2024-01-17 PROCEDURE — G8417 CALC BMI ABV UP PARAM F/U: HCPCS | Performed by: NURSE PRACTITIONER

## 2024-01-17 PROCEDURE — 1123F ACP DISCUSS/DSCN MKR DOCD: CPT | Performed by: NURSE PRACTITIONER

## 2024-01-17 PROCEDURE — 1090F PRES/ABSN URINE INCON ASSESS: CPT | Performed by: NURSE PRACTITIONER

## 2024-01-17 PROCEDURE — 99214 OFFICE O/P EST MOD 30 MIN: CPT | Performed by: NURSE PRACTITIONER

## 2024-01-17 PROCEDURE — 93000 ELECTROCARDIOGRAM COMPLETE: CPT | Performed by: NURSE PRACTITIONER

## 2024-01-17 PROCEDURE — 3078F DIAST BP <80 MM HG: CPT | Performed by: NURSE PRACTITIONER

## 2024-01-17 PROCEDURE — 3075F SYST BP GE 130 - 139MM HG: CPT | Performed by: NURSE PRACTITIONER

## 2024-01-17 ASSESSMENT — ENCOUNTER SYMPTOMS
ORTHOPNEA: 0
SHORTNESS OF BREATH: 0

## 2024-01-17 NOTE — PROGRESS NOTES
dry.      Capillary Refill: Capillary refill takes less than 2 seconds.   Neurological:      Mental Status: She is alert and oriented to person, place, and time.                Current Outpatient Medications   Medication Sig Dispense Refill    Naphazoline-Pheniramine (OPCON-A OP) Apply to eye      polyvinyl alcohol (LIQUIFILM TEARS) 1.4 % ophthalmic solution 1 drop as needed      clopidogrel (PLAVIX) 75 MG tablet TAKE 1 TABLET BY MOUTH DAILY 100 tablet 2    lisinopril (PRINIVIL;ZESTRIL) 10 MG tablet TAKE 1 TABLET BY MOUTH DAILY 100 tablet 2    ADVAIR DISKUS 500-50 MCG/ACT AEPB diskus inhaler Inhale 1 puff into the lungs in the morning and 1 puff in the evening. 3 each 3    hydrOXYzine HCl (ATARAX) 25 MG tablet Take 1 tablet by mouth nightly 90 tablet 2    albuterol sulfate HFA (PROVENTIL HFA) 108 (90 Base) MCG/ACT inhaler Inhale 2 puffs into the lungs every 4 hours as needed for Wheezing or Shortness of Breath (cough) 3 each 3    montelukast (SINGULAIR) 10 MG tablet Take 1 tablet by mouth nightly 90 tablet 1    simvastatin (ZOCOR) 40 MG tablet Take 1 tablet by mouth nightly 90 tablet 1    hydroCHLOROthiazide (HYDRODIURIL) 12.5 MG tablet Take 1 tablet by mouth every other day 90 tablet 1    fluticasone (FLONASE) 50 MCG/ACT nasal spray USE 1 SPRAY IN EACH NOSTRIL EVERY DAY 3 each 1    cetirizine (HM CETIRIZINE HCL) 10 MG tablet Take one tablet by mouth daily. 90 tablet 1    baclofen (LIORESAL) 10 MG tablet Take 1 tablet by mouth 2 times daily 180 tablet 1    albuterol (PROVENTIL) (5 MG/ML) 0.5% nebulizer solution Take 1 mL by nebulization every 6 hours as needed for Wheezing or Shortness of Breath 100 each 1    meclizine (ANTIVERT) 25 MG tablet Take 1 tablet by mouth as needed for Dizziness 30 tablet 0    vitamin D (D3-1000) 25 MCG (1000 UT) TABS tablet Take 1 tablet by mouth daily      BIOTIN PO Take 2 tablets by mouth daily      Cobalamin Combinations (B-12) 100-5000 MCG SUBL Take 1 tablet by mouth      COVID-19 At

## 2024-01-17 NOTE — PATIENT INSTRUCTIONS
After  **It is YOUR responsibilty to bring medication bottles and/or updated medication list to EACH APPOINTMENT. This will allow us to better serve you and all your healthcare needs**  Thank you for allowing us to care for you today!   We want to ensure we can follow your treatment plan and we strive to give you the best outcomes and experience possible.   If you ever have a life threatening emergency and call 911 - for an ambulance (EMS)   Our providers can only care for you at:   [facility] or [facility].   Even if you have someone take you or you drive yourself we can only care for you in a [facility] facility. Our providers are not setup at the other healthcare locations!   We are committed to providing you the best care possible.    If you receive a survey after visiting one of our offices, please take time to share your experience concerning your physician office visit.  These surveys are confidential and no health information about you is shared.    We are eager to improve for you and we are counting on your feedback to help make that happen.

## 2024-02-01 ENCOUNTER — OFFICE VISIT (OUTPATIENT)
Dept: FAMILY MEDICINE CLINIC | Age: 74
End: 2024-02-01

## 2024-02-01 VITALS
WEIGHT: 255 LBS | HEART RATE: 88 BPM | SYSTOLIC BLOOD PRESSURE: 126 MMHG | DIASTOLIC BLOOD PRESSURE: 72 MMHG | TEMPERATURE: 97.8 F | OXYGEN SATURATION: 98 % | RESPIRATION RATE: 20 BRPM | BODY MASS INDEX: 36.59 KG/M2

## 2024-02-01 DIAGNOSIS — Z12.31 SCREENING MAMMOGRAM FOR BREAST CANCER: Primary | ICD-10-CM

## 2024-02-01 DIAGNOSIS — H10.023 OTHER MUCOPURULENT CONJUNCTIVITIS OF BOTH EYES: ICD-10-CM

## 2024-02-01 DIAGNOSIS — J42 CHRONIC BRONCHITIS, UNSPECIFIED CHRONIC BRONCHITIS TYPE (HCC): ICD-10-CM

## 2024-02-01 PROCEDURE — 1036F TOBACCO NON-USER: CPT | Performed by: NURSE PRACTITIONER

## 2024-02-01 PROCEDURE — 3078F DIAST BP <80 MM HG: CPT | Performed by: NURSE PRACTITIONER

## 2024-02-01 PROCEDURE — 3017F COLORECTAL CA SCREEN DOC REV: CPT | Performed by: NURSE PRACTITIONER

## 2024-02-01 PROCEDURE — 99213 OFFICE O/P EST LOW 20 MIN: CPT | Performed by: NURSE PRACTITIONER

## 2024-02-01 PROCEDURE — G8399 PT W/DXA RESULTS DOCUMENT: HCPCS | Performed by: NURSE PRACTITIONER

## 2024-02-01 PROCEDURE — 1123F ACP DISCUSS/DSCN MKR DOCD: CPT | Performed by: NURSE PRACTITIONER

## 2024-02-01 PROCEDURE — 3023F SPIROM DOC REV: CPT | Performed by: NURSE PRACTITIONER

## 2024-02-01 PROCEDURE — G8417 CALC BMI ABV UP PARAM F/U: HCPCS | Performed by: NURSE PRACTITIONER

## 2024-02-01 PROCEDURE — 1090F PRES/ABSN URINE INCON ASSESS: CPT | Performed by: NURSE PRACTITIONER

## 2024-02-01 PROCEDURE — 3074F SYST BP LT 130 MM HG: CPT | Performed by: NURSE PRACTITIONER

## 2024-02-01 PROCEDURE — G8484 FLU IMMUNIZE NO ADMIN: HCPCS | Performed by: NURSE PRACTITIONER

## 2024-02-01 PROCEDURE — G8427 DOCREV CUR MEDS BY ELIG CLIN: HCPCS | Performed by: NURSE PRACTITIONER

## 2024-02-01 ASSESSMENT — ENCOUNTER SYMPTOMS
SINUS PRESSURE: 0
SHORTNESS OF BREATH: 0
TROUBLE SWALLOWING: 0
CHEST TIGHTNESS: 0
EYES NEGATIVE: 1
COUGH: 1
WHEEZING: 0
SORE THROAT: 0
SINUS PAIN: 0

## 2024-02-01 NOTE — PROGRESS NOTES
symptoms of exacerbation and when to seek further medical attention. Advised to call for any problems, questions, or concerns. Patient verbalizes understanding and agrees with plan.     Medications reviewed and reconciled. Continue current medications.  Appropriate prescriptions are ordered. Risks and benefits of meds are discussed.     After visit summary provided.

## 2024-02-02 DIAGNOSIS — G89.29 CHRONIC LOW BACK PAIN WITH SCIATICA, SCIATICA LATERALITY UNSPECIFIED, UNSPECIFIED BACK PAIN LATERALITY: ICD-10-CM

## 2024-02-02 DIAGNOSIS — J32.9 CHRONIC SINUSITIS, UNSPECIFIED LOCATION: ICD-10-CM

## 2024-02-02 DIAGNOSIS — Z76.0 MEDICATION REFILL: ICD-10-CM

## 2024-02-02 DIAGNOSIS — M54.40 CHRONIC LOW BACK PAIN WITH SCIATICA, SCIATICA LATERALITY UNSPECIFIED, UNSPECIFIED BACK PAIN LATERALITY: ICD-10-CM

## 2024-02-05 RX ORDER — FLUTICASONE PROPIONATE 50 MCG
SPRAY, SUSPENSION (ML) NASAL
Qty: 3 EACH | Refills: 1 | Status: SHIPPED | OUTPATIENT
Start: 2024-02-05

## 2024-02-05 RX ORDER — HYDROXYZINE HYDROCHLORIDE 25 MG/1
25 TABLET, FILM COATED ORAL NIGHTLY
Qty: 90 TABLET | Refills: 2 | Status: SHIPPED | OUTPATIENT
Start: 2024-02-05

## 2024-02-05 RX ORDER — BACLOFEN 10 MG/1
10 TABLET ORAL 2 TIMES DAILY
Qty: 180 TABLET | Refills: 1 | Status: SHIPPED | OUTPATIENT
Start: 2024-02-05

## 2024-02-05 RX ORDER — MONTELUKAST SODIUM 10 MG/1
10 TABLET ORAL NIGHTLY
Qty: 90 TABLET | Refills: 1 | Status: SHIPPED | OUTPATIENT
Start: 2024-02-05

## 2024-02-05 RX ORDER — ALBUTEROL SULFATE 90 UG/1
2 AEROSOL, METERED RESPIRATORY (INHALATION) EVERY 4 HOURS PRN
Qty: 3 EACH | Refills: 3 | Status: SHIPPED | OUTPATIENT
Start: 2024-02-05

## 2024-02-12 DIAGNOSIS — Z76.0 MEDICATION REFILL: ICD-10-CM

## 2024-02-12 RX ORDER — SIMVASTATIN 40 MG
40 TABLET ORAL NIGHTLY
Qty: 90 TABLET | Refills: 1 | Status: SHIPPED | OUTPATIENT
Start: 2024-02-12

## 2024-03-27 ENCOUNTER — HOSPITAL ENCOUNTER (OUTPATIENT)
Dept: WOMENS IMAGING | Age: 74
Discharge: HOME | End: 2024-03-27
Payer: MEDICARE

## 2024-03-27 VITALS — HEIGHT: 70 IN | WEIGHT: 251 LBS | BODY MASS INDEX: 35.93 KG/M2

## 2024-03-27 DIAGNOSIS — Z12.31 SCREENING MAMMOGRAM FOR BREAST CANCER: ICD-10-CM

## 2024-03-27 PROCEDURE — 77063 BREAST TOMOSYNTHESIS BI: CPT

## 2024-04-07 DIAGNOSIS — Z76.0 MEDICATION REFILL: ICD-10-CM

## 2024-04-08 RX ORDER — SIMVASTATIN 40 MG
40 TABLET ORAL NIGHTLY
Qty: 100 TABLET | Refills: 2 | Status: SHIPPED | OUTPATIENT
Start: 2024-04-08

## 2024-04-18 ENCOUNTER — OFFICE VISIT (OUTPATIENT)
Dept: PULMONOLOGY | Age: 74
End: 2024-04-18
Payer: MEDICARE

## 2024-04-18 VITALS
BODY MASS INDEX: 36.96 KG/M2 | SYSTOLIC BLOOD PRESSURE: 128 MMHG | OXYGEN SATURATION: 87 % | WEIGHT: 257.6 LBS | HEART RATE: 66 BPM | DIASTOLIC BLOOD PRESSURE: 60 MMHG

## 2024-04-18 DIAGNOSIS — J42 CHRONIC BRONCHITIS, UNSPECIFIED CHRONIC BRONCHITIS TYPE (HCC): ICD-10-CM

## 2024-04-18 DIAGNOSIS — J30.1 ALLERGIC RHINITIS DUE TO POLLEN, UNSPECIFIED SEASONALITY: ICD-10-CM

## 2024-04-18 DIAGNOSIS — J45.30 MILD PERSISTENT ASTHMA WITHOUT COMPLICATION: Primary | ICD-10-CM

## 2024-04-18 DIAGNOSIS — R06.09 DYSPNEA ON EXERTION: ICD-10-CM

## 2024-04-18 DIAGNOSIS — G47.33 OBSTRUCTIVE SLEEP APNEA: ICD-10-CM

## 2024-04-18 PROCEDURE — 3078F DIAST BP <80 MM HG: CPT | Performed by: INTERNAL MEDICINE

## 2024-04-18 PROCEDURE — 1036F TOBACCO NON-USER: CPT | Performed by: INTERNAL MEDICINE

## 2024-04-18 PROCEDURE — G8399 PT W/DXA RESULTS DOCUMENT: HCPCS | Performed by: INTERNAL MEDICINE

## 2024-04-18 PROCEDURE — G8417 CALC BMI ABV UP PARAM F/U: HCPCS | Performed by: INTERNAL MEDICINE

## 2024-04-18 PROCEDURE — G8427 DOCREV CUR MEDS BY ELIG CLIN: HCPCS | Performed by: INTERNAL MEDICINE

## 2024-04-18 PROCEDURE — 3017F COLORECTAL CA SCREEN DOC REV: CPT | Performed by: INTERNAL MEDICINE

## 2024-04-18 PROCEDURE — 99213 OFFICE O/P EST LOW 20 MIN: CPT | Performed by: INTERNAL MEDICINE

## 2024-04-18 PROCEDURE — 1123F ACP DISCUSS/DSCN MKR DOCD: CPT | Performed by: INTERNAL MEDICINE

## 2024-04-18 PROCEDURE — 1090F PRES/ABSN URINE INCON ASSESS: CPT | Performed by: INTERNAL MEDICINE

## 2024-04-18 PROCEDURE — 3074F SYST BP LT 130 MM HG: CPT | Performed by: INTERNAL MEDICINE

## 2024-04-18 PROCEDURE — 3023F SPIROM DOC REV: CPT | Performed by: INTERNAL MEDICINE

## 2024-04-18 NOTE — PROGRESS NOTES
SUBJECTIVE:  Chief Complaint: Mild persistent asthma, chronic bronchitis, chronic cough, untreated obstructive sleep apnea, dyspnea on exertion, former smoker  And states that she did have 1 episode of bronchitis in early March of this year.  She did not seek medical attention.  She states that she was coughing, expectorating thick sputum and was more short of breath but did slowly improve over several days.  She did use her nebulizer with DuoNeb more frequently during this episode.  She mentions that she did get the flu vaccine this fall but did not get the RSV or COVID-19 vaccination.  She continues on Advair Diskus, Singulair, albuterol rescue inhaler and her DuoNeb per nebulizer as needed.  She also uses fluticasone nasal spray for her nasal allergies.  She continues note mild dyspnea exertion but typically is not short of breath at rest.  She does have a history of obstructive sleep apnea but opted not to undergo any specific treatment      ROS:  Constitution:  HEENT: Negative for ear, throat pain  Cardiovascular: Negative for chest pain, syncope, edema  Pulmonary: See HPI  Musculoskeletal: Negative for DVT, myalgias, arthralgias    OBJECTIVE:  /60 (Site: Right Upper Arm, Position: Sitting, Cuff Size: Medium Adult)   Pulse 66   Wt 116.8 kg (257 lb 9.6 oz)   SpO2 (!) 87%   BMI 36.96 kg/m²      Physical Exam:  Constitutional:  She appears well developed and well-nourished.  In no respiratory distress at rest.  Did not cough during exam  Neck:  Supple, No palpable lymphadenopathy, No JVD  Cardiovascular:  S1, S2 Normal, Regular rhythm, no murmurs or gallops, No pericardial  rubs.  Pulmonary: Breath sounds are clear throughout all areas without wheezing or rhonchi  Abdomen: Not examined  Extremities: no edema, No DVT  Neurologic: Oriented x3, No focal deficits    Radiology: Low-dose CT lung screening 11/28/2023 showed no suspicious pulmonary nodules.  PFT: Office spirometry on 12/15/2021 demonstrated no

## 2024-05-09 DIAGNOSIS — Z76.0 MEDICATION REFILL: ICD-10-CM

## 2024-05-09 RX ORDER — MONTELUKAST SODIUM 10 MG/1
10 TABLET ORAL NIGHTLY
Qty: 90 TABLET | Refills: 2 | Status: SHIPPED | OUTPATIENT
Start: 2024-05-09

## 2024-06-04 DIAGNOSIS — Z76.0 MEDICATION REFILL: ICD-10-CM

## 2024-06-04 DIAGNOSIS — I10 ESSENTIAL HYPERTENSION: ICD-10-CM

## 2024-06-05 RX ORDER — HYDROCHLOROTHIAZIDE 12.5 MG/1
12.5 TABLET ORAL EVERY OTHER DAY
Qty: 50 TABLET | Refills: 2 | Status: SHIPPED | OUTPATIENT
Start: 2024-06-05

## 2024-06-10 DIAGNOSIS — Z76.0 MEDICATION REFILL: ICD-10-CM

## 2024-06-11 RX ORDER — HYDROXYZINE HYDROCHLORIDE 25 MG/1
25 TABLET, FILM COATED ORAL NIGHTLY
Qty: 30 TABLET | Refills: 1 | Status: SHIPPED | OUTPATIENT
Start: 2024-06-11

## 2024-07-03 ENCOUNTER — OFFICE VISIT (OUTPATIENT)
Dept: FAMILY MEDICINE CLINIC | Age: 74
End: 2024-07-03

## 2024-07-03 VITALS
WEIGHT: 258 LBS | DIASTOLIC BLOOD PRESSURE: 76 MMHG | HEART RATE: 82 BPM | OXYGEN SATURATION: 98 % | BODY MASS INDEX: 37.02 KG/M2 | SYSTOLIC BLOOD PRESSURE: 128 MMHG | RESPIRATION RATE: 18 BRPM | TEMPERATURE: 97.8 F

## 2024-07-03 DIAGNOSIS — M54.40 CHRONIC LOW BACK PAIN WITH SCIATICA, SCIATICA LATERALITY UNSPECIFIED, UNSPECIFIED BACK PAIN LATERALITY: ICD-10-CM

## 2024-07-03 DIAGNOSIS — R53.1 WEAKNESS: ICD-10-CM

## 2024-07-03 DIAGNOSIS — I10 ESSENTIAL HYPERTENSION: ICD-10-CM

## 2024-07-03 DIAGNOSIS — Z09 ENCOUNTER FOR FOLLOW-UP EXAMINATION: ICD-10-CM

## 2024-07-03 DIAGNOSIS — G89.29 CHRONIC LOW BACK PAIN WITH SCIATICA, SCIATICA LATERALITY UNSPECIFIED, UNSPECIFIED BACK PAIN LATERALITY: ICD-10-CM

## 2024-07-03 DIAGNOSIS — M25.462 EFFUSION OF LEFT KNEE: Primary | ICD-10-CM

## 2024-07-03 PROCEDURE — G8399 PT W/DXA RESULTS DOCUMENT: HCPCS | Performed by: NURSE PRACTITIONER

## 2024-07-03 PROCEDURE — G8417 CALC BMI ABV UP PARAM F/U: HCPCS | Performed by: NURSE PRACTITIONER

## 2024-07-03 PROCEDURE — 1090F PRES/ABSN URINE INCON ASSESS: CPT | Performed by: NURSE PRACTITIONER

## 2024-07-03 PROCEDURE — 3078F DIAST BP <80 MM HG: CPT | Performed by: NURSE PRACTITIONER

## 2024-07-03 PROCEDURE — 3017F COLORECTAL CA SCREEN DOC REV: CPT | Performed by: NURSE PRACTITIONER

## 2024-07-03 PROCEDURE — 3074F SYST BP LT 130 MM HG: CPT | Performed by: NURSE PRACTITIONER

## 2024-07-03 PROCEDURE — 1123F ACP DISCUSS/DSCN MKR DOCD: CPT | Performed by: NURSE PRACTITIONER

## 2024-07-03 PROCEDURE — 1036F TOBACCO NON-USER: CPT | Performed by: NURSE PRACTITIONER

## 2024-07-03 PROCEDURE — 99213 OFFICE O/P EST LOW 20 MIN: CPT | Performed by: NURSE PRACTITIONER

## 2024-07-03 PROCEDURE — G8427 DOCREV CUR MEDS BY ELIG CLIN: HCPCS | Performed by: NURSE PRACTITIONER

## 2024-07-03 RX ORDER — METHYLPREDNISOLONE 4 MG/1
TABLET ORAL
Qty: 1 KIT | Refills: 0 | Status: SHIPPED | OUTPATIENT
Start: 2024-07-03 | End: 2024-07-09

## 2024-07-03 RX ORDER — METHYLPREDNISOLONE 4 MG/1
TABLET ORAL
Qty: 1 KIT | Refills: 0 | Status: SHIPPED | OUTPATIENT
Start: 2024-07-03 | End: 2024-07-03 | Stop reason: CLARIF

## 2024-07-03 ASSESSMENT — ENCOUNTER SYMPTOMS
COUGH: 0
BACK PAIN: 0
WHEEZING: 0
CHEST TIGHTNESS: 0
SHORTNESS OF BREATH: 0

## 2024-07-03 NOTE — PROGRESS NOTES
Luz Ahmadi  1950  73 y.o.    SUBJECT JEMIMA:    Chief Complaint   Patient presents with    Follow-up     [place] [facility] Left Leg swollen.       CHRISTIAN Ahmadi is a 73 y.o. female who presents to the ED with left leg pain ongoing for 4 months.     Patient states that in February of this year she was moving and did a lot of lifting and carrying things. States after the move her legs were swollen but this resolved. States that 2 months later she started having pain behind her left knee which has worsened. No known injury. Patient notes that she is now having pain radiating from behind her left knee down into her calf and upward into her thigh. She has tried resting the leg and wearing compression stockings without relief. 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 States that she had a stent placed in her left groin for history of peripheral arterial disease.      In the ED she was diagnosed with effusion of left knee. She has a follow up appointment with ortho but is wanting something to decrease the swelling now. She states she has had prednisone in the past which helped. She is limping today because of left knee swelling and pain.    Current Outpatient Medications on File Prior to Visit   Medication Sig Dispense Refill    hydrOXYzine HCl (ATARAX) 25 MG tablet TAKE 1 TABLET BY MOUTH EVERY  NIGHT 30 tablet 1    hydroCHLOROthiazide 12.5 MG tablet TAKE 1 TABLET BY MOUTH EVERY  OTHER DAY 50 tablet 2    montelukast (SINGULAIR) 10 MG tablet TAKE 1 TABLET BY MOUTH NIGHTLY 90 tablet 2    simvastatin (ZOCOR) 40 MG tablet TAKE 1 TABLET BY MOUTH EVERY  NIGHT 100 tablet 2    fluticasone (FLONASE) 50 MCG/ACT nasal spray USE 1 SPRAY IN EACH NOSTRIL EVERY DAY 3 each 1    baclofen (LIORESAL) 10 MG tablet Take 1 tablet by mouth 2 times daily 180 tablet 1    albuterol sulfate HFA (PROVENTIL HFA) 108 (90 Base) MCG/ACT inhaler Inhale 2 puffs into the lungs every 4 hours

## 2024-07-11 ENCOUNTER — OFFICE VISIT (OUTPATIENT)
Dept: ORTHOPEDIC SURGERY | Age: 74
End: 2024-07-11

## 2024-07-11 VITALS — HEART RATE: 69 BPM | TEMPERATURE: 98.4 F | OXYGEN SATURATION: 95 %

## 2024-07-11 DIAGNOSIS — G89.29 CHRONIC PAIN OF LEFT KNEE: Primary | ICD-10-CM

## 2024-07-11 DIAGNOSIS — M25.562 CHRONIC PAIN OF LEFT KNEE: Primary | ICD-10-CM

## 2024-07-11 NOTE — PROGRESS NOTES
Patient seen in office today for: Left thigh pain, pain started feb 7 2024  when moving, Pt states that she has overworked her left leg     Patient reports 2/10 pain. But when up and moving it is a 5/10 pain directly in the back of the thigh.   RICE and medication are somewhat effective to alleviate pain and reduce swelling. Pt has also tried heat with no relief    Pain worsened by: Patient reports painful ROM & weight bearing.     Xray preformed in office today

## 2024-07-11 NOTE — PATIENT INSTRUCTIONS
Continue weight-bearing as tolerated.  Continue range of motion exercises as instructed.  Ice and elevate as needed.  Tylenol or Motrin for pain.  Follow up as needed

## 2024-08-01 ENCOUNTER — OFFICE VISIT (OUTPATIENT)
Dept: FAMILY MEDICINE CLINIC | Age: 74
End: 2024-08-01
Payer: MEDICARE

## 2024-08-01 VITALS
HEART RATE: 80 BPM | TEMPERATURE: 98 F | RESPIRATION RATE: 20 BRPM | BODY MASS INDEX: 37.37 KG/M2 | WEIGHT: 261 LBS | OXYGEN SATURATION: 98 % | DIASTOLIC BLOOD PRESSURE: 80 MMHG | SYSTOLIC BLOOD PRESSURE: 130 MMHG | HEIGHT: 70 IN

## 2024-08-01 DIAGNOSIS — Z00.00 MEDICARE ANNUAL WELLNESS VISIT, SUBSEQUENT: Primary | ICD-10-CM

## 2024-08-01 DIAGNOSIS — I10 PRIMARY HYPERTENSION: ICD-10-CM

## 2024-08-01 DIAGNOSIS — R42 DIZZINESS: ICD-10-CM

## 2024-08-01 DIAGNOSIS — F41.9 ANXIETY: ICD-10-CM

## 2024-08-01 DIAGNOSIS — E78.49 OTHER HYPERLIPIDEMIA: ICD-10-CM

## 2024-08-01 PROCEDURE — 3079F DIAST BP 80-89 MM HG: CPT | Performed by: NURSE PRACTITIONER

## 2024-08-01 PROCEDURE — 1123F ACP DISCUSS/DSCN MKR DOCD: CPT | Performed by: NURSE PRACTITIONER

## 2024-08-01 PROCEDURE — 3017F COLORECTAL CA SCREEN DOC REV: CPT | Performed by: NURSE PRACTITIONER

## 2024-08-01 PROCEDURE — G0439 PPPS, SUBSEQ VISIT: HCPCS | Performed by: NURSE PRACTITIONER

## 2024-08-01 PROCEDURE — 3075F SYST BP GE 130 - 139MM HG: CPT | Performed by: NURSE PRACTITIONER

## 2024-08-01 SDOH — ECONOMIC STABILITY: FOOD INSECURITY: WITHIN THE PAST 12 MONTHS, YOU WORRIED THAT YOUR FOOD WOULD RUN OUT BEFORE YOU GOT MONEY TO BUY MORE.: NEVER TRUE

## 2024-08-01 SDOH — ECONOMIC STABILITY: FOOD INSECURITY: WITHIN THE PAST 12 MONTHS, THE FOOD YOU BOUGHT JUST DIDN'T LAST AND YOU DIDN'T HAVE MONEY TO GET MORE.: NEVER TRUE

## 2024-08-01 SDOH — ECONOMIC STABILITY: INCOME INSECURITY: HOW HARD IS IT FOR YOU TO PAY FOR THE VERY BASICS LIKE FOOD, HOUSING, MEDICAL CARE, AND HEATING?: NOT HARD AT ALL

## 2024-08-01 ASSESSMENT — PATIENT HEALTH QUESTIONNAIRE - PHQ9
SUM OF ALL RESPONSES TO PHQ QUESTIONS 1-9: 1
2. FEELING DOWN, DEPRESSED OR HOPELESS: SEVERAL DAYS
SUM OF ALL RESPONSES TO PHQ9 QUESTIONS 1 & 2: 1
1. LITTLE INTEREST OR PLEASURE IN DOING THINGS: NOT AT ALL
SUM OF ALL RESPONSES TO PHQ QUESTIONS 1-9: 1

## 2024-08-01 NOTE — PROGRESS NOTES
Medicare Annual Wellness Visit    Luz Ahmadi is here for Medicare AWV    Assessment & Plan   Medicare annual wellness visit, subsequent  Other hyperlipidemia  -     Lipid Panel; Future  Dizziness  -     Basic Metabolic Panel; Future  -     CBC with Auto Differential; Future  Anxiety  -     Basic Metabolic Panel; Future  -     CBC with Auto Differential; Future  Primary hypertension    Recommendations for Preventive Services Due: see orders and patient instructions/AVS.  Recommended screening schedule for the next 5-10 years is provided to the patient in written form: see Patient Instructions/AVS.     Return in 1 year (on 8/1/2025) for Medicare AWV.     Subjective     Patient's complete Health Risk Assessment and screening values have been reviewed and are found in Flowsheets. The following problems were reviewed today and where indicated follow up appointments were made and/or referrals ordered.    Positive Risk Factor Screenings with Interventions:                  Abnormal BMI (obese):  Body mass index is 37.45 kg/m². (!) Abnormal  Interventions:  Patient comments: She states she walks around her complex. She cannot walk up the hill of her complex.             Lung Cancer Screening:  Will follow up with pulmonology for screening, due 11/2024.              Objective   Vitals:    08/01/24 0945   BP: 130/80   Site: Right Upper Arm   Position: Sitting   Cuff Size: Large Adult   Pulse: 80   Resp: 20   Temp: 98 °F (36.7 °C)   TempSrc: Infrared   SpO2: 98%   Weight: 118.4 kg (261 lb)   Height: 1.778 m (5' 10\")      Body mass index is 37.45 kg/m².      General Appearance: alert and oriented to person, place and time, well developed and well- nourished, in no acute distress  Skin: warm and dry, no rash or erythema  Head: normocephalic and atraumatic  Eyes: pupils equal, round, and reactive to light, conjunctivae normal  Neck: supple and non-tender without mass, no thyromegaly or thyroid nodules, no cervical

## 2024-08-29 NOTE — ED PROVIDER NOTES
EKG Interpretation    Interpreted by emergency department physician    Rhythm: normal sinus   Rate: normal  Axis: normal  Ectopy: none  Conduction: normal  ST Segments: no acute change  T Waves: no acute change  Q Waves: none    Clinical Impression: no acute changes    DO Doyle Carney DO  07/10/20 7156 Please contact patient to schedule

## 2024-09-07 DIAGNOSIS — Z76.0 MEDICATION REFILL: ICD-10-CM

## 2024-09-09 RX ORDER — CLOPIDOGREL BISULFATE 75 MG/1
75 TABLET ORAL DAILY
Qty: 100 TABLET | Refills: 2 | Status: SHIPPED | OUTPATIENT
Start: 2024-09-09

## 2024-09-09 RX ORDER — LISINOPRIL 10 MG/1
10 TABLET ORAL DAILY
Qty: 100 TABLET | Refills: 2 | Status: SHIPPED | OUTPATIENT
Start: 2024-09-09

## 2024-10-17 ENCOUNTER — OFFICE VISIT (OUTPATIENT)
Dept: CARDIOLOGY CLINIC | Age: 74
End: 2024-10-17
Payer: MEDICARE

## 2024-10-17 VITALS
HEART RATE: 68 BPM | DIASTOLIC BLOOD PRESSURE: 68 MMHG | HEIGHT: 69 IN | WEIGHT: 263 LBS | SYSTOLIC BLOOD PRESSURE: 154 MMHG | BODY MASS INDEX: 38.95 KG/M2

## 2024-10-17 DIAGNOSIS — E78.5 HYPERLIPIDEMIA, UNSPECIFIED HYPERLIPIDEMIA TYPE: ICD-10-CM

## 2024-10-17 DIAGNOSIS — Z76.0 MEDICATION REFILL: ICD-10-CM

## 2024-10-17 DIAGNOSIS — I10 PRIMARY HYPERTENSION: ICD-10-CM

## 2024-10-17 DIAGNOSIS — I73.9 PAD (PERIPHERAL ARTERY DISEASE) (HCC): ICD-10-CM

## 2024-10-17 DIAGNOSIS — R42 DIZZINESS: Primary | ICD-10-CM

## 2024-10-17 PROCEDURE — 3017F COLORECTAL CA SCREEN DOC REV: CPT | Performed by: NURSE PRACTITIONER

## 2024-10-17 PROCEDURE — 93000 ELECTROCARDIOGRAM COMPLETE: CPT | Performed by: NURSE PRACTITIONER

## 2024-10-17 PROCEDURE — 1036F TOBACCO NON-USER: CPT | Performed by: NURSE PRACTITIONER

## 2024-10-17 PROCEDURE — 3078F DIAST BP <80 MM HG: CPT | Performed by: NURSE PRACTITIONER

## 2024-10-17 PROCEDURE — 1090F PRES/ABSN URINE INCON ASSESS: CPT | Performed by: NURSE PRACTITIONER

## 2024-10-17 PROCEDURE — 3075F SYST BP GE 130 - 139MM HG: CPT | Performed by: NURSE PRACTITIONER

## 2024-10-17 PROCEDURE — G8399 PT W/DXA RESULTS DOCUMENT: HCPCS | Performed by: NURSE PRACTITIONER

## 2024-10-17 PROCEDURE — G8427 DOCREV CUR MEDS BY ELIG CLIN: HCPCS | Performed by: NURSE PRACTITIONER

## 2024-10-17 PROCEDURE — 99214 OFFICE O/P EST MOD 30 MIN: CPT | Performed by: NURSE PRACTITIONER

## 2024-10-17 PROCEDURE — 1123F ACP DISCUSS/DSCN MKR DOCD: CPT | Performed by: NURSE PRACTITIONER

## 2024-10-17 PROCEDURE — G8484 FLU IMMUNIZE NO ADMIN: HCPCS | Performed by: NURSE PRACTITIONER

## 2024-10-17 PROCEDURE — G8417 CALC BMI ABV UP PARAM F/U: HCPCS | Performed by: NURSE PRACTITIONER

## 2024-10-17 RX ORDER — MECLIZINE HYDROCHLORIDE 25 MG/1
25 TABLET ORAL PRN
Qty: 30 TABLET | Refills: 0 | Status: SHIPPED | OUTPATIENT
Start: 2024-10-17

## 2024-10-17 ASSESSMENT — ENCOUNTER SYMPTOMS
SHORTNESS OF BREATH: 0
ORTHOPNEA: 0

## 2024-10-17 NOTE — PROGRESS NOTES
10/17/2024  Primary cardiologist: Dr. Goldstein    CC:   Luz  is an established 73 y.o.  female here for a follow up on PAD      SUBJECTIVE/OBJECTIVE:  Luz is a 73 y.o. female with a history of peripheral artery disease, s/p PTCA LCIA (2020), hypertensin, hyperlipidemia and OTTO      HPI :   Luz is being seen today to follow-up on her vascular disease. She notes dizziness with bending over or looking down. Feels like she got off a montse-go-round. Lasting for up to a minute. She did use a meclizine with mild relief.  She also notes some shortness of breath with bending over as well.  No claudication symptoms.  Has had knee pain and ankle pain recently.      Review of Systems   Constitutional: Negative for diaphoresis and malaise/fatigue.   Cardiovascular:  Negative for chest pain, claudication, dyspnea on exertion, irregular heartbeat, leg swelling, near-syncope, orthopnea, palpitations and paroxysmal nocturnal dyspnea.   Respiratory:  Negative for shortness of breath.    Musculoskeletal:  Positive for joint pain (knee ankle) and muscle cramps (left thigh).   Neurological:  Positive for dizziness. Negative for light-headedness.       Vitals:    10/17/24 1033 10/17/24 1044 10/17/24 1046   BP: 136/68 (!) 154/64 (!) 154/68   Site: Left Upper Arm Left Upper Arm Left Upper Arm   Position: Supine Sitting Standing   Cuff Size: Large Adult Large Adult Large Adult   Pulse: 57 68 68   Weight: 119.3 kg (263 lb)     Height: 1.753 m (5' 9\")         Wt Readings from Last 3 Encounters:   10/17/24 119.3 kg (263 lb)   08/01/24 118.4 kg (261 lb)   07/03/24 117 kg (258 lb)      Body mass index is 38.84 kg/m².     Physical Exam  Vitals reviewed.   Constitutional:       Appearance: She is obese.   Eyes:      Pupils: Pupils are equal, round, and reactive to light.   Neck:      Vascular: No carotid bruit.   Cardiovascular:      Rate and Rhythm: Normal rate and regular rhythm.      Pulses:           Dorsalis pedis pulses are 1+ on the

## 2024-10-18 ENCOUNTER — HOSPITAL ENCOUNTER (OUTPATIENT)
Age: 74
Discharge: HOME | End: 2024-10-18
Payer: MEDICARE

## 2024-10-18 DIAGNOSIS — F41.9 ANXIETY: ICD-10-CM

## 2024-10-18 DIAGNOSIS — E78.49 OTHER HYPERLIPIDEMIA: ICD-10-CM

## 2024-10-18 DIAGNOSIS — R42 DIZZINESS: ICD-10-CM

## 2024-10-18 LAB
ANION GAP SERPL CALCULATED.3IONS-SCNC: 10 MMOL/L (ref 9–17)
BASOPHILS # BLD: 0.04 K/UL
BASOPHILS NFR BLD: 1 % (ref 0–1)
BUN SERPL-MCNC: 10 MG/DL (ref 7–20)
CALCIUM SERPL-MCNC: 9.8 MG/DL (ref 8.3–10.6)
CHLORIDE SERPL-SCNC: 102 MMOL/L (ref 99–110)
CHOLEST SERPL-MCNC: 133 MG/DL (ref 125–199)
CO2 SERPL-SCNC: 30 MMOL/L (ref 21–32)
CREAT SERPL-MCNC: 1 MG/DL (ref 0.6–1.2)
EOSINOPHIL # BLD: 0.24 K/UL
EOSINOPHILS RELATIVE PERCENT: 3 % (ref 0–3)
ERYTHROCYTE [DISTWIDTH] IN BLOOD BY AUTOMATED COUNT: 14.4 % (ref 11.7–14.9)
GFR, ESTIMATED: 52 ML/MIN/1.73M2
GLUCOSE SERPL-MCNC: 87 MG/DL (ref 74–99)
HCT VFR BLD AUTO: 48.3 % (ref 37–47)
HDLC SERPL-MCNC: 56 MG/DL
HGB BLD-MCNC: 14.7 G/DL (ref 12.5–16)
IMM GRANULOCYTES # BLD AUTO: 0.02 K/UL
IMM GRANULOCYTES NFR BLD: 0 %
LDLC SERPL CALC-MCNC: 55 MG/DL
LYMPHOCYTES NFR BLD: 2.93 K/UL
LYMPHOCYTES RELATIVE PERCENT: 40 % (ref 24–44)
MCH RBC QN AUTO: 26.7 PG (ref 27–31)
MCHC RBC AUTO-ENTMCNC: 30.4 G/DL (ref 32–36)
MCV RBC AUTO: 87.7 FL (ref 78–100)
MONOCYTES NFR BLD: 0.56 K/UL
MONOCYTES NFR BLD: 8 % (ref 0–4)
NEUTROPHILS NFR BLD: 48 % (ref 36–66)
NEUTS SEG NFR BLD: 3.48 K/UL
PLATELET # BLD AUTO: 242 K/UL (ref 140–440)
PMV BLD AUTO: 10.2 FL (ref 7.5–11.1)
POTASSIUM SERPL-SCNC: 4.3 MMOL/L (ref 3.5–5.1)
RBC # BLD AUTO: 5.51 M/UL (ref 4.2–5.4)
SODIUM SERPL-SCNC: 142 MMOL/L (ref 136–145)
TRIGL SERPL-MCNC: 111 MG/DL
WBC OTHER # BLD: 7.3 K/UL (ref 4–10.5)

## 2024-10-18 PROCEDURE — 80048 BASIC METABOLIC PNL TOTAL CA: CPT

## 2024-10-18 PROCEDURE — 85025 COMPLETE CBC W/AUTO DIFF WBC: CPT

## 2024-10-18 PROCEDURE — 36415 COLL VENOUS BLD VENIPUNCTURE: CPT

## 2024-10-18 PROCEDURE — 80061 LIPID PANEL: CPT

## 2024-10-22 DIAGNOSIS — Z76.0 MEDICATION REFILL: ICD-10-CM

## 2024-10-23 RX ORDER — MONTELUKAST SODIUM 10 MG/1
10 TABLET ORAL NIGHTLY
Qty: 100 TABLET | Refills: 2 | Status: SHIPPED | OUTPATIENT
Start: 2024-10-23

## 2024-10-25 ENCOUNTER — OFFICE VISIT (OUTPATIENT)
Dept: FAMILY MEDICINE CLINIC | Age: 74
End: 2024-10-25

## 2024-10-25 VITALS — TEMPERATURE: 97.6 F

## 2024-10-25 DIAGNOSIS — Z23 NEED FOR INFLUENZA VACCINATION: Primary | ICD-10-CM

## 2024-10-30 PROBLEM — N18.31 STAGE 3A CHRONIC KIDNEY DISEASE (HCC): Status: ACTIVE | Noted: 2024-10-30

## 2024-11-07 ENCOUNTER — OFFICE VISIT (OUTPATIENT)
Dept: PULMONOLOGY | Age: 74
End: 2024-11-07

## 2024-11-07 VITALS
BODY MASS INDEX: 40.19 KG/M2 | SYSTOLIC BLOOD PRESSURE: 136 MMHG | OXYGEN SATURATION: 93 % | HEART RATE: 70 BPM | HEIGHT: 68 IN | DIASTOLIC BLOOD PRESSURE: 66 MMHG | WEIGHT: 265.2 LBS

## 2024-11-07 DIAGNOSIS — J30.1 NON-SEASONAL ALLERGIC RHINITIS DUE TO POLLEN: ICD-10-CM

## 2024-11-07 DIAGNOSIS — R06.09 DYSPNEA ON EXERTION: ICD-10-CM

## 2024-11-07 DIAGNOSIS — J41.0 SIMPLE CHRONIC BRONCHITIS (HCC): ICD-10-CM

## 2024-11-07 DIAGNOSIS — J45.30 MILD PERSISTENT ASTHMA WITHOUT COMPLICATION: Primary | ICD-10-CM

## 2024-11-07 DIAGNOSIS — G47.33 OBSTRUCTIVE SLEEP APNEA: ICD-10-CM

## 2024-11-07 DIAGNOSIS — Z87.891 FORMER SMOKER: ICD-10-CM

## 2024-11-07 NOTE — PROGRESS NOTES
SUBJECTIVE:  Chief Complaint: Mild persistent asthma, simple chronic bronchitis, dyspnea on exertion, untreated obstructive sleep apnea, former smoker, chronic allergic rhinitis/sinusitis  Anuja states that she has had no recent episodes of bronchitis but does feel as though she might be developing a sinus infection.  She has fluticasone nasal spray at home but rarely uses it because she does develop some mild nasal bleeding when she uses it regularly.  She continues on Advair discus twice a day, Singulair, albuterol rescue and her DuoNeb for her nebulizer but rarely uses her rescue medications.  She still notes mild dyspnea on exertion.  She does have a history of untreated obstructive sleep apnea.  She states that she already received the influenza vaccine and is planning on getting the COVID-19 booster vaccination in the near future.  She did have RSV vaccine last year  She quit smoking 3 years ago but does have a 30-pack-year history    ROS:  Constitution:  HEENT: Negative for ear, throat pain  Cardiovascular: Negative for chest pain, syncope, edema  Pulmonary: See HPI  Musculoskeletal: Negative for DVT, myalgias, arthralgias    OBJECTIVE:  /66   Pulse 70   Ht 1.727 m (5' 8\")   Wt 120.3 kg (265 lb 3.2 oz)   SpO2 93%   BMI 40.32 kg/m²      Physical Exam:  Constitutional:  She appears well developed and well-nourished.  Neck:  Supple, No palpable lymphadenopathy, No JVD, mild nasal congestion  Cardiovascular:  S1, S2 Normal, Regular rhythm, no murmurs or gallops, No pericardial  rubs.  Pulmonary: Breath sounds are clear throughout all areas without wheezing or rhonchi  Abdomen: Not examined  Extremities: no edema, No DVT  Neurologic: Oriented x3, No focal deficits    Radiology: Low-dose CT lung screening 11/28/2023 showed no suspicious pulmonary nodules.  PFT: Office spirometry in 12/15/2021 demonstrated no significant airways obstruction and no significant sponsor to bronchodilators      Echocardiogram:

## 2024-11-15 ENCOUNTER — OFFICE VISIT (OUTPATIENT)
Dept: FAMILY MEDICINE CLINIC | Age: 74
End: 2024-11-15

## 2024-11-15 VITALS
BODY MASS INDEX: 40.07 KG/M2 | DIASTOLIC BLOOD PRESSURE: 84 MMHG | TEMPERATURE: 92.3 F | HEART RATE: 60 BPM | OXYGEN SATURATION: 95 % | SYSTOLIC BLOOD PRESSURE: 160 MMHG | WEIGHT: 264.4 LBS | HEIGHT: 68 IN

## 2024-11-15 DIAGNOSIS — G89.29 CHRONIC PAIN OF LEFT ANKLE: ICD-10-CM

## 2024-11-15 DIAGNOSIS — Z12.11 SCREENING FOR COLON CANCER: ICD-10-CM

## 2024-11-15 DIAGNOSIS — N18.31 STAGE 3A CHRONIC KIDNEY DISEASE (HCC): Primary | ICD-10-CM

## 2024-11-15 DIAGNOSIS — M25.572 CHRONIC PAIN OF LEFT ANKLE: ICD-10-CM

## 2024-11-15 DIAGNOSIS — I10 ESSENTIAL HYPERTENSION: ICD-10-CM

## 2024-11-15 ASSESSMENT — PATIENT HEALTH QUESTIONNAIRE - PHQ9
SUM OF ALL RESPONSES TO PHQ9 QUESTIONS 1 & 2: 0
SUM OF ALL RESPONSES TO PHQ QUESTIONS 1-9: 0
SUM OF ALL RESPONSES TO PHQ QUESTIONS 1-9: 0
1. LITTLE INTEREST OR PLEASURE IN DOING THINGS: NOT AT ALL
2. FEELING DOWN, DEPRESSED OR HOPELESS: NOT AT ALL
SUM OF ALL RESPONSES TO PHQ QUESTIONS 1-9: 0
SUM OF ALL RESPONSES TO PHQ QUESTIONS 1-9: 0

## 2024-11-15 ASSESSMENT — ANXIETY QUESTIONNAIRES
2. NOT BEING ABLE TO STOP OR CONTROL WORRYING: NOT AT ALL
3. WORRYING TOO MUCH ABOUT DIFFERENT THINGS: NOT AT ALL
6. BECOMING EASILY ANNOYED OR IRRITABLE: NEARLY EVERY DAY
1. FEELING NERVOUS, ANXIOUS, OR ON EDGE: NOT AT ALL
4. TROUBLE RELAXING: NOT AT ALL
5. BEING SO RESTLESS THAT IT IS HARD TO SIT STILL: NOT AT ALL
GAD7 TOTAL SCORE: 3
IF YOU CHECKED OFF ANY PROBLEMS ON THIS QUESTIONNAIRE, HOW DIFFICULT HAVE THESE PROBLEMS MADE IT FOR YOU TO DO YOUR WORK, TAKE CARE OF THINGS AT HOME, OR GET ALONG WITH OTHER PEOPLE: NOT DIFFICULT AT ALL
7. FEELING AFRAID AS IF SOMETHING AWFUL MIGHT HAPPEN: NOT AT ALL

## 2024-11-15 ASSESSMENT — ENCOUNTER SYMPTOMS
SORE THROAT: 0
SINUS PRESSURE: 0
SHORTNESS OF BREATH: 0
GASTROINTESTINAL NEGATIVE: 1
TROUBLE SWALLOWING: 0
CHEST TIGHTNESS: 0
WHEEZING: 0
SINUS PAIN: 0
COUGH: 1
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
## 2024-11-18 DIAGNOSIS — G89.29 CHRONIC LOW BACK PAIN WITH SCIATICA, SCIATICA LATERALITY UNSPECIFIED, UNSPECIFIED BACK PAIN LATERALITY: ICD-10-CM

## 2024-11-18 DIAGNOSIS — Z76.0 MEDICATION REFILL: ICD-10-CM

## 2024-11-18 DIAGNOSIS — M54.40 CHRONIC LOW BACK PAIN WITH SCIATICA, SCIATICA LATERALITY UNSPECIFIED, UNSPECIFIED BACK PAIN LATERALITY: ICD-10-CM

## 2024-11-18 RX ORDER — SIMVASTATIN 40 MG
40 TABLET ORAL NIGHTLY
Qty: 100 TABLET | Refills: 2 | Status: SHIPPED | OUTPATIENT
Start: 2024-11-18

## 2024-11-18 RX ORDER — HYDROXYZINE HYDROCHLORIDE 25 MG/1
25 TABLET, FILM COATED ORAL NIGHTLY
Qty: 60 TABLET | Refills: 0 | Status: SHIPPED | OUTPATIENT
Start: 2024-11-18

## 2024-11-18 RX ORDER — BACLOFEN 10 MG/1
10 TABLET ORAL 2 TIMES DAILY
Qty: 180 TABLET | Refills: 1 | Status: SHIPPED | OUTPATIENT
Start: 2024-11-18

## 2024-12-06 ENCOUNTER — NURSE ONLY (OUTPATIENT)
Dept: FAMILY MEDICINE CLINIC | Age: 74
End: 2024-12-06

## 2024-12-06 DIAGNOSIS — Z12.11 SCREENING FOR COLON CANCER: Primary | ICD-10-CM

## 2024-12-06 LAB
CONTROL: NORMAL
FECAL BLOOD IMMUNOCHEMICAL TEST: NEGATIVE

## 2024-12-06 ASSESSMENT — PATIENT HEALTH QUESTIONNAIRE - PHQ9
SUM OF ALL RESPONSES TO PHQ9 QUESTIONS 1 & 2: 0
2. FEELING DOWN, DEPRESSED OR HOPELESS: NOT AT ALL
SUM OF ALL RESPONSES TO PHQ QUESTIONS 1-9: 0
1. LITTLE INTEREST OR PLEASURE IN DOING THINGS: NOT AT ALL
SUM OF ALL RESPONSES TO PHQ QUESTIONS 1-9: 0

## 2024-12-16 DIAGNOSIS — J42 CHRONIC BRONCHITIS, UNSPECIFIED CHRONIC BRONCHITIS TYPE (HCC): ICD-10-CM

## 2024-12-16 DIAGNOSIS — Z76.0 MEDICATION REFILL: ICD-10-CM

## 2024-12-26 DIAGNOSIS — J32.9 CHRONIC SINUSITIS, UNSPECIFIED LOCATION: ICD-10-CM

## 2024-12-26 DIAGNOSIS — Z76.0 MEDICATION REFILL: ICD-10-CM

## 2024-12-26 RX ORDER — IPRATROPIUM BROMIDE AND ALBUTEROL SULFATE 2.5; .5 MG/3ML; MG/3ML
1 SOLUTION RESPIRATORY (INHALATION) 4 TIMES DAILY
Qty: 120 EACH | Refills: 1 | Status: SHIPPED | OUTPATIENT
Start: 2024-12-26

## 2024-12-26 RX ORDER — ALBUTEROL SULFATE 5 MG/ML
5 SOLUTION RESPIRATORY (INHALATION) EVERY 6 HOURS PRN
Qty: 100 EACH | Refills: 1 | Status: SHIPPED | OUTPATIENT
Start: 2024-12-26 | End: 2030-03-25

## 2024-12-26 RX ORDER — FLUTICASONE PROPIONATE 50 MCG
SPRAY, SUSPENSION (ML) NASAL
Qty: 3 EACH | Refills: 1 | Status: SHIPPED | OUTPATIENT
Start: 2024-12-26

## 2024-12-30 DIAGNOSIS — J42 CHRONIC BRONCHITIS, UNSPECIFIED CHRONIC BRONCHITIS TYPE (HCC): ICD-10-CM

## 2024-12-30 RX ORDER — IPRATROPIUM BROMIDE AND ALBUTEROL SULFATE 2.5; .5 MG/3ML; MG/3ML
1 SOLUTION RESPIRATORY (INHALATION) 4 TIMES DAILY
Qty: 360 EACH | Refills: 0 | Status: SHIPPED | OUTPATIENT
Start: 2024-12-30

## 2024-12-30 RX ORDER — ALBUTEROL SULFATE 90 UG/1
2 INHALANT RESPIRATORY (INHALATION) EVERY 6 HOURS PRN
Qty: 3 EACH | Refills: 0 | Status: SHIPPED | OUTPATIENT
Start: 2024-12-30

## 2024-12-30 RX ORDER — ALBUTEROL SULFATE 90 UG/1
2 INHALANT RESPIRATORY (INHALATION) EVERY 6 HOURS PRN
COMMUNITY
End: 2024-12-30 | Stop reason: SDUPTHER

## 2025-01-14 DIAGNOSIS — Z76.0 MEDICATION REFILL: ICD-10-CM

## 2025-01-14 DIAGNOSIS — I10 ESSENTIAL HYPERTENSION: ICD-10-CM

## 2025-01-14 RX ORDER — HYDROXYZINE HYDROCHLORIDE 25 MG/1
25 TABLET, FILM COATED ORAL NIGHTLY
Qty: 60 TABLET | Refills: 0 | Status: SHIPPED | OUTPATIENT
Start: 2025-01-14

## 2025-01-14 RX ORDER — HYDROCHLOROTHIAZIDE 12.5 MG/1
12.5 TABLET ORAL EVERY OTHER DAY
Qty: 50 TABLET | Refills: 2 | Status: SHIPPED | OUTPATIENT
Start: 2025-01-14

## 2025-02-24 ENCOUNTER — OFFICE VISIT (OUTPATIENT)
Dept: FAMILY MEDICINE CLINIC | Age: 75
End: 2025-02-24

## 2025-02-24 VITALS
HEIGHT: 68 IN | WEIGHT: 271 LBS | HEART RATE: 63 BPM | BODY MASS INDEX: 41.07 KG/M2 | TEMPERATURE: 98.2 F | DIASTOLIC BLOOD PRESSURE: 76 MMHG | OXYGEN SATURATION: 93 % | SYSTOLIC BLOOD PRESSURE: 148 MMHG

## 2025-02-24 DIAGNOSIS — J42 CHRONIC BRONCHITIS, UNSPECIFIED CHRONIC BRONCHITIS TYPE (HCC): ICD-10-CM

## 2025-02-24 DIAGNOSIS — J42 CHRONIC BRONCHITIS, UNSPECIFIED CHRONIC BRONCHITIS TYPE (HCC): Primary | ICD-10-CM

## 2025-02-24 DIAGNOSIS — Z76.0 MEDICATION REFILL: ICD-10-CM

## 2025-02-24 DIAGNOSIS — R53.83 FATIGUE, UNSPECIFIED TYPE: ICD-10-CM

## 2025-02-24 PROCEDURE — G8399 PT W/DXA RESULTS DOCUMENT: HCPCS | Performed by: NURSE PRACTITIONER

## 2025-02-24 PROCEDURE — G8417 CALC BMI ABV UP PARAM F/U: HCPCS | Performed by: NURSE PRACTITIONER

## 2025-02-24 PROCEDURE — 3023F SPIROM DOC REV: CPT | Performed by: NURSE PRACTITIONER

## 2025-02-24 PROCEDURE — 99213 OFFICE O/P EST LOW 20 MIN: CPT | Performed by: NURSE PRACTITIONER

## 2025-02-24 PROCEDURE — 1123F ACP DISCUSS/DSCN MKR DOCD: CPT | Performed by: NURSE PRACTITIONER

## 2025-02-24 PROCEDURE — 3078F DIAST BP <80 MM HG: CPT | Performed by: NURSE PRACTITIONER

## 2025-02-24 PROCEDURE — 3077F SYST BP >= 140 MM HG: CPT | Performed by: NURSE PRACTITIONER

## 2025-02-24 PROCEDURE — 1126F AMNT PAIN NOTED NONE PRSNT: CPT | Performed by: NURSE PRACTITIONER

## 2025-02-24 PROCEDURE — G8428 CUR MEDS NOT DOCUMENT: HCPCS | Performed by: NURSE PRACTITIONER

## 2025-02-24 PROCEDURE — 1090F PRES/ABSN URINE INCON ASSESS: CPT | Performed by: NURSE PRACTITIONER

## 2025-02-24 PROCEDURE — 1036F TOBACCO NON-USER: CPT | Performed by: NURSE PRACTITIONER

## 2025-02-24 ASSESSMENT — PATIENT HEALTH QUESTIONNAIRE - PHQ9
1. LITTLE INTEREST OR PLEASURE IN DOING THINGS: NOT AT ALL
SUM OF ALL RESPONSES TO PHQ QUESTIONS 1-9: 0
SUM OF ALL RESPONSES TO PHQ QUESTIONS 1-9: 0
SUM OF ALL RESPONSES TO PHQ9 QUESTIONS 1 & 2: 0
2. FEELING DOWN, DEPRESSED OR HOPELESS: NOT AT ALL
SUM OF ALL RESPONSES TO PHQ QUESTIONS 1-9: 0
SUM OF ALL RESPONSES TO PHQ QUESTIONS 1-9: 0

## 2025-02-24 ASSESSMENT — ANXIETY QUESTIONNAIRES
1. FEELING NERVOUS, ANXIOUS, OR ON EDGE: SEVERAL DAYS
5. BEING SO RESTLESS THAT IT IS HARD TO SIT STILL: NOT AT ALL
3. WORRYING TOO MUCH ABOUT DIFFERENT THINGS: NOT AT ALL
7. FEELING AFRAID AS IF SOMETHING AWFUL MIGHT HAPPEN: NOT AT ALL
IF YOU CHECKED OFF ANY PROBLEMS ON THIS QUESTIONNAIRE, HOW DIFFICULT HAVE THESE PROBLEMS MADE IT FOR YOU TO DO YOUR WORK, TAKE CARE OF THINGS AT HOME, OR GET ALONG WITH OTHER PEOPLE: NOT DIFFICULT AT ALL
GAD7 TOTAL SCORE: 1
2. NOT BEING ABLE TO STOP OR CONTROL WORRYING: NOT AT ALL
6. BECOMING EASILY ANNOYED OR IRRITABLE: NOT AT ALL
4. TROUBLE RELAXING: NOT AT ALL

## 2025-02-24 NOTE — PROGRESS NOTES
status: Former     Current packs/day: 0.00     Average packs/day: 1 pack/day for 30.0 years (30.0 ttl pk-yrs)     Types: Cigarettes     Start date: 08/1991     Quit date: 08/2021     Years since quitting: 3.5    Smokeless tobacco: Never   Vaping Use    Vaping status: Never Used   Substance and Sexual Activity    Alcohol use: Yes     Comment: socially    Drug use: No    Sexual activity: Not Currently   Other Topics Concern    Not on file   Social History Narrative    Working now at [employer]    Transferred jobs from [employer]      Has a daughter and Daughter in law.          Social Determinants of Health     Financial Resource Strain: Low Risk  (8/1/2024)    Overall Financial Resource Strain (CARDIA)     Difficulty of Paying Living Expenses: Not hard at all   Food Insecurity: No Food Insecurity (8/1/2024)    Hunger Vital Sign     Worried About Running Out of Food in the Last Year: Never true     Ran Out of Food in the Last Year: Never true   Transportation Needs: Unknown (8/1/2024)    PRAPARE - Transportation     Lack of Transportation (Medical): Not on file     Lack of Transportation (Non-Medical): No   Physical Activity: Insufficiently Active (10/30/2023)    Exercise Vital Sign     Days of Exercise per Week: 1 day     Minutes of Exercise per Session: 30 min   Stress: Not on file   Social Connections: Unknown (8/18/2021)    Social Connection and Isolation Panel [NHANES]     Frequency of Communication with Friends and Family: Twice a week     Frequency of Social Gatherings with Friends and Family: Twice a week     Attends Jewish Services: Not on file     Active Member of Clubs or Organizations: Not on file     Attends Club or Organization Meetings: Not on file     Marital Status:    Intimate Partner Violence: Not on file   Housing Stability: Unknown (8/1/2024)    Housing Stability Vital Sign     Unable to Pay for Housing in the Last Year: Not on file     Number of Places Lived in the Last Year: Not on

## 2025-02-26 ASSESSMENT — ENCOUNTER SYMPTOMS
SINUS PRESSURE: 1
SHORTNESS OF BREATH: 1
SORE THROAT: 0
SINUS PAIN: 0
CHEST TIGHTNESS: 0
RHINORRHEA: 1
TROUBLE SWALLOWING: 0
WHEEZING: 1
COUGH: 1

## 2025-03-04 RX ORDER — IPRATROPIUM BROMIDE AND ALBUTEROL SULFATE 2.5; .5 MG/3ML; MG/3ML
SOLUTION RESPIRATORY (INHALATION)
OUTPATIENT
Start: 2025-03-04

## 2025-03-06 DIAGNOSIS — Z76.0 MEDICATION REFILL: ICD-10-CM

## 2025-03-06 DIAGNOSIS — M54.40 CHRONIC LOW BACK PAIN WITH SCIATICA, SCIATICA LATERALITY UNSPECIFIED, UNSPECIFIED BACK PAIN LATERALITY: ICD-10-CM

## 2025-03-06 DIAGNOSIS — G89.29 CHRONIC LOW BACK PAIN WITH SCIATICA, SCIATICA LATERALITY UNSPECIFIED, UNSPECIFIED BACK PAIN LATERALITY: ICD-10-CM

## 2025-03-06 RX ORDER — HYDROXYZINE HYDROCHLORIDE 25 MG/1
25 TABLET, FILM COATED ORAL NIGHTLY
Qty: 60 TABLET | Refills: 0 | Status: SHIPPED | OUTPATIENT
Start: 2025-03-06

## 2025-03-06 RX ORDER — FLUTICASONE PROPIONATE AND SALMETEROL 50; 500 UG/1; UG/1
POWDER RESPIRATORY (INHALATION)
Qty: 180 EACH | Refills: 3 | Status: SHIPPED | OUTPATIENT
Start: 2025-03-06

## 2025-03-06 RX ORDER — BACLOFEN 10 MG/1
10 TABLET ORAL 2 TIMES DAILY
Qty: 180 TABLET | Refills: 1 | Status: SHIPPED | OUTPATIENT
Start: 2025-03-06

## 2025-03-21 ENCOUNTER — OFFICE VISIT (OUTPATIENT)
Dept: FAMILY MEDICINE CLINIC | Age: 75
End: 2025-03-21

## 2025-03-21 VITALS
WEIGHT: 267 LBS | OXYGEN SATURATION: 99 % | BODY MASS INDEX: 40.47 KG/M2 | HEIGHT: 68 IN | SYSTOLIC BLOOD PRESSURE: 152 MMHG | DIASTOLIC BLOOD PRESSURE: 76 MMHG | RESPIRATION RATE: 16 BRPM | TEMPERATURE: 97.7 F | HEART RATE: 62 BPM

## 2025-03-21 DIAGNOSIS — I10 ESSENTIAL HYPERTENSION: Primary | ICD-10-CM

## 2025-03-21 PROCEDURE — 3078F DIAST BP <80 MM HG: CPT | Performed by: NURSE PRACTITIONER

## 2025-03-21 PROCEDURE — 1090F PRES/ABSN URINE INCON ASSESS: CPT | Performed by: NURSE PRACTITIONER

## 2025-03-21 PROCEDURE — 3077F SYST BP >= 140 MM HG: CPT | Performed by: NURSE PRACTITIONER

## 2025-03-21 PROCEDURE — 1159F MED LIST DOCD IN RCRD: CPT | Performed by: NURSE PRACTITIONER

## 2025-03-21 PROCEDURE — G8427 DOCREV CUR MEDS BY ELIG CLIN: HCPCS | Performed by: NURSE PRACTITIONER

## 2025-03-21 PROCEDURE — G8417 CALC BMI ABV UP PARAM F/U: HCPCS | Performed by: NURSE PRACTITIONER

## 2025-03-21 PROCEDURE — 1036F TOBACCO NON-USER: CPT | Performed by: NURSE PRACTITIONER

## 2025-03-21 PROCEDURE — 1126F AMNT PAIN NOTED NONE PRSNT: CPT | Performed by: NURSE PRACTITIONER

## 2025-03-21 PROCEDURE — 1123F ACP DISCUSS/DSCN MKR DOCD: CPT | Performed by: NURSE PRACTITIONER

## 2025-03-21 PROCEDURE — G8399 PT W/DXA RESULTS DOCUMENT: HCPCS | Performed by: NURSE PRACTITIONER

## 2025-03-21 PROCEDURE — 99213 OFFICE O/P EST LOW 20 MIN: CPT | Performed by: NURSE PRACTITIONER

## 2025-03-21 SDOH — ECONOMIC STABILITY: FOOD INSECURITY: WITHIN THE PAST 12 MONTHS, YOU WORRIED THAT YOUR FOOD WOULD RUN OUT BEFORE YOU GOT MONEY TO BUY MORE.: NEVER TRUE

## 2025-03-21 SDOH — ECONOMIC STABILITY: FOOD INSECURITY: WITHIN THE PAST 12 MONTHS, THE FOOD YOU BOUGHT JUST DIDN'T LAST AND YOU DIDN'T HAVE MONEY TO GET MORE.: NEVER TRUE

## 2025-03-21 NOTE — PROGRESS NOTES
Luz Ahmadi  1950  74 y.o.    SUBJECT JEMIMA:    Chief Complaint   Patient presents with    Hypertension     4 month follow up        History of Present Illness  Luz is a 74-year-old female who is in for 4-month follow-up of high blood pressure Luz gives a past medical history chronic kidney disease, obesity, chronic bronchitis, urticaria, syncope abnormal labs, sleep disturbance, primary osteoarthritis of right knee, PVD, obstructive sleep apnea, mild persistent asthma, hypertension, hyperlipidemia, dizziness, depression, chronic back pain and allergic rhinitis      The patient presents for evaluation of left leg swelling, shortness of breath, and blood pressure management.    She reports experiencing edema in her left leg, extending from the knee downwards, which has been present for approximately one month. The swelling has since subsided, but she anticipates persistent ankle swelling. She has not been adhering to her prescribed hydrochlorothiazide regimen but plans to resume it next week.    She has discontinued all vitamin supplements, with the exception of a multivitamin, due to perceived adverse effects. Her appetite has improved, and she experiences less fatigue as long as she remains active. However, she reports increased fatigue when seated. She is not experiencing any chest pain. Her bowel and bladder functions are normal. She has discontinued all vitamin supplements, with the exception of a multivitamin. She has stopped taking B12, biotin, and vitamin D.    Her respiratory function has improved, allowing her to walk without experiencing shortness of breath. She has been limiting her exposure to sick individuals in her living environment. She has been managing her allergies effectively this year. She reports that her bone is deteriorating. She has a scheduled appointment with her pulmonologist in May 2025 for a lung CT scan. She also has an upcoming appointment with her orthopedist for evaluation

## 2025-04-10 DIAGNOSIS — Z76.0 MEDICATION REFILL: ICD-10-CM

## 2025-04-10 RX ORDER — HYDROXYZINE HYDROCHLORIDE 25 MG/1
25 TABLET, FILM COATED ORAL NIGHTLY
Qty: 60 TABLET | Refills: 0 | Status: SHIPPED | OUTPATIENT
Start: 2025-04-10

## 2025-04-10 RX ORDER — CLOPIDOGREL BISULFATE 75 MG/1
75 TABLET ORAL DAILY
Qty: 100 TABLET | Refills: 2 | Status: SHIPPED | OUTPATIENT
Start: 2025-04-10

## 2025-04-10 RX ORDER — LISINOPRIL 10 MG/1
10 TABLET ORAL DAILY
Qty: 100 TABLET | Refills: 2 | Status: SHIPPED | OUTPATIENT
Start: 2025-04-10

## 2025-04-10 NOTE — PROGRESS NOTES
MRN: [id number]  Name: Luz Ahmadi  : 1950    Insurance: Payor: [facility] MEDICARE /  /  /      Phone #: [phone]  Provider: Issa Goldstein MD     Date of Visit: 2025    Reason for visit: 6mo  Recent Hospitalization Date:    Reason for Hospitalization:    Last EKG: 10/24  Type of Device:       Vitals BP HR O2% WT HT ORTHO BP LYING ORTHO BP SITTING ORTHO BP SITTING   Today's Findings           Patients work up- Check List     Testing Last Date Completed Date Expected  ([facility]) Additional Notes    MA to document For provider to complete Either MA or Provider    Carotid Duplex  STAT 1 WK 6 MTH       THIS WK 2 WK 1 YEAR     Cardiac CTA  STAT 1 WK 6 MTH       THIS WK 2 WK 1 YEAR     Cardiac CT Calcium scoring  STAT 1 WK 6 MTH       THIS WK 2 WK 1 YEAR     CTA Chest, Abdomen & Pelvis  STAT 1 WK 6 MTH       THIS WK 2 WK 1 YEAR     CT Chest IV w/ Contrast  STAT 1 WK 6 MTH       THIS WK 2 WK 1 YEAR     CT Chest w/o Contrast  STAT 1 WK 6 MTH       THIS WK 2 WK 1 YEAR     CXR  STAT 1 WK 6 MTH       THIS WK 2 WK 1 YEAR     ECHO  Stress Complete Limited     MRI- Cardiac  STAT 1 WK 6 MTH       THIS WK 2 WK 1 YEAR     MUGA Scan  STAT 1 WK 6 MTH       THIS WK 2 WK 1 YEAR     Nuclear Stress  Lexiscan Cardiolite     PFT  STAT 1 WK 6 MTH       THIS WK 2 WK 1 YEAR     Treadmill Stress Test  STAT 1 WK 6 MTH       THIS WK 2 WK 1 YEAR     Vascular Duplex  Lower: Right Left Bilat       Upper: Right Left Bilat     Other Test Not Listed:    Monitors Last Date Completed Day's Request/Ordered     Holter  Short term 24 hours 48 hours      Long term 3 days 7 days 14 days   Event   (1-30 days)      Procedures Last Date Performed Procedure Details Date Expected   Additional Notes    ASD Closure        Carotid Angio        Cardioversion        Heart Cath  R L R&L      Peripheral Angio  R L      PFO Closure        PTCA/PCI        JEB        JEB/Cardioversion        Venogram        Tilt Table        Other Type of Procedure:   Cardiac

## 2025-04-16 ENCOUNTER — OFFICE VISIT (OUTPATIENT)
Dept: CARDIOLOGY CLINIC | Age: 75
End: 2025-04-16
Payer: MEDICARE

## 2025-04-16 VITALS
WEIGHT: 270 LBS | HEART RATE: 80 BPM | BODY MASS INDEX: 39.99 KG/M2 | DIASTOLIC BLOOD PRESSURE: 82 MMHG | HEIGHT: 69 IN | SYSTOLIC BLOOD PRESSURE: 200 MMHG

## 2025-04-16 DIAGNOSIS — I10 PRIMARY HYPERTENSION: ICD-10-CM

## 2025-04-16 DIAGNOSIS — I73.9 PAD (PERIPHERAL ARTERY DISEASE): ICD-10-CM

## 2025-04-16 DIAGNOSIS — R00.1 BRADYCARDIA: Primary | ICD-10-CM

## 2025-04-16 PROCEDURE — 3077F SYST BP >= 140 MM HG: CPT | Performed by: NURSE PRACTITIONER

## 2025-04-16 PROCEDURE — 3079F DIAST BP 80-89 MM HG: CPT | Performed by: NURSE PRACTITIONER

## 2025-04-16 PROCEDURE — 1036F TOBACCO NON-USER: CPT | Performed by: NURSE PRACTITIONER

## 2025-04-16 PROCEDURE — 1160F RVW MEDS BY RX/DR IN RCRD: CPT | Performed by: NURSE PRACTITIONER

## 2025-04-16 PROCEDURE — 1159F MED LIST DOCD IN RCRD: CPT | Performed by: NURSE PRACTITIONER

## 2025-04-16 PROCEDURE — G8417 CALC BMI ABV UP PARAM F/U: HCPCS | Performed by: NURSE PRACTITIONER

## 2025-04-16 PROCEDURE — 1090F PRES/ABSN URINE INCON ASSESS: CPT | Performed by: NURSE PRACTITIONER

## 2025-04-16 PROCEDURE — 99214 OFFICE O/P EST MOD 30 MIN: CPT | Performed by: NURSE PRACTITIONER

## 2025-04-16 PROCEDURE — 1123F ACP DISCUSS/DSCN MKR DOCD: CPT | Performed by: NURSE PRACTITIONER

## 2025-04-16 PROCEDURE — G8427 DOCREV CUR MEDS BY ELIG CLIN: HCPCS | Performed by: NURSE PRACTITIONER

## 2025-04-16 PROCEDURE — G8399 PT W/DXA RESULTS DOCUMENT: HCPCS | Performed by: NURSE PRACTITIONER

## 2025-04-16 ASSESSMENT — ENCOUNTER SYMPTOMS
SHORTNESS OF BREATH: 0
ORTHOPNEA: 0

## 2025-04-16 NOTE — PATIENT INSTRUCTIONS
Thank you for allowing us to care for you today!   We want to ensure we can follow your treatment plan and we strive to give you the best outcomes and experience possible.   If you ever have a life threatening emergency and call 911 - for an ambulance (EMS)  REMEMBER  Our providers can only care for you at:   [facility] or [facility]   Even if you have someone take you or you drive yourself we can only care for you in a [facility] facility. Our providers are not setup at the other healthcare locations!    PLEASE CALL OUR OFFICE DURING NORMAL BUSINESS HOURS  Monday through Friday 8 am to 5 pm  AFTER HOURS the physician on-call cannot help with scheduling, rescheduling, procedure instruction questions or any type of medication need or issue.  [facility] P:[phone] - [facility] P:[phone] - [facility] P:[phone]      If you receive a survey:  We would appreciate you taking the time to share your experience concerning your provider visit in the office.    These surveys are confidential!  We are eager to improve and are counting on you to share your feedback so we can ensure you get the best care possible.

## 2025-04-16 NOTE — PROGRESS NOTES
CLINICAL STAFF DOCUMENTATION    Leida Carlson, MIRELLA     Luz Ahmadi  1950  [id number]    Have you had any Chest Pain recently? - No  Have you had any Shortness of Breath - Yes nothing new but lasted longer then it usually does.   Have you had any dizziness - Yes  If Yes DO ORTHOSTATIC BP including pulse  When do you feel dizzy?  Random   Does the room spin? No  How long does it last .1  minutes   Have you had any palpitations recently? - No  Do you have any edema - swelling in ankles , legs  When did you have your last labs drawn 12/24  What doctor ordered ross  Do we have the labs in their chart Yes  Do you need any prescriptions refilled? - No  Do you have a surgery or procedure scheduled in the near future - No  Do use tobacco products? - No  Do you drink alcohol? - No  Do you use any illicit drugs? - No  Caffeine? - Yes  How much caffeine? .1  cups of coffee daily.        Check medication list thoroughly!!! AND RECONCILE OUTSIDE MEDICATIONS  If dose has changed change the entire order not just the MG  BE SURE TO ASK PATIENT IF THEY NEED MEDICATION REFILLS  Verify Pharmacy and update if incorrect    Add to every patient's \"wrap up\" the following dot phrase AFTERVISITCARDIOHEARTHOUSE and ensure we explain this to our patients

## 2025-04-16 NOTE — PROGRESS NOTES
4/16/2025  Primary cardiologist: Dr. Goldstein    CC:   Luz  is an established 74 y.o.  female here for a follow up on PAD      SUBJECTIVE/OBJECTIVE:  Luz is a 74 y.o. female with a history of peripheral artery disease, s/p PTCA LCIA (2020), hypertensin, hyperlipidemia, CKD and OTTO      HPI :   Luz her smart watch and phone are showing her HR is going down to the 40's - around 49. Occurs at random times. Mostly occurs during the night however has occurred in the day while sitting and resting. Here normal resting HR is usually in the 50-low 60's.  She has noted some dizziness off and on.     Review of Systems   Constitutional: Negative for diaphoresis and malaise/fatigue.   Cardiovascular:  Negative for chest pain, claudication, dyspnea on exertion, irregular heartbeat, leg swelling, near-syncope, orthopnea, palpitations and paroxysmal nocturnal dyspnea.   Respiratory:  Negative for shortness of breath.    Musculoskeletal:  Positive for joint pain (knee ankle) and muscle cramps (left thigh).   Neurological:  Positive for dizziness. Negative for light-headedness.       Vitals:    04/16/25 0849 04/16/25 0853 04/16/25 0856   BP: (!) 182/80 (!) 190/80 (!) 200/82   BP Site: Left Upper Arm Left Upper Arm Left Upper Arm   Patient Position: Supine Standing Standing   BP Cuff Size: Large Adult Large Adult Large Adult   Pulse: 64 76 80   Weight: 122.5 kg (270 lb)     Height: 1.753 m (5' 9\")           Wt Readings from Last 3 Encounters:   04/16/25 122.5 kg (270 lb)   03/21/25 121.1 kg (267 lb)   02/24/25 122.9 kg (271 lb)      Body mass index is 39.87 kg/m².     Physical Exam  Vitals reviewed.   Constitutional:       Appearance: She is obese.   Eyes:      Pupils: Pupils are equal, round, and reactive to light.   Cardiovascular:      Rate and Rhythm: Normal rate and regular rhythm.      Pulses:           Dorsalis pedis pulses are 1+ on the right side and 1+ on the left side.        Posterior tibial pulses are 1+ on the

## 2025-04-24 ENCOUNTER — TELEPHONE (OUTPATIENT)
Dept: CARDIOLOGY CLINIC | Age: 75
End: 2025-04-24

## 2025-04-24 NOTE — TELEPHONE ENCOUNTER
Spoke to pt advised of abnormal results, pt uses ride service to come to appts so we did not move appts because she is already set up and a change may not be possible.    This is a 7-day event monitor showing that the predominant rhythm is sinus rhythm patient had 2 runs of nonsustained VT 4-5 beats maximum heart rate is 104  Minimum heart rate is 54  Infrequent APCs or PVCs are seen  Suggest clinical correlation

## 2025-05-07 NOTE — PROGRESS NOTES
MRN: [id number]  Name: Luz Ahmadi  : 1950    Insurance: Payor: [facility] MEDICARE /  /  /      Phone #: [phone]  Provider: Issa Goldstein MD     Date of Visit: 2025    Reason for visit:  Results  Recent Hospitalization Date:    Reason for Hospitalization:    Last EKG: 10/2024  Type of Device:       Vitals BP HR O2% WT HT ORTHO BP LYING ORTHO BP SITTING ORTHO BP SITTING   Today's Findings           Patients work up- Check List     Testing Last Date Completed Date Expected  ([place] [facility]) Additional Notes    MA to document For provider to complete Either MA or Provider    Carotid Duplex  STAT 1 WK 6 MTH       THIS WK 2 WK 1 YEAR     Cardiac CTA  STAT 1 WK 6 MTH       THIS WK 2 WK 1 YEAR     Cardiac CT Calcium scoring  STAT 1 WK 6 MTH       THIS WK 2 WK 1 YEAR     CTA Chest, Abdomen & Pelvis  STAT 1 WK 6 MTH       THIS WK 2 WK 1 YEAR     CT Chest IV w/ Contrast  STAT 1 WK 6 MTH       THIS WK 2 WK 1 YEAR     CT Chest w/o Contrast  STAT 1 WK 6 MTH       THIS WK 2 WK 1 YEAR     CXR  STAT 1 WK 6 MTH       THIS WK 2 WK 1 YEAR     ECHO  Stress Complete Limited     MRI- Cardiac  STAT 1 WK 6 MTH       THIS WK 2 WK 1 YEAR     MUGA Scan  STAT 1 WK 6 MTH       THIS WK 2 WK 1 YEAR     Nuclear Stress  Lexiscan Cardiolite     PFT  STAT 1 WK 6 MTH       THIS WK 2 WK 1 YEAR     Treadmill Stress Test  STAT 1 WK 6 MTH       THIS WK 2 WK 1 YEAR     Vascular Duplex  Lower: Right Left Bilat       Upper: Right Left Bilat     Other Test Not Listed:    Monitors Last Date Completed Day's Request/Ordered     Holter  Short term 24 hours 48 hours      Long term 3 days 7 days 14 days   Event  2025 (1-30 days)      Procedures Last Date Performed Procedure Details Date Expected   Additional Notes    ASD Closure        Carotid Angio        Cardioversion        Heart Cath  R L R&L      Peripheral Angio  R L      PFO Closure        PTCA/PCI        JEB        JEB/Cardioversion        Venogram        Tilt Table        Other Type of Procedure:

## 2025-05-16 ENCOUNTER — OFFICE VISIT (OUTPATIENT)
Dept: CARDIOLOGY CLINIC | Age: 75
End: 2025-05-16
Payer: MEDICARE

## 2025-05-16 VITALS
HEART RATE: 80 BPM | DIASTOLIC BLOOD PRESSURE: 74 MMHG | HEIGHT: 69 IN | SYSTOLIC BLOOD PRESSURE: 138 MMHG | WEIGHT: 268.4 LBS | BODY MASS INDEX: 39.75 KG/M2

## 2025-05-16 DIAGNOSIS — R06.02 SHORTNESS OF BREATH: Primary | ICD-10-CM

## 2025-05-16 PROCEDURE — 1159F MED LIST DOCD IN RCRD: CPT | Performed by: INTERNAL MEDICINE

## 2025-05-16 PROCEDURE — 1090F PRES/ABSN URINE INCON ASSESS: CPT | Performed by: INTERNAL MEDICINE

## 2025-05-16 PROCEDURE — G8427 DOCREV CUR MEDS BY ELIG CLIN: HCPCS | Performed by: INTERNAL MEDICINE

## 2025-05-16 PROCEDURE — 1036F TOBACCO NON-USER: CPT | Performed by: INTERNAL MEDICINE

## 2025-05-16 PROCEDURE — 99214 OFFICE O/P EST MOD 30 MIN: CPT | Performed by: INTERNAL MEDICINE

## 2025-05-16 PROCEDURE — G8417 CALC BMI ABV UP PARAM F/U: HCPCS | Performed by: INTERNAL MEDICINE

## 2025-05-16 PROCEDURE — G8399 PT W/DXA RESULTS DOCUMENT: HCPCS | Performed by: INTERNAL MEDICINE

## 2025-05-16 PROCEDURE — 1123F ACP DISCUSS/DSCN MKR DOCD: CPT | Performed by: INTERNAL MEDICINE

## 2025-05-16 PROCEDURE — 3078F DIAST BP <80 MM HG: CPT | Performed by: INTERNAL MEDICINE

## 2025-05-16 PROCEDURE — 3075F SYST BP GE 130 - 139MM HG: CPT | Performed by: INTERNAL MEDICINE

## 2025-05-16 NOTE — PROGRESS NOTES
CARDIOLOGY NOTE      5/16/2025    RE: Luz Ahmadi  (1950)                               TO:  Kyleigh Jacobsen, APRN - CNP            CHIEF COMPLAINT   Luz is a 74 y.o. female who was seen today for management of hypertension                         Fu on monitor had VT           HPI:                   Pt has h/o hypertension, hyperlipidemia, obesity, peripheral vascular disease, seen today for follow-up. Pt has cardiac complaints  but has exertional dizziness    Luz Ahmadi has the following history recorded in care path:  Patient Active Problem List    Diagnosis Date Noted    Hyperlipidemia 05/12/2022    Bradycardia 04/16/2025    Stage 3a chronic kidney disease (HCC) 10/30/2024    Severe obesity (BMI 35.0-39.9) with comorbidity (HCC) 03/27/2023    Former smoker 03/02/2022    Sleep disturbance 10/13/2021    Complex tear of medial meniscus of right knee as current injury 09/07/2021    Primary osteoarthritis of right knee 09/07/2021    Dyspnea on exertion 09/02/2021    Chronic pain of left ankle 01/21/2021    Chronic bronchitis (HCC) 10/19/2020    Morbidly obese (HCC) 10/19/2020    Syncope and collapse 07/10/2020    Urticaria 01/31/2020    PAD (peripheral artery disease)     Dizziness 10/25/2019    Mild persistent asthma without complication 10/04/2019    Obstructive sleep apnea 10/04/2019    Chronic cough 10/04/2019    Conjunctivitis 08/01/2012    Corneal abrasion 08/01/2012    Allergic rhinitis 05/22/2012    HTN (hypertension) 04/24/2012    Chronic back pain 04/24/2012     Current Outpatient Medications   Medication Sig Dispense Refill    lisinopril (PRINIVIL;ZESTRIL) 10 MG tablet TAKE 1 TABLET BY MOUTH DAILY (Patient taking differently: Take 2 tablets by mouth daily) 100 tablet 2    hydrOXYzine HCl (ATARAX) 25 MG tablet TAKE 1 TABLET BY MOUTH EVERY  NIGHT 60 tablet 0    clopidogrel (PLAVIX) 75 MG tablet TAKE 1 TABLET BY MOUTH DAILY 100 tablet 2    baclofen (LIORESAL) 10 MG tablet TAKE 1

## 2025-05-16 NOTE — PROGRESS NOTES
CLINICAL STAFF DOCUMENTATION         Luz Ahmadi  1950  [id number]    Have you had any Chest Pain recently? - No          Have you had any Shortness of Breath - No    Have you had any dizziness - Yes  When do you feel dizzy? Pt states ONLY when gets up too fast   Does the room spin? No  How long does it last .  minutes       Have you had any palpitations recently? - No      Do you have any edema - swelling in both legs, ankles and feet.         When did you have your last labs drawn 10/18/2024  What doctor ordered hce plata [id number]   Do we have the labs in their chart Yes      Do you have a surgery or procedure scheduled in the near future - No        Caffeine? - Yes  How much caffeine? .1  cups 18oz

## 2025-05-19 DIAGNOSIS — J32.9 CHRONIC SINUSITIS, UNSPECIFIED LOCATION: ICD-10-CM

## 2025-05-19 DIAGNOSIS — Z76.0 MEDICATION REFILL: ICD-10-CM

## 2025-05-19 RX ORDER — MONTELUKAST SODIUM 10 MG/1
10 TABLET ORAL NIGHTLY
Qty: 100 TABLET | Refills: 2 | Status: SHIPPED | OUTPATIENT
Start: 2025-05-19

## 2025-05-19 RX ORDER — LISINOPRIL 10 MG/1
10 TABLET ORAL DAILY
Qty: 100 TABLET | Refills: 2 | OUTPATIENT
Start: 2025-05-19

## 2025-05-19 RX ORDER — FLUTICASONE PROPIONATE 50 MCG
SPRAY, SUSPENSION (ML) NASAL
Qty: 32 G | Refills: 1 | Status: SHIPPED | OUTPATIENT
Start: 2025-05-19

## 2025-05-19 RX ORDER — HYDROXYZINE HYDROCHLORIDE 25 MG/1
25 TABLET, FILM COATED ORAL NIGHTLY
Qty: 60 TABLET | Refills: 0 | Status: SHIPPED | OUTPATIENT
Start: 2025-05-19

## 2025-05-19 NOTE — TELEPHONE ENCOUNTER
OV note 5/16/25   Hypertension: Patients blood pressure is normal. Patient is advised about low sodium diet. Present medical regimen will not be changed.    On lisinopril 10 mg p.o. daily we will monitor the blood pressure   no abdominal pain, no bloating, no constipation, no diarrhea, no nausea and no vomiting.

## 2025-05-19 NOTE — TELEPHONE ENCOUNTER
Pt needs refill on lisinopril pt said kristopher changed it to 20 mg once a day, but pt was on 10 mg once a day

## 2025-05-27 DIAGNOSIS — Z76.0 MEDICATION REFILL: ICD-10-CM

## 2025-05-27 RX ORDER — LISINOPRIL 10 MG/1
20 TABLET ORAL DAILY
Qty: 90 TABLET | Refills: 2 | Status: SHIPPED | OUTPATIENT
Start: 2025-05-27 | End: 2025-05-27 | Stop reason: CLARIF

## 2025-05-28 DIAGNOSIS — I10 ESSENTIAL HYPERTENSION: Primary | ICD-10-CM

## 2025-05-28 RX ORDER — LISINOPRIL 20 MG/1
20 TABLET ORAL DAILY
Qty: 90 TABLET | Refills: 1 | Status: SHIPPED | OUTPATIENT
Start: 2025-05-28

## 2025-05-30 RX ORDER — ALBUTEROL SULFATE 90 UG/1
INHALANT RESPIRATORY (INHALATION)
Qty: 20.1 G | Refills: 3 | Status: SHIPPED | OUTPATIENT
Start: 2025-05-30

## 2025-06-20 ENCOUNTER — OFFICE VISIT (OUTPATIENT)
Dept: CARDIOLOGY CLINIC | Age: 75
End: 2025-06-20
Payer: MEDICARE

## 2025-06-20 VITALS
HEIGHT: 69 IN | SYSTOLIC BLOOD PRESSURE: 138 MMHG | BODY MASS INDEX: 39.99 KG/M2 | DIASTOLIC BLOOD PRESSURE: 70 MMHG | WEIGHT: 270 LBS | HEART RATE: 72 BPM

## 2025-06-20 DIAGNOSIS — I47.20 VT (VENTRICULAR TACHYCARDIA) (HCC): Primary | ICD-10-CM

## 2025-06-20 PROCEDURE — 1036F TOBACCO NON-USER: CPT | Performed by: NURSE PRACTITIONER

## 2025-06-20 PROCEDURE — 1090F PRES/ABSN URINE INCON ASSESS: CPT | Performed by: NURSE PRACTITIONER

## 2025-06-20 PROCEDURE — 1159F MED LIST DOCD IN RCRD: CPT | Performed by: NURSE PRACTITIONER

## 2025-06-20 PROCEDURE — 1123F ACP DISCUSS/DSCN MKR DOCD: CPT | Performed by: NURSE PRACTITIONER

## 2025-06-20 PROCEDURE — 99213 OFFICE O/P EST LOW 20 MIN: CPT | Performed by: NURSE PRACTITIONER

## 2025-06-20 PROCEDURE — G8417 CALC BMI ABV UP PARAM F/U: HCPCS | Performed by: NURSE PRACTITIONER

## 2025-06-20 PROCEDURE — 3075F SYST BP GE 130 - 139MM HG: CPT | Performed by: NURSE PRACTITIONER

## 2025-06-20 PROCEDURE — G8427 DOCREV CUR MEDS BY ELIG CLIN: HCPCS | Performed by: NURSE PRACTITIONER

## 2025-06-20 PROCEDURE — 3078F DIAST BP <80 MM HG: CPT | Performed by: NURSE PRACTITIONER

## 2025-06-20 PROCEDURE — G8399 PT W/DXA RESULTS DOCUMENT: HCPCS | Performed by: NURSE PRACTITIONER

## 2025-06-20 ASSESSMENT — ENCOUNTER SYMPTOMS
ORTHOPNEA: 0
SHORTNESS OF BREATH: 1

## 2025-06-20 NOTE — PROGRESS NOTES
6/20/2025  Primary cardiologist: Dr. Goldstein    CC:   Luz  is an established 74 y.o.  female here for a follow up on raymundo       SUBJECTIVE/OBJECTIVE:  Luz is a 74 y.o. female with a history of peripheral artery disease, s/p PTCA LCIA (2020), VT, hypertensin, hyperlipidemia, CKD and OTTO      HPI :   Luz is here today for the results of raymundo scan.  She denies chest pain or palpitations.  She does endorse shortness of breath that appears to be chronic    Review of Systems   Constitutional: Negative for diaphoresis and malaise/fatigue.   Cardiovascular:  Negative for chest pain, claudication, dyspnea on exertion, irregular heartbeat, leg swelling, near-syncope, orthopnea, palpitations and paroxysmal nocturnal dyspnea.   Respiratory:  Positive for shortness of breath.    Musculoskeletal:  Positive for joint pain (knee ankle) and muscle cramps (left thigh).   Neurological:  Negative for light-headedness.       Vitals:    06/20/25 1408   BP: 138/70   BP Site: Left Lower Arm   Patient Position: Sitting   BP Cuff Size: Large Adult   Pulse: 72   Weight: 122.5 kg (270 lb)   Height: 1.753 m (5' 9\")           Wt Readings from Last 3 Encounters:   06/20/25 122.5 kg (270 lb)   05/16/25 121.7 kg (268 lb 6.4 oz)   04/16/25 122.5 kg (270 lb)      Body mass index is 39.87 kg/m².     Physical Exam  Vitals reviewed.   Constitutional:       Appearance: She is obese.   Eyes:      Pupils: Pupils are equal, round, and reactive to light.   Cardiovascular:      Rate and Rhythm: Normal rate.      Pulses:           Dorsalis pedis pulses are 1+ on the left side.   Pulmonary:      Effort: Pulmonary effort is normal.   Skin:     General: Skin is warm and dry.   Neurological:      Mental Status: She is alert and oriented to person, place, and time.                Current Outpatient Medications   Medication Sig Dispense Refill    lisinopril (PRINIVIL;ZESTRIL) 20 MG tablet Take 1 tablet by mouth daily 90 tablet 1    hydrOXYzine HCl (ATARAX) 25

## 2025-06-20 NOTE — PROGRESS NOTES
CLINICAL STAFF DOCUMENTATION    Leida Carlson, MIRELLA     Luz Ahmadi  1950  [id number]    Have you had any Chest Pain recently? - No  Have you had any Shortness of Breath - Yes  Have you had any dizziness - No  Have you had any palpitations recently? - no  Do you have any edema - swelling in ankles    When did you have your last labs drawn 12/24  What doctor ordered ross  Do we have the labs in their chart Yes  Do you need any prescriptions refilled? - No    Do you have a surgery or procedure scheduled in the near future - No  Do use tobacco products? - No  Do you drink alcohol? - No  Do you use any illicit drugs? - No  Caffeine? - Yes  How much caffeine? .3  cups       Check medication list thoroughly!!! AND RECONCILE OUTSIDE MEDICATIONS  If dose has changed change the entire order not just the MG  BE SURE TO ASK PATIENT IF THEY NEED MEDICATION REFILLS  Verify Pharmacy and update if incorrect    Add to every patient's \"wrap up\" the following dot phrase AFTERVISITCARDIOHEARTHOUSE and ensure we explain this to our patients

## 2025-07-28 DIAGNOSIS — I10 ESSENTIAL HYPERTENSION: ICD-10-CM

## 2025-07-29 RX ORDER — LISINOPRIL 20 MG/1
20 TABLET ORAL DAILY
Qty: 90 TABLET | Refills: 1 | Status: SHIPPED | OUTPATIENT
Start: 2025-07-29

## 2025-08-08 ENCOUNTER — OFFICE VISIT (OUTPATIENT)
Dept: FAMILY MEDICINE CLINIC | Age: 75
End: 2025-08-08
Payer: MEDICARE

## 2025-08-08 VITALS
BODY MASS INDEX: 40.94 KG/M2 | OXYGEN SATURATION: 93 % | SYSTOLIC BLOOD PRESSURE: 138 MMHG | TEMPERATURE: 97.1 F | RESPIRATION RATE: 16 BRPM | HEART RATE: 53 BPM | HEIGHT: 69 IN | WEIGHT: 276.4 LBS | DIASTOLIC BLOOD PRESSURE: 74 MMHG

## 2025-08-08 DIAGNOSIS — Z13.1 SCREENING FOR DIABETES MELLITUS: ICD-10-CM

## 2025-08-08 DIAGNOSIS — I10 ESSENTIAL HYPERTENSION: ICD-10-CM

## 2025-08-08 DIAGNOSIS — R63.5 WEIGHT GAIN: ICD-10-CM

## 2025-08-08 DIAGNOSIS — Z00.00 MEDICARE ANNUAL WELLNESS VISIT, SUBSEQUENT: Primary | ICD-10-CM

## 2025-08-08 DIAGNOSIS — R53.83 FATIGUE, UNSPECIFIED TYPE: ICD-10-CM

## 2025-08-08 PROCEDURE — G0439 PPPS, SUBSEQ VISIT: HCPCS | Performed by: NURSE PRACTITIONER

## 2025-08-08 PROCEDURE — 1123F ACP DISCUSS/DSCN MKR DOCD: CPT | Performed by: NURSE PRACTITIONER

## 2025-08-08 PROCEDURE — 3078F DIAST BP <80 MM HG: CPT | Performed by: NURSE PRACTITIONER

## 2025-08-08 PROCEDURE — 1159F MED LIST DOCD IN RCRD: CPT | Performed by: NURSE PRACTITIONER

## 2025-08-08 PROCEDURE — 3075F SYST BP GE 130 - 139MM HG: CPT | Performed by: NURSE PRACTITIONER

## 2025-08-08 ASSESSMENT — PATIENT HEALTH QUESTIONNAIRE - PHQ9
SUM OF ALL RESPONSES TO PHQ QUESTIONS 1-9: 2
2. FEELING DOWN, DEPRESSED OR HOPELESS: SEVERAL DAYS
SUM OF ALL RESPONSES TO PHQ QUESTIONS 1-9: 2
1. LITTLE INTEREST OR PLEASURE IN DOING THINGS: SEVERAL DAYS

## 2025-08-08 ASSESSMENT — LIFESTYLE VARIABLES
HOW OFTEN DO YOU HAVE A DRINK CONTAINING ALCOHOL: NEVER
HOW MANY STANDARD DRINKS CONTAINING ALCOHOL DO YOU HAVE ON A TYPICAL DAY: PATIENT DOES NOT DRINK

## 2025-08-18 ENCOUNTER — TELEPHONE (OUTPATIENT)
Dept: FAMILY MEDICINE CLINIC | Age: 75
End: 2025-08-18

## 2025-08-18 DIAGNOSIS — R63.5 WEIGHT GAIN: Primary | ICD-10-CM

## 2025-08-19 ENCOUNTER — TELEPHONE (OUTPATIENT)
Dept: BARIATRICS/WEIGHT MGMT | Age: 75
End: 2025-08-19

## 2025-08-23 DIAGNOSIS — Z76.0 MEDICATION REFILL: ICD-10-CM

## 2025-08-24 DIAGNOSIS — M54.40 CHRONIC LOW BACK PAIN WITH SCIATICA, SCIATICA LATERALITY UNSPECIFIED, UNSPECIFIED BACK PAIN LATERALITY: ICD-10-CM

## 2025-08-24 DIAGNOSIS — Z76.0 MEDICATION REFILL: ICD-10-CM

## 2025-08-24 DIAGNOSIS — I10 ESSENTIAL HYPERTENSION: ICD-10-CM

## 2025-08-24 DIAGNOSIS — G89.29 CHRONIC LOW BACK PAIN WITH SCIATICA, SCIATICA LATERALITY UNSPECIFIED, UNSPECIFIED BACK PAIN LATERALITY: ICD-10-CM

## 2025-08-25 RX ORDER — SIMVASTATIN 40 MG
40 TABLET ORAL NIGHTLY
Qty: 100 TABLET | Refills: 2 | Status: SHIPPED | OUTPATIENT
Start: 2025-08-25

## 2025-08-25 RX ORDER — LISINOPRIL 20 MG/1
20 TABLET ORAL DAILY
Qty: 100 TABLET | Refills: 2 | Status: SHIPPED | OUTPATIENT
Start: 2025-08-25

## 2025-08-25 RX ORDER — HYDROXYZINE HYDROCHLORIDE 25 MG/1
25 TABLET, FILM COATED ORAL NIGHTLY
Qty: 60 TABLET | Refills: 0 | Status: SHIPPED | OUTPATIENT
Start: 2025-08-25

## 2025-08-25 RX ORDER — BACLOFEN 10 MG/1
10 TABLET ORAL 2 TIMES DAILY
Qty: 180 TABLET | Refills: 1 | Status: SHIPPED | OUTPATIENT
Start: 2025-08-25

## 2025-09-04 DIAGNOSIS — I10 ESSENTIAL HYPERTENSION: ICD-10-CM

## 2025-09-04 DIAGNOSIS — Z76.0 MEDICATION REFILL: ICD-10-CM

## 2025-09-04 RX ORDER — HYDROXYZINE HYDROCHLORIDE 25 MG/1
25 TABLET, FILM COATED ORAL NIGHTLY
Qty: 60 TABLET | Refills: 0 | Status: SHIPPED | OUTPATIENT
Start: 2025-09-04

## 2025-09-04 RX ORDER — HYDROCHLOROTHIAZIDE 12.5 MG/1
12.5 TABLET ORAL EVERY OTHER DAY
Qty: 50 TABLET | Refills: 2 | Status: SHIPPED | OUTPATIENT
Start: 2025-09-04